# Patient Record
Sex: MALE | Race: WHITE | Employment: OTHER | URBAN - METROPOLITAN AREA
[De-identification: names, ages, dates, MRNs, and addresses within clinical notes are randomized per-mention and may not be internally consistent; named-entity substitution may affect disease eponyms.]

---

## 2017-11-06 ENCOUNTER — IP HISTORICAL/CONVERTED ENCOUNTER (OUTPATIENT)
Dept: OTHER | Age: 57
End: 2017-11-06

## 2017-11-29 ENCOUNTER — ED HISTORICAL/CONVERTED ENCOUNTER (OUTPATIENT)
Dept: OTHER | Age: 57
End: 2017-11-29

## 2020-09-22 ENCOUNTER — HOSPITAL ENCOUNTER (OUTPATIENT)
Age: 60
Setting detail: OBSERVATION
Discharge: HOME | End: 2020-09-25
Attending: EMERGENCY MEDICINE | Admitting: FAMILY MEDICINE
Payer: COMMERCIAL

## 2020-09-22 ENCOUNTER — APPOINTMENT (OUTPATIENT)
Dept: CT IMAGING | Age: 60
End: 2020-09-22
Attending: EMERGENCY MEDICINE
Payer: COMMERCIAL

## 2020-09-22 ENCOUNTER — APPOINTMENT (OUTPATIENT)
Dept: GENERAL RADIOLOGY | Age: 60
End: 2020-09-22
Attending: EMERGENCY MEDICINE
Payer: COMMERCIAL

## 2020-09-22 DIAGNOSIS — N39.0 URINARY TRACT INFECTION WITHOUT HEMATURIA, SITE UNSPECIFIED: Primary | ICD-10-CM

## 2020-09-22 DIAGNOSIS — A41.9 SEPSIS, DUE TO UNSPECIFIED ORGANISM, UNSPECIFIED WHETHER ACUTE ORGAN DYSFUNCTION PRESENT (HCC): ICD-10-CM

## 2020-09-22 LAB
ALBUMIN SERPL-MCNC: 2.3 G/DL (ref 3.5–5)
ALBUMIN/GLOB SERPL: 0.5 {RATIO} (ref 1.1–2.2)
ALP SERPL-CCNC: 85 U/L (ref 45–117)
ALT SERPL-CCNC: 30 U/L (ref 12–78)
ANION GAP SERPL CALC-SCNC: 7 MMOL/L (ref 5–15)
APPEARANCE UR: ABNORMAL
AST SERPL W P-5'-P-CCNC: 73 U/L (ref 15–37)
BACTERIA URNS QL MICRO: ABNORMAL /HPF
BASOPHILS # BLD: 0 K/UL (ref 0–0.1)
BASOPHILS NFR BLD: 0 % (ref 0–1)
BILIRUB SERPL-MCNC: 0.7 MG/DL (ref 0.2–1)
BILIRUB UR QL: NEGATIVE
BUN SERPL-MCNC: 17 MG/DL (ref 6–20)
BUN/CREAT SERPL: 16 (ref 12–20)
CA-I BLD-MCNC: 8.7 MG/DL (ref 8.5–10.1)
CHLORIDE SERPL-SCNC: 101 MMOL/L (ref 97–108)
CO2 SERPL-SCNC: 26 MMOL/L (ref 21–32)
COLOR UR: YELLOW
CREAT SERPL-MCNC: 1.05 MG/DL (ref 0.7–1.3)
DIFFERENTIAL METHOD BLD: ABNORMAL
EOSINOPHIL # BLD: 0 K/UL (ref 0–0.4)
EOSINOPHIL NFR BLD: 0 % (ref 0–7)
EPITH CASTS URNS QL MICRO: ABNORMAL /LPF
ERYTHROCYTE [DISTWIDTH] IN BLOOD BY AUTOMATED COUNT: 12.4 % (ref 11.5–14.5)
GLOBULIN SER CALC-MCNC: 4.3 G/DL (ref 2–4)
GLUCOSE SERPL-MCNC: 97 MG/DL (ref 65–100)
GLUCOSE UR STRIP.AUTO-MCNC: NEGATIVE MG/DL
HCT VFR BLD AUTO: 42.6 % (ref 36.6–50.3)
HGB BLD-MCNC: 14.3 % (ref 12.1–17)
HGB UR QL STRIP: ABNORMAL
IMM GRANULOCYTES # BLD AUTO: 0.1 K/UL (ref 0–0.04)
IMM GRANULOCYTES NFR BLD AUTO: 0 % (ref 0–0.5)
KETONES UR QL STRIP.AUTO: 50 MG/DL
LEUKOCYTE ESTERASE UR QL STRIP.AUTO: ABNORMAL
LYMPHOCYTES # BLD: 1.3 K/UL (ref 0.8–3.5)
LYMPHOCYTES NFR BLD: 7 % (ref 12–49)
MCH RBC QN AUTO: 31 PG (ref 26–34)
MCHC RBC AUTO-ENTMCNC: 33.6 G/DL (ref 30–36.5)
MCV RBC AUTO: 92.4 FL (ref 80–99)
MONOCYTES # BLD: 2.1 K/UL (ref 0–1)
MONOCYTES NFR BLD: 11 % (ref 5–13)
NEUTS SEG # BLD: 15.5 K/UL (ref 1.8–8)
NEUTS SEG NFR BLD: 82 % (ref 32–75)
NITRITE UR QL STRIP.AUTO: POSITIVE
PH UR STRIP: 6 [PH] (ref 5–8)
PLATELET # BLD AUTO: 163 K/UL (ref 150–400)
PMV BLD AUTO: 8.4 FL (ref 8.9–12.9)
POTASSIUM SERPL-SCNC: 3.8 MMOL/L (ref 3.5–5.1)
PROT SERPL-MCNC: 6.6 G/DL (ref 6.4–8.2)
PROT UR STRIP-MCNC: >300 MG/DL
RBC # BLD AUTO: 4.61 M/UL (ref 4.1–5.7)
RBC #/AREA URNS HPF: ABNORMAL /HPF (ref 0–5)
SODIUM SERPL-SCNC: 134 MMOL/L (ref 136–145)
SP GR UR REFRACTOMETRY: 1.02 (ref 1–1.03)
UA: UC IF INDICATED,UAUC: ABNORMAL
UROBILINOGEN UR QL STRIP.AUTO: 0.1 EU/DL (ref 0.2–1)
WBC # BLD AUTO: 19.1 K/UL (ref 4.1–11.1)
WBC URNS QL MICRO: >100 /HPF (ref 0–4)

## 2020-09-22 PROCEDURE — 74011250636 HC RX REV CODE- 250/636: Performed by: EMERGENCY MEDICINE

## 2020-09-22 PROCEDURE — 96372 THER/PROPH/DIAG INJ SC/IM: CPT

## 2020-09-22 PROCEDURE — 81001 URINALYSIS AUTO W/SCOPE: CPT

## 2020-09-22 PROCEDURE — 87086 URINE CULTURE/COLONY COUNT: CPT

## 2020-09-22 PROCEDURE — 36415 COLL VENOUS BLD VENIPUNCTURE: CPT

## 2020-09-22 PROCEDURE — 96376 TX/PRO/DX INJ SAME DRUG ADON: CPT

## 2020-09-22 PROCEDURE — G0378 HOSPITAL OBSERVATION PER HR: HCPCS

## 2020-09-22 PROCEDURE — 71045 X-RAY EXAM CHEST 1 VIEW: CPT

## 2020-09-22 PROCEDURE — 87186 SC STD MICRODIL/AGAR DIL: CPT

## 2020-09-22 PROCEDURE — 74011000258 HC RX REV CODE- 258: Performed by: EMERGENCY MEDICINE

## 2020-09-22 PROCEDURE — 99283 EMERGENCY DEPT VISIT LOW MDM: CPT

## 2020-09-22 PROCEDURE — 96374 THER/PROPH/DIAG INJ IV PUSH: CPT

## 2020-09-22 PROCEDURE — 85025 COMPLETE CBC W/AUTO DIFF WBC: CPT

## 2020-09-22 PROCEDURE — 96366 THER/PROPH/DIAG IV INF ADDON: CPT

## 2020-09-22 PROCEDURE — 96365 THER/PROPH/DIAG IV INF INIT: CPT

## 2020-09-22 PROCEDURE — 74011000258 HC RX REV CODE- 258: Performed by: FAMILY MEDICINE

## 2020-09-22 PROCEDURE — 74011250636 HC RX REV CODE- 250/636: Performed by: FAMILY MEDICINE

## 2020-09-22 PROCEDURE — 74011250637 HC RX REV CODE- 250/637: Performed by: FAMILY MEDICINE

## 2020-09-22 PROCEDURE — 80053 COMPREHEN METABOLIC PANEL: CPT

## 2020-09-22 PROCEDURE — 65270000029 HC RM PRIVATE

## 2020-09-22 PROCEDURE — 87635 SARS-COV-2 COVID-19 AMP PRB: CPT

## 2020-09-22 PROCEDURE — 70450 CT HEAD/BRAIN W/O DYE: CPT

## 2020-09-22 PROCEDURE — 87077 CULTURE AEROBIC IDENTIFY: CPT

## 2020-09-22 PROCEDURE — 65660000000 HC RM CCU STEPDOWN

## 2020-09-22 RX ORDER — TAMSULOSIN HYDROCHLORIDE 0.4 MG/1
0.4 CAPSULE ORAL 2 TIMES DAILY
COMMUNITY

## 2020-09-22 RX ORDER — SODIUM CHLORIDE 9 MG/ML
75 INJECTION, SOLUTION INTRAVENOUS CONTINUOUS
Status: DISCONTINUED | OUTPATIENT
Start: 2020-09-22 | End: 2020-09-25 | Stop reason: HOSPADM

## 2020-09-22 RX ORDER — DIVALPROEX SODIUM 500 MG/1
500 TABLET, DELAYED RELEASE ORAL 2 TIMES DAILY
Status: DISCONTINUED | OUTPATIENT
Start: 2020-09-22 | End: 2020-09-25 | Stop reason: HOSPADM

## 2020-09-22 RX ORDER — LORAZEPAM 0.5 MG/1
0.5 TABLET ORAL 2 TIMES DAILY
Status: DISCONTINUED | OUTPATIENT
Start: 2020-09-22 | End: 2020-09-25 | Stop reason: HOSPADM

## 2020-09-22 RX ORDER — ENOXAPARIN SODIUM 100 MG/ML
40 INJECTION SUBCUTANEOUS DAILY
Status: DISCONTINUED | OUTPATIENT
Start: 2020-09-23 | End: 2020-09-25 | Stop reason: HOSPADM

## 2020-09-22 RX ORDER — PRAVASTATIN SODIUM 20 MG/1
20 TABLET ORAL
Status: DISCONTINUED | OUTPATIENT
Start: 2020-09-22 | End: 2020-09-25 | Stop reason: HOSPADM

## 2020-09-22 RX ORDER — BISMUTH SUBSALICYLATE 262 MG/15ML
10 LIQUID ORAL
COMMUNITY

## 2020-09-22 RX ORDER — OLANZAPINE 10 MG/1
5 TABLET ORAL 3 TIMES DAILY
Status: DISCONTINUED | OUTPATIENT
Start: 2020-09-22 | End: 2020-09-25 | Stop reason: HOSPADM

## 2020-09-22 RX ORDER — ZIPRASIDONE MESYLATE 20 MG/ML
20 INJECTION, POWDER, LYOPHILIZED, FOR SOLUTION INTRAMUSCULAR ONCE
Status: COMPLETED | OUTPATIENT
Start: 2020-09-22 | End: 2020-09-22

## 2020-09-22 RX ORDER — NYSTATIN 100000 U/G
CREAM TOPICAL 2 TIMES DAILY
COMMUNITY

## 2020-09-22 RX ORDER — BENZTROPINE MESYLATE 1 MG/1
1 TABLET ORAL 3 TIMES DAILY
Status: DISCONTINUED | OUTPATIENT
Start: 2020-09-22 | End: 2020-09-25 | Stop reason: HOSPADM

## 2020-09-22 RX ORDER — BENZTROPINE MESYLATE 1 MG/1
1 TABLET ORAL 3 TIMES DAILY
COMMUNITY

## 2020-09-22 RX ORDER — LORAZEPAM 1 MG/1
1 TABLET ORAL
COMMUNITY

## 2020-09-22 RX ORDER — LOPERAMIDE HYDROCHLORIDE 2 MG/1
2 CAPSULE ORAL
COMMUNITY

## 2020-09-22 RX ORDER — DIVALPROEX SODIUM 500 MG/1
500 TABLET, DELAYED RELEASE ORAL 2 TIMES DAILY
COMMUNITY
End: 2022-07-26

## 2020-09-22 RX ORDER — ADHESIVE BANDAGE
10 BANDAGE TOPICAL DAILY PRN
Status: DISCONTINUED | OUTPATIENT
Start: 2020-09-22 | End: 2020-09-25 | Stop reason: HOSPADM

## 2020-09-22 RX ORDER — ONDANSETRON 4 MG/1
4 TABLET, FILM COATED ORAL
COMMUNITY

## 2020-09-22 RX ORDER — ACETAMINOPHEN 325 MG/1
650 TABLET ORAL
COMMUNITY

## 2020-09-22 RX ORDER — TAMSULOSIN HYDROCHLORIDE 0.4 MG/1
0.4 CAPSULE ORAL 2 TIMES DAILY
Status: DISCONTINUED | OUTPATIENT
Start: 2020-09-22 | End: 2020-09-25 | Stop reason: HOSPADM

## 2020-09-22 RX ORDER — LORAZEPAM 0.5 MG/1
0.5 TABLET ORAL 2 TIMES DAILY
COMMUNITY

## 2020-09-22 RX ORDER — LORAZEPAM 1 MG/1
1 TABLET ORAL
Status: DISCONTINUED | OUTPATIENT
Start: 2020-09-22 | End: 2020-09-25 | Stop reason: HOSPADM

## 2020-09-22 RX ORDER — ACETAMINOPHEN 325 MG/1
650 TABLET ORAL
Status: DISCONTINUED | OUTPATIENT
Start: 2020-09-22 | End: 2020-09-25 | Stop reason: HOSPADM

## 2020-09-22 RX ORDER — ONDANSETRON 4 MG/1
4 TABLET, ORALLY DISINTEGRATING ORAL
Status: DISCONTINUED | OUTPATIENT
Start: 2020-09-22 | End: 2020-09-25 | Stop reason: HOSPADM

## 2020-09-22 RX ORDER — OLANZAPINE 5 MG/1
10 TABLET ORAL 3 TIMES DAILY
COMMUNITY

## 2020-09-22 RX ORDER — MAGNESIUM HYDROXIDE 400 MG/5ML
10 SUSPENSION, ORAL (FINAL DOSE FORM) ORAL DAILY PRN
COMMUNITY

## 2020-09-22 RX ORDER — POLYETHYLENE GLYCOL 3350 17 G/17G
17 POWDER, FOR SOLUTION ORAL DAILY PRN
Status: DISCONTINUED | OUTPATIENT
Start: 2020-09-22 | End: 2020-09-25 | Stop reason: HOSPADM

## 2020-09-22 RX ORDER — ONDANSETRON 2 MG/ML
4 INJECTION INTRAMUSCULAR; INTRAVENOUS
Status: DISCONTINUED | OUTPATIENT
Start: 2020-09-22 | End: 2020-09-25 | Stop reason: HOSPADM

## 2020-09-22 RX ORDER — PRAVASTATIN SODIUM 20 MG/1
20 TABLET ORAL
COMMUNITY

## 2020-09-22 RX ORDER — ACETAMINOPHEN 650 MG/1
650 SUPPOSITORY RECTAL
Status: DISCONTINUED | OUTPATIENT
Start: 2020-09-22 | End: 2020-09-25 | Stop reason: HOSPADM

## 2020-09-22 RX ORDER — HYDROCORTISONE 1 %
CREAM (GRAM) TOPICAL 2 TIMES DAILY
COMMUNITY

## 2020-09-22 RX ORDER — GUAIFENESIN 100 MG/5ML
100 SOLUTION ORAL
COMMUNITY

## 2020-09-22 RX ORDER — ONDANSETRON 4 MG/1
4 TABLET, ORALLY DISINTEGRATING ORAL
Status: DISCONTINUED | OUTPATIENT
Start: 2020-09-22 | End: 2020-09-22 | Stop reason: SDUPTHER

## 2020-09-22 RX ADMIN — CEFTRIAXONE SODIUM 1 G: 1 INJECTION, POWDER, FOR SOLUTION INTRAMUSCULAR; INTRAVENOUS at 23:46

## 2020-09-22 RX ADMIN — SODIUM CHLORIDE 75 ML/HR: 9 INJECTION, SOLUTION INTRAVENOUS at 14:00

## 2020-09-22 RX ADMIN — BENZTROPINE MESYLATE 1 MG: 1 TABLET ORAL at 23:45

## 2020-09-22 RX ADMIN — LORAZEPAM 0.5 MG: 0.5 TABLET ORAL at 23:46

## 2020-09-22 RX ADMIN — ZIPRASIDONE MESYLATE 20 MG: 20 INJECTION, POWDER, LYOPHILIZED, FOR SOLUTION INTRAMUSCULAR at 11:22

## 2020-09-22 RX ADMIN — PRAVASTATIN SODIUM 20 MG: 20 TABLET ORAL at 23:45

## 2020-09-22 RX ADMIN — DIVALPROEX SODIUM 500 MG: 500 TABLET, DELAYED RELEASE ORAL at 23:45

## 2020-09-22 RX ADMIN — TAMSULOSIN HYDROCHLORIDE 0.4 MG: 0.4 CAPSULE ORAL at 23:45

## 2020-09-22 RX ADMIN — OLANZAPINE 5 MG: 10 TABLET, FILM COATED ORAL at 23:45

## 2020-09-22 RX ADMIN — CEFTRIAXONE SODIUM 1 G: 1 INJECTION, POWDER, FOR SOLUTION INTRAMUSCULAR; INTRAVENOUS at 12:36

## 2020-09-22 NOTE — ED PROVIDER NOTES
EMERGENCY DEPARTMENT HISTORY AND PHYSICAL EXAM      Date: 9/22/2020  Patient Name: Juliann Lewis    History of Presenting Illness     Chief Complaint   Patient presents with    Fatigue       History Provided By: Patient and Caregiver    HPI: Juliann Lewis, 61 y.o. male with a past medical history significant MR presents to the ED with chief complaint of Fatigue  . He returned from a day support program yesterday and I thought he was more confused than usual.  No falls. Has been needing help with assistance with walking secondary to new weakness. Slight confusion. No syncope. Patient however is a limited historian and cannot provide history. There are no other complaints, changes, or physical findings at this time. PCP: Fausto Bhatia MD    Current Facility-Administered Medications   Medication Dose Route Frequency Provider Last Rate Last Dose    0.9% sodium chloride infusion  75 mL/hr IntraVENous CONTINUOUS Ana Pro MD           Past History     Past Medical History:  Past Medical History:   Diagnosis Date    Intellectual disability        Past Surgical History:  No past surgical history on file. Family History:  No family history on file. Social History:  Social History     Tobacco Use    Smoking status: Never Smoker    Smokeless tobacco: Never Used   Substance Use Topics    Alcohol use: Not on file    Drug use: Not on file       Allergies:  No Known Allergies      Review of Systems   Review of Systems   Unable to perform ROS: Mental status change       Physical Exam   Physical Exam  Vitals signs and nursing note reviewed. Constitutional:       General: He is not in acute distress. Appearance: He is normal weight. He is not ill-appearing. HENT:      Head: Normocephalic and atraumatic. Right Ear: External ear normal.      Left Ear: External ear normal.      Nose: Nose normal. No rhinorrhea.       Mouth/Throat:      Mouth: Mucous membranes are moist.      Pharynx: Oropharynx is clear. Eyes:      Extraocular Movements: Extraocular movements intact. Conjunctiva/sclera: Conjunctivae normal.      Pupils: Pupils are equal, round, and reactive to light. Neck:      Musculoskeletal: Normal range of motion and neck supple. Cardiovascular:      Rate and Rhythm: Normal rate and regular rhythm. Pulses: Normal pulses. Heart sounds: Normal heart sounds. Pulmonary:      Effort: Pulmonary effort is normal. No respiratory distress. Breath sounds: Normal breath sounds. Abdominal:      General: Abdomen is flat. Bowel sounds are normal.      Palpations: Abdomen is soft. Musculoskeletal: Normal range of motion. General: No tenderness or deformity. Skin:     General: Skin is warm and dry. Capillary Refill: Capillary refill takes less than 2 seconds. Findings: No bruising, lesion or rash. Neurological:      General: No focal deficit present. Mental Status: He is alert. He is disoriented. Comments: More disoriented than usual at baseline. Diagnostic Study Results     Labs -     Recent Results (from the past 12 hour(s))   CBC WITH AUTOMATED DIFF    Collection Time: 09/22/20 11:00 AM   Result Value Ref Range    WBC 19.1 (H) 4.1 - 11.1 K/uL    RBC 4.61 4.10 - 5.70 M/uL    HGB 14.3 12.1 - 17.0 %    HCT 42.6 36.6 - 50.3 %    MCV 92.4 80.0 - 99.0 FL    MCH 31.0 26.0 - 34.0 PG    MCHC 33.6 30.0 - 36.5 g/dL    RDW 12.4 11.5 - 14.5 %    PLATELET 999 679 - 342 K/uL    MPV 8.4 (L) 8.9 - 12.9 FL    NEUTROPHILS 82 (H) 32 - 75 %    LYMPHOCYTES 7 (L) 12 - 49 %    MONOCYTES 11 5 - 13 %    EOSINOPHILS 0 0 - 7 %    BASOPHILS 0 0 - 1 %    IMMATURE GRANULOCYTES 0 0.0 - 0.5 %    ABS. NEUTROPHILS 15.5 (H) 1.8 - 8.0 K/UL    ABS. LYMPHOCYTES 1.3 0.8 - 3.5 K/UL    ABS. MONOCYTES 2.1 (H) 0.0 - 1.0 K/UL    ABS. EOSINOPHILS 0.0 0.0 - 0.4 K/UL    ABS. BASOPHILS 0.0 0.0 - 0.1 K/UL    ABS. IMM.  GRANS. 0.1 (H) 0.00 - 0.04 K/UL    DF AUTOMATED     METABOLIC PANEL, COMPREHENSIVE    Collection Time: 09/22/20 11:00 AM   Result Value Ref Range    Sodium 134 (L) 136 - 145 mmol/L    Potassium 3.8 3.5 - 5.1 mmol/L    Chloride 101 97 - 108 mmol/L    CO2 26 21 - 32 mmol/L    Anion gap 7 5 - 15 mmol/L    Glucose 97 65 - 100 mg/dL    BUN 17 6 - 20 mg/dL    Creatinine 1.05 0.70 - 1.30 mg/dL    BUN/Creatinine ratio 16 12 - 20      GFR est AA >60 >60 ml/min/1.73m2    GFR est non-AA >60 >60 ml/min/1.73m2    Calcium 8.7 8.5 - 10.1 mg/dL    Bilirubin, total 0.7 0.2 - 1.0 mg/dL    AST (SGOT) 73 (H) 15 - 37 U/L    ALT (SGPT) 30 12 - 78 U/L    Alk. phosphatase 85 45 - 117 U/L    Protein, total 6.6 6.4 - 8.2 g/dL    Albumin 2.3 (L) 3.5 - 5.0 g/dL    Globulin 4.3 (H) 2.0 - 4.0 g/dL    A-G Ratio 0.5 (L) 1.1 - 2.2     URINALYSIS W/ REFLEX CULTURE    Collection Time: 09/22/20 11:00 AM    Specimen: Urine   Result Value Ref Range    Color Yellow      Appearance Turbid (A) Clear      Specific gravity 1.020 1.003 - 1.030      pH (UA) 6.0 5.0 - 8.0      Protein >300 (A) Negative mg/dL    Glucose Negative Negative mg/dL    Ketone 50 (A) Negative mg/dL    Bilirubin Negative Negative      Blood Large (A) Negative      Urobilinogen 0.1 (L) 0.2 - 1.0 EU/dL    Nitrites Positive (A) Negative      Leukocyte Esterase Large (A) Negative      WBC >100 (H) 0 - 4 /hpf    RBC 0-5 0 - 5 /hpf    Epithelial cells Few Few /lpf    Bacteria 3+ (A) Negative /hpf    UA:UC IF INDICATED Urine Culture Ordered (A) Culture not indicated by UA result         Radiologic Studies -   CT HEAD WO CONT   Final Result   Impression: Negative CT of the brain. XR CHEST SNGL V   Final Result        CT Results  (Last 48 hours)               09/22/20 1159  CT HEAD WO CONT Final result    Impression:  Impression: Negative CT of the brain. Narrative:  Axial images from the skull base to the vertex were obtained without the use of   contrast. Bone windows were reviewed. Sagittal and coronal reformatted images   were also reviewed. All CT scans at this facility are performed using dose   reduction optimization techniques as appropriate to a performed exam including   the following:   automated exposure control, adjustments of the mA and/or kV   according to patient size, or use of iterative reconstruction technique. INDICATION: Altered mental status. There is no evidence of hemorrhage. No mass or mass effect is seen. There is no   acute ischemia. Ventricles, sulci and cisterns are intact. Normal gray matter/white matter   differentiation is seen. Filling defects are seen in both external auditory   canals most likely cerumen. Bone windows show no acute abnormalities. The visualized portions of the orbits,   paranasal sinuses and mastoid air cells are unremarkable. Review of the   reconstructed images show no additional findings. CXR Results  (Last 48 hours)               09/22/20 1114  XR CHEST SNGL V Final result    Narrative: Indication: Altered mental status. AP portable upright chest radiograph 9/22/2020. Comparison 3 October 2011. Underexpanded lungs. Right lung appears clear. Mild left basilar atelectasis. Normal heart size. No pneumothorax. No apparent pleural effusion. Normal bones. Medical Decision Making and ED Course   I am the first provider for this patient. I reviewed the vital signs, available nursing notes, past medical history, past surgical history, family history and social history. Vital Signs-Reviewed the patient's vital signs. Patient Vitals for the past 12 hrs:   Temp Pulse Resp BP SpO2   09/22/20 1033 97.5 °F (36.4 °C) (!) 103 20 (!) 141/75 99 %       EKG interpretation:         Records Reviewed: Previous Hospital chart. EMS run report      ED Course:   Initial assessment performed. The patients presenting problems have been discussed, and they are in agreement with the care plan formulated and outlined with them.   I have encouraged them to ask questions as they arise throughout their visit. Orders Placed This Encounter    CULTURE, URINE     Standing Status:   Standing     Number of Occurrences:   1    CT HEAD WO CONT     Standing Status:   Standing     Number of Occurrences:   1     Order Specific Question:   Transport     Answer:   BED [2]     Order Specific Question:   Reason for Exam     Answer:   ams    XR CHEST SNGL V     Standing Status:   Standing     Number of Occurrences:   1     Order Specific Question:   Transport     Answer:   BED [2]     Order Specific Question:   Reason for Exam     Answer:   ams    CBC WITH AUTOMATED DIFF     Standing Status:   Standing     Number of Occurrences:   1    METABOLIC PANEL, COMPREHENSIVE     Standing Status:   Standing     Number of Occurrences:   1    URINALYSIS W/ REFLEX CULTURE     Standing Status:   Standing     Number of Occurrences:   1    ziprasidone (GEODON) injection 20 mg    cefTRIAXone (ROCEPHIN) 1 g in 0.9% sodium chloride (MBP/ADV) 50 mL MBP     Order Specific Question:   Antibiotic Indications     Answer:   Urinary Tract Infection    0.9% sodium chloride infusion    IP CONSULT TO HOSPITALIST     Standing Status:   Standing     Number of Occurrences:   1     Order Specific Question:   Reason for Consult: Answer:   TO ADMIT     Order Specific Question:   Did you call or speak to the consulting provider? Answer: Yes              CONSULTANTS:  Discussed the case with hospitalist who agrees for admission. Dr Napoleon King      Provider Notes (Medical Decision Making):   69-year-old male with a change in mental status with urosepsis. Patient seen assistance to stand up plan for admission for fluids and antibiotics. Procedures                       Disposition       Emergency Department Disposition:  Admitted Dr Hernandez        Diagnosis     Clinical Impression:   1. Urinary tract infection without hematuria, site unspecified    2.  Sepsis, due to unspecified organism, unspecified whether acute organ dysfunction present [facility])        Attestations:    Suzan Sanchez MD    Please note that this dictation was completed with EPS, the computer voice recognition software. Quite often unanticipated grammatical, syntax, homophones, and other interpretive errors are inadvertently transcribed by the computer software. Please disregard these errors. Please excuse any errors that have escaped final proofreading. Thank you.

## 2020-09-22 NOTE — ED TRIAGE NOTES
Pt here with caretaker for evaluation of fatigue and decreased appetite for a few days. Hx ID, minimal communication.

## 2020-09-22 NOTE — PROGRESS NOTES
Skin assessment done on patient upon arrival.  Pt has 2 quarter sized abrasions on his right shoulder open to air. Pt has an old surgical scar to the left knee, scratches on the top of top of his head and right knee area. No areas of breakdown. Moisture associated redness to the scrotal and penile area.

## 2020-09-22 NOTE — ROUTINE PROCESS
Pt left ER with [facility] via stretcher with out problems.  Report called to Sussy Helton on [address]

## 2020-09-23 ENCOUNTER — HOSPITAL ENCOUNTER (OUTPATIENT)
Dept: LAB | Age: 60
Discharge: HOME | End: 2020-09-23

## 2020-09-23 LAB
ALBUMIN SERPL-MCNC: 2 G/DL (ref 3.5–5)
ALBUMIN/GLOB SERPL: 0.5 {RATIO} (ref 1.1–2.2)
ALP SERPL-CCNC: 91 U/L (ref 45–117)
ALT SERPL-CCNC: 38 U/L (ref 12–78)
ANION GAP SERPL CALC-SCNC: 6 MMOL/L (ref 5–15)
AST SERPL W P-5'-P-CCNC: 74 U/L (ref 15–37)
BASOPHILS # BLD: 0 K/UL (ref 0–0.1)
BASOPHILS NFR BLD: 0 % (ref 0–1)
BILIRUB SERPL-MCNC: 0.2 MG/DL (ref 0.2–1)
BUN SERPL-MCNC: 14 MG/DL (ref 6–20)
BUN/CREAT SERPL: 14 (ref 12–20)
CA-I BLD-MCNC: 8 MG/DL (ref 8.5–10.1)
CHLORIDE SERPL-SCNC: 103 MMOL/L (ref 97–108)
CO2 SERPL-SCNC: 27 MMOL/L (ref 21–32)
CREAT SERPL-MCNC: 0.99 MG/DL (ref 0.7–1.3)
CRP SERPL-MCNC: 15.6 MG/DL (ref 0–0.6)
DIFFERENTIAL METHOD BLD: ABNORMAL
EOSINOPHIL # BLD: 0.1 K/UL (ref 0–0.4)
EOSINOPHIL NFR BLD: 1 % (ref 0–7)
ERYTHROCYTE [DISTWIDTH] IN BLOOD BY AUTOMATED COUNT: 13.3 % (ref 11.5–14.5)
GLOBULIN SER CALC-MCNC: 4.2 G/DL (ref 2–4)
GLUCOSE SERPL-MCNC: 105 MG/DL (ref 65–100)
HCT VFR BLD AUTO: 35.2 % (ref 36.6–50.3)
HGB BLD-MCNC: 12 % (ref 12.1–17)
IMM GRANULOCYTES # BLD AUTO: 0 K/UL (ref 0–0.04)
IMM GRANULOCYTES NFR BLD AUTO: 0 % (ref 0–0.5)
LYMPHOCYTES # BLD: 1 K/UL (ref 0.8–3.5)
LYMPHOCYTES NFR BLD: 8 % (ref 12–49)
MCH RBC QN AUTO: 30.9 PG (ref 26–34)
MCHC RBC AUTO-ENTMCNC: 34.1 G/DL (ref 30–36.5)
MCV RBC AUTO: 90.7 FL (ref 80–99)
MONOCYTES # BLD: 1.2 K/UL (ref 0–1)
MONOCYTES NFR BLD: 9 % (ref 5–13)
NEUTS SEG # BLD: 10.3 K/UL (ref 1.8–8)
NEUTS SEG NFR BLD: 82 % (ref 32–75)
PLATELET # BLD AUTO: 188 K/UL (ref 150–400)
PMV BLD AUTO: 9.5 FL (ref 8.9–12.9)
POTASSIUM SERPL-SCNC: 3.7 MMOL/L (ref 3.5–5.1)
PROCALCITONIN SERPL-MCNC: 0.63 NG/ML
PROT SERPL-MCNC: 6.2 G/DL (ref 6.4–8.2)
RBC # BLD AUTO: 3.88 M/UL (ref 4.1–5.7)
SARS-COV-2, COV2: NORMAL
SODIUM SERPL-SCNC: 136 MMOL/L (ref 136–145)
WBC # BLD AUTO: 12.5 K/UL (ref 4.1–11.1)

## 2020-09-23 PROCEDURE — 80053 COMPREHEN METABOLIC PANEL: CPT

## 2020-09-23 PROCEDURE — 65270000029 HC RM PRIVATE

## 2020-09-23 PROCEDURE — 87040 BLOOD CULTURE FOR BACTERIA: CPT

## 2020-09-23 PROCEDURE — 96372 THER/PROPH/DIAG INJ SC/IM: CPT

## 2020-09-23 PROCEDURE — 87077 CULTURE AEROBIC IDENTIFY: CPT

## 2020-09-23 PROCEDURE — 65660000000 HC RM CCU STEPDOWN

## 2020-09-23 PROCEDURE — 86140 C-REACTIVE PROTEIN: CPT

## 2020-09-23 PROCEDURE — 99222 1ST HOSP IP/OBS MODERATE 55: CPT | Performed by: INTERNAL MEDICINE

## 2020-09-23 PROCEDURE — G0378 HOSPITAL OBSERVATION PER HR: HCPCS

## 2020-09-23 PROCEDURE — 74011250637 HC RX REV CODE- 250/637: Performed by: FAMILY MEDICINE

## 2020-09-23 PROCEDURE — 87186 SC STD MICRODIL/AGAR DIL: CPT

## 2020-09-23 PROCEDURE — 74011250636 HC RX REV CODE- 250/636: Performed by: FAMILY MEDICINE

## 2020-09-23 PROCEDURE — 84145 PROCALCITONIN (PCT): CPT

## 2020-09-23 PROCEDURE — 85025 COMPLETE CBC W/AUTO DIFF WBC: CPT

## 2020-09-23 RX ADMIN — SODIUM CHLORIDE 75 ML/HR: 9 INJECTION, SOLUTION INTRAVENOUS at 00:00

## 2020-09-23 RX ADMIN — DIVALPROEX SODIUM 500 MG: 500 TABLET, DELAYED RELEASE ORAL at 17:46

## 2020-09-23 RX ADMIN — BENZTROPINE MESYLATE 1 MG: 1 TABLET ORAL at 17:45

## 2020-09-23 RX ADMIN — TAMSULOSIN HYDROCHLORIDE 0.4 MG: 0.4 CAPSULE ORAL at 09:00

## 2020-09-23 RX ADMIN — TAMSULOSIN HYDROCHLORIDE 0.4 MG: 0.4 CAPSULE ORAL at 17:46

## 2020-09-23 RX ADMIN — BENZTROPINE MESYLATE 1 MG: 1 TABLET ORAL at 11:32

## 2020-09-23 RX ADMIN — OLANZAPINE 5 MG: 10 TABLET, FILM COATED ORAL at 17:46

## 2020-09-23 RX ADMIN — OLANZAPINE 5 MG: 10 TABLET, FILM COATED ORAL at 11:32

## 2020-09-23 RX ADMIN — DIVALPROEX SODIUM 500 MG: 500 TABLET, DELAYED RELEASE ORAL at 11:31

## 2020-09-23 RX ADMIN — ENOXAPARIN SODIUM 40 MG: 40 INJECTION SUBCUTANEOUS at 11:31

## 2020-09-23 RX ADMIN — LORAZEPAM 0.5 MG: 0.5 TABLET ORAL at 17:45

## 2020-09-23 RX ADMIN — LORAZEPAM 0.5 MG: 0.5 TABLET ORAL at 09:00

## 2020-09-23 RX ADMIN — LORAZEPAM 1 MG: 1 TABLET ORAL at 11:31

## 2020-09-23 RX ADMIN — LORAZEPAM 1 MG: 1 TABLET ORAL at 05:49

## 2020-09-23 NOTE — PROGRESS NOTES
Bed alarm in place. Sitter at bedside. When awake patient is irritated, agitated, not easily redirected r/t cognitive impairment.

## 2020-09-23 NOTE — CONSULTS
Consult Date: 9/23/2020    Consults: Sepsis    Subjective      This is a 61year old, chronically debilitated, intellectually challenged, brought to ED because of generalized weakness. He was afebrile but with WBC 19,100. Urinalysis had marked pyuria and bacteria. CXR showed clear right lungs with left basilar atelectasis. CT Head was negative. Urine culture was sent and patient started on Ceftriaxone. ID has been consulted for this reason. Patient is unable to provide any historical information. He is currently in [facility]. Past Medical History:   Diagnosis Date    Intellectual disability     Seizures ([address]       Past Surgical History:   Procedure Laterality Date    HX PROSTATE SURGERY      bph    HX UROLOGICAL      retention     History reviewed. No pertinent family history.    Social History     Tobacco Use    Smoking status: Never Smoker    Smokeless tobacco: Never Used   Substance Use Topics    Alcohol use: Never     Frequency: Never       Current Facility-Administered Medications   Medication Dose Route Frequency Provider Last Rate Last Dose    0.9% sodium chloride infusion  75 mL/hr IntraVENous CONTINUOUS Ellyn Hernnadez MD 75 mL/hr at 09/22/20 1400 75 mL/hr at 09/22/20 1400    benztropine (COGENTIN) tablet 1 mg  1 mg Oral TID Ellyn Hernandez MD   1 mg at 09/23/20 1132    divalproex DR (DEPAKOTE) tablet 500 mg  500 mg Oral BID Ellyn Hernandez MD   500 mg at 09/23/20 1131    LORazepam (ATIVAN) tablet 0.5 mg  0.5 mg Oral BID Ellyn Hernandez MD   0.5 mg at 09/23/20 0900    LORazepam (ATIVAN) tablet 1 mg  1 mg Oral Q6H PRN Ellyn Hernandez MD   1 mg at 09/23/20 1131    magnesium hydroxide (MILK OF MAGNESIA) 400 mg/5 mL oral suspension 10 mL  10 mL Oral DAILY PRN Fartun Hernandez MD        OLANZapine (ZyPREXA) tablet 5 mg  5 mg Oral TID Ellyn Hernandez MD   5 mg at 09/23/20 1132    ondansetron (ZOFRAN ODT) tablet 4 mg  4 mg Oral Q8H PRN Humble Hodges MD       [address] [place] [address] pravastatin (PRAVACHOL) tablet 20 mg  20 mg Oral QHS Ellyn Hernandez MD   20 mg at 09/22/20 2345    tamsulosin (FLOMAX) capsule 0.4 mg  0.4 mg Oral BID Samir Hernandez MD   0.4 mg at 09/23/20 0900    0.9% sodium chloride infusion  75 mL/hr IntraVENous CONTINUOUS Ellyn Hernandez MD 75 mL/hr at 09/23/20 0000 75 mL/hr at 09/23/20 0000    acetaminophen (TYLENOL) tablet 650 mg  650 mg Oral Q6H PRN Samir Hernandez MD        Or   Randy Naamaria teresa acetaminophen (TYLENOL) suppository 650 mg  650 mg Rectal Q6H PRN Samir Hernandez MD        polyethylene glycol (MIRALAX) packet 17 g  17 g Oral DAILY PRN Samir Hernandez MD        ondansetron [facility]) injection 4 mg  4 mg IntraVENous Q6H PRN Samir Hernandez MD        enoxaparin (LOVENOX) injection 40 mg  40 mg SubCUTAneous DAILY Ellyn Hernandez MD   40 mg at 09/23/20 1131    cefTRIAXone (ROCEPHIN) 1 g in 0.9% sodium chloride (MBP/ADV) 50 mL MBP  1 g IntraVENous Q24H Ellyn Hernandez  mL/hr at 09/22/20 2346 1 g at 09/22/20 2346        Review of Systems   Unable to perform ROS: Mental status change       Objective     Vital signs for last 24 hours:  Visit Vitals  BP (!) 150/80   Pulse 88   Temp 98.8 °F (37.1 °C)   Resp 20   Ht 5' 6\" (1.676 m)   Wt 175 lb (79.4 kg)   SpO2 98%   BMI 28.25 kg/m²       Intake/Output this shift:  Current Shift: No intake/output data recorded. Last 3 Shifts: No intake/output data recorded. Data Review:   Recent Results (from the past 24 hour(s))   SARS-COV-2    Collection Time: 09/22/20  7:30 PM   Result Value Ref Range    SARS-CoV-2 Please find results under separate order         Physical Exam  Vitals signs and nursing note reviewed. Constitutional:       General: He is not in acute distress. Appearance: He is not ill-appearing or toxic-appearing. Comments: Chronically  Ill appearing   HENT:      Head: Normocephalic and atraumatic. Nose: Nose normal.   Eyes:      Pupils: Pupils are equal, round, and reactive to light. Neck:      Musculoskeletal: Neck supple. Cardiovascular:      Rate and Rhythm: Regular rhythm. Tachycardia present. Heart sounds: No murmur. Pulmonary:      Breath sounds: Normal breath sounds. No rhonchi or rales. Abdominal:      Palpations: Abdomen is soft. Tenderness: There is no abdominal tenderness. Genitourinary:     Comments: No Flores  Musculoskeletal:      Right lower leg: No edema. Left lower leg: No edema. Skin:     Findings: No rash. Neurological:      Mental Status: He is disoriented. Psychiatric:      Comments: Unable to assess due to intellectual deficit       ASSESSMENT:    1. UTI with marked pyuria, bacteriuria, positive nitrite, urine culture pending  2. Possible sepsis/septicemia with marked leukocytosis. 3. Intellectually challenged with altered mental status. 4. Chronic debility    1. Discontinue Ceftriaxone   2. Start Zosyn for broader Gram negative coverage and Enterococcal coverage pending urine culture   3. Blood cultures, CRP, procalcitonin. Jose Martin Marie MD

## 2020-09-23 NOTE — H&P
History and Physical    NAME: Jeancarlos Rodriguez   :  1960   MRN:  [id number]     Date/Time:  2020 1:18 PM    Patient PCP: Cat Perez MD  ______________________________________________________________________             Subjective:     CHIEF COMPLAINT:     Generalized weakness    HISTORY OF PRESENT ILLNESS:       Patient is a 61y.o. year old male history of intellectual disability seizure disorder history of prostate came to the ER because of generalized weakness seen by the ER physician work-up shows UTI with sepsis patient was recommend to be admitted for IV antibiotic as patient is mentally challenged  Past Medical History:   Diagnosis Date    Intellectual disability     Seizures ([address]         Past Surgical History:   Procedure Laterality Date    HX PROSTATE SURGERY      bph    HX UROLOGICAL      retention       Social History     Tobacco Use    Smoking status: Never Smoker    Smokeless tobacco: Never Used   Substance Use Topics    Alcohol use: Never     Frequency: Never        History reviewed. No pertinent family history. No Known Allergies     Prior to Admission medications    Medication Sig Start Date End Date Taking? Authorizing Provider   LORazepam (ATIVAN) 0.5 mg tablet Take 0.5 mg by mouth two (2) times a day. Yes Eloise, MD Connie   OLANZapine (ZyPREXA) 5 mg tablet Take 5 mg by mouth three (3) times daily. Yes Other, MD Connie   benztropine (COGENTIN) 1 mg tablet Take 1 mg by mouth three (3) times daily. Yes Other, MD Connie   divalproex DR (DEPAKOTE) 500 mg tablet Take 500 mg by mouth two (2) times a day. Yes Other, MD Connie   nystatin (MYCOSTATIN) topical cream Apply  to affected area two (2) times a day. To axilla and both arms   Yes Eloise, MD Connie   acetaminophen (TYLENOL) 325 mg tablet Take 650 mg by mouth every six (6) hours as needed for Pain.    Yes Other, MD Connie   magnesium hydroxide ([employer] Milk of Magnesia) 400 mg/5 mL suspension Take 10 mL by mouth daily as needed for Constipation. Yes Eloise, MD Connie   LORazepam (ATIVAN) 1 mg tablet Take 1 mg by mouth every six (6) hours as needed for Anxiety. Yes Eloise, MD Connie   pravastatin (PravachoL) 20 mg tablet Take 20 mg by mouth nightly. Yes Connie Navas MD   tamsulosin (FLOMAX) 0.4 mg capsule Take 0.4 mg by mouth two (2) times a day. Yes Connie Navas MD   hydrocortisone (CORTAID) 1 % topical cream Apply  to affected area two (2) times a day. use thin layer   Connie Ambriz MD   guaiFENesin (ROBITUSSIN) 100 mg/5 mL liquid Take 100 mg by mouth four (4) times daily as needed for Cough. Connie Ambriz MD   bismuth subsalicylate (BismatroL) 262 mg/15 mL suspension Take 10 mL by mouth every six (6) hours as needed for Indigestion. Yes Connie Navas MD   loperamide (IMODIUM) 2 mg capsule Take 2 mg by mouth four (4) times daily as needed for Diarrhea. Yes Eloise, MD Connie   ondansetron hcl (Zofran) 4 mg tablet Take 4 mg by mouth every eight (8) hours as needed for Nausea or Vomiting.    Yes Eloise, MD Connie         Current Facility-Administered Medications:     0.9% sodium chloride infusion, 75 mL/hr, IntraVENous, CONTINUOUS, Ellyn Hernandez MD, Last Rate: 75 mL/hr at 09/22/20 1400, 75 mL/hr at 09/22/20 1400    benztropine (COGENTIN) tablet 1 mg, 1 mg, Oral, TID, Ellyn Hernandez MD, 1 mg at 09/23/20 1132    divalproex DR (DEPAKOTE) tablet 500 mg, 500 mg, Oral, BID, Ellyn Hernandez MD, 500 mg at 09/23/20 1131    LORazepam (ATIVAN) tablet 0.5 mg, 0.5 mg, Oral, BID, Ellyn Hernandez MD, 0.5 mg at 09/23/20 0900    LORazepam (ATIVAN) tablet 1 mg, 1 mg, Oral, Q6H PRN, Ellyn Hernandez MD, 1 mg at 09/23/20 1131    magnesium hydroxide (MILK OF MAGNESIA) 400 mg/5 mL oral suspension 10 mL, 10 mL, Oral, DAILY PRN, Ellyn Hernandez MD    OLANZapine (ZyPREXA) tablet 5 mg, 5 mg, Oral, TID, Ellyn Hernandez MD, 5 mg at 09/23/20 1132    ondansetron (ZOFRAN ODT) tablet 4 mg, 4 mg, Oral, Q8H PRN, Georgi Hernandez [facility], MD    pravastatin (PRAVACHOL) tablet 20 mg, 20 mg, Oral, QHS, Ellyn Hernandez MD, 20 mg at 09/22/20 2345    tamsulosin (FLOMAX) capsule 0.4 mg, 0.4 mg, Oral, BID, Ellyn Hernandez MD, 0.4 mg at 09/23/20 0900    0.9% sodium chloride infusion, 75 mL/hr, IntraVENous, CONTINUOUS, Ellyn Hernandez MD, Last Rate: 75 mL/hr at 09/23/20 0000, 75 mL/hr at 09/23/20 0000    acetaminophen (TYLENOL) tablet 650 mg, 650 mg, Oral, Q6H PRN **OR** acetaminophen (TYLENOL) suppository 650 mg, 650 mg, Rectal, Q6H PRN, Miracle Hernandez MD    polyethylene glycol (MIRALAX) packet 17 g, 17 g, Oral, DAILY PRN, Miracle Hernandez MD    [DISCONTINUED] ondansetron (ZOFRAN ODT) tablet 4 mg, 4 mg, Oral, Q8H PRN **OR** ondansetron (ZOFRAN) injection 4 mg, 4 mg, IntraVENous, Q6H PRN, Ellyn Hernandez MD    enoxaparin (LOVENOX) injection 40 mg, 40 mg, SubCUTAneous, DAILY, Ellyn Hernandez MD, 40 mg at 09/23/20 1131    cefTRIAXone (ROCEPHIN) 1 g in 0.9% sodium chloride (MBP/ADV) 50 mL MBP, 1 g, IntraVENous, Q24H, Ellyn Hernandez MD, Last Rate: 100 mL/hr at 09/22/20 2346, 1 g at 09/22/20 2346    LAB DATA REVIEWED:    Recent Results (from the past 24 hour(s))   SARS-COV-2    Collection Time: 09/22/20  7:30 PM   Result Value Ref Range    SARS-CoV-2 Please find results under separate order         XR Results (most recent):  Results from Hospital Encounter encounter on 09/22/20   XR CHEST SNGL V    Narrative Indication: Altered mental status. AP portable upright chest radiograph 9/22/2020. Comparison 3 October 2011. Underexpanded lungs. Right lung appears clear. Mild left basilar atelectasis. Normal heart size. No pneumothorax. No apparent pleural effusion. Normal bones. CT HEAD WO CONT   Final Result   Impression: Negative CT of the brain. XR CHEST SNGL V   Final Result           Review of Systems:  Constitutional: Negative for chills and fever. HENT: Negative. Eyes: Negative. Respiratory: Negative. Cardiovascular: Negative. Gastrointestinal: Negative for abdominal pain and nausea. Skin: Negative. Neurological: Negative. Objective:   VITALS:    Visit Vitals  BP (!) 150/80   Pulse 88   Temp 98.8 °F (37.1 °C)   Resp 20   Ht 5' 6\" (1.676 m)   Wt 79.4 kg (175 lb)   SpO2 98%   BMI 28.25 kg/m²       Physical Exam:   Constitutional: pt is oriented to person, place, and time. HENT:   Head: Normocephalic and atraumatic. Eyes: Pupils are equal, round, and reactive to light. EOM are normal.   Cardiovascular: Normal rate, regular rhythm and normal heart sounds. Pulmonary/Chest: Breath sounds normal. No wheezes. No rales. Exhibits no tenderness. Abdominal: Soft. Bowel sounds are normal. There is no abdominal tenderness. There is no rebound and no guarding. Musculoskeletal: Normal range of motion. Neurological: pt is alert and oriented to person, place, and time. Alert. Normal strength. No cranial nerve deficit or sensory deficit. Displays a negative Romberg sign.         ASSESSMENT & PLAN:    Sepsis with UTI  Intellectual disability  Psychosis  Hyperlipidemia    Patient started on following medication blood culture urine cultures and ID consult start on IV Rocephin      Current Facility-Administered Medications:     0.9% sodium chloride infusion, 75 mL/hr, IntraVENous, CONTINUOUS, Ellyn Hernandez MD, Last Rate: 75 mL/hr at 09/22/20 1400, 75 mL/hr at 09/22/20 1400    benztropine (COGENTIN) tablet 1 mg, 1 mg, Oral, TID, Ellyn Hernandez MD, 1 mg at 09/23/20 1132    divalproex DR (DEPAKOTE) tablet 500 mg, 500 mg, Oral, BID, Ellyn Hernandez MD, 500 mg at 09/23/20 1131    LORazepam (ATIVAN) tablet 0.5 mg, 0.5 mg, Oral, BID, Ellyn Hernandez MD, 0.5 mg at 09/23/20 0900    LORazepam (ATIVAN) tablet 1 mg, 1 mg, Oral, Q6H PRN, Ellyn Hernandez MD, 1 mg at 09/23/20 1131    magnesium hydroxide (MILK OF MAGNESIA) 400 mg/5 mL oral suspension 10 mL, 10 mL, Oral, DAILY PRN, Miracle Hernandez MD Brady Rudder OLANZapine (ZyPREXA) tablet 5 mg, 5 mg, Oral, TID, Ellyn Hernandez MD, 5 mg at 09/23/20 1132    ondansetron (ZOFRAN ODT) tablet 4 mg, 4 mg, Oral, Q8H PRN, Samir Hernandez MD    pravastatin (PRAVACHOL) tablet 20 mg, 20 mg, Oral, QHS, Ellyn Hernandez MD, 20 mg at 09/22/20 2345    tamsulosin (FLOMAX) capsule 0.4 mg, 0.4 mg, Oral, BID, Ellyn Hernandez MD, 0.4 mg at 09/23/20 0900    0.9% sodium chloride infusion, 75 mL/hr, IntraVENous, CONTINUOUS, Ellyn Hernandez MD, Last Rate: 75 mL/hr at 09/23/20 0000, 75 mL/hr at 09/23/20 0000    acetaminophen (TYLENOL) tablet 650 mg, 650 mg, Oral, Q6H PRN **OR** acetaminophen (TYLENOL) suppository 650 mg, 650 mg, Rectal, Q6H PRN, Samir Hernandez MD    polyethylene glycol (MIRALAX) packet 17 g, 17 g, Oral, DAILY PRN, Samir Hernandez MD    [DISCONTINUED] ondansetron (ZOFRAN ODT) tablet 4 mg, 4 mg, Oral, Q8H PRN **OR** ondansetron (ZOFRAN) injection 4 mg, 4 mg, IntraVENous, Q6H PRN, Ellyn Hernandez MD    enoxaparin (LOVENOX) injection 40 mg, 40 mg, SubCUTAneous, DAILY, Ellyn Hernandez MD, 40 mg at 09/23/20 1131    cefTRIAXone (ROCEPHIN) 1 g in 0.9% sodium chloride (MBP/ADV) 50 mL MBP, 1 g, IntraVENous, Q24H, Ellyn Hernandez MD, Last Rate: 100 mL/hr at 09/22/20 2346, 1 g at 09/22/20 2346    ________________________________________________________________________    Signed: Emilie Reyes MD

## 2020-09-24 LAB
BASOPHILS # BLD: 0 K/UL (ref 0–0.1)
BASOPHILS NFR BLD: 0 % (ref 0–1)
DIFFERENTIAL METHOD BLD: ABNORMAL
EOSINOPHIL # BLD: 0.1 K/UL (ref 0–0.4)
EOSINOPHIL NFR BLD: 1 % (ref 0–7)
ERYTHROCYTE [DISTWIDTH] IN BLOOD BY AUTOMATED COUNT: 13.2 % (ref 11.5–14.5)
HCT VFR BLD AUTO: 34 % (ref 36.6–50.3)
HGB BLD-MCNC: 11.5 % (ref 12.1–17)
IMM GRANULOCYTES # BLD AUTO: 0 K/UL (ref 0–0.04)
IMM GRANULOCYTES NFR BLD AUTO: 0 % (ref 0–0.5)
LYMPHOCYTES # BLD: 1.6 K/UL (ref 0.8–3.5)
LYMPHOCYTES NFR BLD: 18 % (ref 12–49)
MCH RBC QN AUTO: 30.6 PG (ref 26–34)
MCHC RBC AUTO-ENTMCNC: 33.8 G/DL (ref 30–36.5)
MCV RBC AUTO: 90.4 FL (ref 80–99)
MONOCYTES # BLD: 0.5 K/UL (ref 0–1)
MONOCYTES NFR BLD: 5 % (ref 5–13)
NEUTS SEG # BLD: 6.8 K/UL (ref 1.8–8)
NEUTS SEG NFR BLD: 76 % (ref 32–75)
PLATELET # BLD AUTO: 182 K/UL (ref 150–400)
PMV BLD AUTO: 9.3 FL (ref 8.9–12.9)
RBC # BLD AUTO: 3.76 M/UL (ref 4.1–5.7)
SARS-COV-2, COV2NT: NOT DETECTED
WBC # BLD AUTO: 9 K/UL (ref 4.1–11.1)

## 2020-09-24 PROCEDURE — 74011000258 HC RX REV CODE- 258: Performed by: INTERNAL MEDICINE

## 2020-09-24 PROCEDURE — 96372 THER/PROPH/DIAG INJ SC/IM: CPT

## 2020-09-24 PROCEDURE — 85025 COMPLETE CBC W/AUTO DIFF WBC: CPT

## 2020-09-24 PROCEDURE — 74011250636 HC RX REV CODE- 250/636: Performed by: FAMILY MEDICINE

## 2020-09-24 PROCEDURE — 65660000000 HC RM CCU STEPDOWN

## 2020-09-24 PROCEDURE — 74011250636 HC RX REV CODE- 250/636: Performed by: INTERNAL MEDICINE

## 2020-09-24 PROCEDURE — 36415 COLL VENOUS BLD VENIPUNCTURE: CPT

## 2020-09-24 PROCEDURE — G0378 HOSPITAL OBSERVATION PER HR: HCPCS

## 2020-09-24 PROCEDURE — 99232 SBSQ HOSP IP/OBS MODERATE 35: CPT | Performed by: INTERNAL MEDICINE

## 2020-09-24 PROCEDURE — 65270000029 HC RM PRIVATE

## 2020-09-24 PROCEDURE — 74011250637 HC RX REV CODE- 250/637: Performed by: FAMILY MEDICINE

## 2020-09-24 PROCEDURE — 96375 TX/PRO/DX INJ NEW DRUG ADDON: CPT

## 2020-09-24 RX ADMIN — BENZTROPINE MESYLATE 1 MG: 1 TABLET ORAL at 11:52

## 2020-09-24 RX ADMIN — OLANZAPINE 5 MG: 10 TABLET, FILM COATED ORAL at 17:48

## 2020-09-24 RX ADMIN — ENOXAPARIN SODIUM 40 MG: 40 INJECTION SUBCUTANEOUS at 11:51

## 2020-09-24 RX ADMIN — PRAVASTATIN SODIUM 20 MG: 20 TABLET ORAL at 23:27

## 2020-09-24 RX ADMIN — ACETAMINOPHEN 650 MG: 325 TABLET, FILM COATED ORAL at 11:52

## 2020-09-24 RX ADMIN — BENZTROPINE MESYLATE 1 MG: 1 TABLET ORAL at 01:18

## 2020-09-24 RX ADMIN — BENZTROPINE MESYLATE 1 MG: 1 TABLET ORAL at 23:26

## 2020-09-24 RX ADMIN — OLANZAPINE 5 MG: 10 TABLET, FILM COATED ORAL at 01:18

## 2020-09-24 RX ADMIN — PRAVASTATIN SODIUM 20 MG: 20 TABLET ORAL at 01:18

## 2020-09-24 RX ADMIN — DIVALPROEX SODIUM 500 MG: 500 TABLET, DELAYED RELEASE ORAL at 17:48

## 2020-09-24 RX ADMIN — LORAZEPAM 0.5 MG: 0.5 TABLET ORAL at 17:48

## 2020-09-24 RX ADMIN — DIVALPROEX SODIUM 500 MG: 500 TABLET, DELAYED RELEASE ORAL at 11:53

## 2020-09-24 RX ADMIN — TAMSULOSIN HYDROCHLORIDE 0.4 MG: 0.4 CAPSULE ORAL at 11:52

## 2020-09-24 RX ADMIN — OLANZAPINE 5 MG: 10 TABLET, FILM COATED ORAL at 11:53

## 2020-09-24 RX ADMIN — TAMSULOSIN HYDROCHLORIDE 0.4 MG: 0.4 CAPSULE ORAL at 17:48

## 2020-09-24 RX ADMIN — LORAZEPAM 1 MG: 1 TABLET ORAL at 01:18

## 2020-09-24 RX ADMIN — PIPERACILLIN SODIUM AND TAZOBACTAM SODIUM 3.38 G: 3; .375 INJECTION, POWDER, LYOPHILIZED, FOR SOLUTION INTRAVENOUS at 11:50

## 2020-09-24 RX ADMIN — BENZTROPINE MESYLATE 1 MG: 1 TABLET ORAL at 16:00

## 2020-09-24 RX ADMIN — LORAZEPAM 0.5 MG: 0.5 TABLET ORAL at 11:52

## 2020-09-24 RX ADMIN — OLANZAPINE 5 MG: 10 TABLET, FILM COATED ORAL at 23:26

## 2020-09-24 NOTE — PROGRESS NOTES
General Daily Progress Note          Patient Name:   Myesha Ashley       YOB: 1960       Age:  61 y.o. Admit Date: 9/22/2020      Subjective:     Patient in [address] bed not in distress             Objective:     Visit Vitals  BP (!) 169/94   Pulse 73   Temp 98.1 °F (36.7 °C)   Resp 22   Ht 5' 6\" (1.676 m)   Wt 79.4 kg (175 lb)   SpO2 98%   BMI 28.25 kg/m²        Recent Results (from the past 24 hour(s))   METABOLIC PANEL, COMPREHENSIVE    Collection Time: 09/23/20  5:08 PM   Result Value Ref Range    Sodium 136 136 - 145 mmol/L    Potassium 3.7 3.5 - 5.1 mmol/L    Chloride 103 97 - 108 mmol/L    CO2 27 21 - 32 mmol/L    Anion gap 6 5 - 15 mmol/L    Glucose 105 (H) 65 - 100 mg/dL    BUN 14 6 - 20 mg/dL    Creatinine 0.99 0.70 - 1.30 mg/dL    BUN/Creatinine ratio 14 12 - 20      GFR est AA >60 >60 ml/min/1.73m2    GFR est non-AA >60 >60 ml/min/1.73m2    Calcium 8.0 (L) 8.5 - 10.1 mg/dL    Bilirubin, total 0.2 0.2 - 1.0 mg/dL    AST (SGOT) 74 (H) 15 - 37 U/L    ALT (SGPT) 38 12 - 78 U/L    Alk. phosphatase 91 45 - 117 U/L    Protein, total 6.2 (L) 6.4 - 8.2 g/dL    Albumin 2.0 (L) 3.5 - 5.0 g/dL    Globulin 4.2 (H) 2.0 - 4.0 g/dL    A-G Ratio 0.5 (L) 1.1 - 2.2     CBC WITH AUTOMATED DIFF    Collection Time: 09/23/20  5:08 PM   Result Value Ref Range    WBC 12.5 (H) 4.1 - 11.1 K/uL    RBC 3.88 (L) 4.10 - 5.70 M/uL    HGB 12.0 (L) 12.1 - 17.0 %    HCT 35.2 (L) 36.6 - 50.3 %    MCV 90.7 80.0 - 99.0 FL    MCH 30.9 26.0 - 34.0 PG    MCHC 34.1 30.0 - 36.5 g/dL    RDW 13.3 11.5 - 14.5 %    PLATELET 132 724 - 165 K/uL    MPV 9.5 8.9 - 12.9 FL    NEUTROPHILS 82 (H) 32 - 75 %    LYMPHOCYTES 8 (L) 12 - 49 %    MONOCYTES 9 5 - 13 %    EOSINOPHILS 1 0 - 7 %    BASOPHILS 0 0 - 1 %    IMMATURE GRANULOCYTES 0 0.0 - 0.5 %    ABS. NEUTROPHILS 10.3 (H) 1.8 - 8.0 K/UL    ABS. LYMPHOCYTES 1.0 0.8 - 3.5 K/UL    ABS. MONOCYTES 1.2 (H) 0.0 - 1.0 K/UL    ABS. EOSINOPHILS 0.1 0.0 - 0.4 K/UL    ABS.  BASOPHILS 0.0 0.0 - 0.1 K/UL ABS. IMM. GRANS. 0.0 0.00 - 0.04 K/UL    DF AUTOMATED     C REACTIVE PROTEIN, QT    Collection Time: 09/23/20  5:08 PM   Result Value Ref Range    C-Reactive protein 15.60 (H) 0.00 - 0.60 mg/dL   PROCALCITONIN    Collection Time: 09/23/20  5:08 PM   Result Value Ref Range    Procalcitonin 0.63 (H) 0 ng/mL   CULTURE, BLOOD    Collection Time: 09/23/20  8:45 PM    Specimen: Blood   Result Value Ref Range    Special Requests: No Special Requests      Culture result: No growth after 4 hours     CULTURE, BLOOD    Collection Time: 09/23/20  8:55 PM    Specimen: Blood   Result Value Ref Range    Special Requests: No Special Requests      Culture result: No growth after 4 hours       Recent Results (from the past 24 hour(s))   METABOLIC PANEL, COMPREHENSIVE    Collection Time: 09/23/20  5:08 PM   Result Value Ref Range    Sodium 136 136 - 145 mmol/L    Potassium 3.7 3.5 - 5.1 mmol/L    Chloride 103 97 - 108 mmol/L    CO2 27 21 - 32 mmol/L    Anion gap 6 5 - 15 mmol/L    Glucose 105 (H) 65 - 100 mg/dL    BUN 14 6 - 20 mg/dL    Creatinine 0.99 0.70 - 1.30 mg/dL    BUN/Creatinine ratio 14 12 - 20      GFR est AA >60 >60 ml/min/1.73m2    GFR est non-AA >60 >60 ml/min/1.73m2    Calcium 8.0 (L) 8.5 - 10.1 mg/dL    Bilirubin, total 0.2 0.2 - 1.0 mg/dL    AST (SGOT) 74 (H) 15 - 37 U/L    ALT (SGPT) 38 12 - 78 U/L    Alk.  phosphatase 91 45 - 117 U/L    Protein, total 6.2 (L) 6.4 - 8.2 g/dL    Albumin 2.0 (L) 3.5 - 5.0 g/dL    Globulin 4.2 (H) 2.0 - 4.0 g/dL    A-G Ratio 0.5 (L) 1.1 - 2.2     CBC WITH AUTOMATED DIFF    Collection Time: 09/23/20  5:08 PM   Result Value Ref Range    WBC 12.5 (H) 4.1 - 11.1 K/uL    RBC 3.88 (L) 4.10 - 5.70 M/uL    HGB 12.0 (L) 12.1 - 17.0 %    HCT 35.2 (L) 36.6 - 50.3 %    MCV 90.7 80.0 - 99.0 FL    MCH 30.9 26.0 - 34.0 PG    MCHC 34.1 30.0 - 36.5 g/dL    RDW 13.3 11.5 - 14.5 %    PLATELET 635 215 - 898 K/uL    MPV 9.5 8.9 - 12.9 FL    NEUTROPHILS 82 (H) 32 - 75 %    LYMPHOCYTES 8 (L) 12 - 49 % MONOCYTES 9 5 - 13 %    EOSINOPHILS 1 0 - 7 %    BASOPHILS 0 0 - 1 %    IMMATURE GRANULOCYTES 0 0.0 - 0.5 %    ABS. NEUTROPHILS 10.3 (H) 1.8 - 8.0 K/UL    ABS. LYMPHOCYTES 1.0 0.8 - 3.5 K/UL    ABS. MONOCYTES 1.2 (H) 0.0 - 1.0 K/UL    ABS. EOSINOPHILS 0.1 0.0 - 0.4 K/UL    ABS. BASOPHILS 0.0 0.0 - 0.1 K/UL    ABS. IMM. GRANS. 0.0 0.00 - 0.04 K/UL    DF AUTOMATED     C REACTIVE PROTEIN, QT    Collection Time: 09/23/20  5:08 PM   Result Value Ref Range    C-Reactive protein 15.60 (H) 0.00 - 0.60 mg/dL   PROCALCITONIN    Collection Time: 09/23/20  5:08 PM   Result Value Ref Range    Procalcitonin 0.63 (H) 0 ng/mL   CULTURE, BLOOD    Collection Time: 09/23/20  8:45 PM    Specimen: Blood   Result Value Ref Range    Special Requests: No Special Requests      Culture result: No growth after 4 hours     CULTURE, BLOOD    Collection Time: 09/23/20  8:55 PM    Specimen: Blood   Result Value Ref Range    Special Requests: No Special Requests      Culture result: No growth after 4 hours          Review of Systems    Unable to obtain      Physical Exam:      Constitutional awake  HENT:   Head: Normocephalic and atraumatic. Eyes: Pupils are equal, round, and reactive to light. EOM are normal.   Cardiovascular: Normal rate, regular rhythm and normal heart sounds. Pulmonary/Chest: Breath sounds normal. No wheezes. No rales. Exhibits no tenderness. Abdominal: Soft. Bowel sounds are normal. There is no abdominal tenderness. There is no rebound and no guarding. Musculoskeletal: Normal range of motion. Neurological: pt is alert and oriented to person, place, and time. CT HEAD WO CONT   Final Result   Impression: Negative CT of the brain.       XR CHEST SNGL V   Final Result           Recent Results (from the past 24 hour(s))   METABOLIC PANEL, COMPREHENSIVE    Collection Time: 09/23/20  5:08 PM   Result Value Ref Range    Sodium 136 136 - 145 mmol/L    Potassium 3.7 3.5 - 5.1 mmol/L    Chloride 103 97 - 108 mmol/L    CO2 27 21 - 32 mmol/L    Anion gap 6 5 - 15 mmol/L    Glucose 105 (H) 65 - 100 mg/dL    BUN 14 6 - 20 mg/dL    Creatinine 0.99 0.70 - 1.30 mg/dL    BUN/Creatinine ratio 14 12 - 20      GFR est AA >60 >60 ml/min/1.73m2    GFR est non-AA >60 >60 ml/min/1.73m2    Calcium 8.0 (L) 8.5 - 10.1 mg/dL    Bilirubin, total 0.2 0.2 - 1.0 mg/dL    AST (SGOT) 74 (H) 15 - 37 U/L    ALT (SGPT) 38 12 - 78 U/L    Alk. phosphatase 91 45 - 117 U/L    Protein, total 6.2 (L) 6.4 - 8.2 g/dL    Albumin 2.0 (L) 3.5 - 5.0 g/dL    Globulin 4.2 (H) 2.0 - 4.0 g/dL    A-G Ratio 0.5 (L) 1.1 - 2.2     CBC WITH AUTOMATED DIFF    Collection Time: 09/23/20  5:08 PM   Result Value Ref Range    WBC 12.5 (H) 4.1 - 11.1 K/uL    RBC 3.88 (L) 4.10 - 5.70 M/uL    HGB 12.0 (L) 12.1 - 17.0 %    HCT 35.2 (L) 36.6 - 50.3 %    MCV 90.7 80.0 - 99.0 FL    MCH 30.9 26.0 - 34.0 PG    MCHC 34.1 30.0 - 36.5 g/dL    RDW 13.3 11.5 - 14.5 %    PLATELET 254 749 - 012 K/uL    MPV 9.5 8.9 - 12.9 FL    NEUTROPHILS 82 (H) 32 - 75 %    LYMPHOCYTES 8 (L) 12 - 49 %    MONOCYTES 9 5 - 13 %    EOSINOPHILS 1 0 - 7 %    BASOPHILS 0 0 - 1 %    IMMATURE GRANULOCYTES 0 0.0 - 0.5 %    ABS. NEUTROPHILS 10.3 (H) 1.8 - 8.0 K/UL    ABS. LYMPHOCYTES 1.0 0.8 - 3.5 K/UL    ABS. MONOCYTES 1.2 (H) 0.0 - 1.0 K/UL    ABS. EOSINOPHILS 0.1 0.0 - 0.4 K/UL    ABS. BASOPHILS 0.0 0.0 - 0.1 K/UL    ABS. IMM.  GRANS. 0.0 0.00 - 0.04 K/UL    DF AUTOMATED     C REACTIVE PROTEIN, QT    Collection Time: 09/23/20  5:08 PM   Result Value Ref Range    C-Reactive protein 15.60 (H) 0.00 - 0.60 mg/dL   PROCALCITONIN    Collection Time: 09/23/20  5:08 PM   Result Value Ref Range    Procalcitonin 0.63 (H) 0 ng/mL   CULTURE, BLOOD    Collection Time: 09/23/20  8:45 PM    Specimen: Blood   Result Value Ref Range    Special Requests: No Special Requests      Culture result: No growth after 4 hours     CULTURE, BLOOD    Collection Time: 09/23/20  8:55 PM    Specimen: Blood   Result Value Ref Range    Special Requests: No Special Requests      Culture result: No growth after 4 hours         Results     Procedure Component Value Units Date/Time    CULTURE, BLOOD [[id number]] Collected:  09/23/20 2055    Order Status:  Completed Specimen:  Blood Updated:  09/24/20 1220     Special Requests: No Special Requests        Culture result: No growth after 4 hours       CULTURE, BLOOD [[id number]] Collected:  09/23/20 2045    Order Status:  Completed Specimen:  Blood Updated:  09/24/20 1220     Special Requests: No Special Requests        Culture result: No growth after 4 hours       CULTURE, BLOOD, LINE [[id number]] Collected:  09/22/20 1915    Order Status:  Canceled Specimen:  Blood     CULTURE, URINE [[id number]] Collected:  09/22/20 1915    Order Status:  Completed Specimen:  Urine Updated:  09/24/20 1101    CULTURE, URINE [[id number]]  (Abnormal) Collected:  09/22/20 1100    Order Status:  Completed Specimen:  Urine Updated:  09/24/20 0914     Special Requests: --        No Special Requests  Reflexed from        Bosler Count --        >100,000  colonies/ml       Culture result: Gram Negative Rods                Assessment:       Sepsis with UTI  Intellectual disability  Psychosis  Hyperlipidemia    Plan:     Start on IV Zosyn by infectious disease  Follow the cultures results  Continue home medications        Current Facility-Administered Medications:     piperacillin-tazobactam (ZOSYN) 3.375 g in 0.9% sodium chloride (MBP/ADV) 100 mL MBP, 3.375 g, IntraVENous, Q8H, Angel Luis Gaytan MD, Last Rate: 25 mL/hr at 09/24/20 1150, 3.375 g at 09/24/20 1150    0.9% sodium chloride infusion, 75 mL/hr, IntraVENous, CONTINUOUS, Ellyn Hernandez MD, Last Rate: 75 mL/hr at 09/22/20 1400, 75 mL/hr at 09/22/20 1400    benztropine (COGENTIN) tablet 1 mg, 1 mg, Oral, TID, Ellyn Hernandez MD, 1 mg at 09/24/20 1152    divalproex DR (DEPAKOTE) tablet 500 mg, 500 mg, Oral, BID, Ellyn Hernandez MD, 500 mg at 09/24/20 1153    LORazepam (ATIVAN) tablet 0.5 mg, 0.5 mg, Oral, BID, Ellyn Hernandez MD, 0.5 mg at 09/24/20 1152    LORazepam (ATIVAN) tablet 1 mg, 1 mg, Oral, Q6H PRN, Ellyn Hernandez MD, 1 mg at 09/24/20 0118    magnesium hydroxide (MILK OF MAGNESIA) 400 mg/5 mL oral suspension 10 mL, 10 mL, Oral, DAILY PRN, Mohiuddin, Gretel Lundborg, MD    OLANZapine (ZyPREXA) tablet 5 mg, 5 mg, Oral, TID, Mohiuddin, Gretel Lundborg, MD, 5 mg at 09/24/20 1153    ondansetron (ZOFRAN ODT) tablet 4 mg, 4 mg, Oral, Q8H PRN, Mohiuddin, Gretel Lundborg, MD    pravastatin (PRAVACHOL) tablet 20 mg, 20 mg, Oral, QHS, Ellyn Hernandez MD, 20 mg at 09/24/20 0118    tamsulosin (FLOMAX) capsule 0.4 mg, 0.4 mg, Oral, BID, Ellyn Hernandez MD, 0.4 mg at 09/24/20 1152    0.9% sodium chloride infusion, 75 mL/hr, IntraVENous, CONTINUOUS, Ellyn Hernandez MD, Last Rate: 75 mL/hr at 09/23/20 0000, 75 mL/hr at 09/23/20 0000    acetaminophen (TYLENOL) tablet 650 mg, 650 mg, Oral, Q6H PRN, 650 mg at 09/24/20 1152 **OR** acetaminophen (TYLENOL) suppository 650 mg, 650 mg, Rectal, Q6H PRN, Mohiuddin, Gretel Lundborg, MD    polyethylene glycol (MIRALAX) packet 17 g, 17 g, Oral, DAILY PRN, Mohiuddin, Gretel Lundborg, MD    [DISCONTINUED] ondansetron (ZOFRAN ODT) tablet 4 mg, 4 mg, Oral, Q8H PRN **OR** ondansetron (ZOFRAN) injection 4 mg, 4 mg, IntraVENous, Q6H PRN, Ellyn Hernandez MD    enoxaparin (LOVENOX) injection 40 mg, 40 mg, SubCUTAneous, DAILY, Ellyn Hernandez MD, 40 mg at 09/24/20 1153

## 2020-09-24 NOTE — PROGRESS NOTES
Progress Note    Patient: Artie Lilly MRN: [id number]  SSN: [id number]    YOB: 1960  Age: 61 y.o. Sex: male      Admit Date: 9/22/2020    LOS: 2 days     Subjective:   Patient followed for sepsis with suspected UTI with urine culture growing Gram negative rods. Blood cultures pending. He is afebrile with decreasing WBC. Procalcitonin and CRP also elevated. He is currently on IV Zosyn. He is resting comfortably in [facility] bed, verbally unresponsive. Objective:     Vitals:    09/23/20 1254 09/23/20 1747 09/23/20 2158 09/23/20 2218   BP: (!) 150/80 (!) 174/90 (!) 169/94    Pulse: 88 99 73    Resp: 20 20 22    Temp: 98.8 °F (37.1 °C) 98.6 °F (37 °C) 98.1 °F (36.7 °C)    SpO2:    98%   Weight:       Height:            Intake and Output:  Current Shift: 09/24 0701 - 09/24 1900  In: 300 [P.O.:300]  Out: -   Last three shifts: No intake/output data recorded. Physical Exam:   Vitals signs and nursing note reviewed. Constitutional:  Chronically  Ill appearing   HENT: eyes open. Neck:      Musculoskeletal: Neck supple. Cardiovascular: RRR, No murmur. Pulmonary:  Normal breath sounds   Abdominal: soft, no abdominal tenderness. Genitourinary No Flores  Musculoskeletal: no edema, mitts on both hands  Skin: No rash. Neurological: unable to assess  Psychiatric:  Unable to assess due to intellectual deficit      Lab/Data Review:   WBC 12,500  Procalcitonin 0.63  CRP 15.60     SARS CoV-2 Not detected    Urine culture (9/22) >100,000 cfu/ml Gram negative rods  Blood cultures (9/23) Pending  Assessment:        1. UTI with marked pyuria, bacteriuria, positive nitrite, secondary to Gram negative rods, Day #2 IV Zosyn  2. Sepsis with leukocytosis, elevated procalcitonin and CRP. 3. Intellectually challenged with altered mental status. 4. Chronic debility  5. Covid-19 negative    Plan:   1. Continue Zosyn pending susceptibility results. 2. Follow-up blood cultures, urine culture  3.  In am, repeat CBC, CRP, procalcitonin, done.        Signed By: Sonali Linn MD     September 24, 2020

## 2020-09-25 VITALS
HEART RATE: 75 BPM | TEMPERATURE: 98.2 F | RESPIRATION RATE: 18 BRPM | SYSTOLIC BLOOD PRESSURE: 152 MMHG | BODY MASS INDEX: 28.12 KG/M2 | WEIGHT: 175 LBS | OXYGEN SATURATION: 97 % | HEIGHT: 66 IN | DIASTOLIC BLOOD PRESSURE: 76 MMHG

## 2020-09-25 LAB
BASOPHILS # BLD: 0 K/UL (ref 0–0.1)
BASOPHILS NFR BLD: 0 % (ref 0–1)
COLONY COUNT,CNT: ABNORMAL
CRP SERPL-MCNC: 5.46 MG/DL (ref 0–0.6)
CULTURE,CULT: ABNORMAL
CULTURE,CULT: NORMAL
DIFFERENTIAL METHOD BLD: ABNORMAL
EOSINOPHIL # BLD: 0.1 K/UL (ref 0–0.4)
EOSINOPHIL NFR BLD: 1 % (ref 0–7)
ERYTHROCYTE [DISTWIDTH] IN BLOOD BY AUTOMATED COUNT: 13 % (ref 11.5–14.5)
HCT VFR BLD AUTO: 37.4 % (ref 36.6–50.3)
HGB BLD-MCNC: 12.6 % (ref 12.1–17)
IMM GRANULOCYTES # BLD AUTO: 0.1 K/UL (ref 0–0.04)
IMM GRANULOCYTES NFR BLD AUTO: 1 % (ref 0–0.5)
LYMPHOCYTES # BLD: 0.9 K/UL (ref 0.8–3.5)
LYMPHOCYTES NFR BLD: 10 % (ref 12–49)
MCH RBC QN AUTO: 30.7 PG (ref 26–34)
MCHC RBC AUTO-ENTMCNC: 33.7 G/DL (ref 30–36.5)
MCV RBC AUTO: 91 FL (ref 80–99)
MONOCYTES # BLD: 1.3 K/UL (ref 0–1)
MONOCYTES NFR BLD: 14 % (ref 5–13)
NEUTS SEG # BLD: 6.9 K/UL (ref 1.8–8)
NEUTS SEG NFR BLD: 74 % (ref 32–75)
PLATELET # BLD AUTO: 195 K/UL (ref 150–400)
PMV BLD AUTO: 9.8 FL (ref 8.9–12.9)
PROCALCITONIN SERPL-MCNC: 0.57 NG/ML
RBC # BLD AUTO: 4.11 M/UL (ref 4.1–5.7)
SPECIAL REQUESTS,SREQ: ABNORMAL
SPECIAL REQUESTS,SREQ: NORMAL
WBC # BLD AUTO: 9.2 K/UL (ref 4.1–11.1)

## 2020-09-25 PROCEDURE — 74011250637 HC RX REV CODE- 250/637: Performed by: FAMILY MEDICINE

## 2020-09-25 PROCEDURE — 85025 COMPLETE CBC W/AUTO DIFF WBC: CPT

## 2020-09-25 PROCEDURE — 84145 PROCALCITONIN (PCT): CPT

## 2020-09-25 PROCEDURE — G0378 HOSPITAL OBSERVATION PER HR: HCPCS

## 2020-09-25 PROCEDURE — 74011250636 HC RX REV CODE- 250/636: Performed by: FAMILY MEDICINE

## 2020-09-25 PROCEDURE — 86140 C-REACTIVE PROTEIN: CPT

## 2020-09-25 PROCEDURE — 74011000258 HC RX REV CODE- 258: Performed by: INTERNAL MEDICINE

## 2020-09-25 PROCEDURE — 74011250636 HC RX REV CODE- 250/636: Performed by: INTERNAL MEDICINE

## 2020-09-25 PROCEDURE — 96376 TX/PRO/DX INJ SAME DRUG ADON: CPT

## 2020-09-25 PROCEDURE — 36415 COLL VENOUS BLD VENIPUNCTURE: CPT

## 2020-09-25 PROCEDURE — 96372 THER/PROPH/DIAG INJ SC/IM: CPT

## 2020-09-25 PROCEDURE — 99232 SBSQ HOSP IP/OBS MODERATE 35: CPT | Performed by: INTERNAL MEDICINE

## 2020-09-25 RX ORDER — CIPROFLOXACIN 500 MG/1
500 TABLET ORAL 2 TIMES DAILY
Qty: 20 TAB | Refills: 0 | Status: SHIPPED | OUTPATIENT
Start: 2020-09-25 | End: 2020-09-25 | Stop reason: DRUGHIGH

## 2020-09-25 RX ADMIN — PIPERACILLIN SODIUM AND TAZOBACTAM SODIUM 3.38 G: 3; .375 INJECTION, POWDER, LYOPHILIZED, FOR SOLUTION INTRAVENOUS at 06:23

## 2020-09-25 RX ADMIN — TAMSULOSIN HYDROCHLORIDE 0.4 MG: 0.4 CAPSULE ORAL at 10:18

## 2020-09-25 RX ADMIN — LORAZEPAM 0.5 MG: 0.5 TABLET ORAL at 17:05

## 2020-09-25 RX ADMIN — BENZTROPINE MESYLATE 1 MG: 1 TABLET ORAL at 16:06

## 2020-09-25 RX ADMIN — PIPERACILLIN SODIUM AND TAZOBACTAM SODIUM 3.38 G: 3; .375 INJECTION, POWDER, LYOPHILIZED, FOR SOLUTION INTRAVENOUS at 10:19

## 2020-09-25 RX ADMIN — DIVALPROEX SODIUM 500 MG: 500 TABLET, DELAYED RELEASE ORAL at 10:18

## 2020-09-25 RX ADMIN — TAMSULOSIN HYDROCHLORIDE 0.4 MG: 0.4 CAPSULE ORAL at 17:05

## 2020-09-25 RX ADMIN — ENOXAPARIN SODIUM 40 MG: 40 INJECTION SUBCUTANEOUS at 10:18

## 2020-09-25 RX ADMIN — OLANZAPINE 5 MG: 10 TABLET, FILM COATED ORAL at 10:18

## 2020-09-25 RX ADMIN — LORAZEPAM 0.5 MG: 0.5 TABLET ORAL at 10:18

## 2020-09-25 RX ADMIN — BENZTROPINE MESYLATE 1 MG: 1 TABLET ORAL at 10:18

## 2020-09-25 RX ADMIN — OLANZAPINE 5 MG: 10 TABLET, FILM COATED ORAL at 16:06

## 2020-09-25 RX ADMIN — DIVALPROEX SODIUM 500 MG: 500 TABLET, DELAYED RELEASE ORAL at 17:05

## 2020-09-25 NOTE — PROGRESS NOTES
Progress Note    Patient: Viktor Montelongo MRN: [id number]  SSN: [id number]    YOB: 1960  Age: 61 y.o. Sex: male      Admit Date: 9/22/2020    LOS: 3 days     Subjective:   Patient followed for sepsis with suspected UTI with urine culture growing Gram negative rods. Blood cultures pending. He is afebrile with decreasing WBC. Procalcitonin and CRP also elevated. He is currently on IV Zosyn. He is resting comfortably in Posey bed, verbally unresponsive. It appears that he may be discharged today. Objective:     Vitals:    09/24/20 2002 09/24/20 2051 09/25/20 0408 09/25/20 0811   BP:  (!) 169/90 (!) 155/82 (!) 152/76   Pulse:  88 86 75   Resp:  18 16 18   Temp:  97.7 °F (36.5 °C) 97.8 °F (36.6 °C) 98.2 °F (36.8 °C)   SpO2: 98%   98%   Weight:       Height:            Intake and Output:  Current Shift: No intake/output data recorded. Last three shifts: 09/23 1901 - 09/25 0700  In: 300 [P.O.:300]  Out: -     Physical Exam:   Vitals signs and nursing note reviewed. Constitutional:  Chronically  Ill appearing   HENT: eyes open. Neck:      Musculoskeletal: Neck supple. Cardiovascular: RRR, No murmur. Pulmonary:  Normal breath sounds   Abdominal: soft, no abdominal tenderness. Genitourinary No Flores  Musculoskeletal: no edema, mitts on both hands  Skin: No rash. Neurological: unable to assess  Psychiatric:  Unable to assess due to intellectual deficit      Lab/Data Review:   WBC 9,200  Procalcitonin 0.57 (0.63)  CRP 5.46 (15.60)     SARS CoV-2 Not detected    Urine culture (9/22) >100,000 cfu/ml E. Coli PAN-sensitive  Blood cultures (9/23) No growth at 1 day  Blood cultures (9/23) No growth at 1 day  Assessment:        1. UTI with marked pyuria, bacteriuria, positive nitrite, secondary to E. Coli, Day #3 IV Zosyn  2. Sepsis with leukocytosis, elevated procalcitonin and CRP. 3. Intellectually challenged with altered mental status. 4. Chronic debility  5.  Covid-19 negative    Comment: WBC normal with decreasing procalcitonin and CRP. Plan:   1. Discontinue Zosyn   2. Agree with Ciprofloxacin or Levaquin orally   3. Will follow-up pending blood cultures  4. In am, repeat CRP, procalcitonin, done.   5. Cleared for discharge from ID standpoint    Signed By: Sonali Linn MD     September 25, 2020

## 2020-09-25 NOTE — PROGRESS NOTES
Cm was informed the pt is from [facility] and [address] [place] [address]. Cm attempted to contact pt's LYNDSEY Jaffe Shuck [phone]. CM left a voice message. CM tried calling pt's home number listed on the facesheet [phone]. Cm was not able to get in touch with anyone.

## 2020-09-25 NOTE — PROGRESS NOTES
Bedside and Verbal shift change report given to Donella Mcardle (oncoming nurse) by Dinorah Maguire (offgoing nurse). Report included the following information SBAR and MAR.

## 2020-09-25 NOTE — PROGRESS NOTES
Problem: Falls - Risk of  Goal: *Absence of Falls  Description: Document Zafar Conway Fall Risk and appropriate interventions in the flowsheet. Outcome: Progressing Towards Goal  Note: Fall Risk Interventions:  Mobility Interventions: Assess mobility with egress test, Bed/chair exit alarm, Communicate number of staff needed for ambulation/transfer, Patient to call before getting OOB, OT consult for ADLs, PT Consult for mobility concerns, PT Consult for assist device competence, Strengthening exercises (ROM-active/passive), Utilize walker, cane, or other assistive device, Utilize gait belt for transfers/ambulation    Mentation Interventions: Adequate sleep, hydration, pain control, Bed/chair exit alarm, Door open when patient unattended, Evaluate medications/consider consulting pharmacy, Eyeglasses and hearing aids, Familiar objects from home, Family/sitter at bedside, Gait belt with transfers/ambulation, HELP ([address]) if available, Increase mobility, More frequent rounding, Reorient patient, Room close to nurse's station, Self-releasing belt, Toileting rounds, Update white board    Medication Interventions: Assess postural VS orthostatic hypotension, Bed/chair exit alarm, Evaluate medications/consider consulting pharmacy, Patient to call before getting OOB, Teach patient to arise slowly, Utilize gait belt for transfers/ambulation    Elimination Interventions: Bed/chair exit alarm, Call light in reach, Elevated toilet seat, Patient to call for help with toileting needs, Stay With Me (per policy), Toilet paper/wipes in reach, Toileting schedule/hourly rounds, Urinal in reach              Problem: Patient Education: Go to Patient Education Activity  Goal: Patient/Family Education  Outcome: Progressing Towards Goal     Problem: Pressure Injury - Risk of  Goal: *Prevention of pressure injury  Description: Document Otoniel Scale and appropriate interventions in the flowsheet.   Outcome: Progressing Towards Goal  Note: Pressure Injury Interventions:  Sensory Interventions: Assess changes in LOC, Assess need for specialty bed, Chair cushion, Avoid rigorous massage over bony prominences, Discuss PT/OT consult with provider, Check visual cues for pain, Float heels, Keep linens dry and wrinkle-free, Maintain/enhance activity level, Minimize linen layers, Monitor skin under medical devices, Pad between skin to skin, Pressure redistribution bed/mattress (bed type), Sit a 90-degree angle/use footstool if needed, Suspension boots, Use 30-degree side-lying position, Turn and reposition approx. every two hours (pillows and wedges if needed)    Moisture Interventions: Absorbent underpads, Apply protective barrier, creams and emollients, Check for incontinence Q2 hours and as needed, Assess need for specialty bed, Contain wound drainage, Internal/External fecal devices, Internal/External urinary devices, Maintain skin hydration (lotion/cream), Limit adult briefs, Minimize layers, Moisture barrier, Offer toileting Q_hr    Activity Interventions: Chair cushion, Assess need for specialty bed, Trapeze to reposition, Pressure redistribution bed/mattress(bed type), PT/OT evaluation    Mobility Interventions: Assess need for specialty bed, Float heels, Chair cushion, Pressure redistribution bed/mattress (bed type), HOB 30 degrees or less, PT/OT evaluation, Suspension boots, Trapeze to reposition, Turn and reposition approx.  every two hours(pillow and wedges)    Nutrition Interventions: Document food/fluid/supplement intake, Discuss nutritional consult with provider, Offer support with meals,snacks and hydration                     Problem: Patient Education: Go to Patient Education Activity  Goal: Patient/Family Education  Outcome: Progressing Towards Goal     Problem: Risk for Spread of Infection  Goal: Prevent transmission of infectious organism to others  Description: Prevent the transmission of infectious organisms to other patients, staff members, and visitors.   Outcome: Progressing Towards Goal     Problem: Patient Education:  Go to Education Activity  Goal: Patient/Family Education  Outcome: Progressing Towards Goal

## 2020-09-25 NOTE — PROGRESS NOTES
Pt is COVID negative. Cm called Kamala Pierre [phone]. Cm informed her pt is ready for discharge. Mrs. Deirdre Mishra had some questions. Cm explained to her she will need to speak with the nurse. Pt's primary nurse spoke with Mrs. Gilmore Born. Cm spoke with Mrs. Gilmore Born again. Mrs. Deirdre Mishra indicated the hospital will need to arrange transportation due to staffing issues at the group home. CM verified pt's home address: [address], [address] [place] [address]. Cm asked Mrs. Deirdre Mishra if pt had a [facility] contact. Mrs. Deirdre Mishra indicated [facility] contact was already provided to the hospital.     CM looked through pt's hard chart. CM found a [facility]K contact for Korene Epley and Eliot Drape [phone]. CM called pt's NOK. Mrs. Tori Park answered the phone and indicated she was the sister-in-law. Mrs. Tori Park passed the phone to her . Cm spoke with Mr. Tori Park. Cm informed him his brother is discharging today. Mr. Tori Park voiced understanding.

## 2020-09-25 NOTE — DISCHARGE SUMMARY
Discharge Summary       PATIENT ID: Wiliam Smith  MRN: [id number]   YOB: 1960    DATE OF ADMISSION: 9/22/2020 10:26 AM    DATE OF DISCHARGE:   PRIMARY CARE PROVIDER: Kathleen St MD     ATTENDING PHYSICIAN: Sincere Hernandez  DISCHARGING PROVIDER: Sincere Hernandez      CONSULTATIONS: IP CONSULT TO HOSPITALIST  IP CONSULT TO PSYCHIATRY  IP CONSULT TO INFECTIOUS DISEASES    PROCEDURES/SURGERIES: * No surgery found *    ADMITTING DIAGNOSES:    Patient Active Problem List    Diagnosis Date Noted    UTI (urinary tract infection) 09/22/2020    Sepsis ([address] 09/22/2020       DISCHARGE DIAGNOSES / PLAN:         Sepsis with UTI  Intellectual disability  Psychosis  Hyperlipidemia     ADDITIONAL CARE RECOMMENDATIONS:         DISCHARGE MEDICATIONS:  Current Discharge Medication List      START taking these medications    Details   ciprofloxacin HCl (Cipro) 500 mg tablet Take 1 Tab by mouth two (2) times a day. Qty: 20 Tab, Refills: 0         CONTINUE these medications which have NOT CHANGED    Details   !! LORazepam (ATIVAN) 0.5 mg tablet Take 0.5 mg by mouth two (2) times a day. OLANZapine (ZyPREXA) 5 mg tablet Take 5 mg by mouth three (3) times daily. benztropine (COGENTIN) 1 mg tablet Take 1 mg by mouth three (3) times daily. divalproex DR (DEPAKOTE) 500 mg tablet Take 500 mg by mouth two (2) times a day. nystatin (MYCOSTATIN) topical cream Apply  to affected area two (2) times a day. To axilla and both arms      acetaminophen (TYLENOL) 325 mg tablet Take 650 mg by mouth every six (6) hours as needed for Pain.      magnesium hydroxide (Blackwell Milk of Magnesia) 400 mg/5 mL suspension Take 10 mL by mouth daily as needed for Constipation.      !! LORazepam (ATIVAN) 1 mg tablet Take 1 mg by mouth every six (6) hours as needed for Anxiety. pravastatin (PravachoL) 20 mg tablet Take 20 mg by mouth nightly.       tamsulosin (FLOMAX) 0.4 mg capsule Take 0.4 mg by mouth two (2) times a day.      hydrocortisone (CORTAID) 1 % topical cream Apply  to affected area two (2) times a day. use thin layer      guaiFENesin (ROBITUSSIN) 100 mg/5 mL liquid Take 100 mg by mouth four (4) times daily as needed for Cough. bismuth subsalicylate (BismatroL) 262 mg/15 mL suspension Take 10 mL by mouth every six (6) hours as needed for Indigestion. loperamide (IMODIUM) 2 mg capsule Take 2 mg by mouth four (4) times daily as needed for Diarrhea. ondansetron hcl (Zofran) 4 mg tablet Take 4 mg by mouth every eight (8) hours as needed for Nausea or Vomiting. !! - Potential duplicate medications found. Please discuss with provider. NOTIFY YOUR PHYSICIAN FOR ANY OF THE FOLLOWING:   Fever over 101 degrees for 24 hours. Chest pain, shortness of breath, fever, chills, nausea, vomiting, diarrhea, change in mentation, falling, weakness, bleeding. Severe pain or pain not relieved by medications. Or, any other signs or symptoms that you may have questions about. DISPOSITION:  x  Home With:   OT  PT  HH  RN       Long term SNF/Inpatient Rehab    Independent/assisted living    Hospice    Other:       PATIENT CONDITION AT DISCHARGE: Stable      PHYSICAL EXAMINATION AT DISCHARGE:  General:          Alert, cooperative, no distress, appears stated age. HEENT:           Atraumatic, anicteric sclerae, pink conjunctivae                          No oral ulcers, mucosa moist, throat clear, dentition fair  Neck:               Supple, symmetrical  Lungs:             Clear to auscultation bilaterally. No Wheezing or Rhonchi. No rales. Chest wall:      No tenderness  No Accessory muscle use. Heart:              Regular  rhythm,  No  murmur   No edema  Abdomen:        Soft, non-tender. Not distended. Bowel sounds normal  Extremities:     No cyanosis. No clubbing,                            Skin turgor normal, Capillary refill normal  Skin:                Not pale.   Not Jaundiced  No rashes   Psych: Not anxious or agitated. Neurologic:      Alert, moves all extremities, answers questions appropriately and responds to commands     CT HEAD WO CONT   Final Result   Impression: Negative CT of the brain. XR CHEST SNGL V   Final Result           Recent Results (from the past 24 hour(s))   CBC WITH AUTOMATED DIFF    Collection Time: 09/24/20  1:50 PM   Result Value Ref Range    WBC 9.0 4.1 - 11.1 K/uL    RBC 3.76 (L) 4.10 - 5.70 M/uL    HGB 11.5 (L) 12.1 - 17.0 %    HCT 34.0 (L) 36.6 - 50.3 %    MCV 90.4 80.0 - 99.0 FL    MCH 30.6 26.0 - 34.0 PG    MCHC 33.8 30.0 - 36.5 g/dL    RDW 13.2 11.5 - 14.5 %    PLATELET 648 992 - 421 K/uL    MPV 9.3 8.9 - 12.9 FL    NEUTROPHILS 76 (H) 32 - 75 %    LYMPHOCYTES 18 12 - 49 %    MONOCYTES 5 5 - 13 %    EOSINOPHILS 1 0 - 7 %    BASOPHILS 0 0 - 1 %    IMMATURE GRANULOCYTES 0 0.0 - 0.5 %    ABS. NEUTROPHILS 6.8 1.8 - 8.0 K/UL    ABS. LYMPHOCYTES 1.6 0.8 - 3.5 K/UL    ABS. MONOCYTES 0.5 0.0 - 1.0 K/UL    ABS. EOSINOPHILS 0.1 0.0 - 0.4 K/UL    ABS. BASOPHILS 0.0 0.0 - 0.1 K/UL    ABS. IMM. GRANS. 0.0 0.00 - 0.04 K/UL    DF AUTOMATED            HOSPITAL COURSE:  History of present illness please refer to history and physical at the time of admission and the patient was admitted because of sepsis with UTI culture came back positive for the E. coli patient initially treated with Zosyn switched to Cipro patient is alert awake not in distress no fever no chills no cough no congestion code screening was negative discharge back home on p.o.  Cipro    Signed:   Horace Spatz, MD  9/25/2020  11:07 AM

## 2020-09-25 NOTE — PROGRESS NOTES
Problem: Falls - Risk of  Goal: *Absence of Falls  Description: Document Jadon Iglesias Fall Risk and appropriate interventions in the flowsheet.   9/25/2020 1138 by Cindy Lazo LPN  Outcome: Resolved/Met  9/25/2020 0838 by Cindy Lazo LPN  Outcome: Progressing Towards Goal  Note: Fall Risk Interventions:  Mobility Interventions: Assess mobility with egress test, Bed/chair exit alarm, Communicate number of staff needed for ambulation/transfer, Patient to call before getting OOB, OT consult for ADLs, PT Consult for mobility concerns, PT Consult for assist device competence, Strengthening exercises (ROM-active/passive), Utilize walker, cane, or other assistive device, Utilize gait belt for transfers/ambulation    Mentation Interventions: Adequate sleep, hydration, pain control, Bed/chair exit alarm, Door open when patient unattended, Evaluate medications/consider consulting pharmacy, Eyeglasses and hearing aids, Familiar objects from home, Family/sitter at bedside, Gait belt with transfers/ambulation, HELP ([address]) if available, Increase mobility, More frequent rounding, Reorient patient, Room close to nurse's station, Self-releasing belt, Toileting rounds, Update white board    Medication Interventions: Assess postural VS orthostatic hypotension, Bed/chair exit alarm, Evaluate medications/consider consulting pharmacy, Patient to call before getting OOB, Teach patient to arise slowly, Utilize gait belt for transfers/ambulation    Elimination Interventions: Bed/chair exit alarm, Call light in reach, Elevated toilet seat, Patient to call for help with toileting needs, Stay With Me (per policy), Toilet paper/wipes in reach, Toileting schedule/hourly rounds, Urinal in reach              Problem: Patient Education: Go to Patient Education Activity  Goal: Patient/Family Education  9/25/2020 1138 by Cindy Lazo LPN  Outcome: Resolved/Met  9/25/2020 0838 by Cindy Lazo LPN  Outcome: Progressing Towards Goal     Problem: Pressure Injury - Risk of  Goal: *Prevention of pressure injury  Description: Document Otoniel Scale and appropriate interventions in the flowsheet. 9/25/2020 1138 by Sharlene Cain LPN  Outcome: Resolved/Met  9/25/2020 0838 by Sharlene Cain LPN  Outcome: Progressing Towards Goal  Note: Pressure Injury Interventions:  Sensory Interventions: Assess changes in LOC, Assess need for specialty bed, Chair cushion, Avoid rigorous massage over bony prominences, Discuss PT/OT consult with provider, Check visual cues for pain, Float heels, Keep linens dry and wrinkle-free, Maintain/enhance activity level, Minimize linen layers, Monitor skin under medical devices, Pad between skin to skin, Pressure redistribution bed/mattress (bed type), Sit a 90-degree angle/use footstool if needed, Suspension boots, Use 30-degree side-lying position, Turn and reposition approx. every two hours (pillows and wedges if needed)    Moisture Interventions: Absorbent underpads, Apply protective barrier, creams and emollients, Check for incontinence Q2 hours and as needed, Assess need for specialty bed, Contain wound drainage, Internal/External fecal devices, Internal/External urinary devices, Maintain skin hydration (lotion/cream), Limit adult briefs, Minimize layers, Moisture barrier, Offer toileting Q_hr    Activity Interventions: Chair cushion, Assess need for specialty bed, Trapeze to reposition, Pressure redistribution bed/mattress(bed type), PT/OT evaluation    Mobility Interventions: Assess need for specialty bed, Float heels, Chair cushion, Pressure redistribution bed/mattress (bed type), HOB 30 degrees or less, PT/OT evaluation, Suspension boots, Trapeze to reposition, Turn and reposition approx.  every two hours(pillow and wedges)    Nutrition Interventions: Document food/fluid/supplement intake, Discuss nutritional consult with provider, Offer support with meals,snacks and hydration                     Problem: Patient Education: Go to Patient Education Activity  Goal: Patient/Family Education  9/25/2020 1138 by Fatmata Parsons LPN  Outcome: Resolved/Met  9/25/2020 0838 by Fatmata Parsons LPN  Outcome: Progressing Towards Goal     Problem: Risk for Spread of Infection  Goal: Prevent transmission of infectious organism to others  Description: Prevent the transmission of infectious organisms to other patients, staff members, and visitors.   9/25/2020 1138 by Fatmata Parsons LPN  Outcome: Resolved/Met  9/25/2020 0838 by Fatmata Parsons LPN  Outcome: Progressing Towards Goal     Problem: Patient Education:  Go to Education Activity  Goal: Patient/Family Education  9/25/2020 1138 by Fatmata Parsons LPN  Outcome: Resolved/Met  9/25/2020 0838 by Fatmata Parsons LPN  Outcome: Progressing Towards Goal

## 2020-09-26 NOTE — PROGRESS NOTES
Patient discharged back to group home via [facility] transport. No tele. No IV. No condom cath. Patient in stable condition. Transported with all belongings. Report called previously by edin OAKLEY ,Nicole Adams. Instructed that if facility had any questions about patient or paperwork please call on unit. Patient left in stable condition voicing no complaints.

## 2020-09-27 NOTE — PROGRESS NOTES
Contacted [facility] to perform susceptibilities. Result returned after discharge and patient was sent out on Cipro/Levaquin for E. Coli. These may be contaminants since he was no specific treatment while hospitalized and responded clinically to Zosyn.

## 2020-09-28 NOTE — PROGRESS NOTES
Physician Progress Note      Leonel Giron  CSN #:                  [id number]  :                       1960  ADMIT DATE:       2020 10:26 AM  DISCH DATE:        2020 8:01 PM  RESPONDING  PROVIDER #:        Ronnie Ross MD          QUERY TEXT:    Dr. Rakan Reeves,  Pt admitted with Sepsis. Pt noted to have UTI . If possible, please document in the progress notes and discharge summary if you are evaluating and / or treating any of the following: The medical record reflects the following:  Risk Factors: Sepsis and UTI  Clinical Indicators: AMS, WBC 19.1, Procalcitonin 0.63, , UTI and Sepsis  Treatment: Zosyn IV, Ceftriaxone IV, IVF    Thank you  Maite Mae RN CDI, CCS  Ext [phone]  Options provided:  -- Metabolic encephalopathy  -- Septic encephalopathy  -- Other - I will add my own diagnosis  -- Disagree - Not applicable / Not valid  -- Disagree - Clinically unable to determine / Unknown  -- Refer to Clinical Documentation Reviewer    PROVIDER RESPONSE TEXT:    This patient has septic encephalopathy.     Query created by: Isabela Proctor on 2020 4:09 PM      Electronically signed by:  Ronnie Ross MD 2020 11:44 AM

## 2020-09-29 LAB
CULTURE,CULT: ABNORMAL
SPECIAL REQUESTS,SREQ: ABNORMAL

## 2020-10-05 LAB
CULTURE,CULT: ABNORMAL
SPECIAL REQUESTS,SREQ: ABNORMAL

## 2020-10-19 ENCOUNTER — HOSPITAL ENCOUNTER (EMERGENCY)
Age: 60
Discharge: HOME | End: 2020-10-19
Attending: EMERGENCY MEDICINE
Payer: COMMERCIAL

## 2020-10-19 ENCOUNTER — APPOINTMENT (OUTPATIENT)
Dept: GENERAL RADIOLOGY | Age: 60
End: 2020-10-19
Attending: EMERGENCY MEDICINE
Payer: COMMERCIAL

## 2020-10-19 VITALS
DIASTOLIC BLOOD PRESSURE: 95 MMHG | BODY MASS INDEX: 26.52 KG/M2 | HEART RATE: 95 BPM | HEIGHT: 68 IN | SYSTOLIC BLOOD PRESSURE: 156 MMHG | TEMPERATURE: 98.3 F | RESPIRATION RATE: 18 BRPM | WEIGHT: 175 LBS | OXYGEN SATURATION: 95 %

## 2020-10-19 DIAGNOSIS — R05.9 COUGH: Primary | ICD-10-CM

## 2020-10-19 PROCEDURE — 71046 X-RAY EXAM CHEST 2 VIEWS: CPT

## 2020-10-19 PROCEDURE — 99282 EMERGENCY DEPT VISIT SF MDM: CPT

## 2020-10-19 RX ORDER — CETIRIZINE HCL 10 MG
TABLET ORAL
COMMUNITY

## 2020-10-19 RX ORDER — LEVETIRACETAM 500 MG/1
500 TABLET ORAL 2 TIMES DAILY
COMMUNITY

## 2020-10-19 RX ORDER — AMOXICILLIN 500 MG/1
500 TABLET, FILM COATED ORAL 3 TIMES DAILY
Qty: 21 TAB | Refills: 0 | Status: SHIPPED | OUTPATIENT
Start: 2020-10-19 | End: 2020-10-26

## 2020-10-19 NOTE — ED TRIAGE NOTES
Pt with hx MR, residential rep reports pt with fever and hacking cough, presently being treated for UTI, pt unable to verbalize symptoms or pain, at baseline mentation

## 2020-10-20 NOTE — ED PROVIDER NOTES
EMERGENCY DEPARTMENT HISTORY AND PHYSICAL EXAM      Date: 10/19/2020  Patient Name: Neda Ramsey    History of Presenting Illness     Chief Complaint   Patient presents with    Fever    Cough       History Provided By: Cristo Sandoval    HPI: Neda Ramsey, [address] [facility]. male   presents to the ED with cc of cough and fever. Caretaker at the group home states that patient has been intermittently coughing for last 2 days and had a low-grade fever of 99 today. No respiratory distress. No vomiting or diarrhea. No obvious exposed to COVID-19. No altered mental status from his baseline. PCP: Mag Lewis MD    No current facility-administered medications on file prior to encounter. Current Outpatient Medications on File Prior to Encounter   Medication Sig Dispense Refill    levETIRAcetam (KEPPRA) 500 mg tablet Take 500 mg by mouth two (2) times a day.  cetirizine (ZYRTEC) 10 mg tablet Take  by mouth every six (6) hours as needed for Allergies.  LORazepam (ATIVAN) 0.5 mg tablet Take 0.5 mg by mouth two (2) times a day.  OLANZapine (ZyPREXA) 5 mg tablet Take 5 mg by mouth three (3) times daily.  benztropine (COGENTIN) 1 mg tablet Take 1 mg by mouth three (3) times daily.  divalproex DR (DEPAKOTE) 500 mg tablet Take 500 mg by mouth two (2) times a day.  nystatin (MYCOSTATIN) topical cream Apply  to affected area two (2) times a day. To axilla and both arms      acetaminophen (TYLENOL) 325 mg tablet Take 650 mg by mouth every six (6) hours as needed for Pain.  magnesium hydroxide (Blackwell Milk of Magnesia) 400 mg/5 mL suspension Take 10 mL by mouth daily as needed for Constipation.  LORazepam (ATIVAN) 1 mg tablet Take 1 mg by mouth every six (6) hours as needed for Anxiety.  pravastatin (PravachoL) 20 mg tablet Take 20 mg by mouth nightly.  tamsulosin (FLOMAX) 0.4 mg capsule Take 0.4 mg by mouth two (2) times a day.       hydrocortisone (CORTAID) 1 % topical cream Apply  to affected area two (2) times a day. use thin layer      guaiFENesin (ROBITUSSIN) 100 mg/5 mL liquid Take 100 mg by mouth four (4) times daily as needed for Cough.  bismuth subsalicylate (BismatroL) 262 mg/15 mL suspension Take 10 mL by mouth every six (6) hours as needed for Indigestion.  loperamide (IMODIUM) 2 mg capsule Take 2 mg by mouth four (4) times daily as needed for Diarrhea.  ondansetron hcl (Zofran) 4 mg tablet Take 4 mg by mouth every eight (8) hours as needed for Nausea or Vomiting. Past History     Past Medical History:  Past Medical History:   Diagnosis Date    Anxiety disorder     Dermatitis     High cholesterol     Intellectual disability     [facility]     Seizures ([facility]     Urinary retention        Past Surgical History:  Past Surgical History:   Procedure Laterality Date    HX PROSTATE SURGERY      bph    HX UROLOGICAL      retention       Family History:  No family history on file. Social History:  Social History     Tobacco Use    Smoking status: Never Smoker    Smokeless tobacco: Never Used   Substance Use Topics    Alcohol use: Never     Frequency: Never    Drug use: Never       Allergies:  No Known Allergies      Review of Systems   Review of Systems   Reason unable to perform ROS: ROS as per caretaker. ROS cannot be obtained from patient because of his severe intellectual impairment. Constitutional: Negative for activity change and appetite change. HENT: Negative for voice change. Respiratory: Positive for cough. Negative for choking. Gastrointestinal: Negative for diarrhea and vomiting. Genitourinary: Negative for difficulty urinating. Neurological: Negative for seizures. Psychiatric/Behavioral: Negative for self-injury. Physical Exam   Physical Exam  Vitals signs and nursing note reviewed. Constitutional:       Appearance: Normal appearance. HENT:      Head: Normocephalic and atraumatic. Nose: Nose normal. No congestion. Mouth/Throat:      Mouth: Mucous membranes are moist.   Eyes:      General: No scleral icterus. Conjunctiva/sclera: Conjunctivae normal.   Neck:      Musculoskeletal: Neck supple. Cardiovascular:      Rate and Rhythm: Normal rate and regular rhythm. Heart sounds: Normal heart sounds. Pulmonary:      Effort: Pulmonary effort is normal.      Breath sounds: Normal breath sounds. Abdominal:      General: Abdomen is flat. Bowel sounds are normal.      Palpations: Abdomen is soft. Musculoskeletal:         General: No swelling. Right lower leg: No edema. Left lower leg: No edema. Skin:     General: Skin is warm and dry. Neurological:      General: No focal deficit present. Mental Status: He is alert. Psychiatric:      Comments: Intermittent episode of agitation which is a baseline behavior for the patient as per caretaker         Diagnostic Study Results     Labs -   No results found for this or any previous visit (from the past 12 hour(s)). Radiologic Studies -   XR CHEST PA LAT   Final Result   Impression: The cardiomediastinal silhouette is appropriate for age, technique,   and lung expansion. Pulmonary vasculature is not congested. The lungs are   essentially clear. Nodular density over the right clavicle is probably artifact. No effusion or pneumothorax is seen. CT Results  (Last 48 hours)    None        CXR Results  (Last 48 hours)               10/19/20 1947  XR CHEST PA LAT Final result    Impression:  Impression: The cardiomediastinal silhouette is appropriate for age, technique,   and lung expansion. Pulmonary vasculature is not congested. The lungs are   essentially clear. Nodular density over the right clavicle is probably artifact. No effusion or pneumothorax is seen. Narrative:  2 view comparison September 22                   Medical Decision Making   I am the first provider for this patient.     I reviewed the vital signs, available nursing notes, past medical history, past surgical history, family history and social history. Vital Signs-Reviewed the patient's vital signs. Patient Vitals for the past 12 hrs:   Temp Pulse Resp BP SpO2   10/19/20 1929 98.3 °F (36.8 °C) 95 18 (!) 156/95 95 %       Records Reviewed:     Provider Notes (Medical Decision Making):       ED Course:   Initial assessment performed. The patients presenting problems have been discussed, and they are in agreement with the care plan formulated and outlined with them. I have encouraged them to ask questions as they arise throughout their visit. PROCEDURES        PLAN:  1. Current Discharge Medication List      START taking these medications    Details   amoxicillin 500 mg tab Take 500 mg by mouth three (3) times daily for 7 days. Qty: 21 Tab, Refills: 0           2. Follow-up Information     Follow up With Specialties Details Why Contact Info    Follow up with your primary care physician  Schedule an appointment as soon as possible for a visit in 3 days As needed         Return to ED if worse     Diagnosis     Clinical Impression:   1.  Cough

## 2020-11-09 ENCOUNTER — TRANSCRIBE ORDER (OUTPATIENT)
Dept: SCHEDULING | Age: 60
End: 2020-11-09

## 2020-11-09 DIAGNOSIS — R13.10 DYSPHAGIA: Primary | ICD-10-CM

## 2021-01-01 ENCOUNTER — HOSPITAL ENCOUNTER (OUTPATIENT)
Dept: PREADMISSION TESTING | Age: 61
Discharge: HOME | End: 2021-10-15
Payer: MEDICARE

## 2021-01-01 ENCOUNTER — ANESTHESIA (OUTPATIENT)
Dept: ENDOSCOPY | Age: 61
End: 2021-01-01
Payer: MEDICARE

## 2021-01-01 ENCOUNTER — APPOINTMENT (OUTPATIENT)
Dept: ENDOSCOPY | Age: 61
End: 2021-01-01
Attending: INTERNAL MEDICINE
Payer: MEDICARE

## 2021-01-01 ENCOUNTER — ANESTHESIA EVENT (OUTPATIENT)
Dept: ENDOSCOPY | Age: 61
End: 2021-01-01
Payer: MEDICARE

## 2021-01-01 ENCOUNTER — OFFICE VISIT (OUTPATIENT)
Dept: SURGERY | Age: 61
End: 2021-01-01
Payer: MEDICARE

## 2021-01-01 ENCOUNTER — HOSPITAL ENCOUNTER (OUTPATIENT)
Age: 61
Setting detail: OUTPATIENT SURGERY
Discharge: HOME | End: 2021-10-19
Attending: INTERNAL MEDICINE | Admitting: INTERNAL MEDICINE
Payer: MEDICARE

## 2021-01-01 VITALS
SYSTOLIC BLOOD PRESSURE: 101 MMHG | HEART RATE: 57 BPM | TEMPERATURE: 97.3 F | WEIGHT: 160 LBS | RESPIRATION RATE: 18 BRPM | BODY MASS INDEX: 25.71 KG/M2 | DIASTOLIC BLOOD PRESSURE: 60 MMHG | OXYGEN SATURATION: 99 % | HEIGHT: 66 IN

## 2021-01-01 VITALS — WEIGHT: 162.6 LBS | BODY MASS INDEX: 26.13 KG/M2 | TEMPERATURE: 97.7 F | HEIGHT: 66 IN

## 2021-01-01 VITALS
TEMPERATURE: 97.8 F | HEIGHT: 68 IN | SYSTOLIC BLOOD PRESSURE: 118 MMHG | WEIGHT: 160.2 LBS | DIASTOLIC BLOOD PRESSURE: 72 MMHG | HEART RATE: 66 BPM | BODY MASS INDEX: 24.28 KG/M2

## 2021-01-01 DIAGNOSIS — A63.0 ANAL CONDYLOMA: Primary | ICD-10-CM

## 2021-01-01 LAB
SARS-COV-2, COV2: NORMAL
SARS-COV-2, XPLCVT: NOT DETECTED
SOURCE, COVRS: NORMAL

## 2021-01-01 PROCEDURE — G8427 DOCREV CUR MEDS BY ELIG CLIN: HCPCS | Performed by: COLON & RECTAL SURGERY

## 2021-01-01 PROCEDURE — 99203 OFFICE O/P NEW LOW 30 MIN: CPT | Performed by: COLON & RECTAL SURGERY

## 2021-01-01 PROCEDURE — G8432 DEP SCR NOT DOC, RNG: HCPCS | Performed by: COLON & RECTAL SURGERY

## 2021-01-01 PROCEDURE — 76040000007: Performed by: INTERNAL MEDICINE

## 2021-01-01 PROCEDURE — 74011250636 HC RX REV CODE- 250/636: Performed by: ANESTHESIOLOGY

## 2021-01-01 PROCEDURE — 2709999900 HC NON-CHARGEABLE SUPPLY: Performed by: INTERNAL MEDICINE

## 2021-01-01 PROCEDURE — 99213 OFFICE O/P EST LOW 20 MIN: CPT | Performed by: COLON & RECTAL SURGERY

## 2021-01-01 PROCEDURE — 3017F COLORECTAL CA SCREEN DOC REV: CPT | Performed by: COLON & RECTAL SURGERY

## 2021-01-01 PROCEDURE — 76060000032 HC ANESTHESIA 0.5 TO 1 HR: Performed by: INTERNAL MEDICINE

## 2021-01-01 PROCEDURE — 74011250636 HC RX REV CODE- 250/636: Performed by: INTERNAL MEDICINE

## 2021-01-01 PROCEDURE — 74011000250 HC RX REV CODE- 250: Performed by: ANESTHESIOLOGY

## 2021-01-01 PROCEDURE — G8419 CALC BMI OUT NRM PARAM NOF/U: HCPCS | Performed by: COLON & RECTAL SURGERY

## 2021-01-01 PROCEDURE — G8420 CALC BMI NORM PARAMETERS: HCPCS | Performed by: COLON & RECTAL SURGERY

## 2021-01-01 PROCEDURE — U0003 INFECTIOUS AGENT DETECTION BY NUCLEIC ACID (DNA OR RNA); SEVERE ACUTE RESPIRATORY SYNDROME CORONAVIRUS 2 (SARS-COV-2) (CORONAVIRUS DISEASE [COVID-19]), AMPLIFIED PROBE TECHNIQUE, MAKING USE OF HIGH THROUGHPUT TECHNOLOGIES AS DESCRIBED BY CMS-2020-01-R: HCPCS

## 2021-01-01 RX ORDER — SODIUM CHLORIDE, SODIUM LACTATE, POTASSIUM CHLORIDE, CALCIUM CHLORIDE 600; 310; 30; 20 MG/100ML; MG/100ML; MG/100ML; MG/100ML
50 INJECTION, SOLUTION INTRAVENOUS CONTINUOUS
Status: DISCONTINUED | OUTPATIENT
Start: 2021-01-01 | End: 2021-01-01 | Stop reason: HOSPADM

## 2021-01-01 RX ORDER — FINASTERIDE 5 MG/1
5 TABLET, FILM COATED ORAL DAILY
COMMUNITY
Start: 2021-01-01

## 2021-01-01 RX ORDER — FENTANYL CITRATE 50 UG/ML
50 INJECTION, SOLUTION INTRAMUSCULAR; INTRAVENOUS
Status: CANCELLED | OUTPATIENT
Start: 2021-01-01

## 2021-01-01 RX ORDER — FENTANYL CITRATE 50 UG/ML
50 INJECTION, SOLUTION INTRAMUSCULAR; INTRAVENOUS AS NEEDED
Status: CANCELLED | OUTPATIENT
Start: 2021-01-01

## 2021-01-01 RX ORDER — SODIUM CHLORIDE 0.9 % (FLUSH) 0.9 %
5-40 SYRINGE (ML) INJECTION EVERY 8 HOURS
Status: CANCELLED | OUTPATIENT
Start: 2021-01-01

## 2021-01-01 RX ORDER — LIDOCAINE HYDROCHLORIDE 20 MG/ML
INJECTION, SOLUTION EPIDURAL; INFILTRATION; INTRACAUDAL; PERINEURAL AS NEEDED
Status: DISCONTINUED | OUTPATIENT
Start: 2021-01-01 | End: 2021-01-01 | Stop reason: HOSPADM

## 2021-01-01 RX ORDER — MIDAZOLAM HYDROCHLORIDE 1 MG/ML
0.5 INJECTION, SOLUTION INTRAMUSCULAR; INTRAVENOUS
Status: CANCELLED | OUTPATIENT
Start: 2021-01-01

## 2021-01-01 RX ORDER — HYDROMORPHONE HYDROCHLORIDE 1 MG/ML
0.4 INJECTION, SOLUTION INTRAMUSCULAR; INTRAVENOUS; SUBCUTANEOUS
Status: CANCELLED | OUTPATIENT
Start: 2021-01-01

## 2021-01-01 RX ORDER — ONDANSETRON 2 MG/ML
4 INJECTION INTRAMUSCULAR; INTRAVENOUS AS NEEDED
Status: CANCELLED | OUTPATIENT
Start: 2021-01-01

## 2021-01-01 RX ORDER — LIDOCAINE HYDROCHLORIDE 10 MG/ML
0.1 INJECTION, SOLUTION EPIDURAL; INFILTRATION; INTRACAUDAL; PERINEURAL AS NEEDED
Status: CANCELLED | OUTPATIENT
Start: 2021-01-01

## 2021-01-01 RX ORDER — EPHEDRINE SULFATE/0.9% NACL/PF 50 MG/5 ML
5 SYRINGE (ML) INTRAVENOUS AS NEEDED
Status: CANCELLED | OUTPATIENT
Start: 2021-01-01

## 2021-01-01 RX ORDER — SODIUM CHLORIDE 0.9 % (FLUSH) 0.9 %
5-40 SYRINGE (ML) INJECTION AS NEEDED
Status: CANCELLED | OUTPATIENT
Start: 2021-01-01

## 2021-01-01 RX ORDER — DIPHENHYDRAMINE HYDROCHLORIDE 50 MG/ML
12.5 INJECTION, SOLUTION INTRAMUSCULAR; INTRAVENOUS AS NEEDED
Status: CANCELLED | OUTPATIENT
Start: 2021-01-01 | End: 2021-01-01

## 2021-01-01 RX ORDER — MIDAZOLAM HYDROCHLORIDE 1 MG/ML
1 INJECTION, SOLUTION INTRAMUSCULAR; INTRAVENOUS AS NEEDED
Status: CANCELLED | OUTPATIENT
Start: 2021-01-01

## 2021-01-01 RX ORDER — HALOPERIDOL 5 MG/ML
5 INJECTION INTRAMUSCULAR
Status: COMPLETED | OUTPATIENT
Start: 2021-01-01 | End: 2021-01-01

## 2021-01-01 RX ORDER — PROPOFOL 10 MG/ML
INJECTION, EMULSION INTRAVENOUS AS NEEDED
Status: DISCONTINUED | OUTPATIENT
Start: 2021-01-01 | End: 2021-01-01 | Stop reason: HOSPADM

## 2021-01-01 RX ORDER — SODIUM CHLORIDE, SODIUM LACTATE, POTASSIUM CHLORIDE, CALCIUM CHLORIDE 600; 310; 30; 20 MG/100ML; MG/100ML; MG/100ML; MG/100ML
INJECTION, SOLUTION INTRAVENOUS
Status: DISCONTINUED | OUTPATIENT
Start: 2021-01-01 | End: 2021-01-01 | Stop reason: HOSPADM

## 2021-01-01 RX ORDER — POLYETHYLENE GLYCOL 3350 17 G/17G
17 POWDER, FOR SOLUTION ORAL DAILY
COMMUNITY

## 2021-01-01 RX ORDER — TRIAMCINOLONE ACETONIDE 1 MG/G
CREAM TOPICAL 2 TIMES DAILY
COMMUNITY
Start: 2021-01-01

## 2021-01-01 RX ORDER — LIDOCAINE HYDROCHLORIDE 20 MG/ML
INJECTION, SOLUTION EPIDURAL; INFILTRATION; INTRACAUDAL; PERINEURAL
Status: COMPLETED
Start: 2021-01-01 | End: 2021-01-01

## 2021-01-01 RX ORDER — MIRABEGRON 25 MG/1
25 TABLET, FILM COATED, EXTENDED RELEASE ORAL DAILY
COMMUNITY
Start: 2021-01-01

## 2021-01-01 RX ORDER — HYDROCODONE BITARTRATE AND ACETAMINOPHEN 5; 325 MG/1; MG/1
1 TABLET ORAL AS NEEDED
Status: CANCELLED | OUTPATIENT
Start: 2021-01-01

## 2021-01-01 RX ORDER — PROPOFOL 10 MG/ML
INJECTION, EMULSION INTRAVENOUS
Status: COMPLETED
Start: 2021-01-01 | End: 2021-01-01

## 2021-01-01 RX ADMIN — PROPOFOL 120 MG: 10 INJECTION, EMULSION INTRAVENOUS at 11:12

## 2021-01-01 RX ADMIN — HALOPERIDOL LACTATE 5 MG: 5 INJECTION, SOLUTION INTRAMUSCULAR at 09:49

## 2021-01-01 RX ADMIN — SODIUM CHLORIDE, POTASSIUM CHLORIDE, SODIUM LACTATE AND CALCIUM CHLORIDE 50 ML/HR: 600; 310; 30; 20 INJECTION, SOLUTION INTRAVENOUS at 10:23

## 2021-01-01 RX ADMIN — SODIUM CHLORIDE, POTASSIUM CHLORIDE, SODIUM LACTATE AND CALCIUM CHLORIDE: 600; 310; 30; 20 INJECTION, SOLUTION INTRAVENOUS at 11:05

## 2021-01-01 RX ADMIN — LIDOCAINE HYDROCHLORIDE 100 MG: 20 INJECTION, SOLUTION EPIDURAL; INFILTRATION; INTRACAUDAL; PERINEURAL at 11:12

## 2021-01-01 RX ADMIN — PROPOFOL 30 MG: 10 INJECTION, EMULSION INTRAVENOUS at 11:22

## 2021-01-01 RX ADMIN — PROPOFOL 30 MG: 10 INJECTION, EMULSION INTRAVENOUS at 11:18

## 2021-01-01 RX ADMIN — PROPOFOL 20 MG: 10 INJECTION, EMULSION INTRAVENOUS at 11:32

## 2021-10-13 PROBLEM — A63.0 ANAL CONDYLOMA: Status: ACTIVE | Noted: 2021-01-01

## 2021-10-13 NOTE — PROGRESS NOTES
OFFICE VISIT NOTE    Thomas Swartz is a 64 y.o. male who presents to the office today for:    Chief Complaint   Patient presents with    New Patient     anal warts       The patient is a 77-year-old gentleman who is referred for evaluation of perianal condyloma. I am unable to get any history from the patient as he is essentially nonverbal because of his intellectual disability. From the chart of obstructive that he has a history of phenylketonuria, intellectual disability, seizure disorder, urinary retention/BPH, dermatitis. He has had surgeries for his BPH. In terms of his perianal condyloma is unclear to me how long it has been present. He was seen by dermatology initially for this who recommended a colorectal evaluation. He has been referred to gastroenterology for colonoscopy to evaluate for internal lesions. He has had no prior colonoscopy and he is 64 so screening colonoscopy is in order. The patient is a resident in a group home.       Past Medical History:   Diagnosis Date    Anxiety disorder     Dermatitis     High cholesterol     Intellectual disability     [facility]on[facility]) [facility] [facility]     Seizures ([facility] [facility]     Urinary retention        Past Surgical History:   Procedure Laterality Date    HX PROSTATE SURGERY      bph    HX UROLOGICAL      retention       Family History   Family history unknown: Yes       Social History     Socioeconomic History    Marital status: SINGLE     Spouse name: Not on file    Number of children: Not on file    Years of education: Not on file    Highest education level: Not on file   Occupational History    Not on file   Tobacco Use    Smoking status: Never Smoker    Smokeless tobacco: Never Used   Vaping Use    Vaping Use: Never used   Substance and Sexual Activity    Alcohol use: Never    Drug use: Never    Sexual activity: Never   Other Topics Concern     Service No    Blood Transfusions No    Caffeine Concern No    Occupational Exposure No    Hobby Hazards No    Sleep Concern No    Stress Concern No    Weight Concern No    Special Diet No    Back Care No    Exercise No    Bike Helmet No    Seat Belt No    Self-Exams No   Social History Narrative    Not on file     Social Determinants of Health     Financial Resource Strain:     Difficulty of Paying Living Expenses:    Food Insecurity:     Worried About Running Out of Food in the Last Year:     Ran Out of Food in the Last Year:    Transportation Needs:     Lack of Transportation (Medical):  Lack of Transportation (Non-Medical):    Physical Activity:     Days of Exercise per Week:     Minutes of Exercise per Session:    Stress:     Feeling of Stress :    Social Connections:     Frequency of Communication with Friends and Family:     Frequency of Social Gatherings with Friends and Family:     Attends Hindu Services:     Active Member of Clubs or Organizations:     Attends Club or Organization Meetings:     Marital Status:    Intimate Partner Violence:     Fear of Current or Ex-Partner:     Emotionally Abused:     Physically Abused:     Sexually Abused:        No Known Allergies    Current Outpatient Medications   Medication Sig    finasteride (PROSCAR) 5 mg tablet Take 5 mg by mouth daily.  Myrbetriq 25 mg ER tablet Take 25 mg by mouth daily.  triamcinolone acetonide (KENALOG) 0.1 % topical cream Apply  to affected area two (2) times a day. Apply to face    polyethylene glycol (MIRALAX) 17 gram packet Take 17 g by mouth daily.  levETIRAcetam (KEPPRA) 500 mg tablet Take 500 mg by mouth two (2) times a day.  cetirizine (ZYRTEC) 10 mg tablet Take  by mouth every six (6) hours as needed for Allergies.  LORazepam (ATIVAN) 0.5 mg tablet Take 0.5 mg by mouth two (2) times a day.     OLANZapine (ZyPREXA) 5 mg tablet Take 5 mg by mouth three (3) times daily.  benztropine (COGENTIN) 1 mg tablet Take 1 mg by mouth three (3) times daily.  divalproex DR (DEPAKOTE) 500 mg tablet Take 500 mg by mouth two (2) times a day.  nystatin (MYCOSTATIN) topical cream Apply  to affected area two (2) times a day. To axilla and both arms    acetaminophen (TYLENOL) 325 mg tablet Take 650 mg by mouth every six (6) hours as needed for Pain.  magnesium hydroxide (Blackwell Milk of Magnesia) 400 mg/5 mL suspension Take 10 mL by mouth daily as needed for Constipation.  LORazepam (ATIVAN) 1 mg tablet Take 1 mg by mouth every six (6) hours as needed for Anxiety.  pravastatin (PravachoL) 20 mg tablet Take 20 mg by mouth nightly.  tamsulosin (FLOMAX) 0.4 mg capsule Take 0.4 mg by mouth two (2) times a day.  hydrocortisone (CORTAID) 1 % topical cream Apply  to affected area two (2) times a day. use thin layer    guaiFENesin (ROBITUSSIN) 100 mg/5 mL liquid Take 100 mg by mouth four (4) times daily as needed for Cough.  bismuth subsalicylate (BismatroL) 262 mg/15 mL suspension Take 10 mL by mouth every six (6) hours as needed for Indigestion.  loperamide (IMODIUM) 2 mg capsule Take 2 mg by mouth four (4) times daily as needed for Diarrhea.  ondansetron hcl (Zofran) 4 mg tablet Take 4 mg by mouth every eight (8) hours as needed for Nausea or Vomiting. No current facility-administered medications for this visit. Review of Systems   Constitutional: Negative. HENT: Negative. Eyes: Negative. Respiratory: Negative. Cardiovascular: Negative. Gastrointestinal: Negative. Genitourinary: Negative. Musculoskeletal: Negative. Skin: Negative. Neurological: Negative. Endo/Heme/Allergies: Negative. Psychiatric/Behavioral: Negative.         /72 (BP 1 Location: Left arm, BP Patient Position: Sitting, BP Cuff Size: Adult)   Pulse 66   Temp 97.8 °F (36.6 °C) (Temporal)   Ht 5' 8\" (1.727 m)   Wt 160 lb 3.2 oz (72.7 kg) BMI 24.36 kg/m²     Physical Exam  Constitutional:       Comments: Appears his stated age, answers and nonsensical one-word phrases, in no acute distress, normal weight   HENT:      Head: Normocephalic and atraumatic. Cardiovascular:      Rate and Rhythm: Normal rate and regular rhythm. Pulmonary:      Breath sounds: Normal breath sounds. Abdominal:      General: There is no distension. Palpations: Abdomen is soft. Tenderness: There is no abdominal tenderness. Genitourinary:     Comments: Multiple scattered anal condyloma perianally extending to the posterior perineum  Musculoskeletal:         General: Normal range of motion. Cervical back: Normal range of motion and neck supple. Skin:     General: Skin is warm and dry. Neurological:      Mental Status: Mental status is at baseline. Psychiatric:      Comments: Consistent with history of intellectual disability         Problem List Items Addressed This Visit        Digestive    Anal condyloma - Primary    Relevant Medications    triamcinolone acetonide (KENALOG) 0.1 % topical cream    polyethylene glycol (MIRALAX) 17 gram packet          Assessment and Plan:    I told the caregiver who is with him from the group home that he will need to have surgery for his condyloma. This will be done after his colonoscopy. I will discuss the case with his brother who is the legal guardian. Once the patient has had his colonoscopy we can schedule his exam under anesthesia and excision fulguration perianal condyloma.           Angélica Landaverde MD

## 2021-10-13 NOTE — PROGRESS NOTES
Chief Complaint   Patient presents with    New Patient     anal warts     Visit Vitals  /72 (BP 1 Location: Left arm, BP Patient Position: Sitting, BP Cuff Size: Adult)   Pulse 66   Temp 97.8 °F (36.6 °C) (Temporal)   Ht 5' 8\" (1.727 m)   Wt 160 lb 3.2 oz (72.7 kg)   BMI 24.36 kg/m²

## 2021-10-19 NOTE — ANESTHESIA PREPROCEDURE EVALUATION
Relevant Problems   No relevant active problems       Anesthetic History   No history of anesthetic complications            Review of Systems / Medical History  Patient summary reviewed, nursing notes reviewed and pertinent labs reviewed    Pulmonary  Within defined limits                 Neuro/Psych     seizures    Psychiatric history    Comments: Intellectually disabled Cardiovascular              Hyperlipidemia    Exercise tolerance: >4 METS     GI/Hepatic/Renal  Within defined limits              Endo/Other  Within defined limits           Other Findings              Physical Exam    Airway    TM Distance: 4 - 6 cm  Neck ROM: normal range of motion   Mouth opening: Normal    Comments: Unable to assess due to cognitive disability Cardiovascular    Rhythm: regular  Rate: normal         Dental      Comments: Unable to assess due to cognitive disability   Pulmonary  Breath sounds clear to auscultation               Abdominal  GI exam deferred       Other Findings            Anesthetic Plan    ASA: 3  Anesthesia type: total IV anesthesia and MAC          Induction: Intravenous  Anesthetic plan and risks discussed with: Family

## 2021-10-19 NOTE — ANESTHESIA POSTPROCEDURE EVALUATION
Procedure(s):  COLONOSCOPY.    total IV anesthesia, MAC    Anesthesia Post Evaluation      Multimodal analgesia: multimodal analgesia not used between 6 hours prior to anesthesia start to PACU discharge  Patient location during evaluation: bedside (Endoscopy suite)  Patient participation: complete - patient cannot participate  Level of consciousness: sleepy but conscious  Pain score: 0  Pain management: adequate  Airway patency: patent  Anesthetic complications: no  Cardiovascular status: acceptable  Respiratory status: acceptable and nasal cannula  Hydration status: acceptable  Comments: This patient remained on the stretcher. The patient was handed off to the endoscopy nursing team.  All questions regarding pre-, intra-, and postoperative care were answered.   Post anesthesia nausea and vomiting:  none      INITIAL Post-op Vital signs:   Vitals Value Taken Time   /86 10/19/21 1136   Temp     Pulse 64 10/19/21 1136   Resp 17 10/19/21 1136   SpO2 98 % 10/19/21 1136

## 2021-12-08 NOTE — PROGRESS NOTES
Chief Complaint   Patient presents with    Follow-up     from colonoscopy     Attempted to get vital signs, but patient was non-compliant.

## 2022-01-01 ENCOUNTER — APPOINTMENT (OUTPATIENT)
Dept: GENERAL RADIOLOGY | Age: 62
DRG: 208 | End: 2022-01-01
Attending: PHYSICIAN ASSISTANT
Payer: MEDICARE

## 2022-01-01 ENCOUNTER — APPOINTMENT (OUTPATIENT)
Dept: GENERAL RADIOLOGY | Age: 62
DRG: 208 | End: 2022-01-01
Attending: FAMILY MEDICINE
Payer: MEDICARE

## 2022-01-01 ENCOUNTER — TELEPHONE (OUTPATIENT)
Dept: SURGERY | Age: 62
End: 2022-01-01

## 2022-01-01 ENCOUNTER — APPOINTMENT (OUTPATIENT)
Dept: GENERAL RADIOLOGY | Age: 62
DRG: 208 | End: 2022-01-01
Attending: INTERNAL MEDICINE
Payer: MEDICARE

## 2022-01-01 ENCOUNTER — HOSPITAL ENCOUNTER (OUTPATIENT)
Age: 62
Discharge: HOME | End: 2022-03-15
Attending: COLON & RECTAL SURGERY | Admitting: COLON & RECTAL SURGERY
Payer: MEDICARE

## 2022-01-01 ENCOUNTER — ANESTHESIA EVENT (OUTPATIENT)
Dept: SURGERY | Age: 62
End: 2022-01-01
Payer: MEDICARE

## 2022-01-01 ENCOUNTER — ANESTHESIA (OUTPATIENT)
Dept: ICU | Age: 62
DRG: 208 | End: 2022-01-01
Payer: MEDICARE

## 2022-01-01 ENCOUNTER — ANESTHESIA EVENT (OUTPATIENT)
Dept: ICU | Age: 62
DRG: 208 | End: 2022-01-01
Payer: MEDICARE

## 2022-01-01 ENCOUNTER — APPOINTMENT (OUTPATIENT)
Dept: GENERAL RADIOLOGY | Age: 62
DRG: 208 | End: 2022-01-01
Attending: STUDENT IN AN ORGANIZED HEALTH CARE EDUCATION/TRAINING PROGRAM
Payer: MEDICARE

## 2022-01-01 ENCOUNTER — APPOINTMENT (OUTPATIENT)
Dept: VASCULAR SURGERY | Age: 62
DRG: 208 | End: 2022-01-01
Attending: INTERNAL MEDICINE
Payer: MEDICARE

## 2022-01-01 ENCOUNTER — HOSPITAL ENCOUNTER (OUTPATIENT)
Dept: PREADMISSION TESTING | Age: 62
Discharge: HOME | End: 2022-01-06
Payer: MEDICARE

## 2022-01-01 ENCOUNTER — HOSPITAL ENCOUNTER (INPATIENT)
Age: 62
LOS: 60 days | DRG: 208 | End: 2022-07-25
Attending: STUDENT IN AN ORGANIZED HEALTH CARE EDUCATION/TRAINING PROGRAM | Admitting: STUDENT IN AN ORGANIZED HEALTH CARE EDUCATION/TRAINING PROGRAM
Payer: MEDICARE

## 2022-01-01 ENCOUNTER — ANESTHESIA (OUTPATIENT)
Dept: SURGERY | Age: 62
End: 2022-01-01
Payer: MEDICARE

## 2022-01-01 ENCOUNTER — APPOINTMENT (OUTPATIENT)
Dept: ULTRASOUND IMAGING | Age: 62
DRG: 208 | End: 2022-01-01
Attending: NURSE PRACTITIONER
Payer: MEDICARE

## 2022-01-01 ENCOUNTER — OFFICE VISIT (OUTPATIENT)
Dept: SURGERY | Age: 62
End: 2022-01-01
Payer: MEDICAID

## 2022-01-01 ENCOUNTER — APPOINTMENT (OUTPATIENT)
Dept: CT IMAGING | Age: 62
DRG: 208 | End: 2022-01-01
Attending: STUDENT IN AN ORGANIZED HEALTH CARE EDUCATION/TRAINING PROGRAM
Payer: MEDICARE

## 2022-01-01 ENCOUNTER — APPOINTMENT (OUTPATIENT)
Dept: GENERAL RADIOLOGY | Age: 62
DRG: 208 | End: 2022-01-01
Attending: REGISTERED NURSE
Payer: MEDICARE

## 2022-01-01 VITALS
WEIGHT: 168 LBS | TEMPERATURE: 97 F | RESPIRATION RATE: 16 BRPM | OXYGEN SATURATION: 98 % | SYSTOLIC BLOOD PRESSURE: 122 MMHG | HEIGHT: 68 IN | BODY MASS INDEX: 25.46 KG/M2 | DIASTOLIC BLOOD PRESSURE: 80 MMHG | HEART RATE: 84 BPM

## 2022-01-01 VITALS
HEART RATE: 109 BPM | RESPIRATION RATE: 20 BRPM | HEIGHT: 68 IN | SYSTOLIC BLOOD PRESSURE: 151 MMHG | BODY MASS INDEX: 19.23 KG/M2 | TEMPERATURE: 98.5 F | WEIGHT: 126.88 LBS | DIASTOLIC BLOOD PRESSURE: 78 MMHG | OXYGEN SATURATION: 96 %

## 2022-01-01 VITALS
WEIGHT: 151.8 LBS | HEART RATE: 74 BPM | RESPIRATION RATE: 16 BRPM | OXYGEN SATURATION: 96 % | BODY MASS INDEX: 23.01 KG/M2 | HEIGHT: 68 IN | TEMPERATURE: 98.1 F

## 2022-01-01 DIAGNOSIS — A63.0 ANAL CONDYLOMA: Primary | ICD-10-CM

## 2022-01-01 DIAGNOSIS — R56.9 SEIZURE (HCC): ICD-10-CM

## 2022-01-01 DIAGNOSIS — R53.81 DEBILITY: ICD-10-CM

## 2022-01-01 DIAGNOSIS — E70.1 PKU (PHENYLKETONURIA) (HCC): ICD-10-CM

## 2022-01-01 DIAGNOSIS — J18.9 PNEUMONIA DUE TO INFECTIOUS ORGANISM, UNSPECIFIED LATERALITY, UNSPECIFIED PART OF LUNG: Primary | ICD-10-CM

## 2022-01-01 DIAGNOSIS — R09.02 HYPOXIA: ICD-10-CM

## 2022-01-01 DIAGNOSIS — G93.40 ENCEPHALOPATHY: ICD-10-CM

## 2022-01-01 DIAGNOSIS — R13.10 DYSPHAGIA, UNSPECIFIED TYPE: ICD-10-CM

## 2022-01-01 LAB
ALBUMIN SERPL-MCNC: 2.2 G/DL (ref 3.4–5)
ALBUMIN SERPL-MCNC: 2.3 G/DL (ref 3.4–5)
ALBUMIN SERPL-MCNC: 3.1 G/DL (ref 3.4–5)
ALBUMIN/GLOB SERPL: 0.5 {RATIO} (ref 0.8–1.7)
ALBUMIN/GLOB SERPL: 0.6 {RATIO} (ref 0.8–1.7)
ALBUMIN/GLOB SERPL: 0.6 {RATIO} (ref 0.8–1.7)
ALP SERPL-CCNC: 102 U/L (ref 45–117)
ALP SERPL-CCNC: 51 U/L (ref 45–117)
ALP SERPL-CCNC: 54 U/L (ref 45–117)
ALP SERPL-CCNC: 59 U/L (ref 45–117)
ALP SERPL-CCNC: 72 U/L (ref 45–117)
ALP SERPL-CCNC: 72 U/L (ref 45–117)
ALP SERPL-CCNC: 73 U/L (ref 45–117)
ALT SERPL-CCNC: 57 U/L (ref 16–61)
ALT SERPL-CCNC: 60 U/L (ref 16–61)
ALT SERPL-CCNC: 61 U/L (ref 16–61)
ALT SERPL-CCNC: 76 U/L (ref 16–61)
ALT SERPL-CCNC: 83 U/L (ref 16–61)
ALT SERPL-CCNC: 83 U/L (ref 16–61)
ALT SERPL-CCNC: 85 U/L (ref 16–61)
AMMONIA PLAS-SCNC: 18 UMOL/L (ref 11–32)
AMMONIA PLAS-SCNC: 21 UMOL/L (ref 11–32)
AMPHET UR QL SCN: NEGATIVE
ANION GAP SERPL CALC-SCNC: 4 MMOL/L (ref 3–18)
ANION GAP SERPL CALC-SCNC: 4 MMOL/L (ref 3–18)
ANION GAP SERPL CALC-SCNC: 5 MMOL/L (ref 3–18)
ANION GAP SERPL CALC-SCNC: 6 MMOL/L (ref 3–18)
ANION GAP SERPL CALC-SCNC: 6 MMOL/L (ref 3–18)
ANION GAP SERPL CALC-SCNC: 7 MMOL/L (ref 3–18)
APAP SERPL-MCNC: <2 UG/ML (ref 10–30)
APPEARANCE UR: CLEAR
ARTERIAL PATENCY WRIST A: POSITIVE
AST SERPL-CCNC: 107 U/L (ref 10–38)
AST SERPL-CCNC: 176 U/L (ref 10–38)
AST SERPL-CCNC: 202 U/L (ref 10–38)
AST SERPL-CCNC: 207 U/L (ref 10–38)
AST SERPL-CCNC: 243 U/L (ref 10–38)
AST SERPL-CCNC: 59 U/L (ref 10–38)
AST SERPL-CCNC: 79 U/L (ref 10–38)
ATRIAL RATE: 93 BPM
B PERT DNA SPEC QL NAA+PROBE: NOT DETECTED
BACTERIA SPEC CULT: NORMAL
BACTERIA URNS QL MICRO: ABNORMAL /HPF
BARBITURATES UR QL SCN: NEGATIVE
BASE EXCESS BLD CALC-SCNC: 0.6 MMOL/L
BASE EXCESS BLD CALC-SCNC: 1.9 MMOL/L
BASE EXCESS BLD CALC-SCNC: 2.8 MMOL/L
BASOPHILS # BLD: 0 K/UL (ref 0–0.1)
BASOPHILS # BLD: 0.1 K/UL (ref 0–0.1)
BASOPHILS NFR BLD: 0 % (ref 0–2)
BASOPHILS NFR BLD: 1 % (ref 0–2)
BASOPHILS NFR BLD: 1 % (ref 0–2)
BDY SITE: ABNORMAL
BENZODIAZ UR QL: NEGATIVE
BILIRUB SERPL-MCNC: 0.5 MG/DL (ref 0.2–1)
BILIRUB SERPL-MCNC: 0.5 MG/DL (ref 0.2–1)
BILIRUB SERPL-MCNC: 0.6 MG/DL (ref 0.2–1)
BILIRUB SERPL-MCNC: 0.6 MG/DL (ref 0.2–1)
BILIRUB SERPL-MCNC: 0.7 MG/DL (ref 0.2–1)
BILIRUB UR QL: NEGATIVE
BORDETELLA PARAPERTUSSIS PCR, BORPAR: NOT DETECTED
BUN SERPL-MCNC: 13 MG/DL (ref 7–18)
BUN SERPL-MCNC: 13 MG/DL (ref 7–18)
BUN SERPL-MCNC: 18 MG/DL (ref 7–18)
BUN SERPL-MCNC: 18 MG/DL (ref 7–18)
BUN SERPL-MCNC: 19 MG/DL (ref 7–18)
BUN SERPL-MCNC: 20 MG/DL (ref 7–18)
BUN SERPL-MCNC: 21 MG/DL (ref 7–18)
BUN SERPL-MCNC: 24 MG/DL (ref 7–18)
BUN SERPL-MCNC: 26 MG/DL (ref 7–18)
BUN/CREAT SERPL: 20 (ref 12–20)
BUN/CREAT SERPL: 21 (ref 12–20)
BUN/CREAT SERPL: 23 (ref 12–20)
BUN/CREAT SERPL: 27 (ref 12–20)
BUN/CREAT SERPL: 28 (ref 12–20)
BUN/CREAT SERPL: 28 (ref 12–20)
BUN/CREAT SERPL: 30 (ref 12–20)
BUN/CREAT SERPL: 34 (ref 12–20)
BUN/CREAT SERPL: 34 (ref 12–20)
C PNEUM DNA SPEC QL NAA+PROBE: NOT DETECTED
CA-I SERPL-SCNC: 1.11 MMOL/L (ref 1.15–1.33)
CALCIUM SERPL-MCNC: 8.2 MG/DL (ref 8.5–10.1)
CALCIUM SERPL-MCNC: 8.4 MG/DL (ref 8.5–10.1)
CALCIUM SERPL-MCNC: 8.5 MG/DL (ref 8.5–10.1)
CALCIUM SERPL-MCNC: 8.6 MG/DL (ref 8.5–10.1)
CALCIUM SERPL-MCNC: 8.7 MG/DL (ref 8.5–10.1)
CALCIUM SERPL-MCNC: 9 MG/DL (ref 8.5–10.1)
CALCIUM SERPL-MCNC: 9.3 MG/DL (ref 8.5–10.1)
CALCULATED P AXIS, ECG09: 67 DEGREES
CALCULATED R AXIS, ECG10: 20 DEGREES
CALCULATED T AXIS, ECG11: 54 DEGREES
CANNABINOIDS UR QL SCN: NEGATIVE
CHLORIDE SERPL-SCNC: 100 MMOL/L (ref 100–111)
CHLORIDE SERPL-SCNC: 102 MMOL/L (ref 100–111)
CHLORIDE SERPL-SCNC: 102 MMOL/L (ref 100–111)
CHLORIDE SERPL-SCNC: 103 MMOL/L (ref 100–111)
CHLORIDE SERPL-SCNC: 106 MMOL/L (ref 100–111)
CHLORIDE SERPL-SCNC: 107 MMOL/L (ref 100–111)
CHLORIDE SERPL-SCNC: 107 MMOL/L (ref 100–111)
CHLORIDE SERPL-SCNC: 108 MMOL/L (ref 100–111)
CHLORIDE SERPL-SCNC: 109 MMOL/L (ref 100–111)
CHLORIDE SERPL-SCNC: 109 MMOL/L (ref 100–111)
CHLORIDE SERPL-SCNC: 88 MMOL/L (ref 100–111)
CO2 SERPL-SCNC: 26 MMOL/L (ref 21–32)
CO2 SERPL-SCNC: 27 MMOL/L (ref 21–32)
CO2 SERPL-SCNC: 27 MMOL/L (ref 21–32)
CO2 SERPL-SCNC: 28 MMOL/L (ref 21–32)
CO2 SERPL-SCNC: 29 MMOL/L (ref 21–32)
COCAINE UR QL SCN: NEGATIVE
COLOR UR: YELLOW
COVID-19 RAPID TEST, COVR: DETECTED
CREAT SERPL-MCNC: 0.57 MG/DL (ref 0.6–1.3)
CREAT SERPL-MCNC: 0.6 MG/DL (ref 0.6–1.3)
CREAT SERPL-MCNC: 0.62 MG/DL (ref 0.6–1.3)
CREAT SERPL-MCNC: 0.65 MG/DL (ref 0.6–1.3)
CREAT SERPL-MCNC: 0.67 MG/DL (ref 0.6–1.3)
CREAT SERPL-MCNC: 0.7 MG/DL (ref 0.6–1.3)
CREAT SERPL-MCNC: 0.74 MG/DL (ref 0.6–1.3)
CREAT SERPL-MCNC: 0.76 MG/DL (ref 0.6–1.3)
CREAT SERPL-MCNC: 0.78 MG/DL (ref 0.6–1.3)
CREAT SERPL-MCNC: 0.86 MG/DL (ref 0.6–1.3)
CREAT SERPL-MCNC: 0.98 MG/DL (ref 0.6–1.3)
DIAGNOSIS, 93000: NORMAL
DIFFERENTIAL METHOD BLD: ABNORMAL
EOSINOPHIL # BLD: 0 K/UL (ref 0–0.4)
EOSINOPHIL # BLD: 0.1 K/UL (ref 0–0.4)
EOSINOPHIL # BLD: 0.3 K/UL (ref 0–0.4)
EOSINOPHIL NFR BLD: 0 % (ref 0–5)
EOSINOPHIL NFR BLD: 1 % (ref 0–5)
EOSINOPHIL NFR BLD: 3 % (ref 0–5)
EPITH CASTS URNS QL MICRO: ABNORMAL /LPF (ref 0–5)
ERYTHROCYTE [DISTWIDTH] IN BLOOD BY AUTOMATED COUNT: 12.4 % (ref 11.6–14.5)
ERYTHROCYTE [DISTWIDTH] IN BLOOD BY AUTOMATED COUNT: 12.5 % (ref 11.6–14.5)
ERYTHROCYTE [DISTWIDTH] IN BLOOD BY AUTOMATED COUNT: 12.9 % (ref 11.6–14.5)
ERYTHROCYTE [DISTWIDTH] IN BLOOD BY AUTOMATED COUNT: 12.9 % (ref 11.6–14.5)
ERYTHROCYTE [DISTWIDTH] IN BLOOD BY AUTOMATED COUNT: 13 % (ref 11.6–14.5)
ERYTHROCYTE [DISTWIDTH] IN BLOOD BY AUTOMATED COUNT: 13.1 % (ref 11.6–14.5)
ERYTHROCYTE [DISTWIDTH] IN BLOOD BY AUTOMATED COUNT: 13.2 % (ref 11.6–14.5)
ERYTHROCYTE [DISTWIDTH] IN BLOOD BY AUTOMATED COUNT: 13.2 % (ref 11.6–14.5)
FLUAV H1 2009 PAND RNA SPEC QL NAA+PROBE: NOT DETECTED
FLUAV H1 RNA SPEC QL NAA+PROBE: NOT DETECTED
FLUAV H3 RNA SPEC QL NAA+PROBE: NOT DETECTED
FLUAV SUBTYP SPEC NAA+PROBE: NOT DETECTED
FLUBV RNA SPEC QL NAA+PROBE: NOT DETECTED
FOLATE SERPL-MCNC: 15.5 NG/ML (ref 3.1–17.5)
GAS FLOW.O2 O2 DELIVERY SYS: ABNORMAL L/MIN
GAS FLOW.O2 SETTING OXYMISER: 18 BPM
GAS FLOW.O2 SETTING OXYMISER: 20 BPM
GLOBULIN SER CALC-MCNC: 3.9 G/DL (ref 2–4)
GLOBULIN SER CALC-MCNC: 4.1 G/DL (ref 2–4)
GLOBULIN SER CALC-MCNC: 4.3 G/DL (ref 2–4)
GLOBULIN SER CALC-MCNC: 4.5 G/DL (ref 2–4)
GLOBULIN SER CALC-MCNC: 4.8 G/DL (ref 2–4)
GLOBULIN SER CALC-MCNC: 5 G/DL (ref 2–4)
GLOBULIN SER CALC-MCNC: 5.2 G/DL (ref 2–4)
GLUCOSE BLD STRIP.AUTO-MCNC: 100 MG/DL (ref 70–110)
GLUCOSE BLD STRIP.AUTO-MCNC: 106 MG/DL (ref 70–110)
GLUCOSE BLD STRIP.AUTO-MCNC: 112 MG/DL (ref 70–110)
GLUCOSE BLD STRIP.AUTO-MCNC: 112 MG/DL (ref 70–110)
GLUCOSE BLD STRIP.AUTO-MCNC: 118 MG/DL (ref 70–110)
GLUCOSE BLD STRIP.AUTO-MCNC: 124 MG/DL (ref 70–110)
GLUCOSE BLD STRIP.AUTO-MCNC: 126 MG/DL (ref 70–110)
GLUCOSE BLD STRIP.AUTO-MCNC: 67 MG/DL (ref 70–110)
GLUCOSE BLD STRIP.AUTO-MCNC: 77 MG/DL (ref 70–110)
GLUCOSE BLD STRIP.AUTO-MCNC: 77 MG/DL (ref 70–110)
GLUCOSE BLD STRIP.AUTO-MCNC: 79 MG/DL (ref 70–110)
GLUCOSE BLD STRIP.AUTO-MCNC: 80 MG/DL (ref 70–110)
GLUCOSE BLD STRIP.AUTO-MCNC: 81 MG/DL (ref 70–110)
GLUCOSE BLD STRIP.AUTO-MCNC: 82 MG/DL (ref 70–110)
GLUCOSE BLD STRIP.AUTO-MCNC: 82 MG/DL (ref 70–110)
GLUCOSE BLD STRIP.AUTO-MCNC: 84 MG/DL (ref 70–110)
GLUCOSE BLD STRIP.AUTO-MCNC: 84 MG/DL (ref 70–110)
GLUCOSE BLD STRIP.AUTO-MCNC: 85 MG/DL (ref 70–110)
GLUCOSE BLD STRIP.AUTO-MCNC: 85 MG/DL (ref 70–110)
GLUCOSE BLD STRIP.AUTO-MCNC: 86 MG/DL (ref 70–110)
GLUCOSE BLD STRIP.AUTO-MCNC: 88 MG/DL (ref 70–110)
GLUCOSE BLD STRIP.AUTO-MCNC: 89 MG/DL (ref 70–110)
GLUCOSE BLD STRIP.AUTO-MCNC: 90 MG/DL (ref 70–110)
GLUCOSE BLD STRIP.AUTO-MCNC: 91 MG/DL (ref 70–110)
GLUCOSE BLD STRIP.AUTO-MCNC: 91 MG/DL (ref 70–110)
GLUCOSE BLD STRIP.AUTO-MCNC: 92 MG/DL (ref 70–110)
GLUCOSE BLD STRIP.AUTO-MCNC: 93 MG/DL (ref 70–110)
GLUCOSE BLD STRIP.AUTO-MCNC: 94 MG/DL (ref 70–110)
GLUCOSE BLD STRIP.AUTO-MCNC: 95 MG/DL (ref 70–110)
GLUCOSE BLD STRIP.AUTO-MCNC: 96 MG/DL (ref 70–110)
GLUCOSE BLD STRIP.AUTO-MCNC: 99 MG/DL (ref 70–110)
GLUCOSE SERPL-MCNC: 101 MG/DL (ref 74–99)
GLUCOSE SERPL-MCNC: 102 MG/DL (ref 74–99)
GLUCOSE SERPL-MCNC: 104 MG/DL (ref 74–99)
GLUCOSE SERPL-MCNC: 105 MG/DL (ref 74–99)
GLUCOSE SERPL-MCNC: 113 MG/DL (ref 74–99)
GLUCOSE SERPL-MCNC: 81 MG/DL (ref 74–99)
GLUCOSE SERPL-MCNC: 84 MG/DL (ref 74–99)
GLUCOSE SERPL-MCNC: 95 MG/DL (ref 74–99)
GLUCOSE SERPL-MCNC: 96 MG/DL (ref 74–99)
GLUCOSE SERPL-MCNC: 96 MG/DL (ref 74–99)
GLUCOSE SERPL-MCNC: 99 MG/DL (ref 74–99)
GLUCOSE UR STRIP.AUTO-MCNC: NEGATIVE MG/DL
GRAM STN SPEC: NORMAL
HADV DNA SPEC QL NAA+PROBE: NOT DETECTED
HCO3 BLD-SCNC: 25.1 MMOL/L (ref 22–26)
HCO3 BLD-SCNC: 25.5 MMOL/L (ref 22–26)
HCO3 BLD-SCNC: 26.8 MMOL/L (ref 22–26)
HCOV 229E RNA SPEC QL NAA+PROBE: NOT DETECTED
HCOV HKU1 RNA SPEC QL NAA+PROBE: NOT DETECTED
HCOV NL63 RNA SPEC QL NAA+PROBE: NOT DETECTED
HCOV OC43 RNA SPEC QL NAA+PROBE: NOT DETECTED
HCT VFR BLD AUTO: 35.8 % (ref 36–48)
HCT VFR BLD AUTO: 36.3 % (ref 36–48)
HCT VFR BLD AUTO: 37.6 % (ref 36–48)
HCT VFR BLD AUTO: 37.9 % (ref 36–48)
HCT VFR BLD AUTO: 37.9 % (ref 36–48)
HCT VFR BLD AUTO: 42.1 % (ref 36–48)
HCT VFR BLD AUTO: 42.3 % (ref 36–48)
HCT VFR BLD AUTO: 42.5 % (ref 36–48)
HCT VFR BLD AUTO: 43.5 % (ref 36–48)
HCT VFR BLD AUTO: 44.2 % (ref 36–48)
HCT VFR BLD AUTO: 49.7 % (ref 36–48)
HDSCOM,HDSCOM: NORMAL
HGB BLD-MCNC: 11.7 G/DL (ref 13–16)
HGB BLD-MCNC: 12.1 G/DL (ref 13–16)
HGB BLD-MCNC: 12.4 G/DL (ref 13–16)
HGB BLD-MCNC: 12.4 G/DL (ref 13–16)
HGB BLD-MCNC: 12.6 G/DL (ref 13–16)
HGB BLD-MCNC: 13.9 G/DL (ref 13–16)
HGB BLD-MCNC: 13.9 G/DL (ref 13–16)
HGB BLD-MCNC: 14 G/DL (ref 13–16)
HGB BLD-MCNC: 14.2 G/DL (ref 13–16)
HGB BLD-MCNC: 14.8 G/DL (ref 13–16)
HGB BLD-MCNC: 16.5 G/DL (ref 13–16)
HGB UR QL STRIP: ABNORMAL
HMPV RNA SPEC QL NAA+PROBE: NOT DETECTED
HPIV1 RNA SPEC QL NAA+PROBE: NOT DETECTED
HPIV2 RNA SPEC QL NAA+PROBE: NOT DETECTED
HPIV3 RNA SPEC QL NAA+PROBE: DETECTED
HPIV4 RNA SPEC QL NAA+PROBE: NOT DETECTED
IMM GRANULOCYTES # BLD AUTO: 0 K/UL
IMM GRANULOCYTES # BLD AUTO: 0 K/UL (ref 0–0.04)
IMM GRANULOCYTES # BLD AUTO: 0.1 K/UL (ref 0–0.04)
IMM GRANULOCYTES NFR BLD AUTO: 0 %
IMM GRANULOCYTES NFR BLD AUTO: 0 % (ref 0–0.5)
IMM GRANULOCYTES NFR BLD AUTO: 2 % (ref 0–0.5)
KETONES UR QL STRIP.AUTO: ABNORMAL MG/DL
LACTATE BLD-SCNC: 1.43 MMOL/L (ref 0.4–2)
LACTATE SERPL-SCNC: 1.4 MMOL/L (ref 0.4–2)
LEUKOCYTE ESTERASE UR QL STRIP.AUTO: NEGATIVE
LIPASE SERPL-CCNC: 82 U/L (ref 73–393)
LYMPHOCYTES # BLD: 0.4 K/UL (ref 0.9–3.6)
LYMPHOCYTES # BLD: 0.7 K/UL (ref 0.9–3.6)
LYMPHOCYTES # BLD: 0.9 K/UL (ref 0.9–3.6)
LYMPHOCYTES # BLD: 1.1 K/UL (ref 0.9–3.6)
LYMPHOCYTES # BLD: 1.2 K/UL (ref 0.9–3.6)
LYMPHOCYTES # BLD: 1.3 K/UL (ref 0.9–3.6)
LYMPHOCYTES # BLD: 1.4 K/UL (ref 0.9–3.6)
LYMPHOCYTES NFR BLD: 12 % (ref 21–52)
LYMPHOCYTES NFR BLD: 13 % (ref 21–52)
LYMPHOCYTES NFR BLD: 16 % (ref 21–52)
LYMPHOCYTES NFR BLD: 18 % (ref 21–52)
LYMPHOCYTES NFR BLD: 22 % (ref 21–52)
LYMPHOCYTES NFR BLD: 6 % (ref 21–52)
LYMPHOCYTES NFR BLD: 8 % (ref 21–52)
M PNEUMO DNA SPEC QL NAA+PROBE: NOT DETECTED
MAGNESIUM SERPL-MCNC: 2.2 MG/DL (ref 1.6–2.6)
MAGNESIUM SERPL-MCNC: 2.3 MG/DL (ref 1.6–2.6)
MAGNESIUM SERPL-MCNC: 2.4 MG/DL (ref 1.6–2.6)
MCH RBC QN AUTO: 30.3 PG (ref 24–34)
MCH RBC QN AUTO: 30.4 PG (ref 24–34)
MCH RBC QN AUTO: 30.5 PG (ref 24–34)
MCH RBC QN AUTO: 30.6 PG (ref 24–34)
MCH RBC QN AUTO: 30.6 PG (ref 24–34)
MCH RBC QN AUTO: 30.8 PG (ref 24–34)
MCH RBC QN AUTO: 30.9 PG (ref 24–34)
MCHC RBC AUTO-ENTMCNC: 32 G/DL (ref 31–37)
MCHC RBC AUTO-ENTMCNC: 32.7 G/DL (ref 31–37)
MCHC RBC AUTO-ENTMCNC: 32.7 G/DL (ref 31–37)
MCHC RBC AUTO-ENTMCNC: 33 G/DL (ref 31–37)
MCHC RBC AUTO-ENTMCNC: 33 G/DL (ref 31–37)
MCHC RBC AUTO-ENTMCNC: 33.1 G/DL (ref 31–37)
MCHC RBC AUTO-ENTMCNC: 33.2 G/DL (ref 31–37)
MCHC RBC AUTO-ENTMCNC: 33.2 G/DL (ref 31–37)
MCHC RBC AUTO-ENTMCNC: 33.3 G/DL (ref 31–37)
MCHC RBC AUTO-ENTMCNC: 33.4 G/DL (ref 31–37)
MCHC RBC AUTO-ENTMCNC: 33.5 G/DL (ref 31–37)
MCV RBC AUTO: 90.9 FL (ref 78–100)
MCV RBC AUTO: 91.5 FL (ref 78–100)
MCV RBC AUTO: 91.6 FL (ref 78–100)
MCV RBC AUTO: 91.6 FL (ref 78–100)
MCV RBC AUTO: 92 FL (ref 78–100)
MCV RBC AUTO: 92.2 FL (ref 78–100)
MCV RBC AUTO: 92.8 FL (ref 78–100)
MCV RBC AUTO: 92.9 FL (ref 78–100)
MCV RBC AUTO: 93.2 FL (ref 78–100)
MCV RBC AUTO: 93.3 FL (ref 78–100)
MCV RBC AUTO: 95.4 FL (ref 78–100)
METHADONE UR QL: NEGATIVE
MONOCYTES # BLD: 0.7 K/UL (ref 0.05–1.2)
MONOCYTES # BLD: 1 K/UL (ref 0.05–1.2)
MONOCYTES # BLD: 1.1 K/UL (ref 0.05–1.2)
MONOCYTES # BLD: 1.2 K/UL (ref 0.05–1.2)
MONOCYTES # BLD: 1.3 K/UL (ref 0.05–1.2)
MONOCYTES NFR BLD: 12 % (ref 3–10)
MONOCYTES NFR BLD: 12 % (ref 3–10)
MONOCYTES NFR BLD: 13 % (ref 3–10)
MONOCYTES NFR BLD: 15 % (ref 3–10)
MONOCYTES NFR BLD: 19 % (ref 3–10)
MUCOUS THREADS URNS QL MICRO: ABNORMAL /LPF
NEUTS BAND NFR BLD MANUAL: 15 % (ref 0–5)
NEUTS SEG # BLD: 3.4 K/UL (ref 1.8–8)
NEUTS SEG # BLD: 3.7 K/UL (ref 1.8–8)
NEUTS SEG # BLD: 5.1 K/UL (ref 1.8–8)
NEUTS SEG # BLD: 5.3 K/UL (ref 1.8–8)
NEUTS SEG # BLD: 5.5 K/UL (ref 1.8–8)
NEUTS SEG # BLD: 6.8 K/UL (ref 1.8–8)
NEUTS SEG # BLD: 8.6 K/UL (ref 1.8–8)
NEUTS SEG NFR BLD: 64 % (ref 40–73)
NEUTS SEG NFR BLD: 64 % (ref 40–73)
NEUTS SEG NFR BLD: 67 % (ref 40–73)
NEUTS SEG NFR BLD: 74 % (ref 40–73)
NEUTS SEG NFR BLD: 80 % (ref 40–73)
NITRITE UR QL STRIP.AUTO: NEGATIVE
NRBC # BLD: 0 K/UL (ref 0–0.01)
NRBC BLD-RTO: 0 PER 100 WBC
O2/TOTAL GAS SETTING VFR VENT: 100 %
O2/TOTAL GAS SETTING VFR VENT: 2 %
O2/TOTAL GAS SETTING VFR VENT: 30 %
OPIATES UR QL: NEGATIVE
P-R INTERVAL, ECG05: 162 MS
PAW @ MEAN EXP FLOW ON VENT: 11 CMH2O
PCO2 BLD: 30.2 MMHG (ref 35–45)
PCO2 BLD: 36.1 MMHG (ref 35–45)
PCO2 BLD: 48.5 MMHG (ref 35–45)
PCP UR QL: NEGATIVE
PEEP RESPIRATORY: 5 CMH2O
PH BLD: 7.35 [PH] (ref 7.35–7.45)
PH BLD: 7.46 [PH] (ref 7.35–7.45)
PH BLD: 7.53 [PH] (ref 7.35–7.45)
PH BLDV: 7.36 [PH] (ref 7.32–7.42)
PH UR STRIP: 6.5 [PH] (ref 5–8)
PHOSPHATE SERPL-MCNC: 1 MG/DL (ref 2.5–4.9)
PHOSPHATE SERPL-MCNC: 2.6 MG/DL (ref 2.5–4.9)
PHOSPHATE SERPL-MCNC: 2.7 MG/DL (ref 2.5–4.9)
PHOSPHATE SERPL-MCNC: 2.9 MG/DL (ref 2.5–4.9)
PHOSPHATE SERPL-MCNC: 2.9 MG/DL (ref 2.5–4.9)
PHOSPHATE SERPL-MCNC: 3.3 MG/DL (ref 2.5–4.9)
PHOSPHATE SERPL-MCNC: 3.6 MG/DL (ref 2.5–4.9)
PHOSPHATE SERPL-MCNC: 5 MG/DL (ref 2.5–4.9)
PIP ISTAT,IPIP: 19
PLATELET # BLD AUTO: 124 K/UL (ref 135–420)
PLATELET # BLD AUTO: 143 K/UL (ref 135–420)
PLATELET # BLD AUTO: 151 K/UL (ref 135–420)
PLATELET # BLD AUTO: 158 K/UL (ref 135–420)
PLATELET # BLD AUTO: 163 K/UL (ref 135–420)
PLATELET # BLD AUTO: 185 K/UL (ref 135–420)
PLATELET # BLD AUTO: 201 K/UL (ref 135–420)
PLATELET # BLD AUTO: 202 K/UL (ref 135–420)
PLATELET # BLD AUTO: 250 K/UL (ref 135–420)
PLATELET # BLD AUTO: 394 K/UL (ref 135–420)
PLATELET # BLD AUTO: ABNORMAL K/UL (ref 135–420)
PLATELET COMMENTS,PCOM: ABNORMAL
PLATELET COMMENTS,PCOM: ABNORMAL
PMV BLD AUTO: 10 FL (ref 9.2–11.8)
PMV BLD AUTO: 10.4 FL (ref 9.2–11.8)
PMV BLD AUTO: 10.9 FL (ref 9.2–11.8)
PMV BLD AUTO: 12.9 FL (ref 9.2–11.8)
PMV BLD AUTO: 9.4 FL (ref 9.2–11.8)
PMV BLD AUTO: 9.5 FL (ref 9.2–11.8)
PMV BLD AUTO: 9.5 FL (ref 9.2–11.8)
PMV BLD AUTO: 9.6 FL (ref 9.2–11.8)
PMV BLD AUTO: 9.9 FL (ref 9.2–11.8)
PO2 BLD: 350 MMHG (ref 80–100)
PO2 BLD: 85 MMHG (ref 80–100)
PO2 BLD: 87 MMHG (ref 80–100)
POTASSIUM SERPL-SCNC: 2.9 MMOL/L (ref 3.5–5.5)
POTASSIUM SERPL-SCNC: 3.7 MMOL/L (ref 3.5–5.5)
POTASSIUM SERPL-SCNC: 3.8 MMOL/L (ref 3.5–5.5)
POTASSIUM SERPL-SCNC: 3.8 MMOL/L (ref 3.5–5.5)
POTASSIUM SERPL-SCNC: 3.9 MMOL/L (ref 3.5–5.5)
POTASSIUM SERPL-SCNC: 4 MMOL/L (ref 3.5–5.5)
POTASSIUM SERPL-SCNC: 4.1 MMOL/L (ref 3.5–5.5)
POTASSIUM SERPL-SCNC: 4.2 MMOL/L (ref 3.5–5.5)
POTASSIUM SERPL-SCNC: 4.3 MMOL/L (ref 3.5–5.5)
POTASSIUM SERPL-SCNC: 4.5 MMOL/L (ref 3.5–5.5)
POTASSIUM SERPL-SCNC: 5 MMOL/L (ref 3.5–5.5)
PROCALCITONIN SERPL-MCNC: 0.45 NG/ML
PROT SERPL-MCNC: 6.1 G/DL (ref 6.4–8.2)
PROT SERPL-MCNC: 6.3 G/DL (ref 6.4–8.2)
PROT SERPL-MCNC: 6.5 G/DL (ref 6.4–8.2)
PROT SERPL-MCNC: 6.8 G/DL (ref 6.4–8.2)
PROT SERPL-MCNC: 7.1 G/DL (ref 6.4–8.2)
PROT SERPL-MCNC: 7.3 G/DL (ref 6.4–8.2)
PROT SERPL-MCNC: 8.3 G/DL (ref 6.4–8.2)
PROT UR STRIP-MCNC: 30 MG/DL
Q-T INTERVAL, ECG07: 318 MS
QRS DURATION, ECG06: 92 MS
QTC CALCULATION (BEZET), ECG08: 395 MS
RBC # BLD AUTO: 3.84 M/UL (ref 4.35–5.65)
RBC # BLD AUTO: 3.91 M/UL (ref 4.35–5.65)
RBC # BLD AUTO: 4.08 M/UL (ref 4.35–5.65)
RBC # BLD AUTO: 4.08 M/UL (ref 4.35–5.65)
RBC # BLD AUTO: 4.14 M/UL (ref 4.35–5.65)
RBC # BLD AUTO: 4.51 M/UL (ref 4.35–5.65)
RBC # BLD AUTO: 4.56 M/UL (ref 4.35–5.65)
RBC # BLD AUTO: 4.62 M/UL (ref 4.35–5.65)
RBC # BLD AUTO: 4.64 M/UL (ref 4.35–5.65)
RBC # BLD AUTO: 4.86 M/UL (ref 4.35–5.65)
RBC # BLD AUTO: 5.4 M/UL (ref 4.35–5.65)
RBC #/AREA URNS HPF: ABNORMAL /HPF (ref 0–5)
RBC MORPH BLD: ABNORMAL
RBC MORPH BLD: ABNORMAL
RSV RNA SPEC QL NAA+PROBE: NOT DETECTED
RV+EV RNA SPEC QL NAA+PROBE: NOT DETECTED
SALICYLATES SERPL-MCNC: <1.7 MG/DL (ref 2.8–20)
SAO2 % BLD: 97 % (ref 92–97)
SAO2 % BLD: 97.8 % (ref 92–97)
SAO2 % BLD: 99.9 % (ref 92–97)
SARS-COV-2 PCR, COVPCR: NOT DETECTED
SERVICE CMNT-IMP: ABNORMAL
SERVICE CMNT-IMP: NORMAL
SODIUM SERPL-SCNC: 124 MMOL/L (ref 136–145)
SODIUM SERPL-SCNC: 131 MMOL/L (ref 136–145)
SODIUM SERPL-SCNC: 135 MMOL/L (ref 136–145)
SODIUM SERPL-SCNC: 136 MMOL/L (ref 136–145)
SODIUM SERPL-SCNC: 137 MMOL/L (ref 136–145)
SODIUM SERPL-SCNC: 139 MMOL/L (ref 136–145)
SODIUM SERPL-SCNC: 140 MMOL/L (ref 136–145)
SODIUM SERPL-SCNC: 142 MMOL/L (ref 136–145)
SODIUM SERPL-SCNC: 142 MMOL/L (ref 136–145)
SP GR UR REFRACTOMETRY: 1.03 (ref 1–1.03)
SPECIMEN SOURCE: ABNORMAL
SPECIMEN TYPE: ABNORMAL
TOTAL RESP. RATE, ITRR: 18
TOTAL RESP. RATE, ITRR: 23
TSH SERPL DL<=0.05 MIU/L-ACNC: 1.22 UIU/ML (ref 0.36–3.74)
UROBILINOGEN UR QL STRIP.AUTO: 1 EU/DL (ref 0.2–1)
VALPROATE SERPL-MCNC: 26 UG/ML (ref 50–100)
VALPROATE SERPL-MCNC: 35 UG/ML (ref 50–100)
VENTILATION MODE VENT: ABNORMAL
VENTILATION MODE VENT: ABNORMAL
VENTRICULAR RATE, ECG03: 93 BPM
VIT B12 SERPL-MCNC: 727 PG/ML (ref 211–911)
VT SETTING VENT: 450 ML
VT SETTING VENT: 640 ML
WBC # BLD AUTO: 10.8 K/UL (ref 4.6–13.2)
WBC # BLD AUTO: 12 K/UL (ref 4.6–13.2)
WBC # BLD AUTO: 5.4 K/UL (ref 4.6–13.2)
WBC # BLD AUTO: 5.5 K/UL (ref 4.6–13.2)
WBC # BLD AUTO: 6.7 K/UL (ref 4.6–13.2)
WBC # BLD AUTO: 7.5 K/UL (ref 4.6–13.2)
WBC # BLD AUTO: 7.7 K/UL (ref 4.6–13.2)
WBC # BLD AUTO: 7.8 K/UL (ref 4.6–13.2)
WBC # BLD AUTO: 8.2 K/UL (ref 4.6–13.2)
WBC # BLD AUTO: 9.1 K/UL (ref 4.6–13.2)
WBC # BLD AUTO: 9.2 K/UL (ref 4.6–13.2)
WBC URNS QL MICRO: ABNORMAL /HPF (ref 0–4)

## 2022-01-01 PROCEDURE — 74011250636 HC RX REV CODE- 250/636: Performed by: STUDENT IN AN ORGANIZED HEALTH CARE EDUCATION/TRAINING PROGRAM

## 2022-01-01 PROCEDURE — 99231 SBSQ HOSP IP/OBS SF/LOW 25: CPT | Performed by: INTERNAL MEDICINE

## 2022-01-01 PROCEDURE — 99232 SBSQ HOSP IP/OBS MODERATE 35: CPT | Performed by: INTERNAL MEDICINE

## 2022-01-01 PROCEDURE — 74011250637 HC RX REV CODE- 250/637: Performed by: INTERNAL MEDICINE

## 2022-01-01 PROCEDURE — 74011000258 HC RX REV CODE- 258: Performed by: STUDENT IN AN ORGANIZED HEALTH CARE EDUCATION/TRAINING PROGRAM

## 2022-01-01 PROCEDURE — 74230 X-RAY XM SWLNG FUNCJ C+: CPT

## 2022-01-01 PROCEDURE — 94002 VENT MGMT INPAT INIT DAY: CPT

## 2022-01-01 PROCEDURE — 80307 DRUG TEST PRSMV CHEM ANLYZR: CPT

## 2022-01-01 PROCEDURE — 3E0G76Z INTRODUCTION OF NUTRITIONAL SUBSTANCE INTO UPPER GI, VIA NATURAL OR ARTIFICIAL OPENING: ICD-10-PCS | Performed by: INTERNAL MEDICINE

## 2022-01-01 PROCEDURE — 84100 ASSAY OF PHOSPHORUS: CPT

## 2022-01-01 PROCEDURE — 74011250637 HC RX REV CODE- 250/637: Performed by: STUDENT IN AN ORGANIZED HEALTH CARE EDUCATION/TRAINING PROGRAM

## 2022-01-01 PROCEDURE — 92526 ORAL FUNCTION THERAPY: CPT

## 2022-01-01 PROCEDURE — 74011250637 HC RX REV CODE- 250/637: Performed by: REGISTERED NURSE

## 2022-01-01 PROCEDURE — 65270000046 HC RM TELEMETRY

## 2022-01-01 PROCEDURE — 36592 COLLECT BLOOD FROM PICC: CPT

## 2022-01-01 PROCEDURE — 99291 CRITICAL CARE FIRST HOUR: CPT | Performed by: INTERNAL MEDICINE

## 2022-01-01 PROCEDURE — 77010033678 HC OXYGEN DAILY

## 2022-01-01 PROCEDURE — 77030041445 HC PENCL ELECT SMK EVAC STRY -B: Performed by: COLON & RECTAL SURGERY

## 2022-01-01 PROCEDURE — 0BH17EZ INSERTION OF ENDOTRACHEAL AIRWAY INTO TRACHEA, VIA NATURAL OR ARTIFICIAL OPENING: ICD-10-PCS | Performed by: NURSE ANESTHETIST, CERTIFIED REGISTERED

## 2022-01-01 PROCEDURE — 87070 CULTURE OTHR SPECIMN AEROBIC: CPT

## 2022-01-01 PROCEDURE — 2709999900 HC NON-CHARGEABLE SUPPLY

## 2022-01-01 PROCEDURE — 65660000004 HC RM CVT STEPDOWN

## 2022-01-01 PROCEDURE — 74011250636 HC RX REV CODE- 250/636: Performed by: REGISTERED NURSE

## 2022-01-01 PROCEDURE — 74011250637 HC RX REV CODE- 250/637: Performed by: EMERGENCY MEDICINE

## 2022-01-01 PROCEDURE — 99233 SBSQ HOSP IP/OBS HIGH 50: CPT | Performed by: FAMILY MEDICINE

## 2022-01-01 PROCEDURE — 74011250636 HC RX REV CODE- 250/636: Performed by: FAMILY MEDICINE

## 2022-01-01 PROCEDURE — 80053 COMPREHEN METABOLIC PANEL: CPT

## 2022-01-01 PROCEDURE — 99232 SBSQ HOSP IP/OBS MODERATE 35: CPT | Performed by: EMERGENCY MEDICINE

## 2022-01-01 PROCEDURE — 36415 COLL VENOUS BLD VENIPUNCTURE: CPT

## 2022-01-01 PROCEDURE — 87040 BLOOD CULTURE FOR BACTERIA: CPT

## 2022-01-01 PROCEDURE — 99233 SBSQ HOSP IP/OBS HIGH 50: CPT | Performed by: INTERNAL MEDICINE

## 2022-01-01 PROCEDURE — 74011250636 HC RX REV CODE- 250/636: Performed by: EMERGENCY MEDICINE

## 2022-01-01 PROCEDURE — 82140 ASSAY OF AMMONIA: CPT

## 2022-01-01 PROCEDURE — 85025 COMPLETE CBC W/AUTO DIFF WBC: CPT

## 2022-01-01 PROCEDURE — 99232 SBSQ HOSP IP/OBS MODERATE 35: CPT | Performed by: HOSPITALIST

## 2022-01-01 PROCEDURE — 92611 MOTION FLUOROSCOPY/SWALLOW: CPT

## 2022-01-01 PROCEDURE — 65610000006 HC RM INTENSIVE CARE

## 2022-01-01 PROCEDURE — 97530 THERAPEUTIC ACTIVITIES: CPT

## 2022-01-01 PROCEDURE — 74011000250 HC RX REV CODE- 250: Performed by: INTERNAL MEDICINE

## 2022-01-01 PROCEDURE — 0DH67UZ INSERTION OF FEEDING DEVICE INTO STOMACH, VIA NATURAL OR ARTIFICIAL OPENING: ICD-10-PCS | Performed by: RADIOLOGY

## 2022-01-01 PROCEDURE — 80048 BASIC METABOLIC PNL TOTAL CA: CPT

## 2022-01-01 PROCEDURE — 93971 EXTREMITY STUDY: CPT

## 2022-01-01 PROCEDURE — 74011250636 HC RX REV CODE- 250/636: Performed by: NURSE ANESTHETIST, CERTIFIED REGISTERED

## 2022-01-01 PROCEDURE — 74011250636 HC RX REV CODE- 250/636: Performed by: INTERNAL MEDICINE

## 2022-01-01 PROCEDURE — 80179 DRUG ASSAY SALICYLATE: CPT

## 2022-01-01 PROCEDURE — 99231 SBSQ HOSP IP/OBS SF/LOW 25: CPT | Performed by: HOSPITALIST

## 2022-01-01 PROCEDURE — 76060000032 HC ANESTHESIA 0.5 TO 1 HR: Performed by: COLON & RECTAL SURGERY

## 2022-01-01 PROCEDURE — 36600 WITHDRAWAL OF ARTERIAL BLOOD: CPT

## 2022-01-01 PROCEDURE — 74018 RADEX ABDOMEN 1 VIEW: CPT

## 2022-01-01 PROCEDURE — 97110 THERAPEUTIC EXERCISES: CPT

## 2022-01-01 PROCEDURE — 92610 EVALUATE SWALLOWING FUNCTION: CPT

## 2022-01-01 PROCEDURE — 76705 ECHO EXAM OF ABDOMEN: CPT

## 2022-01-01 PROCEDURE — 2709999900 HC NON-CHARGEABLE SUPPLY: Performed by: COLON & RECTAL SURGERY

## 2022-01-01 PROCEDURE — APPSS45 APP SPLIT SHARED TIME 31-45 MINUTES: Performed by: NURSE PRACTITIONER

## 2022-01-01 PROCEDURE — 94762 N-INVAS EAR/PLS OXIMTRY CONT: CPT

## 2022-01-01 PROCEDURE — 82962 GLUCOSE BLOOD TEST: CPT

## 2022-01-01 PROCEDURE — 97164 PT RE-EVAL EST PLAN CARE: CPT

## 2022-01-01 PROCEDURE — P9045 ALBUMIN (HUMAN), 5%, 250 ML: HCPCS

## 2022-01-01 PROCEDURE — 77030040361 HC SLV COMPR DVT MDII -B: Performed by: COLON & RECTAL SURGERY

## 2022-01-01 PROCEDURE — 99024 POSTOP FOLLOW-UP VISIT: CPT | Performed by: COLON & RECTAL SURGERY

## 2022-01-01 PROCEDURE — 5A1945Z RESPIRATORY VENTILATION, 24-96 CONSECUTIVE HOURS: ICD-10-PCS | Performed by: NURSE ANESTHETIST, CERTIFIED REGISTERED

## 2022-01-01 PROCEDURE — 85027 COMPLETE CBC AUTOMATED: CPT

## 2022-01-01 PROCEDURE — 94761 N-INVAS EAR/PLS OXIMETRY MLT: CPT

## 2022-01-01 PROCEDURE — 94640 AIRWAY INHALATION TREATMENT: CPT

## 2022-01-01 PROCEDURE — 74011000258 HC RX REV CODE- 258: Performed by: INTERNAL MEDICINE

## 2022-01-01 PROCEDURE — 99223 1ST HOSP IP/OBS HIGH 75: CPT | Performed by: STUDENT IN AN ORGANIZED HEALTH CARE EDUCATION/TRAINING PROGRAM

## 2022-01-01 PROCEDURE — 76210000063 HC OR PH I REC FIRST 0.5 HR: Performed by: COLON & RECTAL SURGERY

## 2022-01-01 PROCEDURE — 31500 INSERT EMERGENCY AIRWAY: CPT

## 2022-01-01 PROCEDURE — APPSS30 APP SPLIT SHARED TIME 16-30 MINUTES: Performed by: REGISTERED NURSE

## 2022-01-01 PROCEDURE — 74011000250 HC RX REV CODE- 250: Performed by: FAMILY MEDICINE

## 2022-01-01 PROCEDURE — 97167 OT EVAL HIGH COMPLEX 60 MIN: CPT

## 2022-01-01 PROCEDURE — 83735 ASSAY OF MAGNESIUM: CPT

## 2022-01-01 PROCEDURE — 83605 ASSAY OF LACTIC ACID: CPT

## 2022-01-01 PROCEDURE — 74011250636 HC RX REV CODE- 250/636

## 2022-01-01 PROCEDURE — 82803 BLOOD GASES ANY COMBINATION: CPT

## 2022-01-01 PROCEDURE — 74011000258 HC RX REV CODE- 258: Performed by: NURSE PRACTITIONER

## 2022-01-01 PROCEDURE — 74011250636 HC RX REV CODE- 250/636: Performed by: COLON & RECTAL SURGERY

## 2022-01-01 PROCEDURE — 94669 MECHANICAL CHEST WALL OSCILL: CPT

## 2022-01-01 PROCEDURE — 97535 SELF CARE MNGMENT TRAINING: CPT

## 2022-01-01 PROCEDURE — 74011000250 HC RX REV CODE- 250: Performed by: PHYSICIAN ASSISTANT

## 2022-01-01 PROCEDURE — 70450 CT HEAD/BRAIN W/O DYE: CPT

## 2022-01-01 PROCEDURE — 87086 URINE CULTURE/COLONY COUNT: CPT

## 2022-01-01 PROCEDURE — 82800 BLOOD PH: CPT

## 2022-01-01 PROCEDURE — 74011000250 HC RX REV CODE- 250: Performed by: NURSE ANESTHETIST, CERTIFIED REGISTERED

## 2022-01-01 PROCEDURE — 74011000250 HC RX REV CODE- 250: Performed by: REGISTERED NURSE

## 2022-01-01 PROCEDURE — 46922 EXCISION OF ANAL LESION(S): CPT | Performed by: COLON & RECTAL SURGERY

## 2022-01-01 PROCEDURE — 71045 X-RAY EXAM CHEST 1 VIEW: CPT

## 2022-01-01 PROCEDURE — 94760 N-INVAS EAR/PLS OXIMETRY 1: CPT

## 2022-01-01 PROCEDURE — 74011250636 HC RX REV CODE- 250/636: Performed by: PHYSICIAN ASSISTANT

## 2022-01-01 PROCEDURE — 74011000250 HC RX REV CODE- 250: Performed by: HOSPITALIST

## 2022-01-01 PROCEDURE — 87635 SARS-COV-2 COVID-19 AMP PRB: CPT

## 2022-01-01 PROCEDURE — 84145 PROCALCITONIN (PCT): CPT

## 2022-01-01 PROCEDURE — 74011250637 HC RX REV CODE- 250/637: Performed by: PHYSICIAN ASSISTANT

## 2022-01-01 PROCEDURE — 76210000021 HC REC RM PH II 0.5 TO 1 HR: Performed by: COLON & RECTAL SURGERY

## 2022-01-01 PROCEDURE — 74011000250 HC RX REV CODE- 250: Performed by: STUDENT IN AN ORGANIZED HEALTH CARE EDUCATION/TRAINING PROGRAM

## 2022-01-01 PROCEDURE — 02HV33Z INSERTION OF INFUSION DEVICE INTO SUPERIOR VENA CAVA, PERCUTANEOUS APPROACH: ICD-10-PCS | Performed by: PHYSICIAN ASSISTANT

## 2022-01-01 PROCEDURE — 80164 ASSAY DIPROPYLACETIC ACD TOT: CPT

## 2022-01-01 PROCEDURE — 51798 US URINE CAPACITY MEASURE: CPT

## 2022-01-01 PROCEDURE — 43752 NASAL/OROGASTRIC W/TUBE PLMT: CPT

## 2022-01-01 PROCEDURE — 36555 INSERT NON-TUNNEL CV CATH: CPT | Performed by: PHYSICIAN ASSISTANT

## 2022-01-01 PROCEDURE — 93005 ELECTROCARDIOGRAM TRACING: CPT

## 2022-01-01 PROCEDURE — 74011000250 HC RX REV CODE- 250: Performed by: COLON & RECTAL SURGERY

## 2022-01-01 PROCEDURE — 74011000250 HC RX REV CODE- 250: Performed by: NURSE PRACTITIONER

## 2022-01-01 PROCEDURE — 0202U NFCT DS 22 TRGT SARS-COV-2: CPT

## 2022-01-01 PROCEDURE — 82330 ASSAY OF CALCIUM: CPT

## 2022-01-01 PROCEDURE — 77030040392 HC DRSG OPTIFOAM MDII -A

## 2022-01-01 PROCEDURE — 80143 DRUG ASSAY ACETAMINOPHEN: CPT

## 2022-01-01 PROCEDURE — 77030008771 HC TU NG SALEM SUMP -A

## 2022-01-01 PROCEDURE — 77030005513 HC CATH URETH FOL11 MDII -B

## 2022-01-01 PROCEDURE — 84443 ASSAY THYROID STIM HORMONE: CPT

## 2022-01-01 PROCEDURE — 96375 TX/PRO/DX INJ NEW DRUG ADDON: CPT

## 2022-01-01 PROCEDURE — 99285 EMERGENCY DEPT VISIT HI MDM: CPT

## 2022-01-01 PROCEDURE — 83690 ASSAY OF LIPASE: CPT

## 2022-01-01 PROCEDURE — 99221 1ST HOSP IP/OBS SF/LOW 40: CPT | Performed by: NURSE PRACTITIONER

## 2022-01-01 PROCEDURE — 76010000138 HC OR TIME 0.5 TO 1 HR: Performed by: COLON & RECTAL SURGERY

## 2022-01-01 PROCEDURE — 96365 THER/PROPH/DIAG IV INF INIT: CPT

## 2022-01-01 PROCEDURE — 97162 PT EVAL MOD COMPLEX 30 MIN: CPT

## 2022-01-01 PROCEDURE — 81001 URINALYSIS AUTO W/SCOPE: CPT

## 2022-01-01 PROCEDURE — 82607 VITAMIN B-12: CPT

## 2022-01-01 PROCEDURE — 94003 VENT MGMT INPAT SUBQ DAY: CPT

## 2022-01-01 PROCEDURE — 77030018842 HC SOL IRR SOD CL 9% BAXT -A

## 2022-01-01 RX ORDER — ACETAMINOPHEN 650 MG/1
650 SUPPOSITORY RECTAL
Status: DISCONTINUED | OUTPATIENT
Start: 2022-01-01 | End: 2022-01-01

## 2022-01-01 RX ORDER — ONDANSETRON 2 MG/ML
INJECTION INTRAMUSCULAR; INTRAVENOUS AS NEEDED
Status: DISCONTINUED | OUTPATIENT
Start: 2022-01-01 | End: 2022-01-01 | Stop reason: HOSPADM

## 2022-01-01 RX ORDER — HYDRALAZINE HYDROCHLORIDE 20 MG/ML
10 INJECTION INTRAMUSCULAR; INTRAVENOUS
Status: DISCONTINUED | OUTPATIENT
Start: 2022-01-01 | End: 2022-01-01 | Stop reason: HOSPADM

## 2022-01-01 RX ORDER — SODIUM CHLORIDE FOR INHALATION 3 %
4 VIAL, NEBULIZER (ML) INHALATION EVERY 4 HOURS
Status: DISCONTINUED | OUTPATIENT
Start: 2022-01-01 | End: 2022-01-01

## 2022-01-01 RX ORDER — ROCURONIUM BROMIDE 10 MG/ML
INJECTION, SOLUTION INTRAVENOUS AS NEEDED
Status: DISCONTINUED | OUTPATIENT
Start: 2022-01-01 | End: 2022-01-01 | Stop reason: HOSPADM

## 2022-01-01 RX ORDER — HALOPERIDOL 5 MG/ML
2 INJECTION INTRAMUSCULAR
Status: DISCONTINUED | OUTPATIENT
Start: 2022-01-01 | End: 2022-07-26 | Stop reason: HOSPADM

## 2022-01-01 RX ORDER — SODIUM CHLORIDE 0.9 % (FLUSH) 0.9 %
5-40 SYRINGE (ML) INJECTION EVERY 8 HOURS
Status: DISCONTINUED | OUTPATIENT
Start: 2022-01-01 | End: 2022-01-01 | Stop reason: HOSPADM

## 2022-01-01 RX ORDER — ENOXAPARIN SODIUM 100 MG/ML
40 INJECTION SUBCUTANEOUS DAILY
Status: DISCONTINUED | OUTPATIENT
Start: 2022-01-01 | End: 2022-01-01

## 2022-01-01 RX ORDER — BENZTROPINE MESYLATE 1 MG/1
1 TABLET ORAL 3 TIMES DAILY
Status: DISCONTINUED | OUTPATIENT
Start: 2022-01-01 | End: 2022-01-01

## 2022-01-01 RX ORDER — TAMSULOSIN HYDROCHLORIDE 0.4 MG/1
0.4 CAPSULE ORAL DAILY
Status: DISCONTINUED | OUTPATIENT
Start: 2022-01-01 | End: 2022-07-26 | Stop reason: HOSPADM

## 2022-01-01 RX ORDER — DIVALPROEX SODIUM 250 MG/1
500 TABLET, DELAYED RELEASE ORAL 2 TIMES DAILY
Status: DISCONTINUED | OUTPATIENT
Start: 2022-01-01 | End: 2022-01-01

## 2022-01-01 RX ORDER — MIDAZOLAM HYDROCHLORIDE 1 MG/ML
INJECTION, SOLUTION INTRAMUSCULAR; INTRAVENOUS AS NEEDED
Status: DISCONTINUED | OUTPATIENT
Start: 2022-01-01 | End: 2022-01-01 | Stop reason: HOSPADM

## 2022-01-01 RX ORDER — LORAZEPAM 1 MG/1
1 TABLET ORAL
Status: DISCONTINUED | OUTPATIENT
Start: 2022-01-01 | End: 2022-01-01

## 2022-01-01 RX ORDER — DIPHENHYDRAMINE HYDROCHLORIDE 50 MG/ML
12.5 INJECTION, SOLUTION INTRAMUSCULAR; INTRAVENOUS AS NEEDED
Status: DISCONTINUED | OUTPATIENT
Start: 2022-01-01 | End: 2022-01-01 | Stop reason: HOSPADM

## 2022-01-01 RX ORDER — CETIRIZINE HCL 10 MG
10 TABLET ORAL DAILY
Status: DISCONTINUED | OUTPATIENT
Start: 2022-01-01 | End: 2022-01-01

## 2022-01-01 RX ORDER — FAMOTIDINE 40 MG/5ML
20 POWDER, FOR SUSPENSION ORAL DAILY
Status: DISCONTINUED | OUTPATIENT
Start: 2022-01-01 | End: 2022-07-26 | Stop reason: HOSPADM

## 2022-01-01 RX ORDER — FENTANYL CITRATE 50 UG/ML
100 INJECTION, SOLUTION INTRAMUSCULAR; INTRAVENOUS
Status: DISCONTINUED | OUTPATIENT
Start: 2022-01-01 | End: 2022-01-01

## 2022-01-01 RX ORDER — FENTANYL CITRATE 50 UG/ML
50 INJECTION, SOLUTION INTRAMUSCULAR; INTRAVENOUS
Status: DISCONTINUED | OUTPATIENT
Start: 2022-01-01 | End: 2022-01-01 | Stop reason: HOSPADM

## 2022-01-01 RX ORDER — PROPOFOL 10 MG/ML
INJECTION, EMULSION INTRAVENOUS AS NEEDED
Status: DISCONTINUED | OUTPATIENT
Start: 2022-01-01 | End: 2022-01-01 | Stop reason: HOSPADM

## 2022-01-01 RX ORDER — PRAVASTATIN SODIUM 20 MG/1
20 TABLET ORAL
Status: DISCONTINUED | OUTPATIENT
Start: 2022-01-01 | End: 2022-01-01

## 2022-01-01 RX ORDER — ONDANSETRON 4 MG/1
4 TABLET, ORALLY DISINTEGRATING ORAL
Status: DISCONTINUED | OUTPATIENT
Start: 2022-01-01 | End: 2022-07-26 | Stop reason: HOSPADM

## 2022-01-01 RX ORDER — LEVETIRACETAM 500 MG/1
500 TABLET ORAL 2 TIMES DAILY
Status: DISCONTINUED | OUTPATIENT
Start: 2022-01-01 | End: 2022-01-01

## 2022-01-01 RX ORDER — TROSPIUM CHLORIDE 20 MG/1
20 TABLET, FILM COATED ORAL DAILY
Status: DISCONTINUED | OUTPATIENT
Start: 2022-01-01 | End: 2022-01-01

## 2022-01-01 RX ORDER — VALPROIC ACID 250 MG/5ML
500 SOLUTION ORAL EVERY 12 HOURS
Qty: 60 ML | Refills: 0 | Status: SHIPPED | OUTPATIENT
Start: 2022-01-01

## 2022-01-01 RX ORDER — HYDROMORPHONE HYDROCHLORIDE 1 MG/ML
0.4 INJECTION, SOLUTION INTRAMUSCULAR; INTRAVENOUS; SUBCUTANEOUS
Status: DISCONTINUED | OUTPATIENT
Start: 2022-01-01 | End: 2022-01-01 | Stop reason: HOSPADM

## 2022-01-01 RX ORDER — ONDANSETRON 2 MG/ML
4 INJECTION INTRAMUSCULAR; INTRAVENOUS AS NEEDED
Status: DISCONTINUED | OUTPATIENT
Start: 2022-01-01 | End: 2022-01-01 | Stop reason: HOSPADM

## 2022-01-01 RX ORDER — OXYCODONE HYDROCHLORIDE 5 MG/1
5 TABLET ORAL
Qty: 20 TABLET | Refills: 0 | Status: SHIPPED | OUTPATIENT
Start: 2022-01-01 | End: 2022-01-01

## 2022-01-01 RX ORDER — LIDOCAINE HYDROCHLORIDE 20 MG/ML
INJECTION, SOLUTION EPIDURAL; INFILTRATION; INTRACAUDAL; PERINEURAL AS NEEDED
Status: DISCONTINUED | OUTPATIENT
Start: 2022-01-01 | End: 2022-01-01 | Stop reason: HOSPADM

## 2022-01-01 RX ORDER — MIDAZOLAM HYDROCHLORIDE 1 MG/ML
2 INJECTION, SOLUTION INTRAMUSCULAR; INTRAVENOUS
Status: DISCONTINUED | OUTPATIENT
Start: 2022-01-01 | End: 2022-01-01

## 2022-01-01 RX ORDER — BUPIVACAINE HYDROCHLORIDE AND EPINEPHRINE 2.5; 5 MG/ML; UG/ML
INJECTION, SOLUTION INFILTRATION; PERINEURAL AS NEEDED
Status: DISCONTINUED | OUTPATIENT
Start: 2022-01-01 | End: 2022-01-01 | Stop reason: HOSPADM

## 2022-01-01 RX ORDER — LOPERAMIDE HYDROCHLORIDE 2 MG/1
2 CAPSULE ORAL
Status: DISCONTINUED | OUTPATIENT
Start: 2022-01-01 | End: 2022-07-26 | Stop reason: HOSPADM

## 2022-01-01 RX ORDER — CLOTRIMAZOLE AND BETAMETHASONE DIPROPIONATE 10; .64 MG/G; MG/G
CREAM TOPICAL 2 TIMES DAILY
Status: DISCONTINUED | OUTPATIENT
Start: 2022-01-01 | End: 2022-07-26 | Stop reason: HOSPADM

## 2022-01-01 RX ORDER — DEXTROSE, SODIUM CHLORIDE, SODIUM LACTATE, POTASSIUM CHLORIDE, AND CALCIUM CHLORIDE 5; .6; .31; .03; .02 G/100ML; G/100ML; G/100ML; G/100ML; G/100ML
75 INJECTION, SOLUTION INTRAVENOUS CONTINUOUS
Status: DISCONTINUED | OUTPATIENT
Start: 2022-01-01 | End: 2022-01-01

## 2022-01-01 RX ORDER — SODIUM CHLORIDE 0.9 % (FLUSH) 0.9 %
5-40 SYRINGE (ML) INJECTION AS NEEDED
Status: DISCONTINUED | OUTPATIENT
Start: 2022-01-01 | End: 2022-01-01

## 2022-01-01 RX ORDER — VALPROIC ACID 250 MG/5ML
500 SOLUTION ORAL EVERY 12 HOURS
Status: DISCONTINUED | OUTPATIENT
Start: 2022-01-01 | End: 2022-07-26 | Stop reason: HOSPADM

## 2022-01-01 RX ORDER — MIDAZOLAM HYDROCHLORIDE 1 MG/ML
0.5 INJECTION, SOLUTION INTRAMUSCULAR; INTRAVENOUS
Status: DISCONTINUED | OUTPATIENT
Start: 2022-01-01 | End: 2022-01-01 | Stop reason: HOSPADM

## 2022-01-01 RX ORDER — HALOPERIDOL 5 MG/ML
2 INJECTION INTRAMUSCULAR ONCE
Status: COMPLETED | OUTPATIENT
Start: 2022-01-01 | End: 2022-01-01

## 2022-01-01 RX ORDER — MIDAZOLAM HYDROCHLORIDE 1 MG/ML
1 INJECTION, SOLUTION INTRAMUSCULAR; INTRAVENOUS AS NEEDED
Status: DISCONTINUED | OUTPATIENT
Start: 2022-01-01 | End: 2022-01-01 | Stop reason: HOSPADM

## 2022-01-01 RX ORDER — LIDOCAINE HYDROCHLORIDE 10 MG/ML
0.1 INJECTION, SOLUTION EPIDURAL; INFILTRATION; INTRACAUDAL; PERINEURAL AS NEEDED
Status: DISCONTINUED | OUTPATIENT
Start: 2022-01-01 | End: 2022-01-01 | Stop reason: HOSPADM

## 2022-01-01 RX ORDER — VALPROIC ACID 250 MG/5ML
500 SOLUTION ORAL 2 TIMES DAILY
Status: DISCONTINUED | OUTPATIENT
Start: 2022-01-01 | End: 2022-01-01

## 2022-01-01 RX ORDER — CEFAZOLIN SODIUM 1 G/3ML
INJECTION, POWDER, FOR SOLUTION INTRAMUSCULAR; INTRAVENOUS
Status: DISCONTINUED
Start: 2022-01-01 | End: 2022-01-01 | Stop reason: HOSPADM

## 2022-01-01 RX ORDER — SODIUM CHLORIDE, SODIUM LACTATE, POTASSIUM CHLORIDE, CALCIUM CHLORIDE 600; 310; 30; 20 MG/100ML; MG/100ML; MG/100ML; MG/100ML
INJECTION, SOLUTION INTRAVENOUS
Status: DISCONTINUED | OUTPATIENT
Start: 2022-01-01 | End: 2022-01-01 | Stop reason: HOSPADM

## 2022-01-01 RX ORDER — ALBUMIN HUMAN 50 G/1000ML
25 SOLUTION INTRAVENOUS ONCE
Status: COMPLETED | OUTPATIENT
Start: 2022-01-01 | End: 2022-01-01

## 2022-01-01 RX ORDER — DEXTROSE MONOHYDRATE 100 MG/ML
0-250 INJECTION, SOLUTION INTRAVENOUS AS NEEDED
Status: DISCONTINUED | OUTPATIENT
Start: 2022-01-01 | End: 2022-07-26 | Stop reason: HOSPADM

## 2022-01-01 RX ORDER — CALCIUM GLUCONATE 20 MG/ML
1 INJECTION, SOLUTION INTRAVENOUS
Status: DISPENSED | OUTPATIENT
Start: 2022-01-01 | End: 2022-01-01

## 2022-01-01 RX ORDER — ACETAMINOPHEN 650 MG/1
650 SUPPOSITORY RECTAL
Status: DISCONTINUED | OUTPATIENT
Start: 2022-01-01 | End: 2022-07-26 | Stop reason: HOSPADM

## 2022-01-01 RX ORDER — POTASSIUM CHLORIDE 14.9 MG/ML
20 INJECTION INTRAVENOUS ONCE
Status: COMPLETED | OUTPATIENT
Start: 2022-01-01 | End: 2022-01-01

## 2022-01-01 RX ORDER — SODIUM CHLORIDE, SODIUM LACTATE, POTASSIUM CHLORIDE, CALCIUM CHLORIDE 600; 310; 30; 20 MG/100ML; MG/100ML; MG/100ML; MG/100ML
20 INJECTION, SOLUTION INTRAVENOUS CONTINUOUS
Status: DISCONTINUED | OUTPATIENT
Start: 2022-01-01 | End: 2022-01-01 | Stop reason: HOSPADM

## 2022-01-01 RX ORDER — METOPROLOL TARTRATE 5 MG/5ML
2.5 INJECTION INTRAVENOUS
Status: DISCONTINUED | OUTPATIENT
Start: 2022-01-01 | End: 2022-01-01 | Stop reason: HOSPADM

## 2022-01-01 RX ORDER — ACETAMINOPHEN 325 MG/1
650 TABLET ORAL
Status: DISCONTINUED | OUTPATIENT
Start: 2022-01-01 | End: 2022-01-01

## 2022-01-01 RX ORDER — MAGNESIUM SULFATE 100 %
4 CRYSTALS MISCELLANEOUS AS NEEDED
Status: DISCONTINUED | OUTPATIENT
Start: 2022-01-01 | End: 2022-07-26 | Stop reason: HOSPADM

## 2022-01-01 RX ORDER — IPRATROPIUM BROMIDE AND ALBUTEROL SULFATE 2.5; .5 MG/3ML; MG/3ML
3 SOLUTION RESPIRATORY (INHALATION)
Status: DISCONTINUED | OUTPATIENT
Start: 2022-01-01 | End: 2022-01-01

## 2022-01-01 RX ORDER — LABETALOL HCL 20 MG/4 ML
5 SYRINGE (ML) INTRAVENOUS
Status: DISCONTINUED | OUTPATIENT
Start: 2022-01-01 | End: 2022-01-01 | Stop reason: HOSPADM

## 2022-01-01 RX ORDER — VANCOMYCIN/0.9 % SOD CHLORIDE 1.5G/250ML
1500 PLASTIC BAG, INJECTION (ML) INTRAVENOUS
Status: COMPLETED | OUTPATIENT
Start: 2022-01-01 | End: 2022-01-01

## 2022-01-01 RX ORDER — SODIUM CHLORIDE 0.9 % (FLUSH) 0.9 %
5-40 SYRINGE (ML) INJECTION AS NEEDED
Status: DISCONTINUED | OUTPATIENT
Start: 2022-01-01 | End: 2022-01-01 | Stop reason: HOSPADM

## 2022-01-01 RX ORDER — ONDANSETRON 2 MG/ML
4 INJECTION INTRAMUSCULAR; INTRAVENOUS
Status: DISCONTINUED | OUTPATIENT
Start: 2022-01-01 | End: 2022-07-26 | Stop reason: HOSPADM

## 2022-01-01 RX ORDER — ALBUMIN HUMAN 50 G/1000ML
SOLUTION INTRAVENOUS
Status: COMPLETED
Start: 2022-01-01 | End: 2022-01-01

## 2022-01-01 RX ORDER — NORETHINDRONE AND ETHINYL ESTRADIOL 0.5-0.035
5 KIT ORAL AS NEEDED
Status: DISCONTINUED | OUTPATIENT
Start: 2022-01-01 | End: 2022-01-01 | Stop reason: HOSPADM

## 2022-01-01 RX ORDER — SUCCINYLCHOLINE CHLORIDE 20 MG/ML
INJECTION INTRAMUSCULAR; INTRAVENOUS AS NEEDED
Status: DISCONTINUED | OUTPATIENT
Start: 2022-01-01 | End: 2022-01-01 | Stop reason: HOSPADM

## 2022-01-01 RX ORDER — GLYCOPYRROLATE 0.2 MG/ML
INJECTION INTRAMUSCULAR; INTRAVENOUS AS NEEDED
Status: DISCONTINUED | OUTPATIENT
Start: 2022-01-01 | End: 2022-01-01 | Stop reason: HOSPADM

## 2022-01-01 RX ORDER — LORAZEPAM 1 MG/1
0.5 TABLET ORAL 2 TIMES DAILY
Status: DISCONTINUED | OUTPATIENT
Start: 2022-01-01 | End: 2022-01-01

## 2022-01-01 RX ORDER — HYDROCODONE BITARTRATE AND ACETAMINOPHEN 5; 325 MG/1; MG/1
1 TABLET ORAL AS NEEDED
Status: DISCONTINUED | OUTPATIENT
Start: 2022-01-01 | End: 2022-01-01 | Stop reason: HOSPADM

## 2022-01-01 RX ORDER — BENZTROPINE MESYLATE 1 MG/1
1 TABLET ORAL 3 TIMES DAILY
Status: DISCONTINUED | OUTPATIENT
Start: 2022-01-01 | End: 2022-07-26 | Stop reason: HOSPADM

## 2022-01-01 RX ORDER — FINASTERIDE 5 MG/1
5 TABLET, FILM COATED ORAL DAILY
Status: DISCONTINUED | OUTPATIENT
Start: 2022-01-01 | End: 2022-01-01

## 2022-01-01 RX ORDER — OLANZAPINE 5 MG/1
10 TABLET ORAL 3 TIMES DAILY
Status: DISCONTINUED | OUTPATIENT
Start: 2022-01-01 | End: 2022-07-26 | Stop reason: HOSPADM

## 2022-01-01 RX ORDER — POLYETHYLENE GLYCOL 3350 17 G/17G
17 POWDER, FOR SOLUTION ORAL DAILY PRN
Status: DISCONTINUED | OUTPATIENT
Start: 2022-01-01 | End: 2022-07-26 | Stop reason: HOSPADM

## 2022-01-01 RX ORDER — TROSPIUM CHLORIDE 20 MG/1
20 TABLET, FILM COATED ORAL DAILY
Status: DISCONTINUED | OUTPATIENT
Start: 2022-01-01 | End: 2022-07-26 | Stop reason: HOSPADM

## 2022-01-01 RX ORDER — BACITRACIN 500 [USP'U]/G
OINTMENT TOPICAL AS NEEDED
Status: DISCONTINUED | OUTPATIENT
Start: 2022-01-01 | End: 2022-01-01 | Stop reason: HOSPADM

## 2022-01-01 RX ORDER — BENZTROPINE MESYLATE 1 MG/1
1.5 TABLET ORAL 3 TIMES DAILY
Status: DISCONTINUED | OUTPATIENT
Start: 2022-01-01 | End: 2022-01-01

## 2022-01-01 RX ORDER — FENTANYL CITRATE 50 UG/ML
50 INJECTION, SOLUTION INTRAMUSCULAR; INTRAVENOUS AS NEEDED
Status: DISCONTINUED | OUTPATIENT
Start: 2022-01-01 | End: 2022-01-01 | Stop reason: HOSPADM

## 2022-01-01 RX ORDER — CHLORHEXIDINE GLUCONATE 1.2 MG/ML
10 RINSE ORAL EVERY 12 HOURS
Status: DISCONTINUED | OUTPATIENT
Start: 2022-01-01 | End: 2022-01-01

## 2022-01-01 RX ORDER — BENZTROPINE MESYLATE 1 MG/1
1 TABLET ORAL 2 TIMES DAILY
Status: DISCONTINUED | OUTPATIENT
Start: 2022-01-01 | End: 2022-01-01

## 2022-01-01 RX ORDER — SODIUM CHLORIDE 0.9 % (FLUSH) 0.9 %
5-40 SYRINGE (ML) INJECTION EVERY 8 HOURS
Status: DISCONTINUED | OUTPATIENT
Start: 2022-01-01 | End: 2022-01-01

## 2022-01-01 RX ORDER — FENTANYL CITRATE 50 UG/ML
INJECTION, SOLUTION INTRAMUSCULAR; INTRAVENOUS AS NEEDED
Status: DISCONTINUED | OUTPATIENT
Start: 2022-01-01 | End: 2022-01-01 | Stop reason: HOSPADM

## 2022-01-01 RX ORDER — ALBUTEROL SULFATE 2.5 MG/.5ML
2.5 SOLUTION RESPIRATORY (INHALATION)
Status: DISCONTINUED | OUTPATIENT
Start: 2022-01-01 | End: 2022-01-01

## 2022-01-01 RX ORDER — HALOPERIDOL 5 MG/ML
2 INJECTION INTRAMUSCULAR
Status: COMPLETED | OUTPATIENT
Start: 2022-01-01 | End: 2022-01-01

## 2022-01-01 RX ORDER — DEXAMETHASONE SODIUM PHOSPHATE 4 MG/ML
INJECTION, SOLUTION INTRA-ARTICULAR; INTRALESIONAL; INTRAMUSCULAR; INTRAVENOUS; SOFT TISSUE AS NEEDED
Status: DISCONTINUED | OUTPATIENT
Start: 2022-01-01 | End: 2022-01-01 | Stop reason: HOSPADM

## 2022-01-01 RX ORDER — DIPHENHYDRAMINE HCL 50 MG
50 CAPSULE ORAL
Status: DISCONTINUED | OUTPATIENT
Start: 2022-01-01 | End: 2022-07-26 | Stop reason: HOSPADM

## 2022-01-01 RX ORDER — IPRATROPIUM BROMIDE AND ALBUTEROL SULFATE 2.5; .5 MG/3ML; MG/3ML
3 SOLUTION RESPIRATORY (INHALATION)
Status: DISCONTINUED | OUTPATIENT
Start: 2022-01-01 | End: 2022-07-26 | Stop reason: HOSPADM

## 2022-01-01 RX ORDER — IPRATROPIUM BROMIDE AND ALBUTEROL SULFATE 2.5; .5 MG/3ML; MG/3ML
3 SOLUTION RESPIRATORY (INHALATION)
Status: COMPLETED | OUTPATIENT
Start: 2022-01-01 | End: 2022-01-01

## 2022-01-01 RX ADMIN — BENZTROPINE MESYLATE 1 MG: 1 TABLET ORAL at 21:52

## 2022-01-01 RX ADMIN — TROSPIUM CHLORIDE 20 MG: 20 TABLET, FILM COATED ORAL at 14:11

## 2022-01-01 RX ADMIN — LEVETIRACETAM 500 MG: 100 SOLUTION ORAL at 12:27

## 2022-01-01 RX ADMIN — OLANZAPINE 10 MG: 5 TABLET, FILM COATED ORAL at 14:11

## 2022-01-01 RX ADMIN — BENZTROPINE MESYLATE 1 MG: 1 TABLET ORAL at 17:24

## 2022-01-01 RX ADMIN — VALPROIC ACID 500 MG: 250 SOLUTION ORAL at 20:42

## 2022-01-01 RX ADMIN — DIPHENHYDRAMINE HYDROCHLORIDE 50 MG: 50 CAPSULE ORAL at 20:06

## 2022-01-01 RX ADMIN — CHLORHEXIDINE GLUCONATE 0.12% ORAL RINSE 10 ML: 1.2 LIQUID ORAL at 03:00

## 2022-01-01 RX ADMIN — OLANZAPINE 10 MG: 5 TABLET, FILM COATED ORAL at 21:14

## 2022-01-01 RX ADMIN — VALPROIC ACID 500 MG: 250 SOLUTION ORAL at 08:16

## 2022-01-01 RX ADMIN — OLANZAPINE 10 MG: 5 TABLET, FILM COATED ORAL at 16:48

## 2022-01-01 RX ADMIN — CLOTRIMAZOLE AND BETAMETHASONE DIPROPIONATE: 10; .5 CREAM TOPICAL at 09:12

## 2022-01-01 RX ADMIN — CLOTRIMAZOLE AND BETAMETHASONE DIPROPIONATE: 10; .5 CREAM TOPICAL at 17:17

## 2022-01-01 RX ADMIN — VALPROIC ACID 500 MG: 250 SOLUTION ORAL at 21:22

## 2022-01-01 RX ADMIN — BENZTROPINE MESYLATE 1 MG: 1 TABLET ORAL at 16:44

## 2022-01-01 RX ADMIN — FENTANYL CITRATE 100 MCG: 50 INJECTION, SOLUTION INTRAMUSCULAR; INTRAVENOUS at 02:02

## 2022-01-01 RX ADMIN — LEVETIRACETAM 500 MG: 100 INJECTION, SOLUTION INTRAVENOUS at 09:19

## 2022-01-01 RX ADMIN — TROSPIUM CHLORIDE 20 MG: 20 TABLET, FILM COATED ORAL at 09:31

## 2022-01-01 RX ADMIN — OLANZAPINE 10 MG: 5 TABLET, FILM COATED ORAL at 19:29

## 2022-01-01 RX ADMIN — TAMSULOSIN HYDROCHLORIDE 0.4 MG: 0.4 CAPSULE ORAL at 09:56

## 2022-01-01 RX ADMIN — CLOTRIMAZOLE AND BETAMETHASONE DIPROPIONATE: 10; .5 CREAM TOPICAL at 17:19

## 2022-01-01 RX ADMIN — CLOTRIMAZOLE AND BETAMETHASONE DIPROPIONATE: 10; .5 CREAM TOPICAL at 09:00

## 2022-01-01 RX ADMIN — CLOTRIMAZOLE AND BETAMETHASONE DIPROPIONATE: 10; .5 CREAM TOPICAL at 17:08

## 2022-01-01 RX ADMIN — LEVETIRACETAM 500 MG: 100 SOLUTION ORAL at 08:24

## 2022-01-01 RX ADMIN — Medication 20 MG: at 08:40

## 2022-01-01 RX ADMIN — CLOTRIMAZOLE AND BETAMETHASONE DIPROPIONATE: 10; .5 CREAM TOPICAL at 18:06

## 2022-01-01 RX ADMIN — ALBUTEROL SULFATE 2.5 MG: 2.5 SOLUTION RESPIRATORY (INHALATION) at 03:26

## 2022-01-01 RX ADMIN — Medication 20 MG: at 08:54

## 2022-01-01 RX ADMIN — LEVETIRACETAM 500 MG: 100 SOLUTION ORAL at 08:38

## 2022-01-01 RX ADMIN — LEVETIRACETAM 500 MG: 100 SOLUTION ORAL at 17:58

## 2022-01-01 RX ADMIN — BENZTROPINE MESYLATE 1 MG: 1 TABLET ORAL at 09:58

## 2022-01-01 RX ADMIN — BENZTROPINE MESYLATE 1 MG: 1 TABLET ORAL at 15:09

## 2022-01-01 RX ADMIN — OLANZAPINE 10 MG: 5 TABLET, FILM COATED ORAL at 08:45

## 2022-01-01 RX ADMIN — TROSPIUM CHLORIDE 20 MG: 20 TABLET, FILM COATED ORAL at 08:46

## 2022-01-01 RX ADMIN — CLOTRIMAZOLE AND BETAMETHASONE DIPROPIONATE: 10; .5 CREAM TOPICAL at 17:56

## 2022-01-01 RX ADMIN — LEVETIRACETAM 500 MG: 100 SOLUTION ORAL at 17:48

## 2022-01-01 RX ADMIN — DIPHENHYDRAMINE HYDROCHLORIDE 50 MG: 50 CAPSULE ORAL at 22:19

## 2022-01-01 RX ADMIN — FENTANYL CITRATE 100 MCG: 50 INJECTION, SOLUTION INTRAMUSCULAR; INTRAVENOUS at 05:16

## 2022-01-01 RX ADMIN — OLANZAPINE 10 MG: 5 TABLET, FILM COATED ORAL at 22:40

## 2022-01-01 RX ADMIN — OLANZAPINE 10 MG: 5 TABLET, FILM COATED ORAL at 21:04

## 2022-01-01 RX ADMIN — BENZTROPINE MESYLATE 1 MG: 1 TABLET ORAL at 10:39

## 2022-01-01 RX ADMIN — OLANZAPINE 10 MG: 5 TABLET, FILM COATED ORAL at 08:24

## 2022-01-01 RX ADMIN — TROSPIUM CHLORIDE 20 MG: 20 TABLET, FILM COATED ORAL at 09:11

## 2022-01-01 RX ADMIN — OLANZAPINE 10 MG: 5 TABLET, FILM COATED ORAL at 21:25

## 2022-01-01 RX ADMIN — ACETAMINOPHEN 650 MG: 650 SUPPOSITORY RECTAL at 17:30

## 2022-01-01 RX ADMIN — OLANZAPINE 10 MG: 5 TABLET, FILM COATED ORAL at 22:17

## 2022-01-01 RX ADMIN — CLOTRIMAZOLE AND BETAMETHASONE DIPROPIONATE: 10; .5 CREAM TOPICAL at 10:14

## 2022-01-01 RX ADMIN — HALOPERIDOL LACTATE 2 MG: 5 INJECTION, SOLUTION INTRAMUSCULAR at 21:07

## 2022-01-01 RX ADMIN — MIDAZOLAM 2 MG: 1 INJECTION, SOLUTION INTRAMUSCULAR; INTRAVENOUS at 21:57

## 2022-01-01 RX ADMIN — Medication 20 MG: at 10:44

## 2022-01-01 RX ADMIN — BENZTROPINE MESYLATE 1 MG: 1 TABLET ORAL at 08:24

## 2022-01-01 RX ADMIN — LEVETIRACETAM 500 MG: 100 SOLUTION ORAL at 11:15

## 2022-01-01 RX ADMIN — LEVETIRACETAM 500 MG: 100 SOLUTION ORAL at 17:00

## 2022-01-01 RX ADMIN — OLANZAPINE 10 MG: 5 TABLET, FILM COATED ORAL at 22:20

## 2022-01-01 RX ADMIN — Medication 20 MG: at 10:12

## 2022-01-01 RX ADMIN — TAMSULOSIN HYDROCHLORIDE 0.4 MG: 0.4 CAPSULE ORAL at 08:20

## 2022-01-01 RX ADMIN — TAMSULOSIN HYDROCHLORIDE 0.4 MG: 0.4 CAPSULE ORAL at 08:38

## 2022-01-01 RX ADMIN — VALPROIC ACID 500 MG: 250 SOLUTION ORAL at 20:27

## 2022-01-01 RX ADMIN — DIPHENHYDRAMINE HYDROCHLORIDE 50 MG: 50 CAPSULE ORAL at 05:37

## 2022-01-01 RX ADMIN — CLOTRIMAZOLE AND BETAMETHASONE DIPROPIONATE: 10; .5 CREAM TOPICAL at 12:55

## 2022-01-01 RX ADMIN — OLANZAPINE 10 MG: 5 TABLET, FILM COATED ORAL at 17:47

## 2022-01-01 RX ADMIN — CLOTRIMAZOLE AND BETAMETHASONE DIPROPIONATE: 10; .5 CREAM TOPICAL at 17:24

## 2022-01-01 RX ADMIN — TAMSULOSIN HYDROCHLORIDE 0.4 MG: 0.4 CAPSULE ORAL at 09:35

## 2022-01-01 RX ADMIN — BENZTROPINE MESYLATE 1 MG: 1 TABLET ORAL at 08:43

## 2022-01-01 RX ADMIN — PIPERACILLIN AND TAZOBACTAM 3.38 G: 3; .375 INJECTION, POWDER, LYOPHILIZED, FOR SOLUTION INTRAVENOUS at 10:44

## 2022-01-01 RX ADMIN — OLANZAPINE 10 MG: 5 TABLET, FILM COATED ORAL at 15:48

## 2022-01-01 RX ADMIN — TROSPIUM CHLORIDE 20 MG: 20 TABLET, FILM COATED ORAL at 08:43

## 2022-01-01 RX ADMIN — TROSPIUM CHLORIDE 20 MG: 20 TABLET, FILM COATED ORAL at 08:24

## 2022-01-01 RX ADMIN — TROSPIUM CHLORIDE 20 MG: 20 TABLET, FILM COATED ORAL at 08:51

## 2022-01-01 RX ADMIN — TROSPIUM CHLORIDE 20 MG: 20 TABLET, FILM COATED ORAL at 12:54

## 2022-01-01 RX ADMIN — CLOTRIMAZOLE AND BETAMETHASONE DIPROPIONATE: 10; .5 CREAM TOPICAL at 18:54

## 2022-01-01 RX ADMIN — HALOPERIDOL LACTATE 2 MG: 5 INJECTION, SOLUTION INTRAMUSCULAR at 19:45

## 2022-01-01 RX ADMIN — BENZTROPINE MESYLATE 1.5 MG: 1 TABLET ORAL at 09:41

## 2022-01-01 RX ADMIN — BENZTROPINE MESYLATE 1 MG: 1 TABLET ORAL at 17:18

## 2022-01-01 RX ADMIN — BENZTROPINE MESYLATE 1 MG: 1 TABLET ORAL at 21:44

## 2022-01-01 RX ADMIN — BENZTROPINE MESYLATE 1 MG: 1 TABLET ORAL at 11:27

## 2022-01-01 RX ADMIN — HALOPERIDOL LACTATE 2 MG: 5 INJECTION, SOLUTION INTRAMUSCULAR at 14:38

## 2022-01-01 RX ADMIN — LEVETIRACETAM 500 MG: 100 SOLUTION ORAL at 09:28

## 2022-01-01 RX ADMIN — LIDOCAINE HYDROCHLORIDE 100 MG: 20 INJECTION, SOLUTION EPIDURAL; INFILTRATION; INTRACAUDAL; PERINEURAL at 09:15

## 2022-01-01 RX ADMIN — TROSPIUM CHLORIDE 20 MG: 20 TABLET, FILM COATED ORAL at 08:38

## 2022-01-01 RX ADMIN — GLYCOPYRROLATE 0.2 MG: 0.2 INJECTION INTRAMUSCULAR; INTRAVENOUS at 09:19

## 2022-01-01 RX ADMIN — BENZTROPINE MESYLATE 1 MG: 1 TABLET ORAL at 18:08

## 2022-01-01 RX ADMIN — CLOTRIMAZOLE AND BETAMETHASONE DIPROPIONATE: 10; .5 CREAM TOPICAL at 13:33

## 2022-01-01 RX ADMIN — TAMSULOSIN HYDROCHLORIDE 0.4 MG: 0.4 CAPSULE ORAL at 09:31

## 2022-01-01 RX ADMIN — SODIUM CHLORIDE 500 MG: 9 INJECTION, SOLUTION INTRAVENOUS at 08:26

## 2022-01-01 RX ADMIN — BENZTROPINE MESYLATE 1 MG: 1 TABLET ORAL at 16:42

## 2022-01-01 RX ADMIN — BENZTROPINE MESYLATE 1 MG: 1 TABLET ORAL at 12:31

## 2022-01-01 RX ADMIN — BENZTROPINE MESYLATE 1 MG: 1 TABLET ORAL at 21:10

## 2022-01-01 RX ADMIN — LEVETIRACETAM 500 MG: 100 SOLUTION ORAL at 12:54

## 2022-01-01 RX ADMIN — ENOXAPARIN SODIUM 40 MG: 100 INJECTION SUBCUTANEOUS at 11:39

## 2022-01-01 RX ADMIN — DIPHENHYDRAMINE HYDROCHLORIDE 50 MG: 50 CAPSULE ORAL at 22:43

## 2022-01-01 RX ADMIN — VALPROIC ACID 500 MG: 250 SOLUTION ORAL at 09:38

## 2022-01-01 RX ADMIN — BENZTROPINE MESYLATE 1 MG: 1 TABLET ORAL at 16:35

## 2022-01-01 RX ADMIN — HALOPERIDOL LACTATE 2 MG: 5 INJECTION, SOLUTION INTRAMUSCULAR at 06:03

## 2022-01-01 RX ADMIN — LEVETIRACETAM 500 MG: 100 SOLUTION ORAL at 10:01

## 2022-01-01 RX ADMIN — BENZTROPINE MESYLATE 1 MG: 1 TABLET ORAL at 22:17

## 2022-01-01 RX ADMIN — VALPROIC ACID 500 MG: 250 SOLUTION ORAL at 09:20

## 2022-01-01 RX ADMIN — BENZTROPINE MESYLATE 1 MG: 1 TABLET ORAL at 09:31

## 2022-01-01 RX ADMIN — BENZTROPINE MESYLATE 1 MG: 1 TABLET ORAL at 08:51

## 2022-01-01 RX ADMIN — LEVETIRACETAM 500 MG: 100 SOLUTION ORAL at 17:21

## 2022-01-01 RX ADMIN — PIPERACILLIN AND TAZOBACTAM 3.38 G: 3; .375 INJECTION, POWDER, LYOPHILIZED, FOR SOLUTION INTRAVENOUS at 10:20

## 2022-01-01 RX ADMIN — VALPROIC ACID 500 MG: 250 SOLUTION ORAL at 22:40

## 2022-01-01 RX ADMIN — PIPERACILLIN AND TAZOBACTAM 3.38 G: 3; .375 INJECTION, POWDER, LYOPHILIZED, FOR SOLUTION INTRAVENOUS at 10:37

## 2022-01-01 RX ADMIN — TAMSULOSIN HYDROCHLORIDE 0.4 MG: 0.4 CAPSULE ORAL at 09:37

## 2022-01-01 RX ADMIN — OLANZAPINE 10 MG: 5 TABLET, FILM COATED ORAL at 18:38

## 2022-01-01 RX ADMIN — BENZTROPINE MESYLATE 1 MG: 1 TABLET ORAL at 17:58

## 2022-01-01 RX ADMIN — Medication 20 MG: at 09:19

## 2022-01-01 RX ADMIN — BENZTROPINE MESYLATE 1 MG: 1 TABLET ORAL at 08:04

## 2022-01-01 RX ADMIN — DIPHENHYDRAMINE HYDROCHLORIDE 50 MG: 50 CAPSULE ORAL at 22:38

## 2022-01-01 RX ADMIN — OLANZAPINE 10 MG: 5 TABLET, FILM COATED ORAL at 21:01

## 2022-01-01 RX ADMIN — OLANZAPINE 10 MG: 5 TABLET, FILM COATED ORAL at 21:44

## 2022-01-01 RX ADMIN — SODIUM CHLORIDE, PRESERVATIVE FREE 20 MG: 5 INJECTION INTRAVENOUS at 08:25

## 2022-01-01 RX ADMIN — CLOTRIMAZOLE AND BETAMETHASONE DIPROPIONATE: 10; .5 CREAM TOPICAL at 16:18

## 2022-01-01 RX ADMIN — VALPROIC ACID 500 MG: 250 SOLUTION ORAL at 21:58

## 2022-01-01 RX ADMIN — SODIUM CHLORIDE SOLN NEBU 3% 4 ML: 3 NEBU SOLN at 23:09

## 2022-01-01 RX ADMIN — VALPROIC ACID 500 MG: 250 SOLUTION ORAL at 09:55

## 2022-01-01 RX ADMIN — HALOPERIDOL LACTATE 2 MG: 5 INJECTION, SOLUTION INTRAMUSCULAR at 13:54

## 2022-01-01 RX ADMIN — Medication 20 MG: at 08:46

## 2022-01-01 RX ADMIN — CLOTRIMAZOLE AND BETAMETHASONE DIPROPIONATE: 10; .5 CREAM TOPICAL at 10:39

## 2022-01-01 RX ADMIN — LEVETIRACETAM 500 MG: 100 SOLUTION ORAL at 18:33

## 2022-01-01 RX ADMIN — PHENYLEPHRINE HYDROCHLORIDE 200 MCG: 10 INJECTION INTRAVENOUS at 09:19

## 2022-01-01 RX ADMIN — BENZTROPINE MESYLATE 1 MG: 1 TABLET ORAL at 21:04

## 2022-01-01 RX ADMIN — TAMSULOSIN HYDROCHLORIDE 0.4 MG: 0.4 CAPSULE ORAL at 10:23

## 2022-01-01 RX ADMIN — TAMSULOSIN HYDROCHLORIDE 0.4 MG: 0.4 CAPSULE ORAL at 10:43

## 2022-01-01 RX ADMIN — Medication 20 MG: at 08:20

## 2022-01-01 RX ADMIN — BENZTROPINE MESYLATE 1 MG: 1 TABLET ORAL at 22:21

## 2022-01-01 RX ADMIN — CLOTRIMAZOLE AND BETAMETHASONE DIPROPIONATE: 10; .5 CREAM TOPICAL at 10:24

## 2022-01-01 RX ADMIN — CLOTRIMAZOLE AND BETAMETHASONE DIPROPIONATE: 10; .5 CREAM TOPICAL at 17:00

## 2022-01-01 RX ADMIN — DIPHENHYDRAMINE HYDROCHLORIDE 50 MG: 50 CAPSULE ORAL at 21:08

## 2022-01-01 RX ADMIN — CLOTRIMAZOLE AND BETAMETHASONE DIPROPIONATE: 10; .5 CREAM TOPICAL at 17:46

## 2022-01-01 RX ADMIN — ROCURONIUM BROMIDE 10 MG: 50 INJECTION, SOLUTION INTRAVENOUS at 09:15

## 2022-01-01 RX ADMIN — BENZTROPINE MESYLATE 1 MG: 1 TABLET ORAL at 09:33

## 2022-01-01 RX ADMIN — CLOTRIMAZOLE AND BETAMETHASONE DIPROPIONATE: 10; .5 CREAM TOPICAL at 08:39

## 2022-01-01 RX ADMIN — BENZTROPINE MESYLATE 1 MG: 1 TABLET ORAL at 21:24

## 2022-01-01 RX ADMIN — OLANZAPINE 10 MG: 5 TABLET, FILM COATED ORAL at 22:03

## 2022-01-01 RX ADMIN — VALPROIC ACID 500 MG: 250 SOLUTION ORAL at 21:14

## 2022-01-01 RX ADMIN — PIPERACILLIN AND TAZOBACTAM 3.38 G: 3; .375 INJECTION, POWDER, LYOPHILIZED, FOR SOLUTION INTRAVENOUS at 01:59

## 2022-01-01 RX ADMIN — CLOTRIMAZOLE AND BETAMETHASONE DIPROPIONATE: 10; .5 CREAM TOPICAL at 17:21

## 2022-01-01 RX ADMIN — BENZTROPINE MESYLATE 1 MG: 1 TABLET ORAL at 22:01

## 2022-01-01 RX ADMIN — ENOXAPARIN SODIUM 40 MG: 100 INJECTION SUBCUTANEOUS at 10:17

## 2022-01-01 RX ADMIN — PHENYLEPHRINE HYDROCHLORIDE 100 MCG: 10 INJECTION INTRAVENOUS at 09:24

## 2022-01-01 RX ADMIN — CLOTRIMAZOLE AND BETAMETHASONE DIPROPIONATE: 10; .5 CREAM TOPICAL at 10:06

## 2022-01-01 RX ADMIN — TROSPIUM CHLORIDE 20 MG: 20 TABLET, FILM COATED ORAL at 08:20

## 2022-01-01 RX ADMIN — LEVETIRACETAM 500 MG: 100 SOLUTION ORAL at 10:11

## 2022-01-01 RX ADMIN — Medication 20 MG: at 10:23

## 2022-01-01 RX ADMIN — OLANZAPINE 10 MG: 5 TABLET, FILM COATED ORAL at 11:15

## 2022-01-01 RX ADMIN — TROSPIUM CHLORIDE 20 MG: 20 TABLET, FILM COATED ORAL at 09:27

## 2022-01-01 RX ADMIN — VALPROIC ACID 500 MG: 250 SOLUTION ORAL at 10:10

## 2022-01-01 RX ADMIN — HALOPERIDOL LACTATE 2 MG: 5 INJECTION, SOLUTION INTRAMUSCULAR at 18:31

## 2022-01-01 RX ADMIN — LEVETIRACETAM 500 MG: 100 SOLUTION ORAL at 17:07

## 2022-01-01 RX ADMIN — LEVETIRACETAM 500 MG: 100 SOLUTION ORAL at 18:38

## 2022-01-01 RX ADMIN — VALPROIC ACID 500 MG: 250 SOLUTION ORAL at 08:24

## 2022-01-01 RX ADMIN — TROSPIUM CHLORIDE 20 MG: 20 TABLET, FILM COATED ORAL at 09:35

## 2022-01-01 RX ADMIN — OLANZAPINE 10 MG: 5 TABLET, FILM COATED ORAL at 16:26

## 2022-01-01 RX ADMIN — OLANZAPINE 10 MG: 5 TABLET, FILM COATED ORAL at 12:48

## 2022-01-01 RX ADMIN — BENZTROPINE MESYLATE 1 MG: 1 TABLET ORAL at 17:05

## 2022-01-01 RX ADMIN — CLOTRIMAZOLE AND BETAMETHASONE DIPROPIONATE: 10; .5 CREAM TOPICAL at 09:07

## 2022-01-01 RX ADMIN — TROSPIUM CHLORIDE 20 MG: 20 TABLET, FILM COATED ORAL at 09:00

## 2022-01-01 RX ADMIN — PRAVASTATIN SODIUM 20 MG: 20 TABLET ORAL at 22:07

## 2022-01-01 RX ADMIN — OLANZAPINE 10 MG: 5 TABLET, FILM COATED ORAL at 21:58

## 2022-01-01 RX ADMIN — SODIUM CHLORIDE 500 MG: 9 INJECTION, SOLUTION INTRAVENOUS at 23:31

## 2022-01-01 RX ADMIN — CLOTRIMAZOLE AND BETAMETHASONE DIPROPIONATE: 10; .5 CREAM TOPICAL at 10:52

## 2022-01-01 RX ADMIN — TROSPIUM CHLORIDE 20 MG: 20 TABLET, FILM COATED ORAL at 12:48

## 2022-01-01 RX ADMIN — VALPROIC ACID 500 MG: 250 SOLUTION ORAL at 20:16

## 2022-01-01 RX ADMIN — VALPROIC ACID 500 MG: 250 SOLUTION ORAL at 10:44

## 2022-01-01 RX ADMIN — TAMSULOSIN HYDROCHLORIDE 0.4 MG: 0.4 CAPSULE ORAL at 12:19

## 2022-01-01 RX ADMIN — VALPROIC ACID 500 MG: 250 SOLUTION ORAL at 21:06

## 2022-01-01 RX ADMIN — OLANZAPINE 10 MG: 5 TABLET, FILM COATED ORAL at 09:41

## 2022-01-01 RX ADMIN — HALOPERIDOL LACTATE 2 MG: 5 INJECTION, SOLUTION INTRAMUSCULAR at 14:04

## 2022-01-01 RX ADMIN — LEVETIRACETAM 500 MG: 100 SOLUTION ORAL at 18:26

## 2022-01-01 RX ADMIN — OLANZAPINE 10 MG: 5 TABLET, FILM COATED ORAL at 16:17

## 2022-01-01 RX ADMIN — VALPROIC ACID 500 MG: 250 SOLUTION ORAL at 22:39

## 2022-01-01 RX ADMIN — OLANZAPINE 10 MG: 5 TABLET, FILM COATED ORAL at 09:49

## 2022-01-01 RX ADMIN — BENZTROPINE MESYLATE 1 MG: 1 TABLET ORAL at 21:07

## 2022-01-01 RX ADMIN — OLANZAPINE 10 MG: 5 TABLET, FILM COATED ORAL at 22:01

## 2022-01-01 RX ADMIN — Medication 20 MG: at 10:56

## 2022-01-01 RX ADMIN — TAMSULOSIN HYDROCHLORIDE 0.4 MG: 0.4 CAPSULE ORAL at 12:48

## 2022-01-01 RX ADMIN — VALPROIC ACID 500 MG: 250 SOLUTION ORAL at 21:46

## 2022-01-01 RX ADMIN — VALPROIC ACID 500 MG: 250 SOLUTION ORAL at 09:31

## 2022-01-01 RX ADMIN — BENZTROPINE MESYLATE 1 MG: 1 TABLET ORAL at 15:49

## 2022-01-01 RX ADMIN — CLOTRIMAZOLE AND BETAMETHASONE DIPROPIONATE: 10; .5 CREAM TOPICAL at 10:34

## 2022-01-01 RX ADMIN — OLANZAPINE 10 MG: 5 TABLET, FILM COATED ORAL at 10:27

## 2022-01-01 RX ADMIN — CLOTRIMAZOLE AND BETAMETHASONE DIPROPIONATE: 10; .5 CREAM TOPICAL at 09:38

## 2022-01-01 RX ADMIN — BENZTROPINE MESYLATE 1 MG: 1 TABLET ORAL at 12:54

## 2022-01-01 RX ADMIN — SODIUM CHLORIDE 250 MG: 9 INJECTION, SOLUTION INTRAVENOUS at 17:12

## 2022-01-01 RX ADMIN — BENZTROPINE MESYLATE 1 MG: 1 TABLET ORAL at 22:40

## 2022-01-01 RX ADMIN — LEVETIRACETAM 500 MG: 100 SOLUTION ORAL at 08:15

## 2022-01-01 RX ADMIN — OLANZAPINE 10 MG: 5 TABLET, FILM COATED ORAL at 09:35

## 2022-01-01 RX ADMIN — Medication 20 MG: at 08:38

## 2022-01-01 RX ADMIN — CLOTRIMAZOLE AND BETAMETHASONE DIPROPIONATE: 10; .5 CREAM TOPICAL at 17:18

## 2022-01-01 RX ADMIN — OLANZAPINE 10 MG: 5 TABLET, FILM COATED ORAL at 10:56

## 2022-01-01 RX ADMIN — SODIUM CHLORIDE, PRESERVATIVE FREE 20 MG: 5 INJECTION INTRAVENOUS at 08:55

## 2022-01-01 RX ADMIN — LEVETIRACETAM 500 MG: 100 SOLUTION ORAL at 09:17

## 2022-01-01 RX ADMIN — TAMSULOSIN HYDROCHLORIDE 0.4 MG: 0.4 CAPSULE ORAL at 10:39

## 2022-01-01 RX ADMIN — BENZTROPINE MESYLATE 1 MG: 1 TABLET ORAL at 03:55

## 2022-01-01 RX ADMIN — VALPROIC ACID 500 MG: 250 SOLUTION ORAL at 08:43

## 2022-01-01 RX ADMIN — HALOPERIDOL LACTATE 2 MG: 5 INJECTION, SOLUTION INTRAMUSCULAR at 20:02

## 2022-01-01 RX ADMIN — OLANZAPINE 10 MG: 5 TABLET, FILM COATED ORAL at 21:40

## 2022-01-01 RX ADMIN — TAMSULOSIN HYDROCHLORIDE 0.4 MG: 0.4 CAPSULE ORAL at 12:39

## 2022-01-01 RX ADMIN — VALPROIC ACID 500 MG: 250 SOLUTION ORAL at 20:53

## 2022-01-01 RX ADMIN — CLOTRIMAZOLE AND BETAMETHASONE DIPROPIONATE: 10; .5 CREAM TOPICAL at 08:56

## 2022-01-01 RX ADMIN — CLOTRIMAZOLE AND BETAMETHASONE DIPROPIONATE: 10; .5 CREAM TOPICAL at 17:53

## 2022-01-01 RX ADMIN — CLOTRIMAZOLE AND BETAMETHASONE DIPROPIONATE: 10; .5 CREAM TOPICAL at 17:44

## 2022-01-01 RX ADMIN — BENZTROPINE MESYLATE 1 MG: 1 TABLET ORAL at 17:14

## 2022-01-01 RX ADMIN — VALPROIC ACID 500 MG: 250 SOLUTION ORAL at 21:36

## 2022-01-01 RX ADMIN — BENZTROPINE MESYLATE 1 MG: 1 TABLET ORAL at 10:26

## 2022-01-01 RX ADMIN — LEVETIRACETAM 500 MG: 100 SOLUTION ORAL at 17:11

## 2022-01-01 RX ADMIN — SODIUM CHLORIDE 250 MG: 9 INJECTION, SOLUTION INTRAVENOUS at 06:08

## 2022-01-01 RX ADMIN — VALPROIC ACID 500 MG: 250 SOLUTION ORAL at 08:14

## 2022-01-01 RX ADMIN — OLANZAPINE 10 MG: 5 TABLET, FILM COATED ORAL at 21:08

## 2022-01-01 RX ADMIN — LEVETIRACETAM 500 MG: 100 SOLUTION ORAL at 17:37

## 2022-01-01 RX ADMIN — Medication 20 MG: at 10:10

## 2022-01-01 RX ADMIN — OLANZAPINE 10 MG: 5 TABLET, FILM COATED ORAL at 16:49

## 2022-01-01 RX ADMIN — CLOTRIMAZOLE AND BETAMETHASONE DIPROPIONATE: 10; .5 CREAM TOPICAL at 17:54

## 2022-01-01 RX ADMIN — PHENYLEPHRINE HYDROCHLORIDE 200 MCG: 10 INJECTION INTRAVENOUS at 09:42

## 2022-01-01 RX ADMIN — HALOPERIDOL LACTATE 2 MG: 5 INJECTION, SOLUTION INTRAMUSCULAR at 10:00

## 2022-01-01 RX ADMIN — OLANZAPINE 10 MG: 5 TABLET, FILM COATED ORAL at 22:29

## 2022-01-01 RX ADMIN — CLOTRIMAZOLE AND BETAMETHASONE DIPROPIONATE: 10; .5 CREAM TOPICAL at 18:41

## 2022-01-01 RX ADMIN — TAMSULOSIN HYDROCHLORIDE 0.4 MG: 0.4 CAPSULE ORAL at 10:11

## 2022-01-01 RX ADMIN — BENZTROPINE MESYLATE 1.5 MG: 1 TABLET ORAL at 21:09

## 2022-01-01 RX ADMIN — VALPROIC ACID 500 MG: 250 SOLUTION ORAL at 12:18

## 2022-01-01 RX ADMIN — PIPERACILLIN AND TAZOBACTAM 3.38 G: 3; .375 INJECTION, POWDER, LYOPHILIZED, FOR SOLUTION INTRAVENOUS at 11:11

## 2022-01-01 RX ADMIN — OLANZAPINE 10 MG: 5 TABLET, FILM COATED ORAL at 22:07

## 2022-01-01 RX ADMIN — HALOPERIDOL LACTATE 2 MG: 5 INJECTION, SOLUTION INTRAMUSCULAR at 18:10

## 2022-01-01 RX ADMIN — BENZTROPINE MESYLATE 1 MG: 1 TABLET ORAL at 21:09

## 2022-01-01 RX ADMIN — Medication 20 MG: at 11:04

## 2022-01-01 RX ADMIN — BENZTROPINE MESYLATE 1 MG: 1 TABLET ORAL at 22:16

## 2022-01-01 RX ADMIN — CLOTRIMAZOLE AND BETAMETHASONE DIPROPIONATE: 10; .5 CREAM TOPICAL at 11:05

## 2022-01-01 RX ADMIN — TAMSULOSIN HYDROCHLORIDE 0.4 MG: 0.4 CAPSULE ORAL at 08:24

## 2022-01-01 RX ADMIN — BENZTROPINE MESYLATE 1 MG: 1 TABLET ORAL at 16:48

## 2022-01-01 RX ADMIN — BENZTROPINE MESYLATE 1 MG: 1 TABLET ORAL at 09:49

## 2022-01-01 RX ADMIN — CLOTRIMAZOLE AND BETAMETHASONE DIPROPIONATE: 10; .5 CREAM TOPICAL at 18:52

## 2022-01-01 RX ADMIN — BENZTROPINE MESYLATE 1 MG: 1 TABLET ORAL at 10:43

## 2022-01-01 RX ADMIN — TROSPIUM CHLORIDE 20 MG: 20 TABLET, FILM COATED ORAL at 09:47

## 2022-01-01 RX ADMIN — LEVETIRACETAM 500 MG: 100 SOLUTION ORAL at 17:22

## 2022-01-01 RX ADMIN — OLANZAPINE 10 MG: 5 TABLET, FILM COATED ORAL at 17:24

## 2022-01-01 RX ADMIN — VALPROIC ACID 500 MG: 250 SOLUTION ORAL at 21:35

## 2022-01-01 RX ADMIN — VALPROIC ACID 500 MG: 250 SOLUTION ORAL at 21:33

## 2022-01-01 RX ADMIN — PIPERACILLIN AND TAZOBACTAM 3.38 G: 3; .375 INJECTION, POWDER, LYOPHILIZED, FOR SOLUTION INTRAVENOUS at 18:20

## 2022-01-01 RX ADMIN — BENZTROPINE MESYLATE 1 MG: 1 TABLET ORAL at 21:36

## 2022-01-01 RX ADMIN — CLOTRIMAZOLE AND BETAMETHASONE DIPROPIONATE: 10; .5 CREAM TOPICAL at 17:37

## 2022-01-01 RX ADMIN — TAMSULOSIN HYDROCHLORIDE 0.4 MG: 0.4 CAPSULE ORAL at 08:40

## 2022-01-01 RX ADMIN — LEVETIRACETAM 500 MG: 100 INJECTION, SOLUTION INTRAVENOUS at 19:00

## 2022-01-01 RX ADMIN — LEVETIRACETAM 500 MG: 100 SOLUTION ORAL at 22:23

## 2022-01-01 RX ADMIN — CLOTRIMAZOLE AND BETAMETHASONE DIPROPIONATE: 10; .5 CREAM TOPICAL at 12:48

## 2022-01-01 RX ADMIN — BENZTROPINE MESYLATE 1 MG: 1 TABLET ORAL at 10:09

## 2022-01-01 RX ADMIN — LEVETIRACETAM 500 MG: 100 SOLUTION ORAL at 17:17

## 2022-01-01 RX ADMIN — LEVETIRACETAM 500 MG: 100 SOLUTION ORAL at 10:23

## 2022-01-01 RX ADMIN — VALPROIC ACID 500 MG: 250 SOLUTION ORAL at 10:23

## 2022-01-01 RX ADMIN — PIPERACILLIN AND TAZOBACTAM 3.38 G: 3; .375 INJECTION, POWDER, LYOPHILIZED, FOR SOLUTION INTRAVENOUS at 18:15

## 2022-01-01 RX ADMIN — LEVETIRACETAM 500 MG: 100 INJECTION, SOLUTION INTRAVENOUS at 06:06

## 2022-01-01 RX ADMIN — OLANZAPINE 10 MG: 5 TABLET, FILM COATED ORAL at 17:41

## 2022-01-01 RX ADMIN — TROSPIUM CHLORIDE 20 MG: 20 TABLET, FILM COATED ORAL at 09:17

## 2022-01-01 RX ADMIN — OLANZAPINE 10 MG: 5 TABLET, FILM COATED ORAL at 17:18

## 2022-01-01 RX ADMIN — VALPROIC ACID 500 MG: 250 SOLUTION ORAL at 22:38

## 2022-01-01 RX ADMIN — Medication 20 MG: at 09:47

## 2022-01-01 RX ADMIN — MIDAZOLAM 2 MG: 1 INJECTION, SOLUTION INTRAMUSCULAR; INTRAVENOUS at 10:38

## 2022-01-01 RX ADMIN — HALOPERIDOL LACTATE 2 MG: 5 INJECTION, SOLUTION INTRAMUSCULAR at 20:00

## 2022-01-01 RX ADMIN — TROSPIUM CHLORIDE 20 MG: 20 TABLET, FILM COATED ORAL at 10:10

## 2022-01-01 RX ADMIN — OLANZAPINE 10 MG: 5 TABLET, FILM COATED ORAL at 10:22

## 2022-01-01 RX ADMIN — VALPROIC ACID 500 MG: 250 SOLUTION ORAL at 22:01

## 2022-01-01 RX ADMIN — VALPROIC ACID 500 MG: 250 SOLUTION ORAL at 22:03

## 2022-01-01 RX ADMIN — BENZTROPINE MESYLATE 1 MG: 1 TABLET ORAL at 21:38

## 2022-01-01 RX ADMIN — VALPROIC ACID 500 MG: 250 SOLUTION ORAL at 09:02

## 2022-01-01 RX ADMIN — BENZTROPINE MESYLATE 1 MG: 1 TABLET ORAL at 17:56

## 2022-01-01 RX ADMIN — LEVETIRACETAM 500 MG: 100 SOLUTION ORAL at 09:34

## 2022-01-01 RX ADMIN — BENZTROPINE MESYLATE 1 MG: 1 TABLET ORAL at 12:48

## 2022-01-01 RX ADMIN — TAMSULOSIN HYDROCHLORIDE 0.4 MG: 0.4 CAPSULE ORAL at 09:20

## 2022-01-01 RX ADMIN — LEVETIRACETAM 500 MG: 100 SOLUTION ORAL at 09:20

## 2022-01-01 RX ADMIN — OLANZAPINE 10 MG: 5 TABLET, FILM COATED ORAL at 21:33

## 2022-01-01 RX ADMIN — CLOTRIMAZOLE AND BETAMETHASONE DIPROPIONATE: 10; .5 CREAM TOPICAL at 08:41

## 2022-01-01 RX ADMIN — LEVETIRACETAM 500 MG: 100 SOLUTION ORAL at 17:18

## 2022-01-01 RX ADMIN — VALPROIC ACID 500 MG: 250 SOLUTION ORAL at 12:54

## 2022-01-01 RX ADMIN — VALPROIC ACID 500 MG: 250 SOLUTION ORAL at 09:49

## 2022-01-01 RX ADMIN — SODIUM CHLORIDE SOLN NEBU 3% 4 ML: 3 NEBU SOLN at 03:26

## 2022-01-01 RX ADMIN — BENZTROPINE MESYLATE 1 MG: 1 TABLET ORAL at 19:54

## 2022-01-01 RX ADMIN — OLANZAPINE 10 MG: 5 TABLET, FILM COATED ORAL at 19:54

## 2022-01-01 RX ADMIN — LEVETIRACETAM 500 MG: 100 SOLUTION ORAL at 08:20

## 2022-01-01 RX ADMIN — SODIUM CHLORIDE SOLN NEBU 3% 4 ML: 3 NEBU SOLN at 19:13

## 2022-01-01 RX ADMIN — OLANZAPINE 10 MG: 5 TABLET, FILM COATED ORAL at 10:11

## 2022-01-01 RX ADMIN — LEVETIRACETAM 500 MG: 100 SOLUTION ORAL at 17:23

## 2022-01-01 RX ADMIN — CLOTRIMAZOLE AND BETAMETHASONE DIPROPIONATE: 10; .5 CREAM TOPICAL at 09:48

## 2022-01-01 RX ADMIN — VALPROIC ACID 500 MG: 250 SOLUTION ORAL at 21:24

## 2022-01-01 RX ADMIN — LEVETIRACETAM 500 MG: 100 SOLUTION ORAL at 09:21

## 2022-01-01 RX ADMIN — Medication 20 MG: at 09:37

## 2022-01-01 RX ADMIN — VALPROIC ACID 500 MG: 250 SOLUTION ORAL at 09:37

## 2022-01-01 RX ADMIN — VALPROIC ACID 500 MG: 250 SOLUTION ORAL at 22:07

## 2022-01-01 RX ADMIN — DIPHENHYDRAMINE HYDROCHLORIDE 50 MG: 50 CAPSULE ORAL at 13:16

## 2022-01-01 RX ADMIN — CHLORHEXIDINE GLUCONATE 0.12% ORAL RINSE 10 ML: 1.2 LIQUID ORAL at 09:17

## 2022-01-01 RX ADMIN — VALPROIC ACID 500 MG: 250 SOLUTION ORAL at 08:20

## 2022-01-01 RX ADMIN — VALPROIC ACID 500 MG: 250 SOLUTION ORAL at 21:13

## 2022-01-01 RX ADMIN — BENZTROPINE MESYLATE 1.5 MG: 1 TABLET ORAL at 15:34

## 2022-01-01 RX ADMIN — LEVETIRACETAM 500 MG: 100 SOLUTION ORAL at 10:44

## 2022-01-01 RX ADMIN — VALPROIC ACID 500 MG: 250 SOLUTION ORAL at 21:56

## 2022-01-01 RX ADMIN — LEVETIRACETAM 500 MG: 100 SOLUTION ORAL at 09:37

## 2022-01-01 RX ADMIN — VALPROIC ACID 500 MG: 250 SOLUTION ORAL at 08:38

## 2022-01-01 RX ADMIN — LEVETIRACETAM 500 MG: 100 SOLUTION ORAL at 17:56

## 2022-01-01 RX ADMIN — VALPROIC ACID 500 MG: 250 SOLUTION ORAL at 09:47

## 2022-01-01 RX ADMIN — HALOPERIDOL LACTATE 2 MG: 5 INJECTION, SOLUTION INTRAMUSCULAR at 04:28

## 2022-01-01 RX ADMIN — CLOTRIMAZOLE AND BETAMETHASONE DIPROPIONATE: 10; .5 CREAM TOPICAL at 09:41

## 2022-01-01 RX ADMIN — CLOTRIMAZOLE AND BETAMETHASONE DIPROPIONATE: 10; .5 CREAM TOPICAL at 18:19

## 2022-01-01 RX ADMIN — BENZTROPINE MESYLATE 1 MG: 1 TABLET ORAL at 22:03

## 2022-01-01 RX ADMIN — PIPERACILLIN AND TAZOBACTAM 3.38 G: 3; .375 INJECTION, POWDER, LYOPHILIZED, FOR SOLUTION INTRAVENOUS at 01:48

## 2022-01-01 RX ADMIN — VALPROIC ACID 500 MG: 250 SOLUTION ORAL at 21:40

## 2022-01-01 RX ADMIN — BENZTROPINE MESYLATE 1 MG: 1 TABLET ORAL at 10:23

## 2022-01-01 RX ADMIN — TAMSULOSIN HYDROCHLORIDE 0.4 MG: 0.4 CAPSULE ORAL at 09:02

## 2022-01-01 RX ADMIN — CLOTRIMAZOLE AND BETAMETHASONE DIPROPIONATE: 10; .5 CREAM TOPICAL at 18:00

## 2022-01-01 RX ADMIN — BENZTROPINE MESYLATE 1 MG: 1 TABLET ORAL at 11:15

## 2022-01-01 RX ADMIN — OLANZAPINE 10 MG: 5 TABLET, FILM COATED ORAL at 17:52

## 2022-01-01 RX ADMIN — Medication 20 MG: at 08:01

## 2022-01-01 RX ADMIN — OLANZAPINE 10 MG: 5 TABLET, FILM COATED ORAL at 09:00

## 2022-01-01 RX ADMIN — ENOXAPARIN SODIUM 40 MG: 100 INJECTION SUBCUTANEOUS at 08:17

## 2022-01-01 RX ADMIN — OLANZAPINE 10 MG: 5 TABLET, FILM COATED ORAL at 17:16

## 2022-01-01 RX ADMIN — TROSPIUM CHLORIDE 20 MG: 20 TABLET, FILM COATED ORAL at 11:27

## 2022-01-01 RX ADMIN — PIPERACILLIN AND TAZOBACTAM 3.38 G: 3; .375 INJECTION, POWDER, LYOPHILIZED, FOR SOLUTION INTRAVENOUS at 18:21

## 2022-01-01 RX ADMIN — TROSPIUM CHLORIDE 20 MG: 20 TABLET, FILM COATED ORAL at 09:49

## 2022-01-01 RX ADMIN — CHLORHEXIDINE GLUCONATE 0.12% ORAL RINSE 10 ML: 1.2 LIQUID ORAL at 21:57

## 2022-01-01 RX ADMIN — IPRATROPIUM BROMIDE AND ALBUTEROL SULFATE 3 ML: .5; 2.5 SOLUTION RESPIRATORY (INHALATION) at 14:22

## 2022-01-01 RX ADMIN — LEVETIRACETAM 500 MG: 100 SOLUTION ORAL at 18:54

## 2022-01-01 RX ADMIN — LEVETIRACETAM 500 MG: 100 SOLUTION ORAL at 08:58

## 2022-01-01 RX ADMIN — VALPROIC ACID 500 MG: 250 SOLUTION ORAL at 22:19

## 2022-01-01 RX ADMIN — BENZTROPINE MESYLATE 1 MG: 1 TABLET ORAL at 22:20

## 2022-01-01 RX ADMIN — BENZTROPINE MESYLATE 1 MG: 1 TABLET ORAL at 21:57

## 2022-01-01 RX ADMIN — OLANZAPINE 10 MG: 5 TABLET, FILM COATED ORAL at 10:10

## 2022-01-01 RX ADMIN — DIPHENHYDRAMINE HYDROCHLORIDE 50 MG: 50 CAPSULE ORAL at 21:10

## 2022-01-01 RX ADMIN — Medication 20 MG: at 09:58

## 2022-01-01 RX ADMIN — BENZTROPINE MESYLATE 1 MG: 1 TABLET ORAL at 18:43

## 2022-01-01 RX ADMIN — SUCCINYLCHOLINE CHLORIDE 140 MG: 20 INJECTION, SOLUTION INTRAMUSCULAR; INTRAVENOUS at 09:15

## 2022-01-01 RX ADMIN — VALPROIC ACID 500 MG: 250 SOLUTION ORAL at 09:28

## 2022-01-01 RX ADMIN — PIPERACILLIN AND TAZOBACTAM 3.38 G: 3; .375 INJECTION, POWDER, LYOPHILIZED, FOR SOLUTION INTRAVENOUS at 03:55

## 2022-01-01 RX ADMIN — TAMSULOSIN HYDROCHLORIDE 0.4 MG: 0.4 CAPSULE ORAL at 09:33

## 2022-01-01 RX ADMIN — VALPROIC ACID 500 MG: 250 SOLUTION ORAL at 09:58

## 2022-01-01 RX ADMIN — LEVETIRACETAM 500 MG: 100 SOLUTION ORAL at 17:42

## 2022-01-01 RX ADMIN — OLANZAPINE 10 MG: 5 TABLET, FILM COATED ORAL at 09:31

## 2022-01-01 RX ADMIN — BENZTROPINE MESYLATE 1 MG: 1 TABLET ORAL at 21:14

## 2022-01-01 RX ADMIN — CLOTRIMAZOLE AND BETAMETHASONE DIPROPIONATE: 10; .5 CREAM TOPICAL at 09:18

## 2022-01-01 RX ADMIN — FENTANYL CITRATE 150 MCG/HR: 0.05 INJECTION, SOLUTION INTRAMUSCULAR; INTRAVENOUS at 11:31

## 2022-01-01 RX ADMIN — PHENYLEPHRINE HYDROCHLORIDE 100 MCG: 10 INJECTION INTRAVENOUS at 09:32

## 2022-01-01 RX ADMIN — ENOXAPARIN SODIUM 40 MG: 100 INJECTION SUBCUTANEOUS at 09:59

## 2022-01-01 RX ADMIN — OLANZAPINE 10 MG: 5 TABLET, FILM COATED ORAL at 18:26

## 2022-01-01 RX ADMIN — BENZTROPINE MESYLATE 1 MG: 1 TABLET ORAL at 17:39

## 2022-01-01 RX ADMIN — BENZTROPINE MESYLATE 1 MG: 1 TABLET ORAL at 16:26

## 2022-01-01 RX ADMIN — GLYCOPYRROLATE 0.2 MG: 0.2 INJECTION INTRAMUSCULAR; INTRAVENOUS at 09:26

## 2022-01-01 RX ADMIN — OLANZAPINE 10 MG: 5 TABLET, FILM COATED ORAL at 18:01

## 2022-01-01 RX ADMIN — CLOTRIMAZOLE AND BETAMETHASONE DIPROPIONATE: 10; .5 CREAM TOPICAL at 18:21

## 2022-01-01 RX ADMIN — VALPROIC ACID 500 MG: 250 SOLUTION ORAL at 21:52

## 2022-01-01 RX ADMIN — ENOXAPARIN SODIUM 40 MG: 100 INJECTION SUBCUTANEOUS at 08:41

## 2022-01-01 RX ADMIN — ALBUMIN HUMAN 25 G: 50 SOLUTION INTRAVENOUS at 02:07

## 2022-01-01 RX ADMIN — LEVETIRACETAM 500 MG: 100 SOLUTION ORAL at 18:44

## 2022-01-01 RX ADMIN — OLANZAPINE 10 MG: 5 TABLET, FILM COATED ORAL at 15:09

## 2022-01-01 RX ADMIN — TAMSULOSIN HYDROCHLORIDE 0.4 MG: 0.4 CAPSULE ORAL at 09:27

## 2022-01-01 RX ADMIN — SODIUM CHLORIDE 500 MG: 9 INJECTION, SOLUTION INTRAVENOUS at 20:25

## 2022-01-01 RX ADMIN — CLOTRIMAZOLE AND BETAMETHASONE DIPROPIONATE: 10; .5 CREAM TOPICAL at 09:01

## 2022-01-01 RX ADMIN — LEVETIRACETAM 500 MG: 100 SOLUTION ORAL at 18:00

## 2022-01-01 RX ADMIN — VALPROIC ACID 500 MG: 250 SOLUTION ORAL at 21:44

## 2022-01-01 RX ADMIN — OLANZAPINE 10 MG: 5 TABLET, FILM COATED ORAL at 15:34

## 2022-01-01 RX ADMIN — HALOPERIDOL LACTATE 2 MG: 5 INJECTION, SOLUTION INTRAMUSCULAR at 13:16

## 2022-01-01 RX ADMIN — CLOTRIMAZOLE AND BETAMETHASONE DIPROPIONATE: 10; .5 CREAM TOPICAL at 08:24

## 2022-01-01 RX ADMIN — HALOPERIDOL LACTATE 2 MG: 5 INJECTION, SOLUTION INTRAMUSCULAR at 22:03

## 2022-01-01 RX ADMIN — BENZTROPINE MESYLATE 1.5 MG: 1 TABLET ORAL at 08:38

## 2022-01-01 RX ADMIN — BARIUM SULFATE 15 ML: 400 PASTE ORAL at 12:07

## 2022-01-01 RX ADMIN — CLOTRIMAZOLE AND BETAMETHASONE DIPROPIONATE: 10; .5 CREAM TOPICAL at 08:26

## 2022-01-01 RX ADMIN — Medication 20 MG: at 08:43

## 2022-01-01 RX ADMIN — LEVETIRACETAM 500 MG: 100 SOLUTION ORAL at 10:10

## 2022-01-01 RX ADMIN — OLANZAPINE 10 MG: 5 TABLET, FILM COATED ORAL at 15:41

## 2022-01-01 RX ADMIN — LEVETIRACETAM 500 MG: 100 SOLUTION ORAL at 17:04

## 2022-01-01 RX ADMIN — BENZTROPINE MESYLATE 1 MG: 1 TABLET ORAL at 21:40

## 2022-01-01 RX ADMIN — HALOPERIDOL LACTATE 2 MG: 5 INJECTION, SOLUTION INTRAMUSCULAR at 22:40

## 2022-01-01 RX ADMIN — OLANZAPINE 10 MG: 5 TABLET, FILM COATED ORAL at 16:30

## 2022-01-01 RX ADMIN — LEVETIRACETAM 500 MG: 100 SOLUTION ORAL at 17:19

## 2022-01-01 RX ADMIN — DIPHENHYDRAMINE HYDROCHLORIDE 50 MG: 50 CAPSULE ORAL at 22:40

## 2022-01-01 RX ADMIN — Medication 20 MG: at 08:50

## 2022-01-01 RX ADMIN — OLANZAPINE 10 MG: 5 TABLET, FILM COATED ORAL at 17:38

## 2022-01-01 RX ADMIN — LEVETIRACETAM 500 MG: 100 SOLUTION ORAL at 18:13

## 2022-01-01 RX ADMIN — LEVETIRACETAM 500 MG: 100 SOLUTION ORAL at 17:06

## 2022-01-01 RX ADMIN — ENOXAPARIN SODIUM 40 MG: 100 INJECTION SUBCUTANEOUS at 08:20

## 2022-01-01 RX ADMIN — Medication 20 MG: at 08:25

## 2022-01-01 RX ADMIN — VALPROIC ACID 500 MG: 250 SOLUTION ORAL at 22:18

## 2022-01-01 RX ADMIN — LEVETIRACETAM 500 MG: 100 INJECTION, SOLUTION INTRAVENOUS at 10:44

## 2022-01-01 RX ADMIN — ALBUTEROL SULFATE 2.5 MG: 2.5 SOLUTION RESPIRATORY (INHALATION) at 19:13

## 2022-01-01 RX ADMIN — FENTANYL CITRATE 100 MCG: 50 INJECTION, SOLUTION INTRAMUSCULAR; INTRAVENOUS at 03:00

## 2022-01-01 RX ADMIN — Medication 20 MG: at 09:31

## 2022-01-01 RX ADMIN — VALPROIC ACID 500 MG: 250 SOLUTION ORAL at 17:21

## 2022-01-01 RX ADMIN — CLOTRIMAZOLE AND BETAMETHASONE DIPROPIONATE: 10; .5 CREAM TOPICAL at 08:51

## 2022-01-01 RX ADMIN — HALOPERIDOL LACTATE 2 MG: 5 INJECTION, SOLUTION INTRAMUSCULAR at 22:08

## 2022-01-01 RX ADMIN — Medication 20 MG: at 09:21

## 2022-01-01 RX ADMIN — LEVETIRACETAM 500 MG: 100 SOLUTION ORAL at 12:48

## 2022-01-01 RX ADMIN — CLOTRIMAZOLE AND BETAMETHASONE DIPROPIONATE: 10; .5 CREAM TOPICAL at 17:58

## 2022-01-01 RX ADMIN — CLOTRIMAZOLE AND BETAMETHASONE DIPROPIONATE: 10; .5 CREAM TOPICAL at 09:29

## 2022-01-01 RX ADMIN — BENZTROPINE MESYLATE 1 MG: 1 TABLET ORAL at 11:13

## 2022-01-01 RX ADMIN — VALPROIC ACID 500 MG: 250 SOLUTION ORAL at 22:04

## 2022-01-01 RX ADMIN — ENOXAPARIN SODIUM 40 MG: 100 INJECTION SUBCUTANEOUS at 09:07

## 2022-01-01 RX ADMIN — VALPROIC ACID 500 MG: 250 SOLUTION ORAL at 08:40

## 2022-01-01 RX ADMIN — OLANZAPINE 10 MG: 5 TABLET, FILM COATED ORAL at 16:31

## 2022-01-01 RX ADMIN — LEVETIRACETAM 500 MG: 100 SOLUTION ORAL at 08:43

## 2022-01-01 RX ADMIN — POLYETHYLENE GLYCOL 3350 17 G: 17 POWDER, FOR SOLUTION ORAL at 15:27

## 2022-01-01 RX ADMIN — LEVETIRACETAM 500 MG: 100 SOLUTION ORAL at 09:02

## 2022-01-01 RX ADMIN — BENZTROPINE MESYLATE 1 MG: 1 TABLET ORAL at 09:47

## 2022-01-01 RX ADMIN — BENZTROPINE MESYLATE 1 MG: 1 TABLET ORAL at 21:08

## 2022-01-01 RX ADMIN — Medication 20 MG: at 08:19

## 2022-01-01 RX ADMIN — Medication 20 MG: at 14:11

## 2022-01-01 RX ADMIN — FENTANYL CITRATE 100 MCG/HR: 0.05 INJECTION, SOLUTION INTRAMUSCULAR; INTRAVENOUS at 06:52

## 2022-01-01 RX ADMIN — LEVETIRACETAM 500 MG: 100 SOLUTION ORAL at 10:08

## 2022-01-01 RX ADMIN — Medication 20 MG: at 12:55

## 2022-01-01 RX ADMIN — FENTANYL CITRATE 150 MCG/HR: 0.05 INJECTION, SOLUTION INTRAMUSCULAR; INTRAVENOUS at 20:46

## 2022-01-01 RX ADMIN — LEVETIRACETAM 500 MG: 100 SOLUTION ORAL at 09:07

## 2022-01-01 RX ADMIN — BARIUM SULFATE 15 ML: 400 SUSPENSION ORAL at 12:07

## 2022-01-01 RX ADMIN — PIPERACILLIN AND TAZOBACTAM 3.38 G: 3; .375 INJECTION, POWDER, LYOPHILIZED, FOR SOLUTION INTRAVENOUS at 02:00

## 2022-01-01 RX ADMIN — OLANZAPINE 10 MG: 5 TABLET, FILM COATED ORAL at 15:27

## 2022-01-01 RX ADMIN — PIPERACILLIN AND TAZOBACTAM 3.38 G: 3; .375 INJECTION, POWDER, LYOPHILIZED, FOR SOLUTION INTRAVENOUS at 19:29

## 2022-01-01 RX ADMIN — CEFAZOLIN SODIUM 2 G: 1 INJECTION, POWDER, FOR SOLUTION INTRAMUSCULAR; INTRAVENOUS at 09:27

## 2022-01-01 RX ADMIN — CLOTRIMAZOLE AND BETAMETHASONE DIPROPIONATE: 10; .5 CREAM TOPICAL at 10:17

## 2022-01-01 RX ADMIN — VALPROIC ACID 500 MG: 250 SOLUTION ORAL at 22:29

## 2022-01-01 RX ADMIN — BENZTROPINE MESYLATE 1 MG: 1 TABLET ORAL at 22:19

## 2022-01-01 RX ADMIN — FENTANYL CITRATE 100 MCG/HR: 0.05 INJECTION, SOLUTION INTRAMUSCULAR; INTRAVENOUS at 12:00

## 2022-01-01 RX ADMIN — BENZTROPINE MESYLATE 1 MG: 1 TABLET ORAL at 21:34

## 2022-01-01 RX ADMIN — BENZTROPINE MESYLATE 1 MG: 1 TABLET ORAL at 10:10

## 2022-01-01 RX ADMIN — OLANZAPINE 10 MG: 5 TABLET, FILM COATED ORAL at 16:34

## 2022-01-01 RX ADMIN — TAMSULOSIN HYDROCHLORIDE 0.4 MG: 0.4 CAPSULE ORAL at 08:56

## 2022-01-01 RX ADMIN — Medication 20 MG: at 09:38

## 2022-01-01 RX ADMIN — FINASTERIDE 5 MG: 5 TABLET, FILM COATED ORAL at 12:31

## 2022-01-01 RX ADMIN — LEVETIRACETAM 500 MG: 100 SOLUTION ORAL at 17:55

## 2022-01-01 RX ADMIN — LEVETIRACETAM 500 MG: 100 SOLUTION ORAL at 09:49

## 2022-01-01 RX ADMIN — LEVETIRACETAM 500 MG: 100 SOLUTION ORAL at 17:49

## 2022-01-01 RX ADMIN — VALPROIC ACID 500 MG: 250 SOLUTION ORAL at 10:12

## 2022-01-01 RX ADMIN — CLOTRIMAZOLE AND BETAMETHASONE DIPROPIONATE: 10; .5 CREAM TOPICAL at 18:12

## 2022-01-01 RX ADMIN — VALPROIC ACID 500 MG: 250 SOLUTION ORAL at 21:16

## 2022-01-01 RX ADMIN — BENZTROPINE MESYLATE 1 MG: 1 TABLET ORAL at 22:04

## 2022-01-01 RX ADMIN — CLOTRIMAZOLE AND BETAMETHASONE DIPROPIONATE: 10; .5 CREAM TOPICAL at 08:21

## 2022-01-01 RX ADMIN — TAMSULOSIN HYDROCHLORIDE 0.4 MG: 0.4 CAPSULE ORAL at 10:08

## 2022-01-01 RX ADMIN — SODIUM CHLORIDE, SODIUM LACTATE, POTASSIUM CHLORIDE, CALCIUM CHLORIDE, AND DEXTROSE MONOHYDRATE 75 ML/HR: 600; 310; 30; 20; 5 INJECTION, SOLUTION INTRAVENOUS at 17:14

## 2022-01-01 RX ADMIN — SODIUM CHLORIDE 250 MG: 9 INJECTION, SOLUTION INTRAVENOUS at 01:00

## 2022-01-01 RX ADMIN — VANCOMYCIN HYDROCHLORIDE 1500 MG: 10 INJECTION, POWDER, LYOPHILIZED, FOR SOLUTION INTRAVENOUS at 13:33

## 2022-01-01 RX ADMIN — LEVETIRACETAM 500 MG: 100 SOLUTION ORAL at 08:40

## 2022-01-01 RX ADMIN — Medication 20 MG: at 09:59

## 2022-01-01 RX ADMIN — LEVETIRACETAM 500 MG: 100 SOLUTION ORAL at 09:00

## 2022-01-01 RX ADMIN — POTASSIUM BICARBONATE 20 MEQ: 782 TABLET, EFFERVESCENT ORAL at 02:04

## 2022-01-01 RX ADMIN — FENTANYL CITRATE 50 MCG: 50 INJECTION, SOLUTION INTRAMUSCULAR; INTRAVENOUS at 09:52

## 2022-01-01 RX ADMIN — HALOPERIDOL LACTATE 2 MG: 5 INJECTION, SOLUTION INTRAMUSCULAR at 19:52

## 2022-01-01 RX ADMIN — BENZTROPINE MESYLATE 1 MG: 1 TABLET ORAL at 08:38

## 2022-01-01 RX ADMIN — TAMSULOSIN HYDROCHLORIDE 0.4 MG: 0.4 CAPSULE ORAL at 10:26

## 2022-01-01 RX ADMIN — SODIUM CHLORIDE 500 MG: 9 INJECTION, SOLUTION INTRAVENOUS at 22:06

## 2022-01-01 RX ADMIN — VALPROIC ACID 500 MG: 250 SOLUTION ORAL at 18:22

## 2022-01-01 RX ADMIN — VALPROIC ACID 500 MG: 250 SOLUTION ORAL at 08:45

## 2022-01-01 RX ADMIN — CLOTRIMAZOLE AND BETAMETHASONE DIPROPIONATE: 10; .5 CREAM TOPICAL at 17:22

## 2022-01-01 RX ADMIN — TROSPIUM CHLORIDE 20 MG: 20 TABLET, FILM COATED ORAL at 10:23

## 2022-01-01 RX ADMIN — FENTANYL CITRATE 100 MCG: 50 INJECTION, SOLUTION INTRAMUSCULAR; INTRAVENOUS at 23:51

## 2022-01-01 RX ADMIN — PIPERACILLIN AND TAZOBACTAM 3.38 G: 3; .375 INJECTION, POWDER, LYOPHILIZED, FOR SOLUTION INTRAVENOUS at 17:11

## 2022-01-01 RX ADMIN — LEVETIRACETAM 500 MG: 100 SOLUTION ORAL at 09:31

## 2022-01-01 RX ADMIN — BENZTROPINE MESYLATE 1 MG: 1 TABLET ORAL at 10:56

## 2022-01-01 RX ADMIN — CLOTRIMAZOLE AND BETAMETHASONE DIPROPIONATE: 10; .5 CREAM TOPICAL at 17:33

## 2022-01-01 RX ADMIN — POTASSIUM CHLORIDE 20 MEQ: 14.9 INJECTION, SOLUTION INTRAVENOUS at 04:54

## 2022-01-01 RX ADMIN — TROSPIUM CHLORIDE 20 MG: 20 TABLET, FILM COATED ORAL at 10:44

## 2022-01-01 RX ADMIN — LEVETIRACETAM 500 MG: 100 SOLUTION ORAL at 09:48

## 2022-01-01 RX ADMIN — LEVETIRACETAM 500 MG: 100 SOLUTION ORAL at 10:27

## 2022-01-01 RX ADMIN — OLANZAPINE 10 MG: 5 TABLET, FILM COATED ORAL at 09:47

## 2022-01-01 RX ADMIN — ENOXAPARIN SODIUM 40 MG: 100 INJECTION SUBCUTANEOUS at 08:24

## 2022-01-01 RX ADMIN — OLANZAPINE 10 MG: 5 TABLET, FILM COATED ORAL at 22:19

## 2022-01-01 RX ADMIN — Medication 20 MG: at 08:53

## 2022-01-01 RX ADMIN — BENZTROPINE MESYLATE 1 MG: 1 TABLET ORAL at 17:41

## 2022-01-01 RX ADMIN — Medication 20 MG: at 10:26

## 2022-01-01 RX ADMIN — VALPROIC ACID 500 MG: 250 SOLUTION ORAL at 11:25

## 2022-01-01 RX ADMIN — LEVETIRACETAM 500 MG: 100 SOLUTION ORAL at 17:36

## 2022-01-01 RX ADMIN — TAMSULOSIN HYDROCHLORIDE 0.4 MG: 0.4 CAPSULE ORAL at 09:49

## 2022-01-01 RX ADMIN — OLANZAPINE 7.5 MG: 10 INJECTION, POWDER, LYOPHILIZED, FOR SOLUTION INTRAMUSCULAR at 22:28

## 2022-01-01 RX ADMIN — TROSPIUM CHLORIDE 20 MG: 20 TABLET, FILM COATED ORAL at 10:08

## 2022-01-01 RX ADMIN — BENZTROPINE MESYLATE 1 MG: 1 TABLET ORAL at 08:40

## 2022-01-01 RX ADMIN — OLANZAPINE 10 MG: 5 TABLET, FILM COATED ORAL at 08:54

## 2022-01-01 RX ADMIN — TAMSULOSIN HYDROCHLORIDE 0.4 MG: 0.4 CAPSULE ORAL at 11:04

## 2022-01-01 RX ADMIN — OLANZAPINE 10 MG: 5 TABLET, FILM COATED ORAL at 15:43

## 2022-01-01 RX ADMIN — Medication 20 MG: at 09:02

## 2022-01-01 RX ADMIN — Medication 20 MG: at 09:49

## 2022-01-01 RX ADMIN — LEVETIRACETAM 500 MG: 100 INJECTION, SOLUTION INTRAVENOUS at 19:08

## 2022-01-01 RX ADMIN — BENZTROPINE MESYLATE 1 MG: 1 TABLET ORAL at 17:47

## 2022-01-01 RX ADMIN — VALPROIC ACID 500 MG: 250 SOLUTION ORAL at 09:33

## 2022-01-01 RX ADMIN — OLANZAPINE 10 MG: 5 TABLET, FILM COATED ORAL at 17:23

## 2022-01-01 RX ADMIN — BENZTROPINE MESYLATE 1 MG: 1 TABLET ORAL at 09:11

## 2022-01-01 RX ADMIN — LEVETIRACETAM 500 MG: 100 SOLUTION ORAL at 09:12

## 2022-01-01 RX ADMIN — OLANZAPINE 10 MG: 5 TABLET, FILM COATED ORAL at 10:43

## 2022-01-01 RX ADMIN — TROSPIUM CHLORIDE 20 MG: 20 TABLET, FILM COATED ORAL at 10:39

## 2022-01-01 RX ADMIN — VALPROIC ACID 500 MG: 250 SOLUTION ORAL at 20:48

## 2022-01-01 RX ADMIN — VALPROIC ACID 500 MG: 250 SOLUTION ORAL at 08:51

## 2022-01-01 RX ADMIN — BENZTROPINE MESYLATE 1.5 MG: 1 TABLET ORAL at 22:29

## 2022-01-01 RX ADMIN — CLOTRIMAZOLE AND BETAMETHASONE DIPROPIONATE: 10; .5 CREAM TOPICAL at 19:04

## 2022-01-01 RX ADMIN — LEVETIRACETAM 500 MG: 100 SOLUTION ORAL at 08:41

## 2022-01-01 RX ADMIN — BENZTROPINE MESYLATE 1 MG: 1 TABLET ORAL at 09:02

## 2022-01-01 RX ADMIN — LEVETIRACETAM 500 MG: 100 SOLUTION ORAL at 08:46

## 2022-01-01 RX ADMIN — TROSPIUM CHLORIDE 20 MG: 20 TABLET, FILM COATED ORAL at 09:58

## 2022-01-01 RX ADMIN — OLANZAPINE 10 MG: 5 TABLET, FILM COATED ORAL at 21:52

## 2022-01-01 RX ADMIN — POLYETHYLENE GLYCOL 3350 17 G: 17 POWDER, FOR SOLUTION ORAL at 18:31

## 2022-01-01 RX ADMIN — LEVETIRACETAM 500 MG: 100 SOLUTION ORAL at 08:50

## 2022-01-01 RX ADMIN — OLANZAPINE 10 MG: 5 TABLET, FILM COATED ORAL at 08:38

## 2022-01-01 RX ADMIN — VALPROIC ACID 500 MG: 250 SOLUTION ORAL at 10:38

## 2022-01-01 RX ADMIN — OLANZAPINE 10 MG: 5 TABLET, FILM COATED ORAL at 09:27

## 2022-01-01 RX ADMIN — LEVETIRACETAM 500 MG: 100 INJECTION, SOLUTION INTRAVENOUS at 00:56

## 2022-01-01 RX ADMIN — TROSPIUM CHLORIDE 20 MG: 20 TABLET, FILM COATED ORAL at 09:33

## 2022-01-01 RX ADMIN — LEVETIRACETAM 500 MG: 100 SOLUTION ORAL at 08:19

## 2022-01-01 RX ADMIN — TAMSULOSIN HYDROCHLORIDE 0.4 MG: 0.4 CAPSULE ORAL at 10:10

## 2022-01-01 RX ADMIN — TAMSULOSIN HYDROCHLORIDE 0.4 MG: 0.4 CAPSULE ORAL at 09:17

## 2022-01-01 RX ADMIN — LEVETIRACETAM 500 MG: 100 SOLUTION ORAL at 18:08

## 2022-01-01 RX ADMIN — OLANZAPINE 10 MG: 5 TABLET, FILM COATED ORAL at 12:31

## 2022-01-01 RX ADMIN — VALPROIC ACID 500 MG: 250 SOLUTION ORAL at 22:24

## 2022-01-01 RX ADMIN — LEVETIRACETAM 500 MG: 100 SOLUTION ORAL at 17:01

## 2022-01-01 RX ADMIN — OLANZAPINE 10 MG: 5 TABLET, FILM COATED ORAL at 17:21

## 2022-01-01 RX ADMIN — TROSPIUM CHLORIDE 20 MG: 20 TABLET, FILM COATED ORAL at 08:40

## 2022-01-01 RX ADMIN — LEVETIRACETAM 500 MG: 100 SOLUTION ORAL at 08:32

## 2022-01-01 RX ADMIN — SODIUM CHLORIDE 250 MG: 9 INJECTION, SOLUTION INTRAVENOUS at 13:10

## 2022-01-01 RX ADMIN — VALPROIC ACID 500 MG: 250 SOLUTION ORAL at 22:21

## 2022-01-01 RX ADMIN — VALPROIC ACID 500 MG: 250 SOLUTION ORAL at 21:09

## 2022-01-01 RX ADMIN — PHENYLEPHRINE HYDROCHLORIDE 100 MCG: 10 INJECTION INTRAVENOUS at 09:49

## 2022-01-01 RX ADMIN — Medication 20 MG: at 10:38

## 2022-01-01 RX ADMIN — LEVETIRACETAM 500 MG: 100 SOLUTION ORAL at 10:38

## 2022-01-01 RX ADMIN — Medication 20 MG: at 10:11

## 2022-01-01 RX ADMIN — BENZTROPINE MESYLATE 1 MG: 1 TABLET ORAL at 22:29

## 2022-01-01 RX ADMIN — VALPROIC ACID 500 MG: 250 SOLUTION ORAL at 21:08

## 2022-01-01 RX ADMIN — OLANZAPINE 10 MG: 5 TABLET, FILM COATED ORAL at 18:52

## 2022-01-01 RX ADMIN — ENOXAPARIN SODIUM 40 MG: 100 INJECTION SUBCUTANEOUS at 08:32

## 2022-01-01 RX ADMIN — TAMSULOSIN HYDROCHLORIDE 0.4 MG: 0.4 CAPSULE ORAL at 10:02

## 2022-01-01 RX ADMIN — TROSPIUM CHLORIDE 20 MG: 20 TABLET, FILM COATED ORAL at 09:41

## 2022-01-01 RX ADMIN — ENOXAPARIN SODIUM 40 MG: 100 INJECTION SUBCUTANEOUS at 09:11

## 2022-01-01 RX ADMIN — CLOTRIMAZOLE AND BETAMETHASONE DIPROPIONATE: 10; .5 CREAM TOPICAL at 17:05

## 2022-01-01 RX ADMIN — CLOTRIMAZOLE AND BETAMETHASONE DIPROPIONATE: 10; .5 CREAM TOPICAL at 17:06

## 2022-01-01 RX ADMIN — OLANZAPINE 10 MG: 5 TABLET, FILM COATED ORAL at 09:11

## 2022-01-01 RX ADMIN — ALBUTEROL SULFATE 2.5 MG: 2.5 SOLUTION RESPIRATORY (INHALATION) at 23:09

## 2022-01-01 RX ADMIN — ENOXAPARIN SODIUM 40 MG: 100 INJECTION SUBCUTANEOUS at 08:57

## 2022-01-01 RX ADMIN — CLOTRIMAZOLE AND BETAMETHASONE DIPROPIONATE: 10; .5 CREAM TOPICAL at 09:50

## 2022-01-01 RX ADMIN — OLANZAPINE 10 MG: 5 TABLET, FILM COATED ORAL at 17:57

## 2022-01-01 RX ADMIN — PIPERACILLIN AND TAZOBACTAM 3.38 G: 3; .375 INJECTION, POWDER, LYOPHILIZED, FOR SOLUTION INTRAVENOUS at 02:50

## 2022-01-01 RX ADMIN — VALPROIC ACID 500 MG: 250 SOLUTION ORAL at 11:15

## 2022-01-01 RX ADMIN — HALOPERIDOL LACTATE 2 MG: 5 INJECTION, SOLUTION INTRAMUSCULAR at 20:34

## 2022-01-01 RX ADMIN — OLANZAPINE 10 MG: 5 TABLET, FILM COATED ORAL at 21:38

## 2022-01-01 RX ADMIN — OLANZAPINE 10 MG: 5 TABLET, FILM COATED ORAL at 08:51

## 2022-01-01 RX ADMIN — CLOTRIMAZOLE AND BETAMETHASONE DIPROPIONATE: 10; .5 CREAM TOPICAL at 17:30

## 2022-01-01 RX ADMIN — VALPROIC ACID 500 MG: 250 SOLUTION ORAL at 12:27

## 2022-01-01 RX ADMIN — Medication 20 MG: at 10:00

## 2022-01-01 RX ADMIN — LEVETIRACETAM 500 MG: 100 SOLUTION ORAL at 17:26

## 2022-01-01 RX ADMIN — PIPERACILLIN AND TAZOBACTAM 3.38 G: 3; .375 INJECTION, POWDER, LYOPHILIZED, FOR SOLUTION INTRAVENOUS at 02:17

## 2022-01-01 RX ADMIN — OLANZAPINE 10 MG: 5 TABLET, FILM COATED ORAL at 08:39

## 2022-01-01 RX ADMIN — BENZTROPINE MESYLATE 1 MG: 1 TABLET ORAL at 17:22

## 2022-01-01 RX ADMIN — BENZTROPINE MESYLATE 1 MG: 1 TABLET ORAL at 15:27

## 2022-01-01 RX ADMIN — LEVETIRACETAM 500 MG: 100 SOLUTION ORAL at 18:22

## 2022-01-01 RX ADMIN — VALPROIC ACID 500 MG: 250 SOLUTION ORAL at 10:56

## 2022-01-01 RX ADMIN — BENZTROPINE MESYLATE 1 MG: 1 TABLET ORAL at 16:04

## 2022-01-01 RX ADMIN — LEVETIRACETAM 500 MG: 100 SOLUTION ORAL at 08:01

## 2022-01-01 RX ADMIN — CLOTRIMAZOLE AND BETAMETHASONE DIPROPIONATE: 10; .5 CREAM TOPICAL at 08:49

## 2022-01-01 RX ADMIN — OLANZAPINE 10 MG: 5 TABLET, FILM COATED ORAL at 09:17

## 2022-01-01 RX ADMIN — BENZTROPINE MESYLATE 1 MG: 1 TABLET ORAL at 16:31

## 2022-01-01 RX ADMIN — BARIUM SULFATE 30 G: 960 POWDER, FOR SUSPENSION ORAL at 09:58

## 2022-01-01 RX ADMIN — BENZTROPINE MESYLATE 1 MG: 1 TABLET ORAL at 17:21

## 2022-01-01 RX ADMIN — BENZTROPINE MESYLATE 1 MG: 1 TABLET ORAL at 15:20

## 2022-01-01 RX ADMIN — OLANZAPINE 10 MG: 5 TABLET, FILM COATED ORAL at 18:00

## 2022-01-01 RX ADMIN — OLANZAPINE 10 MG: 5 TABLET, FILM COATED ORAL at 21:57

## 2022-01-01 RX ADMIN — Medication 20 MG: at 10:08

## 2022-01-01 RX ADMIN — LEVETIRACETAM 500 MG: 100 SOLUTION ORAL at 17:05

## 2022-01-01 RX ADMIN — VALPROIC ACID 500 MG: 250 SOLUTION ORAL at 14:11

## 2022-01-01 RX ADMIN — VALPROIC ACID 500 MG: 250 SOLUTION ORAL at 08:54

## 2022-01-01 RX ADMIN — OLANZAPINE 10 MG: 5 TABLET, FILM COATED ORAL at 09:36

## 2022-01-01 RX ADMIN — OLANZAPINE 10 MG: 5 TABLET, FILM COATED ORAL at 10:02

## 2022-01-01 RX ADMIN — HALOPERIDOL LACTATE 2 MG: 5 INJECTION, SOLUTION INTRAMUSCULAR at 10:43

## 2022-01-01 RX ADMIN — MIDAZOLAM 2 MG: 1 INJECTION, SOLUTION INTRAMUSCULAR; INTRAVENOUS at 16:44

## 2022-01-01 RX ADMIN — PIPERACILLIN AND TAZOBACTAM 4.5 G: 4; .5 INJECTION, POWDER, FOR SOLUTION INTRAVENOUS at 12:57

## 2022-01-01 RX ADMIN — PIPERACILLIN AND TAZOBACTAM 3.38 G: 3; .375 INJECTION, POWDER, LYOPHILIZED, FOR SOLUTION INTRAVENOUS at 09:46

## 2022-01-01 RX ADMIN — HALOPERIDOL LACTATE 2 MG: 5 INJECTION, SOLUTION INTRAMUSCULAR at 22:18

## 2022-01-01 RX ADMIN — TAMSULOSIN HYDROCHLORIDE 0.4 MG: 0.4 CAPSULE ORAL at 12:54

## 2022-01-01 RX ADMIN — VALPROIC ACID 500 MG: 250 SOLUTION ORAL at 09:16

## 2022-01-01 RX ADMIN — Medication 20 MG: at 12:18

## 2022-01-01 RX ADMIN — OLANZAPINE 10 MG: 5 TABLET, FILM COATED ORAL at 09:20

## 2022-01-01 RX ADMIN — TROSPIUM CHLORIDE 20 MG: 20 TABLET, FILM COATED ORAL at 09:20

## 2022-01-01 RX ADMIN — SODIUM CHLORIDE, PRESERVATIVE FREE 20 MG: 5 INJECTION INTRAVENOUS at 08:24

## 2022-01-01 RX ADMIN — OLANZAPINE 10 MG: 5 TABLET, FILM COATED ORAL at 20:02

## 2022-01-01 RX ADMIN — CLOTRIMAZOLE AND BETAMETHASONE DIPROPIONATE: 10; .5 CREAM TOPICAL at 18:33

## 2022-01-01 RX ADMIN — VALPROIC ACID 500 MG: 250 SOLUTION ORAL at 10:08

## 2022-01-01 RX ADMIN — TROSPIUM CHLORIDE 20 MG: 20 TABLET, FILM COATED ORAL at 08:04

## 2022-01-01 RX ADMIN — LEVETIRACETAM 500 MG: 100 SOLUTION ORAL at 17:14

## 2022-01-01 RX ADMIN — LEVETIRACETAM 500 MG: 100 INJECTION, SOLUTION INTRAVENOUS at 07:20

## 2022-01-01 RX ADMIN — TROSPIUM CHLORIDE 20 MG: 20 TABLET, FILM COATED ORAL at 09:37

## 2022-01-01 RX ADMIN — ENOXAPARIN SODIUM 40 MG: 100 INJECTION SUBCUTANEOUS at 10:11

## 2022-01-01 RX ADMIN — VALPROIC ACID 500 MG: 250 SOLUTION ORAL at 10:11

## 2022-01-01 RX ADMIN — LEVETIRACETAM 500 MG: 100 SOLUTION ORAL at 18:01

## 2022-01-01 RX ADMIN — BENZTROPINE MESYLATE 1 MG: 1 TABLET ORAL at 18:52

## 2022-01-01 RX ADMIN — HALOPERIDOL LACTATE 2 MG: 5 INJECTION, SOLUTION INTRAMUSCULAR at 16:43

## 2022-01-01 RX ADMIN — SODIUM CHLORIDE 500 MG: 9 INJECTION, SOLUTION INTRAVENOUS at 10:18

## 2022-01-01 RX ADMIN — BENZTROPINE MESYLATE 1 MG: 1 TABLET ORAL at 09:20

## 2022-01-01 RX ADMIN — HALOPERIDOL LACTATE 2 MG: 5 INJECTION, SOLUTION INTRAMUSCULAR at 15:55

## 2022-01-01 RX ADMIN — BENZTROPINE MESYLATE 1 MG: 1 TABLET ORAL at 12:19

## 2022-01-01 RX ADMIN — VALPROIC ACID 500 MG: 250 SOLUTION ORAL at 21:41

## 2022-01-01 RX ADMIN — OLANZAPINE 10 MG: 5 TABLET, FILM COATED ORAL at 21:36

## 2022-01-01 RX ADMIN — LEVETIRACETAM 500 MG: 100 SOLUTION ORAL at 10:22

## 2022-01-01 RX ADMIN — CLOTRIMAZOLE AND BETAMETHASONE DIPROPIONATE: 10; .5 CREAM TOPICAL at 18:53

## 2022-01-01 RX ADMIN — LEVETIRACETAM 500 MG: 100 SOLUTION ORAL at 09:33

## 2022-01-01 RX ADMIN — IPRATROPIUM BROMIDE AND ALBUTEROL SULFATE 3 ML: .5; 2.5 SOLUTION RESPIRATORY (INHALATION) at 02:17

## 2022-01-01 RX ADMIN — BENZTROPINE MESYLATE 1 MG: 1 TABLET ORAL at 21:41

## 2022-01-01 RX ADMIN — CLOTRIMAZOLE AND BETAMETHASONE DIPROPIONATE: 10; .5 CREAM TOPICAL at 18:08

## 2022-01-01 RX ADMIN — TROSPIUM CHLORIDE 20 MG: 20 TABLET, FILM COATED ORAL at 11:04

## 2022-01-01 RX ADMIN — Medication 20 MG: at 09:33

## 2022-01-01 RX ADMIN — Medication 20 MG: at 09:11

## 2022-01-01 RX ADMIN — PIPERACILLIN AND TAZOBACTAM 3.38 G: 3; .375 INJECTION, POWDER, LYOPHILIZED, FOR SOLUTION INTRAVENOUS at 02:53

## 2022-01-01 RX ADMIN — TAMSULOSIN HYDROCHLORIDE 0.4 MG: 0.4 CAPSULE ORAL at 08:46

## 2022-01-01 RX ADMIN — VALPROIC ACID 500 MG: 250 SOLUTION ORAL at 21:00

## 2022-01-01 RX ADMIN — Medication 20 MG: at 08:16

## 2022-01-01 RX ADMIN — Medication 20 MG: at 11:15

## 2022-01-01 RX ADMIN — OLANZAPINE 10 MG: 5 TABLET, FILM COATED ORAL at 10:08

## 2022-01-01 RX ADMIN — PIPERACILLIN AND TAZOBACTAM 3.38 G: 3; .375 INJECTION, POWDER, LYOPHILIZED, FOR SOLUTION INTRAVENOUS at 02:19

## 2022-01-01 RX ADMIN — TAMSULOSIN HYDROCHLORIDE 0.4 MG: 0.4 CAPSULE ORAL at 09:47

## 2022-01-01 RX ADMIN — BENZTROPINE MESYLATE 1 MG: 1 TABLET ORAL at 21:58

## 2022-01-01 RX ADMIN — OLANZAPINE 10 MG: 5 TABLET, FILM COATED ORAL at 21:00

## 2022-01-01 RX ADMIN — BENZTROPINE MESYLATE 1 MG: 1 TABLET ORAL at 09:00

## 2022-01-01 RX ADMIN — CLOTRIMAZOLE AND BETAMETHASONE DIPROPIONATE: 10; .5 CREAM TOPICAL at 17:48

## 2022-01-01 RX ADMIN — LEVETIRACETAM 500 MG: 100 SOLUTION ORAL at 09:56

## 2022-01-01 RX ADMIN — LEVETIRACETAM 500 MG: 100 SOLUTION ORAL at 08:54

## 2022-01-01 RX ADMIN — ENOXAPARIN SODIUM 40 MG: 100 INJECTION SUBCUTANEOUS at 08:19

## 2022-01-01 RX ADMIN — Medication 20 MG: at 09:00

## 2022-01-01 RX ADMIN — PIPERACILLIN AND TAZOBACTAM 4.5 G: 4; .5 INJECTION, POWDER, FOR SOLUTION INTRAVENOUS at 20:09

## 2022-01-01 RX ADMIN — OLANZAPINE 10 MG: 5 TABLET, FILM COATED ORAL at 09:07

## 2022-01-01 RX ADMIN — OLANZAPINE 10 MG: 5 TABLET, FILM COATED ORAL at 21:10

## 2022-01-01 RX ADMIN — BENZTROPINE MESYLATE 1 MG: 1 TABLET ORAL at 08:20

## 2022-01-01 RX ADMIN — BENZTROPINE MESYLATE 1 MG: 1 TABLET ORAL at 14:11

## 2022-01-01 RX ADMIN — CLOTRIMAZOLE AND BETAMETHASONE DIPROPIONATE: 10; .5 CREAM TOPICAL at 17:01

## 2022-01-01 RX ADMIN — OLANZAPINE 10 MG: 5 TABLET, FILM COATED ORAL at 17:49

## 2022-01-01 RX ADMIN — ENOXAPARIN SODIUM 40 MG: 100 INJECTION SUBCUTANEOUS at 09:27

## 2022-01-01 RX ADMIN — CLOTRIMAZOLE AND BETAMETHASONE DIPROPIONATE: 10; .5 CREAM TOPICAL at 18:39

## 2022-01-01 RX ADMIN — TAMSULOSIN HYDROCHLORIDE 0.4 MG: 0.4 CAPSULE ORAL at 08:04

## 2022-01-01 RX ADMIN — VALPROIC ACID 500 MG: 250 SOLUTION ORAL at 20:33

## 2022-01-01 RX ADMIN — LEVETIRACETAM 500 MG: 100 SOLUTION ORAL at 17:47

## 2022-01-01 RX ADMIN — OLANZAPINE 10 MG: 5 TABLET, FILM COATED ORAL at 22:21

## 2022-01-01 RX ADMIN — CLOTRIMAZOLE AND BETAMETHASONE DIPROPIONATE: 10; .5 CREAM TOPICAL at 09:32

## 2022-01-01 RX ADMIN — CLOTRIMAZOLE AND BETAMETHASONE DIPROPIONATE: 10; .5 CREAM TOPICAL at 13:21

## 2022-01-01 RX ADMIN — SODIUM CHLORIDE, PRESERVATIVE FREE 10 ML: 5 INJECTION INTRAVENOUS at 20:46

## 2022-01-01 RX ADMIN — TROSPIUM CHLORIDE 20 MG: 20 TABLET, FILM COATED ORAL at 10:11

## 2022-01-01 RX ADMIN — POTASSIUM PHOSPHATE, MONOBASIC AND POTASSIUM PHOSPHATE, DIBASIC: 224; 236 INJECTION, SOLUTION, CONCENTRATE INTRAVENOUS at 01:46

## 2022-01-01 RX ADMIN — MIDAZOLAM 2 MG: 1 INJECTION, SOLUTION INTRAMUSCULAR; INTRAVENOUS at 05:16

## 2022-01-01 RX ADMIN — TAMSULOSIN HYDROCHLORIDE 0.4 MG: 0.4 CAPSULE ORAL at 11:27

## 2022-01-01 RX ADMIN — CLOTRIMAZOLE AND BETAMETHASONE DIPROPIONATE: 10; .5 CREAM TOPICAL at 12:19

## 2022-01-01 RX ADMIN — Medication 20 MG: at 09:07

## 2022-01-01 RX ADMIN — OLANZAPINE 10 MG: 5 TABLET, FILM COATED ORAL at 09:33

## 2022-01-01 RX ADMIN — VALPROIC ACID 500 MG: 250 SOLUTION ORAL at 21:04

## 2022-01-01 RX ADMIN — CLOTRIMAZOLE AND BETAMETHASONE DIPROPIONATE: 10; .5 CREAM TOPICAL at 10:12

## 2022-01-01 RX ADMIN — OLANZAPINE 10 MG: 5 TABLET, FILM COATED ORAL at 11:04

## 2022-01-01 RX ADMIN — BENZTROPINE MESYLATE 1 MG: 1 TABLET ORAL at 09:56

## 2022-01-01 RX ADMIN — OLANZAPINE 10 MG: 5 TABLET, FILM COATED ORAL at 18:43

## 2022-01-01 RX ADMIN — Medication 20 MG: at 09:16

## 2022-01-01 RX ADMIN — DIPHENHYDRAMINE HYDROCHLORIDE 50 MG: 50 CAPSULE ORAL at 12:48

## 2022-01-01 RX ADMIN — HALOPERIDOL LACTATE 2 MG: 5 INJECTION, SOLUTION INTRAMUSCULAR at 22:21

## 2022-01-01 RX ADMIN — OLANZAPINE 10 MG: 5 TABLET, FILM COATED ORAL at 15:14

## 2022-01-01 RX ADMIN — VALPROIC ACID 500 MG: 250 SOLUTION ORAL at 09:34

## 2022-01-01 RX ADMIN — ENOXAPARIN SODIUM 40 MG: 100 INJECTION SUBCUTANEOUS at 08:04

## 2022-01-01 RX ADMIN — OLANZAPINE 10 MG: 5 TABLET, FILM COATED ORAL at 08:20

## 2022-01-01 RX ADMIN — TROSPIUM CHLORIDE 20 MG: 20 TABLET, FILM COATED ORAL at 09:02

## 2022-01-01 RX ADMIN — BENZTROPINE MESYLATE 1 MG: 1 TABLET ORAL at 21:25

## 2022-01-01 RX ADMIN — ENOXAPARIN SODIUM 40 MG: 100 INJECTION SUBCUTANEOUS at 09:21

## 2022-01-01 RX ADMIN — LEVETIRACETAM 500 MG: 100 SOLUTION ORAL at 17:24

## 2022-01-01 RX ADMIN — ONDANSETRON 4 MG: 2 INJECTION INTRAMUSCULAR; INTRAVENOUS at 09:25

## 2022-01-01 RX ADMIN — LEVETIRACETAM 500 MG: 100 SOLUTION ORAL at 11:26

## 2022-01-01 RX ADMIN — CLOTRIMAZOLE AND BETAMETHASONE DIPROPIONATE: 10; .5 CREAM TOPICAL at 09:47

## 2022-01-01 RX ADMIN — OLANZAPINE 10 MG: 5 TABLET, FILM COATED ORAL at 12:54

## 2022-01-01 RX ADMIN — VALPROIC ACID 500 MG: 250 SOLUTION ORAL at 21:39

## 2022-01-01 RX ADMIN — CLOTRIMAZOLE AND BETAMETHASONE DIPROPIONATE: 10; .5 CREAM TOPICAL at 17:03

## 2022-01-01 RX ADMIN — SODIUM CHLORIDE 250 MG: 9 INJECTION, SOLUTION INTRAVENOUS at 05:02

## 2022-01-01 RX ADMIN — Medication 20 MG: at 12:48

## 2022-01-01 RX ADMIN — HALOPERIDOL LACTATE 2 MG: 5 INJECTION, SOLUTION INTRAMUSCULAR at 06:14

## 2022-01-01 RX ADMIN — Medication 20 MG: at 09:41

## 2022-01-01 RX ADMIN — OLANZAPINE 10 MG: 5 TABLET, FILM COATED ORAL at 21:06

## 2022-01-01 RX ADMIN — CALCIUM GLUCONATE 1000 MG: 20 INJECTION, SOLUTION INTRAVENOUS at 01:53

## 2022-01-01 RX ADMIN — BENZTROPINE MESYLATE 1 MG: 1 TABLET ORAL at 17:52

## 2022-01-01 RX ADMIN — LEVETIRACETAM 500 MG: 100 SOLUTION ORAL at 11:04

## 2022-01-01 RX ADMIN — LEVETIRACETAM 500 MG: 100 SOLUTION ORAL at 10:12

## 2022-01-01 RX ADMIN — BENZTROPINE MESYLATE 1 MG: 1 TABLET ORAL at 16:16

## 2022-01-01 RX ADMIN — LEVETIRACETAM 500 MG: 100 SOLUTION ORAL at 09:38

## 2022-01-01 RX ADMIN — Medication 20 MG: at 09:34

## 2022-01-01 RX ADMIN — BENZTROPINE MESYLATE 1 MG: 1 TABLET ORAL at 08:46

## 2022-01-01 RX ADMIN — LEVETIRACETAM 500 MG: 100 SOLUTION ORAL at 12:18

## 2022-01-01 RX ADMIN — OLANZAPINE 10 MG: 5 TABLET, FILM COATED ORAL at 15:20

## 2022-01-01 RX ADMIN — OLANZAPINE 10 MG: 5 TABLET, FILM COATED ORAL at 11:27

## 2022-01-01 RX ADMIN — LEVETIRACETAM 500 MG: 100 SOLUTION ORAL at 17:33

## 2022-01-01 RX ADMIN — FENTANYL CITRATE 50 MCG/HR: 0.05 INJECTION, SOLUTION INTRAMUSCULAR; INTRAVENOUS at 03:40

## 2022-01-01 RX ADMIN — VALPROIC ACID 500 MG: 250 SOLUTION ORAL at 08:01

## 2022-01-01 RX ADMIN — ENOXAPARIN SODIUM 40 MG: 100 INJECTION SUBCUTANEOUS at 09:12

## 2022-01-01 RX ADMIN — BENZTROPINE MESYLATE 1 MG: 1 TABLET ORAL at 18:38

## 2022-01-01 RX ADMIN — CLOTRIMAZOLE AND BETAMETHASONE DIPROPIONATE: 10; .5 CREAM TOPICAL at 10:35

## 2022-01-01 RX ADMIN — OLANZAPINE 10 MG: 5 TABLET, FILM COATED ORAL at 10:23

## 2022-01-01 RX ADMIN — ENOXAPARIN SODIUM 40 MG: 100 INJECTION SUBCUTANEOUS at 09:37

## 2022-01-01 RX ADMIN — VALPROIC ACID 500 MG: 250 SOLUTION ORAL at 09:07

## 2022-01-01 RX ADMIN — CLOTRIMAZOLE AND BETAMETHASONE DIPROPIONATE: 10; .5 CREAM TOPICAL at 09:21

## 2022-01-01 RX ADMIN — OLANZAPINE 10 MG: 5 TABLET, FILM COATED ORAL at 16:04

## 2022-01-01 RX ADMIN — TAMSULOSIN HYDROCHLORIDE 0.4 MG: 0.4 CAPSULE ORAL at 09:07

## 2022-01-01 RX ADMIN — PIPERACILLIN AND TAZOBACTAM 3.38 G: 3; .375 INJECTION, POWDER, LYOPHILIZED, FOR SOLUTION INTRAVENOUS at 09:28

## 2022-01-01 RX ADMIN — VALPROIC ACID 500 MG: 250 SOLUTION ORAL at 09:00

## 2022-01-01 RX ADMIN — SODIUM CHLORIDE, POTASSIUM CHLORIDE, SODIUM LACTATE AND CALCIUM CHLORIDE: 600; 310; 30; 20 INJECTION, SOLUTION INTRAVENOUS at 09:14

## 2022-01-01 RX ADMIN — BENZTROPINE MESYLATE 1 MG: 1 TABLET ORAL at 16:40

## 2022-01-01 RX ADMIN — BENZTROPINE MESYLATE 1 MG: 1 TABLET ORAL at 09:36

## 2022-01-01 RX ADMIN — TROSPIUM CHLORIDE 20 MG: 20 TABLET, FILM COATED ORAL at 12:19

## 2022-01-01 RX ADMIN — OLANZAPINE 10 MG: 5 TABLET, FILM COATED ORAL at 21:16

## 2022-01-01 RX ADMIN — VALPROIC ACID 500 MG: 250 SOLUTION ORAL at 22:02

## 2022-01-01 RX ADMIN — OLANZAPINE 10 MG: 5 TABLET, FILM COATED ORAL at 08:40

## 2022-01-01 RX ADMIN — LEVETIRACETAM 500 MG: 100 SOLUTION ORAL at 18:52

## 2022-01-01 RX ADMIN — BENZTROPINE MESYLATE 1 MG: 1 TABLET ORAL at 10:11

## 2022-01-01 RX ADMIN — SODIUM CHLORIDE, PRESERVATIVE FREE 20 MG: 5 INJECTION INTRAVENOUS at 09:18

## 2022-01-01 RX ADMIN — PROPOFOL 50 MG: 10 INJECTION, EMULSION INTRAVENOUS at 09:15

## 2022-01-01 RX ADMIN — BENZTROPINE MESYLATE 1 MG: 1 TABLET ORAL at 16:30

## 2022-01-01 RX ADMIN — TAMSULOSIN HYDROCHLORIDE 0.4 MG: 0.4 CAPSULE ORAL at 09:00

## 2022-01-01 RX ADMIN — OLANZAPINE 10 MG: 5 TABLET, FILM COATED ORAL at 03:58

## 2022-01-01 RX ADMIN — BENZTROPINE MESYLATE 1 MG: 1 TABLET ORAL at 22:39

## 2022-01-01 RX ADMIN — FENTANYL CITRATE 50 MCG: 50 INJECTION, SOLUTION INTRAMUSCULAR; INTRAVENOUS at 09:44

## 2022-01-01 RX ADMIN — CLOTRIMAZOLE AND BETAMETHASONE DIPROPIONATE: 10; .5 CREAM TOPICAL at 09:39

## 2022-01-01 RX ADMIN — FINASTERIDE 5 MG: 5 TABLET, FILM COATED ORAL at 08:24

## 2022-01-01 RX ADMIN — DIPHENHYDRAMINE HYDROCHLORIDE 50 MG: 50 CAPSULE ORAL at 21:25

## 2022-01-01 RX ADMIN — OLANZAPINE 10 MG: 5 TABLET, FILM COATED ORAL at 09:56

## 2022-01-01 RX ADMIN — LEVETIRACETAM 500 MG: 100 SOLUTION ORAL at 14:11

## 2022-01-01 RX ADMIN — TAMSULOSIN HYDROCHLORIDE 0.4 MG: 0.4 CAPSULE ORAL at 08:51

## 2022-01-01 RX ADMIN — IPRATROPIUM BROMIDE AND ALBUTEROL SULFATE 3 ML: .5; 2.5 SOLUTION RESPIRATORY (INHALATION) at 11:38

## 2022-01-01 RX ADMIN — TAMSULOSIN HYDROCHLORIDE 0.4 MG: 0.4 CAPSULE ORAL at 09:40

## 2022-01-01 RX ADMIN — LEVETIRACETAM 500 MG: 100 INJECTION, SOLUTION INTRAVENOUS at 18:17

## 2022-01-01 RX ADMIN — VALPROIC ACID 500 MG: 250 SOLUTION ORAL at 10:26

## 2022-01-01 RX ADMIN — TAMSULOSIN HYDROCHLORIDE 0.4 MG: 0.4 CAPSULE ORAL at 14:11

## 2022-01-01 RX ADMIN — OLANZAPINE 10 MG: 5 TABLET, FILM COATED ORAL at 16:40

## 2022-01-01 RX ADMIN — ENOXAPARIN SODIUM 40 MG: 100 INJECTION SUBCUTANEOUS at 08:25

## 2022-01-01 RX ADMIN — OLANZAPINE 10 MG: 5 TABLET, FILM COATED ORAL at 09:02

## 2022-01-01 RX ADMIN — LEVETIRACETAM 500 MG: 100 SOLUTION ORAL at 10:56

## 2022-01-01 RX ADMIN — VALPROIC ACID 500 MG: 250 SOLUTION ORAL at 22:17

## 2022-01-01 RX ADMIN — VALPROIC ACID 500 MG: 250 SOLUTION ORAL at 09:11

## 2022-01-01 RX ADMIN — Medication 20 MG: at 08:24

## 2022-01-01 RX ADMIN — OLANZAPINE 10 MG: 5 TABLET, FILM COATED ORAL at 21:27

## 2022-01-01 RX ADMIN — VALPROIC ACID 500 MG: 250 SOLUTION ORAL at 12:48

## 2022-01-01 RX ADMIN — BENZTROPINE MESYLATE 1 MG: 1 TABLET ORAL at 09:37

## 2022-01-01 RX ADMIN — LEVETIRACETAM 500 MG: 100 SOLUTION ORAL at 17:52

## 2022-01-01 RX ADMIN — ALBUMIN (HUMAN) 25 G: 12.5 INJECTION, SOLUTION INTRAVENOUS at 02:07

## 2022-01-01 RX ADMIN — OLANZAPINE 10 MG: 5 TABLET, FILM COATED ORAL at 08:43

## 2022-01-01 RX ADMIN — OLANZAPINE 10 MG: 5 TABLET, FILM COATED ORAL at 10:38

## 2022-01-01 RX ADMIN — HALOPERIDOL LACTATE 2 MG: 5 INJECTION, SOLUTION INTRAMUSCULAR at 19:18

## 2022-01-01 RX ADMIN — CLOTRIMAZOLE AND BETAMETHASONE DIPROPIONATE: 10; .5 CREAM TOPICAL at 09:40

## 2022-01-01 RX ADMIN — OLANZAPINE 10 MG: 5 TABLET, FILM COATED ORAL at 17:05

## 2022-01-01 RX ADMIN — LEVETIRACETAM 500 MG: 100 SOLUTION ORAL at 17:38

## 2022-01-01 RX ADMIN — VALPROIC ACID 500 MG: 250 SOLUTION ORAL at 10:22

## 2022-01-01 RX ADMIN — CLOTRIMAZOLE AND BETAMETHASONE DIPROPIONATE: 10; .5 CREAM TOPICAL at 10:57

## 2022-01-01 RX ADMIN — BENZTROPINE MESYLATE 1 MG: 1 TABLET ORAL at 18:21

## 2022-01-01 RX ADMIN — VALPROIC ACID 500 MG: 250 SOLUTION ORAL at 21:28

## 2022-01-01 RX ADMIN — OLANZAPINE 10 MG: 5 TABLET, FILM COATED ORAL at 16:42

## 2022-01-01 RX ADMIN — TROSPIUM CHLORIDE 20 MG: 20 TABLET, FILM COATED ORAL at 10:26

## 2022-01-01 RX ADMIN — BENZTROPINE MESYLATE 1 MG: 1 TABLET ORAL at 21:16

## 2022-01-01 RX ADMIN — VALPROIC ACID 500 MG: 250 SOLUTION ORAL at 21:49

## 2022-01-01 RX ADMIN — BENZTROPINE MESYLATE 1 MG: 1 TABLET ORAL at 21:22

## 2022-01-01 RX ADMIN — VALPROIC ACID 500 MG: 250 SOLUTION ORAL at 11:04

## 2022-01-01 RX ADMIN — CLOTRIMAZOLE AND BETAMETHASONE DIPROPIONATE: 10; .5 CREAM TOPICAL at 18:27

## 2022-01-01 RX ADMIN — Medication 20 MG: at 10:22

## 2022-01-01 RX ADMIN — TAMSULOSIN HYDROCHLORIDE 0.4 MG: 0.4 CAPSULE ORAL at 11:15

## 2022-01-01 RX ADMIN — OLANZAPINE 10 MG: 5 TABLET, FILM COATED ORAL at 16:44

## 2022-01-01 RX ADMIN — CLOTRIMAZOLE AND BETAMETHASONE DIPROPIONATE: 10; .5 CREAM TOPICAL at 10:09

## 2022-01-01 RX ADMIN — LEVETIRACETAM 500 MG: 100 SOLUTION ORAL at 09:41

## 2022-01-01 RX ADMIN — TROSPIUM CHLORIDE 20 MG: 20 TABLET, FILM COATED ORAL at 10:56

## 2022-01-01 RX ADMIN — OLANZAPINE 10 MG: 5 TABLET, FILM COATED ORAL at 21:24

## 2022-01-01 RX ADMIN — FENTANYL CITRATE 100 MCG: 50 INJECTION, SOLUTION INTRAMUSCULAR; INTRAVENOUS at 02:14

## 2022-01-01 RX ADMIN — BENZTROPINE MESYLATE 1 MG: 1 TABLET ORAL at 15:14

## 2022-01-01 RX ADMIN — CHLORHEXIDINE GLUCONATE 0.12% ORAL RINSE 10 ML: 1.2 LIQUID ORAL at 08:24

## 2022-01-01 RX ADMIN — OLANZAPINE 10 MG: 5 TABLET, FILM COATED ORAL at 12:18

## 2022-01-01 RX ADMIN — CLOTRIMAZOLE AND BETAMETHASONE DIPROPIONATE: 10; .5 CREAM TOPICAL at 17:23

## 2022-01-01 RX ADMIN — LEVETIRACETAM 500 MG: 100 INJECTION, SOLUTION INTRAVENOUS at 10:18

## 2022-01-01 RX ADMIN — OLANZAPINE 10 MG: 5 TABLET, FILM COATED ORAL at 18:21

## 2022-01-01 RX ADMIN — VALPROIC ACID 500 MG: 250 SOLUTION ORAL at 08:32

## 2022-01-01 RX ADMIN — BENZTROPINE MESYLATE 1 MG: 1 TABLET ORAL at 19:29

## 2022-01-01 RX ADMIN — PIPERACILLIN AND TAZOBACTAM 3.38 G: 3; .375 INJECTION, POWDER, LYOPHILIZED, FOR SOLUTION INTRAVENOUS at 17:44

## 2022-01-01 RX ADMIN — HALOPERIDOL LACTATE 2 MG: 5 INJECTION, SOLUTION INTRAMUSCULAR at 09:03

## 2022-01-01 RX ADMIN — TROSPIUM CHLORIDE 20 MG: 20 TABLET, FILM COATED ORAL at 11:14

## 2022-01-01 RX ADMIN — Medication 20 MG: at 11:26

## 2022-01-01 RX ADMIN — VALPROIC ACID 500 MG: 250 SOLUTION ORAL at 21:02

## 2022-01-01 RX ADMIN — BENZTROPINE MESYLATE 1 MG: 1 TABLET ORAL at 17:23

## 2022-01-01 RX ADMIN — LEVETIRACETAM 500 MG: 100 SOLUTION ORAL at 19:54

## 2022-01-01 RX ADMIN — OLANZAPINE 10 MG: 5 TABLET, FILM COATED ORAL at 17:14

## 2022-01-01 RX ADMIN — TROSPIUM CHLORIDE 20 MG: 20 TABLET, FILM COATED ORAL at 08:54

## 2022-01-01 RX ADMIN — CLOTRIMAZOLE AND BETAMETHASONE DIPROPIONATE: 10; .5 CREAM TOPICAL at 08:01

## 2022-01-01 RX ADMIN — LEVETIRACETAM 500 MG: 100 SOLUTION ORAL at 17:16

## 2022-01-01 RX ADMIN — ENOXAPARIN SODIUM 40 MG: 100 INJECTION SUBCUTANEOUS at 09:18

## 2022-01-01 RX ADMIN — OLANZAPINE 10 MG: 5 TABLET, FILM COATED ORAL at 21:37

## 2022-01-01 RX ADMIN — CLOTRIMAZOLE AND BETAMETHASONE DIPROPIONATE: 10; .5 CREAM TOPICAL at 08:17

## 2022-01-01 RX ADMIN — VALPROIC ACID 500 MG: 250 SOLUTION ORAL at 21:38

## 2022-01-01 RX ADMIN — OLANZAPINE 10 MG: 5 TABLET, FILM COATED ORAL at 16:35

## 2022-01-01 RX ADMIN — SODIUM CHLORIDE, PRESERVATIVE FREE 20 MG: 5 INJECTION INTRAVENOUS at 09:28

## 2022-01-01 RX ADMIN — OLANZAPINE 10 MG: 5 TABLET, FILM COATED ORAL at 21:09

## 2022-01-01 RX ADMIN — PIPERACILLIN AND TAZOBACTAM 3.38 G: 3; .375 INJECTION, POWDER, LYOPHILIZED, FOR SOLUTION INTRAVENOUS at 09:10

## 2022-01-01 RX ADMIN — PIPERACILLIN AND TAZOBACTAM 3.38 G: 3; .375 INJECTION, POWDER, LYOPHILIZED, FOR SOLUTION INTRAVENOUS at 17:03

## 2022-01-01 RX ADMIN — BENZTROPINE MESYLATE 1 MG: 1 TABLET ORAL at 18:26

## 2022-01-01 RX ADMIN — LEVETIRACETAM 500 MG: 100 SOLUTION ORAL at 18:41

## 2022-01-01 RX ADMIN — CLOTRIMAZOLE AND BETAMETHASONE DIPROPIONATE: 10; .5 CREAM TOPICAL at 18:49

## 2022-01-01 RX ADMIN — OLANZAPINE 10 MG: 5 TABLET, FILM COATED ORAL at 08:04

## 2022-01-01 RX ADMIN — BENZTROPINE MESYLATE 1 MG: 1 TABLET ORAL at 17:16

## 2022-01-01 RX ADMIN — LEVETIRACETAM 500 MG: 100 SOLUTION ORAL at 19:29

## 2022-01-01 RX ADMIN — CLOTRIMAZOLE AND BETAMETHASONE DIPROPIONATE: 10; .5 CREAM TOPICAL at 09:46

## 2022-01-01 RX ADMIN — CLOTRIMAZOLE AND BETAMETHASONE DIPROPIONATE: 10; .5 CREAM TOPICAL at 18:44

## 2022-01-01 RX ADMIN — TROSPIUM CHLORIDE 20 MG: 20 TABLET, FILM COATED ORAL at 09:56

## 2022-01-01 RX ADMIN — LEVETIRACETAM 500 MG: 100 SOLUTION ORAL at 22:04

## 2022-01-01 RX ADMIN — MIDAZOLAM HYDROCHLORIDE 2 MG: 2 INJECTION, SOLUTION INTRAMUSCULAR; INTRAVENOUS at 09:44

## 2022-01-01 RX ADMIN — TAMSULOSIN HYDROCHLORIDE 0.4 MG: 0.4 CAPSULE ORAL at 09:11

## 2022-01-01 RX ADMIN — OLANZAPINE 10 MG: 5 TABLET, FILM COATED ORAL at 22:39

## 2022-01-01 RX ADMIN — BENZTROPINE MESYLATE 1 MG: 1 TABLET ORAL at 11:04

## 2022-01-01 RX ADMIN — BENZTROPINE MESYLATE 1 MG: 1 TABLET ORAL at 16:28

## 2022-01-01 RX ADMIN — OLANZAPINE 10 MG: 5 TABLET, FILM COATED ORAL at 21:22

## 2022-01-01 RX ADMIN — VALPROIC ACID 500 MG: 250 SOLUTION ORAL at 09:41

## 2022-01-01 RX ADMIN — BENZTROPINE MESYLATE 1 MG: 1 TABLET ORAL at 15:43

## 2022-01-01 RX ADMIN — LEVETIRACETAM 500 MG: 100 SOLUTION ORAL at 17:39

## 2022-01-01 RX ADMIN — SODIUM CHLORIDE, PRESERVATIVE FREE 20 MG: 5 INJECTION INTRAVENOUS at 10:18

## 2022-01-01 RX ADMIN — OLANZAPINE 10 MG: 5 TABLET, FILM COATED ORAL at 22:16

## 2022-01-01 RX ADMIN — LEVETIRACETAM 500 MG: 100 SOLUTION ORAL at 17:45

## 2022-01-01 RX ADMIN — TAMSULOSIN HYDROCHLORIDE 0.4 MG: 0.4 CAPSULE ORAL at 10:56

## 2022-01-01 RX ADMIN — BENZTROPINE MESYLATE 1 MG: 1 TABLET ORAL at 18:00

## 2022-01-01 RX ADMIN — ENOXAPARIN SODIUM 40 MG: 100 INJECTION SUBCUTANEOUS at 10:00

## 2022-01-01 RX ADMIN — VALPROIC ACID 500 MG: 250 SOLUTION ORAL at 20:38

## 2022-01-01 RX ADMIN — VALPROIC ACID 500 MG: 250 SOLUTION ORAL at 09:59

## 2022-01-01 RX ADMIN — LEVETIRACETAM 500 MG: 100 SOLUTION ORAL at 09:59

## 2022-01-01 RX ADMIN — LEVETIRACETAM 500 MG: 100 SOLUTION ORAL at 18:51

## 2022-01-01 RX ADMIN — CLOTRIMAZOLE AND BETAMETHASONE DIPROPIONATE: 10; .5 CREAM TOPICAL at 17:49

## 2022-01-01 RX ADMIN — DEXAMETHASONE SODIUM PHOSPHATE 4 MG: 4 INJECTION, SOLUTION INTRA-ARTICULAR; INTRALESIONAL; INTRAMUSCULAR; INTRAVENOUS; SOFT TISSUE at 09:25

## 2022-01-01 RX ADMIN — BENZTROPINE MESYLATE 1 MG: 1 TABLET ORAL at 09:17

## 2022-01-04 NOTE — TELEPHONE ENCOUNTER
Called Ms Willy Erickson to be sure they are getting pt to the [facility] by 1230pm on Friday. Ms. Willy Erickson stated the pt , GLADYS Coleman, will drive the pt and will stay with the pt and bring him home when he is done. I also confirmed that the hospital will call pt's brother, Laureen Escalante at [phone] to get verbal permission for this procedure since he is power of . I called the brother-JOELRamo Naik and he verbalized understanding of being available via phone on Friday to give permission for this procedure.  Thank you

## 2022-01-24 NOTE — TELEPHONE ENCOUNTER
Vinayak Curiel from [place] [facility] called stated would like to reschedule patients surgery with Dr Casey Govea.  Please call Hanna [phone]

## 2022-02-03 NOTE — TELEPHONE ENCOUNTER
Spoke to Alaina regarding reschedule of pt procedure. She preferred March. Pt was rescheduled for march 15, 2022, 1100am. Hanna verbalized understanding of this information. We went over her bringing in pt, staying with him, and taking him home. Also nothing to eat or drink after midnight, and come for covid swab 4 days prior. Hanna verbalized understanding.  Thank you

## 2022-02-15 NOTE — TELEPHONE ENCOUNTER
Returned [facility]	[place]. She was confirming date and time that pt needs to be at hospital for his procedure.  Thank you

## 2022-02-15 NOTE — TELEPHONE ENCOUNTER
Faizan Guardado from [facility]. She have more question about patient procedure schedule for 03/15/2022. Ms. Ayla Fajardo can be reach at [phone].

## 2022-03-15 NOTE — INTERVAL H&P NOTE
Update History & Physical    The Patient's History and Physical of 3/15/2022  was reviewed with the patient and I examined the patient. There was no change. The surgical site was confirmed by the patient and me. Plan:  The risk, benefits, expected outcome, and alternative to the recommended procedure have been discussed with the patient. Patient understands and wants to proceed with the procedure.     Electronically signed by Kaci Roy MD on 3/15/2022 at 8:14 AM

## 2022-03-15 NOTE — ANESTHESIA PREPROCEDURE EVALUATION
Relevant Problems   No relevant active problems       Anesthetic History   No history of anesthetic complications            Review of Systems / Medical History  Patient summary reviewed, nursing notes reviewed and pertinent labs reviewed    Pulmonary  Within defined limits                 Neuro/Psych     seizures    Psychiatric history    Comments: Intellectually disabled Cardiovascular              Hyperlipidemia    Exercise tolerance: >4 METS     GI/Hepatic/Renal  Within defined limits              Endo/Other  Within defined limits           Other Findings            Past Medical History:   Diagnosis Date    Anxiety disorder     Dermatitis     High cholesterol     Intellectual disability     [facility] [address]     Seizures ([facility] [address]     Urinary retention        Past Surgical History:   Procedure Laterality Date    COLONOSCOPY N/A 10/19/2021    COLONOSCOPY performed by Alphonso Hurst MD at [address] HX PROSTATE SURGERY      bph    HX UROLOGICAL      retention       Current Outpatient Medications   Medication Instructions    acetaminophen (TYLENOL) 650 mg, Oral, EVERY 6 HOURS AS NEEDED    benztropine (COGENTIN) 1 mg, Oral, 3 TIMES DAILY    bismuth subsalicylate (BismatroL) 262 mg/15 mL suspension 10 mL, Oral, EVERY 6 HOURS AS NEEDED    cetirizine (ZYRTEC) 10 mg tablet Oral, EVERY 6 HOURS AS NEEDED    divalproex DR (DEPAKOTE) 500 mg, Oral, 2 TIMES DAILY    finasteride (PROSCAR) 5 mg, Oral, DAILY    guaiFENesin (ROBITUSSIN) 100 mg, Oral, 4 TIMES DAILY AS NEEDED    hydrocortisone (CORTAID) 1 % topical cream Topical, 2 TIMES DAILY, use thin layer     levETIRAcetam (KEPPRA) 500 mg, Oral, 2 TIMES DAILY    loperamide (IMODIUM) 2 mg, Oral, 4 TIMES DAILY AS NEEDED    LORazepam (ATIVAN) 0.5 mg, Oral, 2 TIMES DAILY    LORazepam (ATIVAN) 1 mg, Oral, EVERY 6 HOURS AS NEEDED    magnesium hydroxide ([employer] Milk of Magnesia) 400 mg/5 mL suspension 10 mL, Oral, DAILY PRN    Myrbetriq 25 mg, Oral, DAILY    nystatin (MYCOSTATIN) topical cream Topical, 2 TIMES DAILY, To axilla and both arms     OLANZapine (ZYPREXA) 10 mg, Oral, 3 TIMES DAILY    ondansetron hcl (ZOFRAN) 4 mg, Oral, EVERY 8 HOURS AS NEEDED    polyethylene glycol (MIRALAX) 17 g, Oral, DAILY    pravastatin (PRAVACHOL) 20 mg, Oral, EVERY BEDTIME    tamsulosin (FLOMAX) 0.4 mg, Oral, 2 TIMES DAILY    triamcinolone acetonide (KENALOG) 0.1 % topical cream Topical, 2 TIMES DAILY, Apply to face       Current Facility-Administered Medications   Medication Dose Route Frequency    lactated Ringers infusion  20 mL/hr IntraVENous CONTINUOUS    ceFAZolin (ANCEF) 2 g in sterile water (preservative free) 20 mL IV syringe  2 g IntraVENous ONCE    ceFAZolin (ANCEF) 1 gram injection           Patient Vitals for the past 24 hrs:   Temp Pulse Resp BP SpO2   03/15/22 0748 36.6 °C (97.9 °F) 78 18 (!) 147/75 97 %       Lab Results   Component Value Date/Time    WBC 9.2 09/25/2020 09:15 AM    HGB 12.6 09/25/2020 09:15 AM    HCT 37.4 09/25/2020 09:15 AM    PLATELET 176 06/33/9561 09:15 AM    MCV 91.0 09/25/2020 09:15 AM     Lab Results   Component Value Date/Time    Sodium 136 09/23/2020 05:08 PM    Potassium 3.7 09/23/2020 05:08 PM    Chloride 103 09/23/2020 05:08 PM    CO2 27 09/23/2020 05:08 PM    Anion gap 6 09/23/2020 05:08 PM    Glucose 105 (H) 09/23/2020 05:08 PM    BUN 14 09/23/2020 05:08 PM    Creatinine 0.99 09/23/2020 05:08 PM    BUN/Creatinine ratio 14 09/23/2020 05:08 PM    GFR est AA >60 09/23/2020 05:08 PM    GFR est non-AA >60 09/23/2020 05:08 PM    Calcium 8.0 (L) 09/23/2020 05:08 PM     No results found for: APTT, PTP, INR, INREXT  Lab Results   Component Value Date/Time    Glucose 105 (H) 09/23/2020 05:08 PM     Physical Exam    Airway    TM Distance: 4 - 6 cm  Neck ROM: normal range of motion   Mouth opening: Normal    Comments: Unable to assess due to cognitive disability Cardiovascular    Rhythm: regular  Rate: normal Dental      Comments: Unable to assess due to cognitive disability   Pulmonary  Breath sounds clear to auscultation               Abdominal  GI exam deferred       Other Findings            Anesthetic Plan    ASA: 3  Anesthesia type: general          Induction: Intravenous  Anesthetic plan and risks discussed with: Patient and Family

## 2022-03-15 NOTE — ANESTHESIA POSTPROCEDURE EVALUATION
Procedure(s):  EXCISION AND FULGERATION ANAL CONDYLOMA.    general    Anesthesia Post Evaluation      Multimodal analgesia: multimodal analgesia used between 6 hours prior to anesthesia start to PACU discharge  Patient location during evaluation: PACU  Patient participation: complete - patient participated  Level of consciousness: awake  Pain score: 0  Pain management: adequate  Airway patency: patent  Anesthetic complications: no  Cardiovascular status: acceptable  Respiratory status: acceptable  Hydration status: acceptable  Post anesthesia nausea and vomiting:  controlled  Final Post Anesthesia Temperature Assessment:  Normothermia (36.0-37.5 degrees C)      INITIAL Post-op Vital signs:   Vitals Value Taken Time   /68 03/15/22 1004   Temp 36.1 °C (97 °F) 03/15/22 1004   Pulse 85 03/15/22 1004   Resp 14 03/15/22 1004   SpO2 99 % 03/15/22 1004

## 2022-03-15 NOTE — PERIOP NOTES
TRANSFER - OUT REPORT:    Verbal report given to Bob Zheng RN(name) on Jose Rogers  being transferred to [facility] room 24(unit) for routine post - op       Report consisted of patients Situation, Background, Assessment and   Recommendations(SBAR). Information from the following report(s) SBAR, Procedure Summary, Intake/Output, MAR and Cardiac Rhythm SR was reviewed with the receiving nurse. Opportunity for questions and clarification was provided.       Patient transported with:   Registered Nurse

## 2022-03-15 NOTE — H&P
History and Physical    Subjective:     Ledy Duran is a 64 y.o.  male who presents whom I have seen in the office for perianal condyloma. The patient is nonverbal because of his intellectual disability. He does live in a group home. At the time of my initial visit and subsequent visit he was noted to have significant perianal condyloma however could not do a digital rectal exam because of his emotional status at the time. He did go on to have a colonoscopy with Dr. Alec Garibay which did demonstrate anal condyloma within the anal canal.    He comes in today for surgical management of his internal/external anal condyloma. His past medical history significant for history of phenylketonuria, intellectual disability, seizure disorder, urinary retention/BPH, dermatitis. He has had surgical intervention for his BPH. Past Medical History:   Diagnosis Date    Anxiety disorder     Dermatitis     High cholesterol     Intellectual disability     [facility] [address] [facility]     Seizures ([facility] [facility]     Urinary retention       Past Surgical History:   Procedure Laterality Date    COLONOSCOPY N/A 10/19/2021    [facility]    HX UROLOGICAL      retention     Family History   Family history unknown: Yes      Social History     Tobacco Use    Smoking status: Never Smoker    Smokeless tobacco: Never Used   Substance Use Topics    Alcohol use: Never       Prior to Admission medications    Medication Sig Start Date End Date Taking? Authorizing Provider   finasteride (PROSCAR) 5 mg tablet Take 5 mg by mouth daily. 9/26/21  Yes Provider, Historical   Myrbetriq 25 mg ER tablet Take 25 mg by mouth daily. 9/26/21  Yes Provider, Historical   triamcinolone acetonide (KENALOG) 0.1 % topical cream Apply  to affected area two (2) times a day.  Apply to face 8/20/21  Yes Provider, Historical   levETIRAcetam (KEPPRA) 500 mg tablet Take 500 mg by mouth two (2) times a day. Yes Connie Navas MD   cetirizine (ZYRTEC) 10 mg tablet Take  by mouth every six (6) hours as needed for Allergies. Yes Connie Navas MD   LORazepam (ATIVAN) 0.5 mg tablet Take 0.5 mg by mouth two (2) times a day. Yes Connie Navas MD   OLANZapine (ZyPREXA) 5 mg tablet Take 10 mg by mouth three (3) times daily. Yes Connie Navas MD   benztropine (COGENTIN) 1 mg tablet Take 1 mg by mouth three (3) times daily. Yes Connie Navas MD   divalproex DR (DEPAKOTE) 500 mg tablet Take 500 mg by mouth two (2) times a day. Yes Connie Navas MD   nystatin (MYCOSTATIN) topical cream Apply  to affected area two (2) times a day. To axilla and both arms   Yes Connie Navas MD   LORazepam (ATIVAN) 1 mg tablet Take 1 mg by mouth every six (6) hours as needed for Anxiety. Yes Connie Navas MD   pravastatin (PravachoL) 20 mg tablet Take 20 mg by mouth nightly. Yes Connie Navas MD   tamsulosin (FLOMAX) 0.4 mg capsule Take 0.4 mg by mouth two (2) times a day. Yes Connie Navas MD   polyethylene glycol (MIRALAX) 17 gram packet Take 17 g by mouth daily. Ayla Bello   acetaminophen (TYLENOL) 325 mg tablet Take 650 mg by mouth every six (6) hours as needed for Pain. Connie Navas MD   magnesium hydroxide (Blackwell Milk of Magnesia) 400 mg/5 mL suspension Take 10 mL by mouth daily as needed for Constipation. Connie Navas MD   hydrocortisone (CORTAID) 1 % topical cream Apply  to affected area two (2) times a day. use thin layer    Connie Navas MD   guaiFENesin (ROBITUSSIN) 100 mg/5 mL liquid Take 100 mg by mouth four (4) times daily as needed for Cough. Connie Navas MD   bismuth subsalicylate (BismatroL) 262 mg/15 mL suspension Take 10 mL by mouth every six (6) hours as needed for Indigestion. Connie Navas MD   loperamide (IMODIUM) 2 mg capsule Take 2 mg by mouth four (4) times daily as needed for Diarrhea.     Connie Navas MD   ondansetron hcl (Zofran) 4 mg tablet Take 4 mg by mouth every eight (8) hours as needed for Nausea or Vomiting. Other, MD Connie     No Known Allergies     Review of Systems:  Unable to obtain given patient's intellectual disability    Objective:     Patient Vitals for the past 12 hrs:   Temp Pulse Resp BP SpO2   03/15/22 0748 97.9 °F (36.6 °C) 78 18 (!) 147/75 97 %     Physical Exam:   Constitutional:       Comments: Appears his stated age, answers and nonsensical one-word phrases, in no acute distress, normal weight   HENT:      Head: Normocephalic and atraumatic. Cardiovascular:      Rate and Rhythm: Normal rate and regular rhythm. Pulmonary:      Breath sounds: Normal breath sounds. Abdominal:      General: There is no distension. Palpations: Abdomen is soft. Tenderness: There is no abdominal tenderness. Genitourinary:     Comments: Multiple scattered anal condyloma perianally extending to the posterior perineum  Musculoskeletal:         General: Normal range of motion. Cervical back: Normal range of motion and neck supple. Skin:     General: Skin is warm and dry. Neurological:      Mental Status: Mental status is at baseline.    Psychiatric:      Comments: Consistent with history of intellectual disability         Assessment:     Active Problems:    Anal condyloma (10/13/2021)        Plan:   Plan for the OR today for excision fulguration of internal and external anal condyloma  Consent to be obtained from patient's brother who is POA  Stat rapid Covid test ordered

## 2022-03-16 NOTE — TELEPHONE ENCOUNTER
Staci Somers from [facility] called stated patient had surgery with Dr Brent Davis on 03/15/2022 and stated Dr Brent Davis spoke with the patients brother Jose Luis Sanchez and informed him that the patient would need medication for his private area. Please call Staci Somers [phone] for clarification.

## 2022-03-16 NOTE — TELEPHONE ENCOUNTER
Spoke with Renita Fish, she states that the reason she was calling was due to the brother stating that  has an infection on his scrotum. I placed Hanna on hold to clarify with . I spoke with Stevie Tam he states that he informed the brother that  has HPV which is a sexually transmitted disease and to let Hanna know that. I informed her of what  informed me. She said she thought they established this before the surgery. She was a little confused and wanted to know if there was other ways to catch it. I informed her that it was a sexually transmitted disease. She understands and will speak to the brother.

## 2022-03-26 NOTE — PROGRESS NOTES
OFFICE VISIT NOTE    Kellen Henley is a 64 y.o. male who presents to the office today for:    Chief Complaint   Patient presents with    Follow-up     from colonoscopy       Mr. Deven Richardson comes in today for follow-up of his anal condyloma. He is a 26-year-old male who is mentally disabled secondary to phenylketonuria. He lives in a group home. I initially saw him for this couple months ago at that time he had fairly significant perianal condyloma. He had never had a colonoscopy and has since been referred for colonoscopy. Dr. Mic Mejia performed his colonoscopy and it demonstrated perianal condyloma and hemorrhoids. Otherwise normal.    Mr. Deven Richardson himself is nonverbal which is his baseline however the aide with him today denies any change in his health or health history.       Past Medical History:   Diagnosis Date    Anxiety disorder     Dermatitis     High cholesterol     Intellectual disability     PKU (phenylketonuria) ([address]     Seizures ([address]     Urinary retention        Past Surgical History:   Procedure Laterality Date    COLONOSCOPY N/A 10/19/2021    COLONOSCOPY performed by Kamla Jain MD at [address] [facility] H[facility] GI  03/15/2022    Excision and Fulgeration Anal Condyloma      PROSTATE SURGERY      bph    HX UROLOGICAL      retention       Family History   Family history unknown: Yes       Social History     Socioeconomic History    Marital status: SINGLE     Spouse name: Not on file    Number of children: Not on file    Years of education: Not on file    Highest education level: Not on file   Occupational History    Not on file   Tobacco Use    Smoking status: Never Smoker    Smokeless tobacco: Never Used   Vaping Use    Vaping Use: Never used   Substance and Sexual Activity    Alcohol use: Never    Drug use: Never    Sexual activity: Never   Other Topics Concern   Service No    Blood Transfusions No    Caffeine Concern No    Occupational Exposure No    Hobby Hazards No    Sleep Concern No    Stress Concern No    Weight Concern No    Special Diet No    Back Care No    Exercise No    Bike Helmet No    Seat Belt No    Self-Exams No   Social History Narrative    Not on file     Social Determinants of Health     Financial Resource Strain:     Difficulty of Paying Living Expenses: Not on file   Food Insecurity:     Worried About Running Out of Food in the Last Year: Not on file    Mango of Food in the Last Year: Not on file   Transportation Needs:     Lack of Transportation (Medical): Not on file    Lack of Transportation (Non-Medical): Not on file   Physical Activity:     Days of Exercise per Week: Not on file    Minutes of Exercise per Session: Not on file   Stress:     Feeling of Stress : Not on file   Social Connections:     Frequency of Communication with Friends and Family: Not on file    Frequency of Social Gatherings with Friends and Family: Not on file    Attends [employer] Services: Not on file    Active Member of [address] [place] [address] or Organizations: Not on file    Attends Club or Organization Meetings: Not on file    Marital Status: Not on file   Intimate Partner Violence:     Fear of Current or Ex-Partner: Not on file    Emotionally Abused: Not on file    Physically Abused: Not on file    Sexually Abused: Not on file   Housing Stability:     Unable to Pay for Housing in the Last Year: Not on file    Number of [place] in the Last Year: Not on file    Unstable Housing in the Last Year: Not on file       No Known Allergies    Current Outpatient Medications   Medication Sig    finasteride (PROSCAR) 5 mg tablet Take 5 mg by mouth daily.  Myrbetriq 25 mg ER tablet Take 25 mg by mouth daily.  triamcinolone acetonide (KENALOG) 0.1 % topical cream Apply  to affected area two (2) times a day.  Apply to face    polyethylene glycol (MIRALAX) 17 gram packet Take 17 g by mouth daily.  levETIRAcetam (KEPPRA) 500 mg tablet Take 500 mg by mouth two (2) times a day.  cetirizine (ZYRTEC) 10 mg tablet Take  by mouth every six (6) hours as needed for Allergies.  OLANZapine (ZyPREXA) 5 mg tablet Take 10 mg by mouth three (3) times daily.  benztropine (COGENTIN) 1 mg tablet Take 1 mg by mouth three (3) times daily.  divalproex DR (DEPAKOTE) 500 mg tablet Take 500 mg by mouth two (2) times a day.  nystatin (MYCOSTATIN) topical cream Apply  to affected area two (2) times a day. To axilla and both arms    acetaminophen (TYLENOL) 325 mg tablet Take 650 mg by mouth every six (6) hours as needed for Pain.  magnesium hydroxide (Blackwell Milk of Magnesia) 400 mg/5 mL suspension Take 10 mL by mouth daily as needed for Constipation.  LORazepam (ATIVAN) 1 mg tablet Take 1 mg by mouth every six (6) hours as needed for Anxiety.  pravastatin (PravachoL) 20 mg tablet Take 20 mg by mouth nightly.  tamsulosin (FLOMAX) 0.4 mg capsule Take 0.4 mg by mouth two (2) times a day.  hydrocortisone (CORTAID) 1 % topical cream Apply  to affected area two (2) times a day. use thin layer    guaiFENesin (ROBITUSSIN) 100 mg/5 mL liquid Take 100 mg by mouth four (4) times daily as needed for Cough.  bismuth subsalicylate (BismatroL) 262 mg/15 mL suspension Take 10 mL by mouth every six (6) hours as needed for Indigestion.  loperamide (IMODIUM) 2 mg capsule Take 2 mg by mouth four (4) times daily as needed for Diarrhea.  ondansetron hcl (Zofran) 4 mg tablet Take 4 mg by mouth every eight (8) hours as needed for Nausea or Vomiting.  LORazepam (ATIVAN) 0.5 mg tablet Take 0.5 mg by mouth two (2) times a day. No current facility-administered medications for this visit. Review of Systems   All other systems reviewed and are negative.       Temp 97.7 °F (36.5 °C) (Temporal)   Ht 5' 6\" (1.676 m)   Wt 162 lb 9.6 oz (73.8 kg)   BMI 26.24 kg/m²     Physical Exam  Genitourinary:     Comments: On examination he has multiple scattered external anal condyloma extending out from the anal verge for a distance of approximately 5 cm. He would not tolerate digital rectal examination. Problem List Items Addressed This Visit        Digestive    Anal condyloma - Primary          Assessment and Plan:    I told the person with him from the group home that he would need to be scheduled for a excision fulguration of perianal condyloma. Consent will need to be obtained from his POA who is his brother already had surgery. Surgery be scheduled in the upcoming month. Instructions were sent to the facility.           Radha Warren MD

## 2022-03-30 NOTE — PROGRESS NOTES
Chief Complaint   Patient presents with    Post OP Follow Up     Excision Anal Condyloma      Visit Vitals  Pulse 74   Temp 98.1 °F (36.7 °C) (Temporal)   Resp 16   Ht 5' 8\" (1.727 m)   Wt 151 lb 12.8 oz (68.9 kg)   SpO2 96%   BMI 23.08 kg/m²     1. Have you been to the ER, urgent care clinic since your last visit? Hospitalized since your last visit? No    2. Have you seen or consulted any other health care providers outside of the [address] [place] [address] since your last visit? Include any pap smears or colon screening.  No

## 2022-03-30 NOTE — PROGRESS NOTES
OFFICE VISIT NOTE    Colin Shultz is a 64 y.o. male who presents to the office today for:    Chief Complaint   Patient presents with    Post OP Follow Up     Excision Anal Condyloma        Mr. Misael Negrete comes in today for follow-up status post excision fulguration perianal condyloma on March 15, 2022. He is accompanied by caretaker from the group home where he lives. He remains at his baseline communicative. The caretaker with him states he is doing well.        Past Medical History:   Diagnosis Date    Anxiety disorder     Dermatitis     High cholesterol     Intellectual disability     [facility] [address]     Seizures ([facility] [address] [facility]     Urinary retention        Past Surgical History:   Procedure Laterality Date    COLONOSCOPY N/A 10/19/2021    COLONOSCOPY performed by Liu Keen MD at [facility] GI  03/15/2022    Excision and Fulgeration Anal Condyloma     HX PROSTATE SURGERY      bph    HX UROLOGICAL      retention       Family History   Family history unknown: Yes       Social History     Socioeconomic History    Marital status: SINGLE     Spouse name: Not on file    Number of children: Not on file    Years of education: Not on file    Highest education level: Not on file   Occupational History    Not on file   Tobacco Use    Smoking status: Never Smoker    Smokeless tobacco: Never Used   Vaping Use    Vaping Use: Never used   Substance and Sexual Activity    Alcohol use: Never    Drug use: Never    Sexual activity: Never   Other Topics Concern     Service No    Blood Transfusions No    Caffeine Concern No    Occupational Exposure No    Hobby Hazards No    Sleep Concern No    Stress Concern No    Weight Concern No    Special Diet No    Back Care No    Exercise No    Bike Helmet No    Seat Belt No    Self-Exams No   Social History Narrative    Not on file     Social Determinants of Health     Financial Resource Strain:     Difficulty of Paying Living Expenses: Not on file   Food Insecurity:     Worried About Running Out of Food in the Last Year: Not on file    Mango of Food in the Last Year: Not on file   Transportation Needs:     Lack of Transportation (Medical): Not on file    Lack of Transportation (Non-Medical): Not on file   Physical Activity:     Days of Exercise per Week: Not on file    Minutes of Exercise per Session: Not on file   Stress:     Feeling of Stress : Not on file   Social Connections:     Frequency of Communication with Friends and Family: Not on file    Frequency of Social Gatherings with Friends and Family: Not on file    Attends Pentecostal Services: Not on file    Active Member of [address] [place] [address] [employer] or Organizations: Not on file    Attends Club or Organization Meetings: Not on file    Marital Status: Not on file   Intimate Partner Violence:     Fear of Current or Ex-Partner: Not on file    Emotionally Abused: Not on file    Physically Abused: Not on file    Sexually Abused: Not on file   Housing Stability:     Unable to Pay for Housing in the Last Year: Not on file    Number of [place] in the Last Year: Not on file    Unstable Housing in the Last Year: Not on file       No Known Allergies    Current Outpatient Medications   Medication Sig    finasteride (PROSCAR) 5 mg tablet Take 5 mg by mouth daily.  Myrbetriq 25 mg ER tablet Take 25 mg by mouth daily.  triamcinolone acetonide (KENALOG) 0.1 % topical cream Apply  to affected area two (2) times a day. Apply to face    polyethylene glycol (MIRALAX) 17 gram packet Take 17 g by mouth daily.  levETIRAcetam (KEPPRA) 500 mg tablet Take 500 mg by mouth two (2) times a day.  cetirizine (ZYRTEC) 10 mg tablet Take  by mouth every six (6) hours as needed for Allergies.  LORazepam (ATIVAN) 0.5 mg tablet Take 0.5 mg by mouth two (2) times a day.     OLANZapine (ZyPREXA) 5 mg tablet Take 10 mg by mouth three (3) times daily.  benztropine (COGENTIN) 1 mg tablet Take 1 mg by mouth three (3) times daily.  divalproex DR (DEPAKOTE) 500 mg tablet Take 500 mg by mouth two (2) times a day.  nystatin (MYCOSTATIN) topical cream Apply  to affected area two (2) times a day. To axilla and both arms    acetaminophen (TYLENOL) 325 mg tablet Take 650 mg by mouth every six (6) hours as needed for Pain.  magnesium hydroxide (Blackwell Milk of Magnesia) 400 mg/5 mL suspension Take 10 mL by mouth daily as needed for Constipation.  LORazepam (ATIVAN) 1 mg tablet Take 1 mg by mouth every six (6) hours as needed for Anxiety.  pravastatin (PravachoL) 20 mg tablet Take 20 mg by mouth nightly.  tamsulosin (FLOMAX) 0.4 mg capsule Take 0.4 mg by mouth two (2) times a day.  hydrocortisone (CORTAID) 1 % topical cream Apply  to affected area two (2) times a day. use thin layer    guaiFENesin (ROBITUSSIN) 100 mg/5 mL liquid Take 100 mg by mouth four (4) times daily as needed for Cough.  bismuth subsalicylate (BismatroL) 262 mg/15 mL suspension Take 10 mL by mouth every six (6) hours as needed for Indigestion.  loperamide (IMODIUM) 2 mg capsule Take 2 mg by mouth four (4) times daily as needed for Diarrhea.  ondansetron hcl (Zofran) 4 mg tablet Take 4 mg by mouth every eight (8) hours as needed for Nausea or Vomiting. No current facility-administered medications for this visit. Review of Systems   All other systems reviewed and are negative. Pulse 74   Temp 98.1 °F (36.7 °C) (Temporal)   Resp 16   Ht 5' 8\" (1.727 m)   Wt 151 lb 12.8 oz (68.9 kg)   SpO2 96%   BMI 23.08 kg/m²     Physical Exam  Genitourinary:     Comments: On examination his fulguration sites perianally are healing nicely.   I see no evidence of recurrent or persistent condyloma        Problem List Items Addressed This Visit        Digestive    Anal condyloma - Primary Assessment and Plan:    Doing well  Follow-up my office 3 months for routine Hardik Gardner MD

## 2022-05-26 PROBLEM — J18.9 PNEUMONIA: Status: ACTIVE | Noted: 2022-01-01

## 2022-05-26 NOTE — ED NOTES
TRANSFER - OUT REPORT:    Verbal report given to Luis Smith RN (name) on Artie Lilly  being transferred to [address] [place] [address] (unit) for routine progression of care       Report consisted of patients Situation, Background, Assessment and   Recommendations(SBAR). Information from the following report(s) ED Summary and MAR was reviewed with the receiving nurse. Lines:   Peripheral IV 05/26/22 (Active)       Peripheral IV 05/26/22 Right Hand (Active)   Site Assessment Clean, dry, & intact 05/26/22 1100   Phlebitis Assessment 0 05/26/22 1100   Infiltration Assessment 0 05/26/22 1100   Dressing Status Clean, dry, & intact 05/26/22 1100   Hub Color/Line Status Pink 05/26/22 1100        Opportunity for questions and clarification was provided.       Patient transported with:   Tech, transportation

## 2022-05-26 NOTE — ED PROVIDER NOTES
70-year-old male with a history of intellectual disability, PKU, seizures, anxiety, and hypercholesteremia. Patient presents today with primary complaint of altered mental status per EMS patient was last known well 2 days prior but has had a gradual worsening of his mental status, they state patient is normally able to walk and hold conversations but has been nonverbal for the last 24 to 48 hours. Due to patient's nonverbal status history is limited what was provided by EMS and available on chart review.            Past Medical History:   Diagnosis Date    Anxiety disorder     Dermatitis     High cholesterol     Intellectual disability     [facility]     Seizures ([facility]     Urinary retention        Past Surgical History:   Procedure Laterality Date    COLONOSCOPY N/A 10/19/2021    COLONOSCOPY performed by Nargis Dennis MD at [address] [place] [address] HX GI  03/15/2022    Excision and Fulgeration Anal Condyloma     HX PROSTATE SURGERY      bph    HX UROLOGICAL      retention         Family History:   Family history unknown: Yes       Social History     Socioeconomic History    Marital status: SINGLE     Spouse name: Not on file    Number of children: Not on file    Years of education: Not on file    Highest education level: Not on file   Occupational History    Not on file   Tobacco Use    Smoking status: Never Smoker    Smokeless tobacco: Never Used   Vaping Use    Vaping Use: Never used   Substance and Sexual Activity    Alcohol use: Never    Drug use: Never    Sexual activity: Never   Other Topics Concern     Service No    Blood Transfusions No    Caffeine Concern No    Occupational Exposure No    Hobby Hazards No    Sleep Concern No    Stress Concern No    Weight Concern No    Special Diet No    Back Care No    Exercise No    Bike Helmet No    Seat Belt No    Self-Exams No   Social History Narrative    Not on file     Social Determinants of Health Financial Resource Strain:     Difficulty of Paying Living Expenses: Not on file   Food Insecurity:     Worried About Running Out of Food in the Last Year: Not on file    Mango of Food in the Last Year: Not on file   Transportation Needs:     Lack of Transportation (Medical): Not on file    Lack of Transportation (Non-Medical): Not on file   Physical Activity:     Days of Exercise per Week: Not on file    Minutes of Exercise per Session: Not on file   Stress:     Feeling of Stress : Not on file   Social Connections:     Frequency of Communication with Friends and Family: Not on file    Frequency of Social Gatherings with Friends and Family: Not on file    Attends Scientology Services: Not on file    Active Member of [address] [place] [address] [employer] or Organizations: Not on file    Attends Club or Organization Meetings: Not on file    Marital Status: Not on file   Intimate Partner Violence:     Fear of Current or Ex-Partner: Not on file    Emotionally Abused: Not on file    Physically Abused: Not on file    Sexually Abused: Not on file   Housing Stability:     Unable to Pay for Housing in the Last Year: Not on file    Number of [place] in the Last Year: Not on file    Unstable Housing in the Last Year: Not on file         ALLERGIES: Patient has no known allergies. Review of Systems   Unable to perform ROS: Patient nonverbal       There were no vitals filed for this visit. Physical Exam  Constitutional:       General: He is not in acute distress. Appearance: He is not ill-appearing, toxic-appearing or diaphoretic. HENT:      Head: Normocephalic and atraumatic. Right Ear: External ear normal.      Left Ear: External ear normal.      Nose: No congestion or rhinorrhea. Mouth/Throat:      Mouth: Mucous membranes are moist.      Pharynx: No oropharyngeal exudate or posterior oropharyngeal erythema. Eyes:      General: No scleral icterus. Right eye: No discharge.          Left eye: No discharge. Pupils: Pupils are equal, round, and reactive to light. Neck:      Vascular: No carotid bruit. Cardiovascular:      Rate and Rhythm: Normal rate and regular rhythm. Heart sounds: No murmur heard. No friction rub. No gallop. Pulmonary:      Effort: Pulmonary effort is normal. No respiratory distress. Breath sounds: No stridor. No wheezing, rhonchi or rales. Abdominal:      General: Abdomen is flat. There is no distension. Tenderness: There is no abdominal tenderness. There is no right CVA tenderness, left CVA tenderness, guarding or rebound. Musculoskeletal:         General: No swelling, tenderness, deformity or signs of injury. Cervical back: No rigidity or tenderness. Right lower leg: No edema. Left lower leg: No edema. Lymphadenopathy:      Cervical: No cervical adenopathy. Skin:     General: Skin is warm. Capillary Refill: Capillary refill takes less than 2 seconds. Coloration: Skin is not jaundiced or pale. Findings: No bruising, erythema, lesion or rash. Neurological:      Mental Status: He is lethargic. Cranial Nerves: No facial asymmetry. Sensory: No sensory deficit. Motor: No weakness. Comments: Left upper extremity is contracted, patient is nonverbal but responds to verbal stimuli. Neurologic exam limited by patient's altered mental status. Psychiatric:         Mood and Affect: Mood normal.          MDM  Number of Diagnoses or Management Options  Diagnosis management comments: Patient presents with primary complaint of altered mental status, staff state patient has become less responsive for the last 24 to 48 hours and reports he is at increased coughing and reportedly may have aspirated. They report no fever and state he is nonverbal at his baseline but he is normally able to still interact which she has not been for the last 24 to 48 hours.   Given patient's presentation concern would be for infection versus metabolic derangement versus electrolyte abnormality versus anemia versus decline of chronic conditions.        Amount and/or Complexity of Data Reviewed  Clinical lab tests: reviewed  Tests in the radiology section of CPT®: reviewed  Decide to obtain previous medical records or to obtain history from someone other than the patient: yes           Procedures

## 2022-05-26 NOTE — ED TRIAGE NOTES
Per EMS, Patient from home, caregiver is new to the patient x2 days. He usually can talk in one or two word sentences, but today he only responds to verbal or painful stimuli. Per new caregiver he may have aspirated on some water or something he drank.

## 2022-05-26 NOTE — ED NOTES
Patient resting on stretcher. Call bell in reach NAD noted will continue to monitor. Antibiotics infusing without difficulty.

## 2022-05-26 NOTE — ED NOTES
Adult brief changed. Patient tolerated activity well. Straight cath preformed with personal aid assistance. Patient did not like this, but specimen was obtained and sent to lab.

## 2022-05-26 NOTE — H&P
History & Physical    Patient: Lois Humphries MRN: [id number]  CSN: [id number]    YOB: 1960  Age: 64 y.o. Sex: male      DOA: 5/26/2022    Chief Complaint:   Chief Complaint   Patient presents with    Altered mental status          HPI:     Lois Humphries is a 64 y.o.  male with hx of PKU, seizures, anxiety disorder. Patient presented from group home secondary to decreased alertness. He apparently was at baseline until 2 days ago and then started progressively becoming more lethargic and altered  Caregivers state normally patient is able to walk and hold conversation but has been nonverbal for the last 24 to 48 hours. Upon presentation, patient is excessively lethargic. Will open eyes on exam but otherwise does not respond to stimuli. Not answering questions by provider. Patient is new to current group home - has only been there for 2 days (from ED notes).      Past Medical History:   Diagnosis Date    Anxiety disorder     Dermatitis     High cholesterol     Intellectual disability     PKU (phenylketonuria) ([address]     Seizures ([facility] [address]     Urinary retention        Past Surgical History:   Procedure Laterality Date    COLONOSCOPY N/A 10/19/2021    COLONOSCOPY performed by Mariano Zepeda MD at [address] [facility] HX GI  03/15/2022    Excision and Fulgeration Anal Condyloma     HX PROSTATE SURGERY      bph    HX UROLOGICAL      retention       Family History   Family history unknown: Yes       Social History     Socioeconomic History    Marital status: SINGLE   Tobacco Use    Smoking status: Never Smoker    Smokeless tobacco: Never Used   Vaping Use    Vaping Use: Never used   Substance and Sexual Activity    Alcohol use: Never    Drug use: Never    Sexual activity: Never   Other Topics Concern     Service No    Blood Transfusions No    Caffeine Concern No    Occupational Exposure No    Hobby Hazards No    Sleep Concern No    Stress Concern No    Weight Concern No    Special Diet No    Back Care No    Exercise No    Bike Helmet No    Seat Belt No    Self-Exams No       Prior to Admission medications    Medication Sig Start Date End Date Taking? Authorizing Provider   finasteride (PROSCAR) 5 mg tablet Take 5 mg by mouth daily. 9/26/21  Yes Provider, Historical   Myrbetriq 25 mg ER tablet Take 25 mg by mouth daily. 9/26/21  Yes Provider, Historical   triamcinolone acetonide (KENALOG) 0.1 % topical cream Apply  to affected area two (2) times a day. Apply to face 8/20/21  Yes Provider, Historical   polyethylene glycol (MIRALAX) 17 gram packet Take 17 g by mouth daily. Yes Provider, Historical   levETIRAcetam (KEPPRA) 500 mg tablet Take 500 mg by mouth two (2) times a day. Yes Other, MD Connie   cetirizine (ZYRTEC) 10 mg tablet Take  by mouth every six (6) hours as needed for Allergies. Yes Other, MD Connie   LORazepam (ATIVAN) 0.5 mg tablet Take 0.5 mg by mouth two (2) times a day. Yes Other, MD Connie   OLANZapine (ZyPREXA) 5 mg tablet Take 10 mg by mouth three (3) times daily. Yes Other, MD Connie   benztropine (COGENTIN) 1 mg tablet Take 1 mg by mouth three (3) times daily. Yes Other, MD Connie   divalproex DR (DEPAKOTE) 500 mg tablet Take 500 mg by mouth two (2) times a day. Yes Other, MD Connie   nystatin (MYCOSTATIN) topical cream Apply  to affected area two (2) times a day. To axilla and both arms   Yes Eloise, MD Connie   acetaminophen (TYLENOL) 325 mg tablet Take 650 mg by mouth every six (6) hours as needed for Pain. Yes Other, MD Connie   magnesium hydroxide ([employer] Milk of Magnesia) 400 mg/5 mL suspension Take 10 mL by mouth daily as needed for Constipation. Yes Other, MD Connie   LORazepam (ATIVAN) 1 mg tablet Take 1 mg by mouth every six (6) hours as needed for Anxiety. Yes Other, MD Connie   pravastatin (PravachoL) 20 mg tablet Take 20 mg by mouth nightly.    Yes Eloise, MD Connie   tamsulosin (FLOMAX) 0.4 mg capsule Take 0.4 mg by mouth two (2) times a day. Yes Eloise, MD Connie   hydrocortisone (CORTAID) 1 % topical cream Apply  to affected area two (2) times a day. use thin layer   Yes Connie Navas MD   guaiFENesin (ROBITUSSIN) 100 mg/5 mL liquid Take 100 mg by mouth four (4) times daily as needed for Cough. Yes Connie Navas MD   bismuth subsalicylate (BismatroL) 262 mg/15 mL suspension Take 10 mL by mouth every six (6) hours as needed for Indigestion. Yes Eloise, MD Connie   loperamide (IMODIUM) 2 mg capsule Take 2 mg by mouth four (4) times daily as needed for Diarrhea. Yes Connie Navas MD   ondansetron hcl (Zofran) 4 mg tablet Take 4 mg by mouth every eight (8) hours as needed for Nausea or Vomiting. Yes Eloise, MD Connie       No Known Allergies      Review of Systems  Currently not speaking. Physical Exam:     Physical Exam:  Visit Vitals  /72 (BP 1 Location: Left upper arm, BP Patient Position: At rest)   Pulse 98   Temp 98.7 °F (37.1 °C)   Resp 19   SpO2 92%           Temp (24hrs), Av.7 °F (37.6 °C), Min:98.7 °F (37.1 °C), Max:100.6 °F (38.1 °C)    No intake/output data recorded. No intake/output data recorded. General:  Lethargic, no distress, appears stated age. Head: Normocephalic, without obvious abnormality, atraumatic. Eyes:  Conjunctivae/corneas clear. PERRL, EOMs intact. Nose: Nares normal.    Neck: Supple, symmetrical, trachea midline   Lungs:    Rhonchi bilaterally worse in upper lung fields. Heart:  Regular rate and rhythm, S1, S2 normal.     Abdomen: Soft, non-distended. Extremities: Extremities normal, atraumatic, no cyanosis or edema. Pulses: 2+ and symmetric all extremities. Skin:  No rashes or lesions   Neurologic:  Responsive to painful stimuli; will open eyes to voice. Currently non-verbal.        Labs Reviewed: All lab results for the last 24 hours reviewed.   Recent Results (from the past 24 hour(s))   POC LACTIC ACID    Collection Time: 22 10:56 AM   Result Value Ref Range    Lactic Acid (POC) 1.43 0.40 - 2.00 mmol/L   CBC WITH AUTOMATED DIFF    Collection Time: 05/26/22 11:00 AM   Result Value Ref Range    WBC 10.8 4.6 - 13.2 K/uL    RBC 5.40 4.35 - 5.65 M/uL    HGB 16.5 (H) 13.0 - 16.0 g/dL    HCT 49.7 (H) 36.0 - 48.0 %    MCV 92.0 78.0 - 100.0 FL    MCH 30.6 24.0 - 34.0 PG    MCHC 33.2 31.0 - 37.0 g/dL    RDW 13.0 11.6 - 14.5 %    PLATELET  186 - 338 K/uL     UNABLE TO REPORT ACCURATE COUNT DUE TO PLATELET AGGREGATION, HOWEVER, PLATELETS APPEAR DECREASED IN NUMBER ON SMEAR. PLEASE RESUBMIT SODIUM CITRATE (BLUE) AND EDTA (LAVENDAR) TUBES FOR HEMATOLOGICAL TESTING. MPV 10.9 9.2 - 11.8 FL    NRBC 0.0 0  WBC    ABSOLUTE NRBC 0.00 0.00 - 0.01 K/uL    NEUTROPHILS 80 (H) 40 - 73 %    LYMPHOCYTES 8 (L) 21 - 52 %    MONOCYTES 12 (H) 3 - 10 %    EOSINOPHILS 0 0 - 5 %    BASOPHILS 0 0 - 2 %    IMMATURE GRANULOCYTES 0 0.0 - 0.5 %    ABS. NEUTROPHILS 8.6 (H) 1.8 - 8.0 K/UL    ABS. LYMPHOCYTES 0.9 0.9 - 3.6 K/UL    ABS. MONOCYTES 1.3 (H) 0.05 - 1.2 K/UL    ABS. EOSINOPHILS 0.0 0.0 - 0.4 K/UL    ABS. BASOPHILS 0.0 0.0 - 0.1 K/UL    ABS. IMM. GRANS. 0.0 0.00 - 0.04 K/UL    DF SMEAR SCANNED      PLATELET COMMENTS DECREASED PLATELETS      RBC COMMENTS NORMOCYTIC, NORMOCHROMIC     METABOLIC PANEL, COMPREHENSIVE    Collection Time: 05/26/22 11:00 AM   Result Value Ref Range    Sodium 131 (L) 136 - 145 mmol/L    Potassium 5.0 3.5 - 5.5 mmol/L    Chloride 100 100 - 111 mmol/L    CO2 26 21 - 32 mmol/L    Anion gap 5 3.0 - 18 mmol/L    Glucose 105 (H) 74 - 99 mg/dL    BUN 18 7.0 - 18 MG/DL    Creatinine 0.86 0.6 - 1.3 MG/DL    BUN/Creatinine ratio 21 (H) 12 - 20      GFR est AA >60 >60 ml/min/1.73m2    GFR est non-AA >60 >60 ml/min/1.73m2    Calcium 9.3 8.5 - 10.1 MG/DL    Bilirubin, total 0.7 0.2 - 1.0 MG/DL    ALT (SGPT) 83 (H) 16 - 61 U/L    AST (SGOT) 243 (H) 10 - 38 U/L    Alk.  phosphatase 102 45 - 117 U/L    Protein, total 8.3 (H) 6.4 - 8.2 g/dL    Albumin 3.1 (L) 3.4 - 5.0 g/dL Globulin 5.2 (H) 2.0 - 4.0 g/dL    A-G Ratio 0.6 (L) 0.8 - 1.7     MAGNESIUM    Collection Time: 05/26/22 11:00 AM   Result Value Ref Range    Magnesium 2.2 1.6 - 2.6 mg/dL   PHOSPHORUS    Collection Time: 05/26/22 11:00 AM   Result Value Ref Range    Phosphorus 3.6 2.5 - 4.9 MG/DL   EKG, 12 LEAD, INITIAL    Collection Time: 05/26/22 12:11 PM   Result Value Ref Range    Ventricular Rate 93 BPM    Atrial Rate 93 BPM    P-R Interval 162 ms    QRS Duration 92 ms    Q-T Interval 318 ms    QTC Calculation (Bezet) 395 ms    Calculated P Axis 67 degrees    Calculated R Axis 20 degrees    Calculated T Axis 54 degrees    Diagnosis       Normal sinus rhythm  Normal ECG  No previous ECGs available     URINALYSIS W/ RFLX MICROSCOPIC    Collection Time: 05/26/22 12:34 PM   Result Value Ref Range    Color YELLOW      Appearance CLEAR      Specific gravity 1.026 1.005 - 1.030      pH (UA) 6.5 5.0 - 8.0      Protein 30 (A) NEG mg/dL    Glucose Negative NEG mg/dL    Ketone TRACE (A) NEG mg/dL    Bilirubin Negative NEG      Blood MODERATE (A) NEG      Urobilinogen 1.0 0.2 - 1.0 EU/dL    Nitrites Negative NEG      Leukocyte Esterase Negative NEG     URINE MICROSCOPIC ONLY    Collection Time: 05/26/22 12:34 PM   Result Value Ref Range    WBC 4 to 6 0 - 4 /hpf    RBC 8 to 10 0 - 5 /hpf    Epithelial cells FEW 0 - 5 /lpf    Bacteria FEW (A) NEG /hpf    Mucus FEW (A) NEG /lpf   RESPIRATORY VIRUS PANEL W/COVID-19, PCR    Collection Time: 05/26/22  1:09 PM    Specimen: Nasopharyngeal   Result Value Ref Range    Adenovirus Not detected NOTD      Coronavirus 229E Not detected NOTD      Coronavirus HKU1 Not detected NOTD      Coronavirus CVNL63 Not detected NOTD      Coronavirus OC43 Not detected NOTD      SARS-CoV-2, PCR Not detected NOTD      Metapneumovirus Not detected NOTD      Rhinovirus and Enterovirus Not detected NOTD      Influenza A Not detected NOTD      Influenza A, subtype H1 Not detected NOTD      Influenza A, subtype H3 Not detected NOTD      INFLUENZA A H1N1 PCR Not detected NOTD      Influenza B Not detected NOTD      Parainfluenza 1 Not detected NOTD      Parainfluenza 2 Not detected NOTD      Parainfluenza 3 Detected (A) NOTD      Parainfluenza virus 4 Not detected NOTD      RSV by PCR Not detected NOTD      B. parapertussis, PCR Not detected NOTD      Bordetella pertussis - PCR Not detected NOTD      Chlamydophila pneumoniae DNA, QL, PCR Not detected NOTD      Mycoplasma pneumoniae DNA, QL, PCR Not detected NOTD          XR Results (most recent):  Results from Hospital Encounter encounter on 05/26/22    XR CHEST PORT    Narrative  HISTORY: Altered mental status. EXAM: Chest.    TECHNIQUE: Single view portable chest.    COMPARISON: 10/19/2020    FINDINGS: There is no pneumothorax, pneumonia or pleural effusions. Heart and  mediastinal structures are unremarkable. . Visualized bony thorax and soft  tissues are within normal limits. Impression  IMPRESSION:    1. No acute cardiopulmonary process. No change. CT Results  (Last 48 hours)               05/26/22 1128  CT HEAD WO CONT Final result    Impression:      No acute findings. Stable mild features of chronic microvascular disease. Narrative:  CT OF THE HEAD WITHOUT CONTRAST. CLINICAL HISTORY: Altered mental status. TECHNIQUE: Helical scan obtained of the head were obtained from the skull vertex   through the base of the skull without intravenous contrast.    All CT scans are   performed using dose optimization techniques as appropriate to the performed   exam including the following: Automated exposure control, adjustment of mA   and/or kV according to patient size, and use of iterative reconstructive   technique. COMPARISON: 9/22/2020. FINDINGS:        The sulcal pattern and ventricular system are normal in size and configuration   for age. Mild periventricular white matter hypodensities. No intracranial   hemorrhage. No mass effect. The visualized paranasal sinuses are clear. The   mastoid air cells are clear. The visualized bony structures are unremarkable. Procedures/imaging: see electronic medical records for all procedures/Xrays and details which were not copied into this note but were reviewed prior to creation of Plan      Assessment/Plan     Active Problems:    Pneumonia (5/26/2022)       Assessment  1. Sepsis -tachypnea, tachycardia, febrile to 100.6. Pneumonia likely source. 2. Pneumonia -likely multifactorial to both parainfluenza and aspiration. #1-2. Admit. ID consult (Dr. Gina Carpenter). Started Zosyn (5/26/22). SLP consult to assess diet when more alert. Scheduled duonebs. 3.   Transaminitis - ?secondary to benztropine? #3. Trend. 4.   Abnormal urinalysis        #4. Urine culture pending. 5.   History of seizure disorder        #5. Hold oral meds secondary to lethargy. IV Keppra, Zosyn. 6.   Hyponatremia, mild  7. History of phenylketonuria        #7. Nutrition consult. Unsure if patient is on specialized diet at group home. 8.   Intellectual disability - group home patient, at baseline walks freely, speaks in 1-2 word sentences        #8. SLP consult for swallow eval. Per group home they are unsure of what fluid thickness the patient should  be given. 9.   History of anxiety    #9. On PRN Ativan at group home, will hold currently secondary to lethargy and need to assess mentation. 10. Hypercholesterolemia    #10. Hold statin until patient more alert. Diet: NPO until more awake   DVT/GI Prophylaxis: Lovenox  Code Status: full     Contact: [phone], Norma Ayala (caregiver)     Discussed with patient at bedside about hospital admission and plan care. He understands and agrees. All questions answered. Disclaimer: Sections of this note are dictated using utilizing voice recognition software. Minor typographical errors may be present.  If questions arise, please do not hesitate to contact me or call our department.         Rebekah Noble DO  [facility]

## 2022-05-26 NOTE — Clinical Note
Status[de-identified] INPATIENT [101]   Type of Bed: Telemetry [19]   Cardiac Monitoring Required?: Yes   Inpatient Hospitalization Certified Necessary for the Following Reasons: 3.  Patient receiving treatment that can only be provided in an inpatient setting (further clarification in H&P documentation)   Admitting Diagnosis: Pneumonia [926111]   Admitting Physician: Jenny Victor [[id number]]   Attending Physician: Jenny Victor [[id number]]   Estimated Length of Stay: 2 Midnights   Discharge Plan[de-identified] Home with Office Follow-up

## 2022-05-27 NOTE — CONSULTS
3 [facility]  Pulmonary, Critical Care, and Sleep Medicine      Name: Erum Santiago MRN: [id number]   : 1960 Hospital: [facility]   Date: 2022          Critical Care Initial Patient Consult    Requesting MD:                                                  Reason for CC Consult:    IMPRESSION:   · Acute toxic metabolic encephalopathy- compounded by intellectual disability, lethargic on presentation, negative CT head  · Acute respiratory failure secondary to parainfluenza 3 virus compounded by aspiration secondary to above  · Sepsis vs SIRS- lethargic and low grade fever  on presentation. Elevated procal and CRP. UA and CXR negative  · Transaminitis- ALT:76, AST:207, negative alk phos  · Hyponatremia, mild  · Intellectual disability-able to speak 1-2 words per caregiver, able to walk at  Baseline  · Hx of seizure- Keppra and Depakote as home regimen  · Hx of Anxiety- PRN Ativan per home regimen  · Hx of PKU  · Hypercholesterolemia- home statins      RECOMMENDATIONS:   Resp -  Ventilator-Titrate supplemental O2 for SpO2 >92%. Scheduled hypertonic and albuterol, CPT for bronchial hygiene, frequent NT suctioning as needed  ID - . Afebrile, aleukocytosis. Zosyn to cover for aspiration. ID on board. Trend WBCs and temperature curve. CVS - Monitor HD, MAP goal>65. Heme/Onc- Daily CBC. Monitor for s/o active bleeding. Metabolic - Daily BMP, mag, phos. Renal - Trend Renal Indices, UOP. Endocrine - Q6 glucoses, SSI. Neuro/ Pain/ Sedation - Titrate sedation to RASS of 0 to -1. PRN for breakthrough sedation needs. GI - NPO. Failed SLP eval. Trending LFTs, DILIP of liver ordered per ID recs  Prophylaxis - DVT-Lovenox , GI- Pepcid  +/- restraints  PT/OT/SLP  Full Code  Discussed with Dr. Mere Romano     Subjective/History: This patient has been seen and evaluated at the request of Dr. Leander Carpenter for acute respiratory failure and encephalopathy.   Patient is a 64 y.o. male with PMHx of seizures, anxiety, dyslipidemia, PKU, and intellectual disability, presented in the ED from his group home due to lethargy, shortness of breath and cough. It was also reported that he fell at his group home with bleeding in his left ear, CT head is negative. His work-up revealed mild leukocytosis, hyponatremia, positive parainfluenza virus. Pt was admitted to the floor under IM service. On 5/27, pt was transferred to the ICU for concerns for  airway clearance due to encephalopathy with risk of intubation. The patient is critically ill and can not provide additional history due to Unable to comprehend. Past Medical History:   Diagnosis Date    Anxiety disorder     Dermatitis     High cholesterol     Intellectual disability     PKU (phenyl[facility]onuria) ([facility] [address]     Seizures ([facility] [address]     Urinary retention       Past Surgical History:   Procedure Laterality Date    COLONOSCOPY N/A 10/19/2021    COLONOSCOPY performed by Emerald Bartlett MD at [facility] HX GI  03/15/2022    Excision and Fulgeration Anal Condyloma     HX PROSTATE SURGERY      bph    HX UROLOGICAL      retention      Prior to Admission medications    Medication Sig Start Date End Date Taking? Authorizing Provider   finasteride (PROSCAR) 5 mg tablet Take 5 mg by mouth daily. 9/26/21  Yes Provider, Historical   Myrbetriq 25 mg ER tablet Take 25 mg by mouth daily. 9/26/21  Yes Provider, Historical   triamcinolone acetonide (KENALOG) 0.1 % topical cream Apply  to affected area two (2) times a day. Apply to face 8/20/21  Yes Provider, Historical   polyethylene glycol (MIRALAX) 17 gram packet Take 17 g by mouth daily. Yes Provider, Historical   levETIRAcetam (KEPPRA) 500 mg tablet Take 500 mg by mouth two (2) times a day. Yes Other, MD Connie   cetirizine (ZYRTEC) 10 mg tablet Take  by mouth every six (6) hours as needed for Allergies.    Yes Other, MD Connie   LORazepam (ATIVAN) 0.5 mg tablet Take 0.5 mg by mouth two (2) times a day.   Yes Connie Navas MD   OLANZapine (ZyPREXA) 5 mg tablet Take 10 mg by mouth three (3) times daily. Yes Connie Navas MD   benztropine (COGENTIN) 1 mg tablet Take 1 mg by mouth three (3) times daily. Yes Connie Navas MD   divalproex DR (DEPAKOTE) 500 mg tablet Take 500 mg by mouth two (2) times a day. Yes Connie Navas MD   nystatin (MYCOSTATIN) topical cream Apply  to affected area two (2) times a day. To axilla and both arms   Yes Connie Navas MD   acetaminophen (TYLENOL) 325 mg tablet Take 650 mg by mouth every six (6) hours as needed for Pain. Connie Ambriz MD   LORazepam (ATIVAN) 1 mg tablet Take 1 mg by mouth every six (6) hours as needed for Anxiety. Yes Connie Navas MD   pravastatin (PravachoL) 20 mg tablet Take 20 mg by mouth nightly. Yes Connie Navas MD   tamsulosin (FLOMAX) 0.4 mg capsule Take 0.4 mg by mouth two (2) times a day. Yes Connie Navas MD   hydrocortisone (CORTAID) 1 % topical cream Apply  to affected area two (2) times a day. use thin layer   Yes Connie Navas MD   guaiFENesin (ROBITUSSIN) 100 mg/5 mL liquid Take 100 mg by mouth four (4) times daily as needed for Cough. Yes Connie Navas MD   bismuth subsalicylate (BismatroL) 262 mg/15 mL suspension Take 10 mL by mouth every six (6) hours as needed for Indigestion. Yes Connie Navas MD   loperamide (IMODIUM) 2 mg capsule Take 2 mg by mouth four (4) times daily as needed for Diarrhea. Yes Connie Navas MD   ondansetron hcl (Zofran) 4 mg tablet Take 4 mg by mouth every eight (8) hours as needed for Nausea or Vomiting. Yes Connie Navas MD   magnesium hydroxide (Blackwell Milk of Magnesia) 400 mg/5 mL suspension Take 10 mL by mouth daily as needed for Constipation.     Connie Navas MD     Current Facility-Administered Medications   Medication Dose Route Frequency    albuterol-ipratropium (DUO-NEB) 2.5 MG-0.5 MG/3 ML  3 mL Nebulization Q4H RT    sodium chloride 3% hypertonic nebulizer soln  4 mL Nebulization Q4H    And    albuterol CONCENTRATE 2.5mg/0.5 mL neb soln  2.5 mg Nebulization Q4H RT    valproate (DEPACON) 250 mg in 0.9% sodium chloride 50 mL IVPB  250 mg IntraVENous Q6H    clotrimazole-betamethasone (LOTRISONE) 1-0.05 % cream   Topical BID    [Held by provider] benztropine (COGENTIN) tablet 1 mg  1 mg Oral TID    [Held by provider] cetirizine (ZYRTEC) tablet 10 mg  10 mg Oral DAILY    [Held by provider] divalproex DR (DEPAKOTE) tablet 500 mg  500 mg Oral BID    [Held by provider] finasteride (PROSCAR) tablet 5 mg  5 mg Oral DAILY    [Held by provider] levETIRAcetam (KEPPRA) tablet 500 mg  500 mg Oral BID    [Held by provider] LORazepam (ATIVAN) tablet 0.5 mg  0.5 mg Oral BID    [Held by provider] trospium (SANCTURA) tablet 20 mg  20 mg Oral DAILY    [Held by provider] OLANZapine (ZyPREXA) tablet 10 mg  10 mg Oral TID    [Held by provider] pravastatin (PRAVACHOL) tablet 20 mg  20 mg Oral QHS    [Held by provider] tamsulosin (FLOMAX) capsule 0.4 mg  0.4 mg Oral DAILY    [Held by provider] sodium chloride (NS) flush 5-40 mL  5-40 mL IntraVENous Q8H    enoxaparin (LOVENOX) injection 40 mg  40 mg SubCUTAneous DAILY    piperacillin-tazobactam (ZOSYN) 3.375 g in 0.9% sodium chloride (MBP/ADV) 100 mL MBP  3.375 g IntraVENous Q8H    levETIRAcetam (KEPPRA) 500 mg in 0.9% sodium chloride (MBP/ADV) 100 mL MBP  500 mg IntraVENous Q12H     No Known Allergies   Social History     Tobacco Use    Smoking status: Never Smoker    Smokeless tobacco: Never Used   Substance Use Topics    Alcohol use: Never      Family History   Family history unknown: Yes        Review of Systems:  Pertinent items are noted in HPI.     Objective:   Vital Signs:    Visit Vitals  BP (!) 142/76 (BP 1 Location: Left upper arm)   Pulse 86   Temp 99.1 °F (37.3 °C)   Resp 21   Ht 5' 8\" (1.727 m)   Wt 69.4 kg (153 lb)   SpO2 96%   BMI 23.26 kg/m²       O2 Device: Nasal cannula   O2 Flow Rate (L/min): 3 l/min   Temp (24hrs), Av.1 °F (37.3 °C), Min:98.7 °F (37.1 °C), Max:100 °F (37.8 °C)       Intake/Output:   Last shift:      No intake/output data recorded. Last 3 shifts: 05/25 1901 - 05/27 0700  In: -   Out: 400 [Urine:400]    Intake/Output Summary (Last 24 hours) at 5/27/2022 1459  Last data filed at 5/27/2022 6290  Gross per 24 hour   Intake --   Output 400 ml   Net -400 ml       Ventilator Settings:  Mode Rate Tidal Volume Pressure FiO2 PEEP                    Peak airway pressure:      Minute ventilation:        ARDS network Guidelines: Lung protective strategy and Pl pressure goals____________      Physical Exam:    General:  Alert, grunts and moan, non-verbal, garbled voice   Head:  Normocephalic, without obvious abnormality, atraumatic. Eyes:  Conjunctivae/corneas clear. PERRL, EOMs intact. Nose: Nares normal. Septum midline. Mucosa normal. No drainage or sinus tenderness. Throat: Lips, mucosa, and tongue normal. Teeth and gums normal.   Neck: Supple, symmetrical, trachea midline, no adenopathy, thyroid: no enlargment/tenderness/nodules, no carotid bruit and no JVD. Back:   Symmetric, no curvature. ROM normal.   Lungs:   Coarse breath sounds bilaterally. Chest wall:  No tenderness or deformity. Heart:  Regular rate and rhythm, S1, S2 normal, no murmur, click, rub or gallop. Abdomen:   Soft, non-tender. Bowel sounds normal. No masses,  No organomegaly. Extremities: Extremities normal, atraumatic, no cyanosis or edema. +/- restraints   Pulses: 2+ and symmetric all extremities. Skin: Skin color, texture, turgor normal. No rashes or lesions. Normal cap refill.     Lymph nodes: Cervical, supraclavicular, and axillary nodes normal.   Neurologic: Grossly non-focal, lethargic, moves extremities       Data:     Recent Results (from the past 24 hour(s))   METABOLIC PANEL, COMPREHENSIVE    Collection Time: 05/27/22  3:46 AM   Result Value Ref Range    Sodium 135 (L) 136 - 145 mmol/L    Potassium 4.2 3.5 - 5.5 mmol/L    Chloride 102 100 - 111 mmol/L    CO2 26 21 - 32 mmol/L    Anion gap 7 3.0 - 18 mmol/L    Glucose 101 (H) 74 - 99 mg/dL    BUN 20 (H) 7.0 - 18 MG/DL    Creatinine 0.74 0.6 - 1.3 MG/DL    BUN/Creatinine ratio 27 (H) 12 - 20      GFR est AA >60 >60 ml/min/1.73m2    GFR est non-AA >60 >60 ml/min/1.73m2    Calcium 8.7 8.5 - 10.1 MG/DL    Bilirubin, total 0.7 0.2 - 1.0 MG/DL    ALT (SGPT) 76 (H) 16 - 61 U/L    AST (SGOT) 207 (H) 10 - 38 U/L    Alk.  phosphatase 72 45 - 117 U/L    Protein, total 6.8 6.4 - 8.2 g/dL    Albumin 2.3 (L) 3.4 - 5.0 g/dL    Globulin 4.5 (H) 2.0 - 4.0 g/dL    A-G Ratio 0.5 (L) 0.8 - 1.7     CBC W/O DIFF    Collection Time: 05/27/22  3:46 AM   Result Value Ref Range    WBC 7.8 4.6 - 13.2 K/uL    RBC 4.62 4.35 - 5.65 M/uL    HGB 14.0 13.0 - 16.0 g/dL    HCT 42.3 36.0 - 48.0 %    MCV 91.6 78.0 - 100.0 FL    MCH 30.3 24.0 - 34.0 PG    MCHC 33.1 31.0 - 37.0 g/dL    RDW 13.2 11.6 - 14.5 %    PLATELET 865 409 - 966 K/uL    MPV 9.9 9.2 - 11.8 FL    NRBC 0.0 0  WBC    ABSOLUTE NRBC 0.00 0.00 - 0.01 K/uL   GLUCOSE, POC    Collection Time: 05/27/22 10:51 AM   Result Value Ref Range    Glucose (POC) 91 70 - 110 mg/dL   BLOOD GAS, ARTERIAL POC    Collection Time: 05/27/22 12:45 PM   Result Value Ref Range    Device: NASAL CANNULA      FIO2 (POC) 2 %    pH (POC) 7.46 (H) 7.35 - 7.45      pCO2 (POC) 36.1 35.0 - 45.0 MMHG    pO2 (POC) 85 80 - 100 MMHG    HCO3 (POC) 25.5 22 - 26 MMOL/L    sO2 (POC) 97.0 92 - 97 %    Base excess (POC) 1.9 mmol/L    Allens test (POC) Positive      Site RIGHT RADIAL      Specimen type (POC) ARTERIAL      Performed by KRISSY Carlton    Collection Time: 05/27/22  2:10 PM   Result Value Ref Range    Glucose (POC) 106 70 - 110 mg/dL             Telemetry:normal sinus rhythm    Imaging:  I have personally reviewed the patients radiographs and have reviewed the reports:  CXR Results  (Last 48 hours)               05/27/22 4282  XR CHEST PORT Final result    Impression:   IMPRESSION: 1. Minimally worsened congestion. Otherwise no change. Follow-up with plain   imaging. Narrative:  HISTORY: Hypoxia. EXAM: Chest.       TECHNIQUE: Single view AP portable semiupright chest.        COMPARISON: 5/26/2022       FINDINGS: Minimally worsened aeration of bilateral hemithoraces is demonstrated   with sequela of minimal congestion. No pneumothorax pneumonia or pleural   effusions. Heart and mediastinal structures are unremarkable. . Visualized bony   thorax and soft tissues are within normal limits. 05/26/22 1135  XR CHEST PORT Final result    Impression:   IMPRESSION:       1. No acute cardiopulmonary process. No change. Narrative:  HISTORY: Altered mental status. EXAM: Chest.       TECHNIQUE: Single view portable chest.        COMPARISON: 10/19/2020       FINDINGS: There is no pneumothorax, pneumonia or pleural effusions. Heart and   mediastinal structures are unremarkable. . Visualized bony thorax and soft   tissues are within normal limits.                      Total critical care time exclusive of procedures: 15 minutes  Ebenezer Kerr NP  05/27/22  Pulmonary, Critical Care Medicine  [facility]

## 2022-05-27 NOTE — PROGRESS NOTES
Aprox 0200 pt lung sounds very course and congested when nurse entered room for rounding. Rhonchi audible throughout lungs. Hospitalist notified and received one time order for duo neb.

## 2022-05-27 NOTE — PROGRESS NOTES
Upon assessment pt was visibly SOB and grunting, O2 sats checked and pt O2 at 83%. m O2 applied at 3 liters pt at 93%. Lung sounds very wet and coarse. Pt looks to be very pale, Bg spot check done and was 91.  Md notified, will continue to monitor

## 2022-05-27 NOTE — PROGRESS NOTES
Pt lying in bed, resp 20-24,  hob elevated greater than 30degrees. Pt w frequent, gurgling,weak moist cough, diminished gag. Pt requires q one hour deep oral suctioning, has thick, yellow/green sputum. NP and Dr Coulter Medicus aware. O2 sat decreased 87-89% and placed pt on 3liters humidified oxygen with o2 sats increase 93-95%. Placed left ac pil draws blood/ns flushed/capped. IVABX infusing as ordered.    RN shift report given to SURI GAGE [facility] nurse Daylin De Paz

## 2022-05-27 NOTE — PROGRESS NOTES
Noticed pt's abdomen feeling somewhat distended, oncoming nurse made aware in report and states that she will perform bladder scan on pt. Pt has had very little output throughout shift, overnight shift nurse stated that pt urinated WNL.

## 2022-05-27 NOTE — PROGRESS NOTES
RESPIRATORY CARE ASSESSMENT FOR BRONCHIAL HYGIENE OR LUNG EXPANSION THERAPY  Patient  Viktor Montelongo     64 y.o.   male     5/27/2022  11:22 AM  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    Pneumonia J18.9       ABG:  Date:5/27/2022  No results found for: PH, PHI, PCO2, PCO2I, PO2, PO2I, HCO3, HCO3I, FIO2, FIO2I    Chest X-ray:  Date:5/27/2022  Results from Hospital Encounter encounter on 05/26/22    XR CHEST PORT    Narrative  HISTORY: Altered mental status. EXAM: Chest.    TECHNIQUE: Single view portable chest.    COMPARISON: 10/19/2020    FINDINGS: There is no pneumothorax, pneumonia or pleural effusions. Heart and  mediastinal structures are unremarkable. . Visualized bony thorax and soft  tissues are within normal limits. Impression  IMPRESSION:    1. No acute cardiopulmonary process. No change.       Lab Test:  Date:5/27/2022  WBC:   Lab Results   Component Value Date/Time    WBC 7.8 05/27/2022 03:46 AM   HGB:   Lab Results   Component Value Date/Time    HGB 14.0 05/27/2022 03:46 AM    PLTS:   Lab Results   Component Value Date/Time    PLATELET 132 38/52/0417 03:46 AM       SaO2%/flow: @LASTSAO2(1)@    Vital Signs:     Patient Vitals for the past 8 hrs:   Temp Pulse Resp BP SpO2   05/27/22 1119 99.1 °F (37.3 °C) 86 22 (!) 142/76 93 %   05/27/22 0758 99.3 °F (37.4 °C) 93 20 (!) 141/77 94 %   05/27/22 0453 98.7 °F (37.1 °C) 93 20 (!) 160/81 93 %         RA/O2 flow/device:RA        First Inital Assesment:     [x]Yes []No   Reevaluation/Reassessment:    []Yes [x]No     CHART REVIEW   Points 0 X 1 X 2 X 3 X 4 X Points   Pulmonary History Smoking History (1) none  Recent Smoking History <1 PPD  Recent Smoking History >1 PPD  Pulmonary Disease or Impairment  Severe Pulmonary Disease  ***   Surgical History No Surgery  General Surgery  Lower Abdominal  Thoracic or Upper Abdominal  Thoracic & Pulmonary Disease  ***   CXR Clear or not indicated  Chronic changes or CXR Pending  Infiltrate, atelectasis or pleural effusions  Infiltrates in more than 1lobe  Infiltrates +atelectasis  +/or pleural effusions  ***     PATIENT ASSESSMENT    0 X 1 X 2 X 3 X 4 X Points   Respiratory Pattern Regular pattern RR 12-20  Increased RR 21-27   Mild Dyspnea at rest, irregular pattern RR 28-32  Moderate Dyspnea at rest, Use of accessory muscles, RR 33-36  Severe Dyspnea, Use of accessory muscles RR >36  ***   Mental Status Alert Oriented cooperative  Confused, Follows commands  Lethargic, Does not follow commands  Obtunded  Unresponsive  ***   Breath Sounds Clear  Decreased Unilaterally  Decreased Bilaterally  Mild Scattered wheezing or Crackles in bases  Severe Wheezing or rhonchi  ***   Cough Strong dry NPC  Strong Productive  Weak NPC  Weak productive or weak with rhonchi  No cough or may require suctioning  ***   Level of Activity Ambulatory  Ambulatory with assistance  Temporarily Non-ambulatory  Non-Ambulatory, able to position self  Unable to position self, confined to bed  ***   Total Points/Score:   ***     Specific Intervention Chart(***)    Bronchial Hygiene/Secretion Clearance:    []EZPAP []Rotation bed with vibration    []CPT with percussor []CPT via vest   []Oscillastiang positive pressure expiratory device      Lung Expansion:    []Incentive Spirometer w/RT visits []Incentive Spirometer w/nursing    []EZPAP [] Metaneb     *Suctioning:    []Nasal Tracheal []Tracheal     *suctioning will be ordered and done PRN with an associated frequency such as QID/PRN based on score       Other:    Care Plan   Level # Score Modality Frequency Comment   Level 1 >17 *** ***    Level 2 14-17 *** ***    Level 3 10-13 *** ***    Level 4 1-9 *** ***      BRONCHIAL HYGIENE SCORING AND FREQUENCY GUIDELINES   Frequency Indications/Findings Level #   Q4 ATC Copious secretions, SOB, unable to sleep 1   QID & PRN at night Moderate amounts of secretions 2   TID or Q6 wa Small amounts of secretions and poor cough: recent history of secretions 3   BID or Q8 wa Unable to deep breathe and cough effectively 4        Comments:  ***    Respiratory Therapist: Shwetha Swanson RT

## 2022-05-27 NOTE — PROGRESS NOTES
Problem: Dysphagia (Adult)  Goal: *Acute Goals and Plan of Care (Insert Text)  Description: Patient will:  1. Tolerate PO trials with 0 s/s overt distress in 4/5 trials  2. Utilize compensatory swallow strategies/maneuvers (decrease bite/sip, size/rate, alt. liq/sol) with min cues in 4/5 trials  3. Perform oral-motor/laryngeal exercises to increase oropharyngeal swallow function with min cues  4. Complete an objective swallow study (i.e., MBSS) to assess swallow integrity, r/o aspiration, and determine of safest LRD, min A  5. Pt will utilize comp strategies to improve respiration to swallow coordination to reduce aspiration risk and improve activity tolerance for sustained nutrition/ hydration given min-mod cues. Rec:     NPO  Aspiration precautions  HOB >45 during po intake, remain >30 for 30-45 minutes after po   Small bites/sips; alternate liquid/solid with slow feeding rate   Oral care TID  Meds non orally      Outcome: Progressing Towards Goal   SPEECH LANGUAGE PATHOLOGY BEDSIDE SWALLOW EVALUATION    Patient: Jim Grimes (88 y.o. male)  Date: 5/27/2022  Primary Diagnosis: Pneumonia [J18.9]        Precautions: High aspiration Risk    PLOF: Per H&P    ASSESSMENT :  Based on the objective data described below, the patient presents with mod-severe oropharyngeal dysphagia. SLP conducted a Clinical Beside Swallow Evaluation, per MD orders. Pt A&O to self with reduced command following. Speech/voice: unable to assess, pt currently non verbal, but baseline reported to be ~1-2 word utterances. Oral mech examination revealed generalized weakness. Prior to po trials, SLP provided aggressive oral care with Yankauer suction removing thick mucous in oral cavity. Face/lips were washed as well. Pt with poor response to ice chip trial. No reflexive/volitional lingual movement with ice chip. Ice chip removed from oral cavity and suction again provided. Rec NPO; non oral meds;  High aspiration Risk; Oral care with suction 3+ times daily with HOB > 45 at all times. Standard aspiration Precautions    RN unavailable after eval. Recs left with unit secretary to inform RN. ST will continue to follow as medically indicated. Patient will benefit from skilled intervention to address the above impairments. Patient's rehabilitation potential is considered to be Guarded  Factors which may influence rehabilitation potential include:   []            None noted  []            Mental ability/status  [x]            Medical condition  []            Home/family situation and support systems  []            Safety awareness  []            Pain tolerance/management  []            Other:      PLAN :  Recommendations and Planned Interventions:  See above  Frequency/Duration: Patient will be followed by speech-language pathology 1-2 times per day/3-5 days per week to address goals. Discharge Recommendations: To Be Determined     SUBJECTIVE:   Patient currently not verbalizing    OBJECTIVE:     Past Medical History:   Diagnosis Date    Anxiety disorder     Dermatitis     High cholesterol     Intellectual disability     PKU (phenylketonuria) ([facility]     Seizures ([facility]     Urinary retention      Past Surgical History:   Procedure Laterality Date    COLONOSCOPY N/A 10/19/2021    COLONOSCOPY performed by Mariano Zepeda MD at [facility] ENDOSCOPY    HX GI  03/15/2022    Excision and Fulgeration Anal Condyloma     HX PROSTATE SURGERY      bph    HX UROLOGICAL      retention     Home Situation:       Cognitive and Communication Status:  Neurologic State: Drowsy  Orientation Level: Unable to verbalize  Cognition: Decreased attention/concentration,Decreased command following  Perception: Tactile,Verbal  Perseveration: No perseveration noted  Safety/Judgement: Lack of insight into deficits  Oral Assessment:  Oral Assessment  Labial: Decreased seal  Dentition: Limited  Oral Hygiene: fair  Lingual: Decreased strength; Incoordinated  Velum: Unable to visualize  Mandible: No impairment  Gag Reflex: No impairment  P.O. Trials:  Patient Position: 70  Vocal quality prior to P.O.: Other (comment)  Consistency Presented: Ice chips  How Presented: SLP-fed/presented;Spoon  How Much: 1  Bolus Acceptance: Impaired  Bolus Formation/Control: Impaired  Type of Impairment: Poor;Lip closure  Propulsion: No impairment  Oral Residue: None  Initiation of Swallow: Absent  Laryngeal Elevation: Absent  Aspiration Signs/Symptoms: None  Pharyngeal Phase Characteristics: Other (comment)  Effective Modifications: None  Cues for Modifications: Maximal       Oral Phase Severity: Moderate-severe  Pharyngeal Phase Severity : Moderate-severe    PAIN:  Pain level pre-treatment: unable to verbalize/10   Pain level post-treatment: unable to verbalize/10   Pain Intervention(s): Medication (see MAR); Rest, Ice, Repositioning   Response to intervention: Nurse notified, See doc flow    After treatment:   []            Patient left in no apparent distress sitting up in chair  [x]            Patient left in no apparent distress in bed  [x]            Call bell left within reach  [x]            Nursing notified  []            Family present  []            Caregiver present  []            Bed alarm activated    COMMUNICATION/EDUCATION:   [x]            Aspiration precautions; swallow safety; compensatory techniques. []            Patient/family have participated as able in goal setting and plan of care. []            Patient/family agree to work toward stated goals and plan of care. []            Patient understands intent and goals of therapy; neutral about participation. [x]            Patient unable to participate in goal setting/plan of care; educ ongoing with interdisciplinary staff  [x]         Posted safety precautions in patient's room.     Thank you for this referral,  Marsha Allen, SLP  MA, CCC-SLP  Speech-Language Pathologist    Time Calculation: 11 mins

## 2022-05-27 NOTE — CONSULTS
[facility]  (A Division of [address] [place] [address])      Consultation Note      Date of Admission: 5/26/2022    Date of Note: 5/27/2022      Reason for Referral: parainfluenza  Referring Physician: Dr. Saniya Willett from this admission:   5/26 blood cultures: No growth to date  5/26 urine culture no course to date  5/26 respiratory viral culture:  positive for parainfluenza 3 negative for all other pathogens    Current Antimicrobials:    Prior Antimicrobials:  Zosyn 5/26 to present        Assessment:         Acute respiratory failure  Infection with parainfluenza 3 virus: no pneumonia on CXR  Acute metabolic encephalopathy  Transaminitis  Mild hyponatremia  PKU  Mild hematuria    Plan:   Aspiration precautions. Duonebs prn  Swallow eval to r/o aspiration. Continue with Zosyn for possible aspiration. Consider liver US. DO Dilan [facility]  [address], [address] [place] [address]  Office: [phone]  Mobile/Text: [phone]    Lines / Catheters:  Peripheral    HPI:  Mr. Maurice Herrera is a very pleasant 55-year-old gentleman with a history of seizures, anxiety disorder, dyslipidemia, PKU and intellectual disability who is presenting to the emergency department on 5/26 with complaints of increasing shortness of breath and cough. He resides at a group home to which she is relatively new. Would have a difficult historian and is not able to provide fine details of his current history. Per discussion with staff and admitting physician he apparently had started to get increasingly lethargic and confused about 2 days prior to admission. In the ED he was noted to respond only to voice and was not answering questions. The work-up included temp of 100.6, pulse 92, /69, O2 sats are 96% on room air. Ischial WBCs were 10.8 with a hemoglobin of 16.5 and platelets 523.   UA with 4-6 WBCs few bacteria negative nitrite and leukocyte esterase few epithelial cells. Respiratory viral panel was negative for coronavirus however positive for parainfluenza 3. Associated chest x-ray was negative for any acute infiltrates.          Past Medical History:   Diagnosis Date    Anxiety disorder     Dermatitis     High cholesterol     Intellectual disability     [facility]     Seizures ([facility]     Urinary retention      Past Surgical History:   Procedure Laterality Date    COLONOSCOPY N/A 10/19/2021    COLONOSCOPY performed by Sonia Benitez MD at [address] [facility] HX GI  03/15/2022    Excision and Fulgeration Anal Condyloma Dr.Akbari Marlon [facility] PROSTATE SURGERY      bph    HX UROLOGICAL      retention     Family History   Family history unknown: Yes     Medications reviewed as below:   Current Facility-Administered Medications   Medication Dose Route Frequency Provider Last Rate Last Admin    [Held by provider] benztropine (COGENTIN) tablet 1 mg  1 mg Oral TID Sofai Hill, DO        [Held by provider] cetirizine (ZYRTEC) tablet 10 mg  10 mg Oral DAILY Sofia Hill, DO        [Held by provider] divalproex DR (DEPAKOTE) tablet 500 mg  500 mg Oral BID Sofia Hill, DO        [Held by provider] finasteride (PROSCAR) tablet 5 mg  5 mg Oral DAILY Sofia Hill, DO        [Held by provider] levETIRAcetam (KEPPRA) tablet 500 mg  500 mg Oral BID Sofia Hill, DO        [Held by provider] loperamide (IMODIUM) capsule 2 mg  2 mg Oral QID PRN Sofia Hill, DO        [Held by provider] LORazepam (ATIVAN) tablet 0.5 mg  0.5 mg Oral BID Sofia Hill, DO        [Held by provider] LORazepam (ATIVAN) tablet 1 mg  1 mg Oral Q6H PRN Sofia Hill, DO        [Held by provider] trospium (SANCTURA) tablet 20 mg  20 mg Oral DAILY Sofia Hill, DO        [Held by provider] OLANZapine (ZyPREXA) tablet 10 mg  10 mg Oral TID Sofia Hill, DO        [Held by provider] pravastatin (PRAVACHOL) tablet 20 mg 20 mg Oral QHS Sofia Hill, DO        [Held by provider] tamsulosin (FLOMAX) capsule 0.4 mg  0.4 mg Oral DAILY Sofia Hill, DO        [Held by provider] sodium chloride (NS) flush 5-40 mL  5-40 mL IntraVENous Q8H Sofia Hill, DO   10 mL at 05/26/22 2046    sodium chloride (NS) flush 5-40 mL  5-40 mL IntraVENous PRN Sofia Hill, DO        [Held by provider] polyethylene glycol (MIRALAX) packet 17 g  17 g Oral DAILY PRN Sofia Hill, DO        [Held by provider] ondansetron (ZOFRAN ODT) tablet 4 mg  4 mg Oral Q8H PRN Sofia Hill, DO        Or    [Held by provider] ondansetron (ZOFRAN) injection 4 mg  4 mg IntraVENous Q6H PRN Sofia Hill, DO        enoxaparin (LOVENOX) injection 40 mg  40 mg SubCUTAneous DAILY Sofia Hill DO        valproate (DEPACON) 250 mg in 0.9% sodium chloride 50 mL IVPB  250 mg IntraVENous Q6H Sofia Hill, DO 50 mL/hr at 05/27/22 9843 250 mg at 05/27/22 0737    piperacillin-tazobactam (ZOSYN) 3.375 g in 0.9% sodium chloride (MBP/ADV) 100 mL MBP  3.375 g IntraVENous Q8H Sofia Hill, DO 25 mL/hr at 05/27/22 0217 3.375 g at 05/27/22 0217    acetaminophen (TYLENOL) suppository 650 mg  650 mg Rectal Q4H PRN Sofia Hill,         levETIRAcetam (KEPPRA) 500 mg in 0.9% sodium chloride (MBP/ADV) 100 mL MBP  500 mg IntraVENous Q12H Sofia Hill, DO         No Known Allergies  Social History     Socioeconomic History    Marital status: SINGLE     Spouse name: Not on file    Number of children: Not on file    Years of education: Not on file    Highest education level: Not on file   Occupational History    Not on file   Tobacco Use    Smoking status: Never Smoker    Smokeless tobacco: Never Used   Vaping Use    Vaping Use: Never used   Substance and Sexual Activity    Alcohol use: Never    Drug use: Never    Sexual activity: Never   Other Topics Concern     Service No    Blood Transfusions No    Caffeine Concern No  Occupational Exposure No    Hobby Hazards No    Sleep Concern No    Stress Concern No    Weight Concern No    Special Diet No    Back Care No    Exercise No    Bike Helmet No    Seat Belt No    Self-Exams No   Social History Narrative    Not on file     Social Determinants of Health     Financial Resource Strain:     Difficulty of Paying Living Expenses: Not on file   Food Insecurity:     Worried About Running Out of Food in the Last Year: Not on file    Mango of Food in the Last Year: Not on file   Transportation Needs:     Lack of Transportation (Medical): Not on file    Lack of Transportation (Non-Medical): Not on file   Physical Activity:     Days of Exercise per Week: Not on file    Minutes of Exercise per Session: Not on file   Stress:     Feeling of Stress : Not on file   Social Connections:     Frequency of Communication with Friends and Family: Not on file    Frequency of Social Gatherings with Friends and Family: Not on file    Attends Oriental orthodox Services: Not on file    Active Member of [address] [place] [address] or Organizations: Not on file    Attends Club or Organization Meetings: Not on file    Marital Status: Not on file   Intimate Partner Violence:     Fear of Current or Ex-Partner: Not on file    Emotionally Abused: Not on file    Physically Abused: Not on file    Sexually Abused: Not on file   Housing Stability:     Unable to Pay for Housing in the Last Year: Not on file    Number of [place] in the Last Year: Not on file    Unstable Housing in the Last Year: Not on file        Review of Systems    Negative Unless BOLDED    General: fevers, chills, myalgias, arthralgias, unexplained weight loss, malaise, fatigue.   HEENT:  headaches,sinus pain or presure, recent URI, recent dental procedures;  tinnitus, hearing loss , visual changes, catarats, dizziness or blurred vision  PUlMONARY:  cough , shortness of breath, sputum production, hx of asthma or COPD. previous treatement for TB or PPD. Cardiovascular: chest pain, previous CAD/MI, vavlular heart disease,  murmurs  GI:   nausea, vomiting, diarrhea, abdominal pain, prior C.diff  :  urinary frequency, dysuria, hematuria, bladder incontinence. Neurologic:  seizures, syncope or prior CVA/TIA, confusion, memory impairment, neuropathy  Musculoskeletal:  myalgias arthralgias, joint pain/ swelling,  back pain  Skin:  Purities,  recurrent cellulitis,  chronic stasis ulcer, diabetic foot ulcers  Endocrine: polyuria, polydipsia, hair loss, weight gain  Psych: Denies depression or treatment by a psychiatrist/psycologist  Heme-Onc: prior DVT, easy bruising, fatigue, malignancy        Objective:        Visit Vitals  BP (!) 141/77 (BP 1 Location: Left upper arm, BP Patient Position: At rest)   Pulse 93   Temp 99.3 °F (37.4 °C)   Resp 20   Ht 5' 8\" (1.727 m)   Wt 69.4 kg (153 lb)   SpO2 94%   BMI 23.26 kg/m²     Temp (24hrs), Av.3 °F (37.4 °C), Min:98.7 °F (37.1 °C), Max:100.6 °F (38.1 °C)        General:   awake, slow to respond   Skin:   no rashes or skin lesions noted on limited exam   HEENT:  Normocephalic, atraumatic, PERRL, EOMI, no scleral icterus or pallor; no conjunctival hemmohage;  nasal and oral mucous are moist and without evidence of lesions. No thrush. Neck supple, no bruits. Lymph Nodes:   no cervical, axillary or inguinal adenopathy   Lungs: Tachypneic, coarse breath sounds throughout with rare wheezes, diminished at bases   Heart:  RRR, s1 and s2; no murmurs rubs or gallops, no edema, + pedal pulses   Abdomen:  soft, non-distended, active bowel sounds, no hepatomegaly, no splenomegaly. Non-tender   Genitourinary:  deferred   Extremities:   no clubbing, cyanosis; no joint effusions or swelling; Full ROM of all large joints to the upper and lower extremities; muscle mass appropriate for age   Neurologic:  No gross focal sensory abnormalities; moves independently, poor historian.     Psychiatric:   calm       Labs: Results: Chemistry Recent Labs     05/27/22  0346 05/26/22  1100   * 105*   * 131*   K 4.2 5.0    100   CO2 26 26   BUN 20* 18   CREA 0.74 0.86   CA 8.7 9.3   AGAP 7 5   BUCR 27* 21*   AP 72 102   TP 6.8 8.3*   ALB 2.3* 3.1*   GLOB 4.5* 5.2*   AGRAT 0.5* 0.6*      CBC w/Diff Recent Labs     05/27/22  0346 05/26/22  1100   WBC 7.8 10.8   RBC 4.62 5.40   HGB 14.0 16.5*   HCT 42.3 49.7*    UNABLE TO REPORT ACCURATE COUNT DUE TO PLATELET AGGREGATION, HOWEVER, PLATELETS APPEAR DECREASED IN NUMBER ON SMEAR. PLEASE RESUBMIT SODIUM CITRATE (BLUE) AND EDTA (LAVENDAR) TUBES FOR HEMATOLOGICAL TESTING. GRANS  --  80*   LYMPH  --  8*   EOS  --  0            No results found for: SDES Lab Results   Component Value Date/Time    Culture result: NO GROWTH AFTER 16 HOURS 05/26/2022 12:00 PM    Culture result: NO GROWTH AFTER 19 HOURS 05/26/2022 11:00 AM    Culture result: (A) 09/23/2020 08:55 PM     Staphylococcus capitis (Oxacillin resistant) GROWING IN THIS AEROBIC BOTTLE (SITE NOT INDICATED)    Culture result:  09/23/2020 08:55 PM     preliminary report of Gram Positive Cocci in clusters growing in 1 of 2 bottles drawn CALLED TO AND READ BACK BY Isidro Hubbard AT 1856 ON 09/25/2020.  TW    Culture result: ID AND SENSI PER  [facility] 9/26/20 09/23/2020 08:55 PM        Results     Procedure Component Value Units Date/Time    CULTURE, RESPIRATORY/SPUTUM/BRONCH Baird Must [[id number]]     Order Status: Sent Specimen: Sputum     RESPIRATORY VIRUS PANEL W/COVID-19, PCR [[id number]]  (Abnormal) Collected: 05/26/22 1309    Order Status: Completed Specimen: Nasopharyngeal Updated: 05/26/22 1452     Adenovirus Not detected        Coronavirus 229E Not detected        Coronavirus HKU1 Not detected        Coronavirus CVNL63 Not detected        Coronavirus OC43 Not detected        SARS-CoV-2, PCR Not detected        Metapneumovirus Not detected        Rhinovirus and Enterovirus Not detected        Influenza A Not detected Influenza A, subtype H1 Not detected        Influenza A, subtype H3 Not detected        INFLUENZA A H1N1 PCR Not detected        Influenza B Not detected        Parainfluenza 1 Not detected        Parainfluenza 2 Not detected        Parainfluenza 3 Detected        Parainfluenza virus 4 Not detected        RSV by PCR Not detected        B. parapertussis, PCR Not detected        Bordetella pertussis - PCR Not detected        Chlamydophila pneumoniae DNA, QL, PCR Not detected        Mycoplasma pneumoniae DNA, QL, PCR Not detected       CULTURE, URINE [[id number]] Collected: 05/26/22 1234    Order Status: Completed Specimen: Urine from Clean catch Updated: 05/26/22 2132    CULTURE, BLOOD [[id number]] Collected: 05/26/22 1200    Order Status: Completed Specimen: Blood Updated: 05/27/22 0641     Special Requests: NO SPECIAL REQUESTS        Culture result: NO GROWTH AFTER 16 HOURS       CULTURE, BLOOD [[id number]] Collected: 05/26/22 1100    Order Status: Completed Specimen: Blood Updated: 05/27/22 0641     Special Requests: NO SPECIAL REQUESTS        Culture result: NO GROWTH AFTER 19 HOURS               Imaging:      All imaging reviewed from Admission to present as per radiology interpretation in [address] [place] [address]

## 2022-05-27 NOTE — PROGRESS NOTES
ST Note:    ST eval completed; Rec STRICT NPO with consideration for alt means of nutrition/hydration vs comfort feeds. STRICT HOB > 60* at all times. Oral care via Yankauer suction x3+ daily. HIGH Risk for Aspiration. Kaylah Proctor You for this referral,  Gill Goodrich, [address] [facility]  Speech Language Pathologist

## 2022-05-27 NOTE — PROGRESS NOTES
Pt arrived to ICU m, started neb tx Q4 and CPT due to poor cough deon not clearing upper airway well,CXR clear with no pulmonary issues, 02 not keeping in nose but running from 92-96%, some snoring, somewhat tolerant of vest and tx but was willing to wear, he has some Mental incapacity. NO resp distress. HR 85, sats 98 on tx and RR20-22. Oral suction done back of throat for some thick clear secretions moderate.

## 2022-05-27 NOTE — PROGRESS NOTES
[facility]  Progress Note    Patient: Belle Mishra Age: 64 y.o. : 1960 MR#: [id number] SSN: [id number]  Date: 2022     Subjective:   Alert and oriented to self. H&P reports intellectual disability, lives in group home and baseline ambulates with no assistance, speaks 1-2 word sentences. Opens eyes, follows simple commands, and garbled speech. Assessment/Plan:   1. Parainfluenza 3 virus   2. Acute respiratory failure suspect aspiration. 3. Sepsis POA. Fever. PNA ruled out  4. Acute metabolic encephalopathy   5. transaminitis   6. Mild hyponatremia   7. Mild hematuria and few bacteria on urinalysis   8. History of PKU     Consults  ID    Plan to transfer to step down. High risk for aspiration and decompensation. Possibly need further respiratory management. ie bipap   1. ID following. Recommendations continue zosyn, follow cultures, swallow eval, consider US liver. US liver ordered   2. NPO until more alert and swallow evaluation. Speech consult. keppra and valproate IV due to NPO status. 3. RT for bronchial hygiene, chest physiotherapy. High risk for aspiration. duonebs scheduled. Oxygen as needed. 4. Monitor metabolic panel and renal function  5. Caregiver Chayo Michel [phone] is the designated . \"I spent 30 minutes F2F with the patient, over half in discussion of the diagnosis and the importance of compliance with the treatment plan. \"      Past Medical History:   Diagnosis Date    Anxiety disorder     Dermatitis     High cholesterol     Intellectual disability     [facility] [address]     Seizures ([facility] [address]     Urinary retention      Case discussed with:  [x]Patient  []Family  [x]Nursing  []Case Management  DVT Prophylaxis:  [x]Lovenox  []Hep SQ  []SCDs  []Coumadin   []On Heparin gtt    Objective:   VS:   Visit Vitals  BP (!) 141/77 (BP 1 Location: Left upper arm, BP Patient Position: At rest)   Pulse 93   Temp 99.3 °F (37.4 °C)   Resp 20   Ht 5' 8\" (1.727 m)   Wt 69.4 kg (153 lb)   SpO2 94%   BMI 23.26 kg/m²      Tmax/24hrs: Temp (24hrs), Av.3 °F (37.4 °C), Min:98.7 °F (37.1 °C), Max:100.6 °F (38.1 °C)      Intake/Output Summary (Last 24 hours) at 2022 1029  Last data filed at 2022 6230  Gross per 24 hour   Intake --   Output 400 ml   Net -400 ml       General:         Alert, cooperative, no acute distress    HEENT:           NC, Atraumatic. PERRLA, anicteric sclerae. Lungs:            coarse t/o, rhonchi and wheeze t/o, weak cough  Heart:              RRR, No murmur, No Rubs, No Gallops  Abdomen:      Soft, Non distended, Non tender. +Bowel sounds, no HSM  Extremities:   No c/c/e  Psych:              Good insight. Not anxious or agitated. Neurologic:     lethargic, garbled words, follow simple commands. Labs:    Recent Results (from the past 24 hour(s))   POC LACTIC ACID    Collection Time: 22 10:56 AM   Result Value Ref Range    Lactic Acid (POC) 1.43 0.40 - 2.00 mmol/L   CBC WITH AUTOMATED DIFF    Collection Time: 22 11:00 AM   Result Value Ref Range    WBC 10.8 4.6 - 13.2 K/uL    RBC 5.40 4.35 - 5.65 M/uL    HGB 16.5 (H) 13.0 - 16.0 g/dL    HCT 49.7 (H) 36.0 - 48.0 %    MCV 92.0 78.0 - 100.0 FL    MCH 30.6 24.0 - 34.0 PG    MCHC 33.2 31.0 - 37.0 g/dL    RDW 13.0 11.6 - 14.5 %    PLATELET  199 - 535 K/uL     UNABLE TO REPORT ACCURATE COUNT DUE TO PLATELET AGGREGATION, HOWEVER, PLATELETS APPEAR DECREASED IN NUMBER ON SMEAR. PLEASE RESUBMIT SODIUM CITRATE (BLUE) AND EDTA (LAVENDAR) TUBES FOR HEMATOLOGICAL TESTING. MPV 10.9 9.2 - 11.8 FL    NRBC 0.0 0  WBC    ABSOLUTE NRBC 0.00 0.00 - 0.01 K/uL    NEUTROPHILS 80 (H) 40 - 73 %    LYMPHOCYTES 8 (L) 21 - 52 %    MONOCYTES 12 (H) 3 - 10 %    EOSINOPHILS 0 0 - 5 %    BASOPHILS 0 0 - 2 %    IMMATURE GRANULOCYTES 0 0.0 - 0.5 %    ABS. NEUTROPHILS 8.6 (H) 1.8 - 8.0 K/UL    ABS. LYMPHOCYTES 0.9 0.9 - 3.6 K/UL    ABS.  MONOCYTES 1.3 (H) 0.05 - 1.2 K/UL    ABS. EOSINOPHILS 0.0 0.0 - 0.4 K/UL    ABS. BASOPHILS 0.0 0.0 - 0.1 K/UL    ABS. IMM. GRANS. 0.0 0.00 - 0.04 K/UL    DF SMEAR SCANNED      PLATELET COMMENTS DECREASED PLATELETS      RBC COMMENTS NORMOCYTIC, NORMOCHROMIC     METABOLIC PANEL, COMPREHENSIVE    Collection Time: 05/26/22 11:00 AM   Result Value Ref Range    Sodium 131 (L) 136 - 145 mmol/L    Potassium 5.0 3.5 - 5.5 mmol/L    Chloride 100 100 - 111 mmol/L    CO2 26 21 - 32 mmol/L    Anion gap 5 3.0 - 18 mmol/L    Glucose 105 (H) 74 - 99 mg/dL    BUN 18 7.0 - 18 MG/DL    Creatinine 0.86 0.6 - 1.3 MG/DL    BUN/Creatinine ratio 21 (H) 12 - 20      GFR est AA >60 >60 ml/min/1.73m2    GFR est non-AA >60 >60 ml/min/1.73m2    Calcium 9.3 8.5 - 10.1 MG/DL    Bilirubin, total 0.7 0.2 - 1.0 MG/DL    ALT (SGPT) 83 (H) 16 - 61 U/L    AST (SGOT) 243 (H) 10 - 38 U/L    Alk.  phosphatase 102 45 - 117 U/L    Protein, total 8.3 (H) 6.4 - 8.2 g/dL    Albumin 3.1 (L) 3.4 - 5.0 g/dL    Globulin 5.2 (H) 2.0 - 4.0 g/dL    A-G Ratio 0.6 (L) 0.8 - 1.7     MAGNESIUM    Collection Time: 05/26/22 11:00 AM   Result Value Ref Range    Magnesium 2.2 1.6 - 2.6 mg/dL   PHOSPHORUS    Collection Time: 05/26/22 11:00 AM   Result Value Ref Range    Phosphorus 3.6 2.5 - 4.9 MG/DL   CULTURE, BLOOD    Collection Time: 05/26/22 11:00 AM    Specimen: Blood   Result Value Ref Range    Special Requests: NO SPECIAL REQUESTS      Culture result: NO GROWTH AFTER 19 HOURS     CULTURE, BLOOD    Collection Time: 05/26/22 12:00 PM    Specimen: Blood   Result Value Ref Range    Special Requests: NO SPECIAL REQUESTS      Culture result: NO GROWTH AFTER 16 HOURS     EKG, 12 LEAD, INITIAL    Collection Time: 05/26/22 12:11 PM   Result Value Ref Range    Ventricular Rate 93 BPM    Atrial Rate 93 BPM    P-R Interval 162 ms    QRS Duration 92 ms    Q-T Interval 318 ms    QTC Calculation (Bezet) 395 ms    Calculated P Axis 67 degrees    Calculated R Axis 20 degrees    Calculated T Axis 54 degrees Diagnosis       Normal sinus rhythm  Normal ECG  No previous ECGs available  Confirmed by Nikos Davalos M.D., [address] [facility] [id number]) on 5/26/2022 10:19:31 PM     URINALYSIS W/ RFLX MICROSCOPIC    Collection Time: 05/26/22 12:34 PM   Result Value Ref Range    Color YELLOW      Appearance CLEAR      Specific gravity 1.026 1.005 - 1.030      pH (UA) 6.5 5.0 - 8.0      Protein 30 (A) NEG mg/dL    Glucose Negative NEG mg/dL    Ketone TRACE (A) NEG mg/dL    Bilirubin Negative NEG      Blood MODERATE (A) NEG      Urobilinogen 1.0 0.2 - 1.0 EU/dL    Nitrites Negative NEG      Leukocyte Esterase Negative NEG     URINE MICROSCOPIC ONLY    Collection Time: 05/26/22 12:34 PM   Result Value Ref Range    WBC 4 to 6 0 - 4 /hpf    RBC 8 to 10 0 - 5 /hpf    Epithelial cells FEW 0 - 5 /lpf    Bacteria FEW (A) NEG /hpf    Mucus FEW (A) NEG /lpf   RESPIRATORY VIRUS PANEL W/COVID-19, PCR    Collection Time: 05/26/22  1:09 PM    Specimen: Nasopharyngeal   Result Value Ref Range    Adenovirus Not detected NOTD      Coronavirus 229E Not detected NOTD      Coronavirus HKU1 Not detected NOTD      Coronavirus CVNL63 Not detected NOTD      Coronavirus OC43 Not detected NOTD      SARS-CoV-2, PCR Not detected NOTD      Metapneumovirus Not detected NOTD      Rhinovirus and Enterovirus Not detected NOTD      Influenza A Not detected NOTD      Influenza A, subtype H1 Not detected NOTD      Influenza A, subtype H3 Not detected NOTD      INFLUENZA A H1N1 PCR Not detected NOTD      Influenza B Not detected NOTD      Parainfluenza 1 Not detected NOTD      Parainfluenza 2 Not detected NOTD      Parainfluenza 3 Detected (A) NOTD      Parainfluenza virus 4 Not detected NOTD      RSV by PCR Not detected NOTD      B. parapertussis, PCR Not detected NOTD      Bordetella pertussis - PCR Not detected NOTD      Chlamydophila pneumoniae DNA, QL, PCR Not detected NOTD      Mycoplasma pneumoniae DNA, QL, PCR Not detected NOTD     METABOLIC PANEL, COMPREHENSIVE    Collection Time: 05/27/22  3:46 AM   Result Value Ref Range    Sodium 135 (L) 136 - 145 mmol/L    Potassium 4.2 3.5 - 5.5 mmol/L    Chloride 102 100 - 111 mmol/L    CO2 26 21 - 32 mmol/L    Anion gap 7 3.0 - 18 mmol/L    Glucose 101 (H) 74 - 99 mg/dL    BUN 20 (H) 7.0 - 18 MG/DL    Creatinine 0.74 0.6 - 1.3 MG/DL    BUN/Creatinine ratio 27 (H) 12 - 20      GFR est AA >60 >60 ml/min/1.73m2    GFR est non-AA >60 >60 ml/min/1.73m2    Calcium 8.7 8.5 - 10.1 MG/DL    Bilirubin, total 0.7 0.2 - 1.0 MG/DL    ALT (SGPT) 76 (H) 16 - 61 U/L    AST (SGOT) 207 (H) 10 - 38 U/L    Alk.  phosphatase 72 45 - 117 U/L    Protein, total 6.8 6.4 - 8.2 g/dL    Albumin 2.3 (L) 3.4 - 5.0 g/dL    Globulin 4.5 (H) 2.0 - 4.0 g/dL    A-G Ratio 0.5 (L) 0.8 - 1.7     CBC W/O DIFF    Collection Time: 05/27/22  3:46 AM   Result Value Ref Range    WBC 7.8 4.6 - 13.2 K/uL    RBC 4.62 4.35 - 5.65 M/uL    HGB 14.0 13.0 - 16.0 g/dL    HCT 42.3 36.0 - 48.0 %    MCV 91.6 78.0 - 100.0 FL    MCH 30.3 24.0 - 34.0 PG    MCHC 33.1 31.0 - 37.0 g/dL    RDW 13.2 11.6 - 14.5 %    PLATELET 186 548 - 615 K/uL    MPV 9.9 9.2 - 11.8 FL    NRBC 0.0 0  WBC    ABSOLUTE NRBC 0.00 0.00 - 0.01 K/uL       Signed By: Sarai Aguilar NP     May 27, 2022

## 2022-05-27 NOTE — PROGRESS NOTES
RT assessment and discussion with Wallula Certain NP in ICU bed 311, pt is not able to comply with Metaneb, IS or pep therapy, we are however continuing Q4 tx and CPT via vest 3 x per Day, NT suction and back of throat as needed to clear airways. Will also keep bed upright for better air movement at 30degree angle to prevent aspiration. No resp distress.

## 2022-05-27 NOTE — PROGRESS NOTES
Physician Progress Note      PATIENTIsashaun Bullock  CSN #:                  [id number]  :                       1960  ADMIT DATE:       2022 10:35 AM  DISCH DATE:  RESPONDING  PROVIDER #:        Brando CORTEZ NP          QUERY TEXT:    Dear Dr. Gus Trinidad and Umer Morales NP,    Patient admitted with sepsis. Noted documentation of acute respiratory failure in progress note on 2022. In order to support the diagnosis of acute respiratory failure, please include additional clinical indicators in your documentation. Or please document if the diagnosis of acute respiratory failure has been ruled out after further study. The medical record reflects the following:  Risk Factors: sepsis, parainfluenza 3 virus, suspect aspiration, acute metabolic encephalopathy    Clinical Indicators:  per Umer Morales, NP, PN, 2022  \"1. Parainfluenza 3 virus  2. Acute respiratory failure suspect aspiration  3. Sepsis POA. PNA ruled out\"  and \"H&P reports intellectual disability, lives in group home and baseline ambulates with no assistance, speaks 1-2 word sentences. Opens eyes, follows simple commands, and garbled speech\"  and \"lethargic, garbled words, follow simple commands. \"  \"Alert, cooperative, no acute distress\"  and \"Lungs:  coarse t/o, rhonchi and wheeze t/o, weak cough\"  per Rachell Rolle MA CCC-SLP, PN, 2022:  \"HIGH Risk for Aspiration. \"  per Dr. Ayana Mcguire, consult note, 2022:  \"Acute respiratory failure,  Infection with parainfluenza 3 virus: no pneumonia on CXR,  Acute metabolic encephalopathy\"  oxygen saturation:  91 - 100% on room air since admission  respiratory rate:  >20 breaths/min since admission    Treatment: on room air, no supplemental oxygen needs since admission,  maintain HOB >60 degrees at all times, ID following.  Recommendations continue zosyn, follow cultures, NPO until more alert    Acute Respiratory Failure Clinical Indicators per  MS-DRG Training Guide and Quick Reference Guide:  pO2 < 60 mmHg or SpO2 (pulse oximetry) < 91% breathing room air  pCO2 > 50 and pH < 7.35  P/F ratio (pO2 / FIO2) < 300  pO2 decrease or pCO2 increase by 10 mmHg from baseline (if known)  Supplemental oxygen of 40% or more  Presence of respiratory distress, tachypnea, dyspnea, shortness of breath, wheezing  Unable to speak in complete sentences  Use of accessory muscles to breathe  Extreme anxiety and feeling of impending doom  Tripod position  Confusion/altered mental status/obtunded    Thank you,  PETRA Jung RN, CDS  [email or web address]  Options provided:  -- Acute Respiratory Failure as evidenced by, Please document evidence. -- Acute Respiratory Failure ruled out after study  -- Other - I will add my own diagnosis  -- Disagree - Not applicable / Not valid  -- Disagree - Clinically unable to determine / Unknown  -- Refer to Clinical Documentation Reviewer    PROVIDER RESPONSE TEXT:    This patient is in acute respiratory failure as evidenced by SOB, coarse lung sounds, rhonchi and wheeze, altered mental staus change    Query created by:  Ashley Jeff on 5/27/2022 11:20 AM      Electronically signed by:  Nerissa Brown NP 5/27/2022 11:25 AM

## 2022-05-28 NOTE — ROUTINE PROCESS
Rec'd report from offgoing RN, Leland Gloria. Pt awake, in bed, with some difficulty noted to pts respiratory status. Has limited abilityto communicate, mostly words/noises. Suctioned pt orally with 14 F. Secretions are thick and copius. Yellow white and pink tinged. [id number]   Pt will focus/ moves ext's with generalized weakness. Resp's appears to be labored most of time. Continuing deep suctioning- improves pts sats from  Mid 80's to 92-93 range. Pt incontinent of large soft stool. Tolerated moving/turning fairly. [id number]      Pt becoming increasingly sleepy/and diffuclt to arouse. Resp's appear labored/shallow. NP, Margie, updated on pt status. --rec'd new orders including intubation. Pt has since beginning of shift one IV that is accessible. Multiple attempts unsuccessful/pt veins small and blow frequently, NP aware- may need midline insertion available on dayshift. Will continue to monitor. 0430     Chest x ray completed x 2/ ETT placement and OGT placement confirmed. Secretions remainthick /clear /pink tinged from ETT and orally. ABG's completed. VSS/resting.

## 2022-05-28 NOTE — ANESTHESIA PROCEDURE NOTES
Emergent Intubation  Performed by: Zenia Ford CRNA  Authorized by: Zenia Ford CRNA     Emergent Intubation:   Location:  ICU  Date/Time:  5/28/2022 2:00 AM  Indications:  Impending respiratory failure  Spontaneous Ventilation: present    Level of Consciousness: unresponsive  Preoxygenated: Yes      Airway Documentation:   Airway:  ETT - Cuffed  Technique:  Direct laryngoscopy  Insertion Site:  Oral  Blade Type:  Willard  Blade Size:  2  ETT Line Ridge:  Lips  ETT Insertion depth (cm):  24  Placement verified by: auscultation, EtCO2 and BBS    Attempts:  1  Difficult airway: No    Called to ICU for emergent intubation for altered level of consciousness and impending respiratory failure. 100mg propofol and 100mg sux given IV. DVL x1 with gr1 view. Easy intubation. Secured by RT. SBP upper 70's post intubation. Total 1000mcg Neosynephrine given by RN in incremental doses. SBP steady at low 100's. Pt stable.

## 2022-05-28 NOTE — PROGRESS NOTES
PCCM Update:    Pt noted to have several episodes of desaturation throughout the night with escalating O2 requirements. ~01:40 - Pt noted to have worsening encehalopathy, unresponsive to deep sternal rub with O2 sats noted in the 80s. RT and Anesthesia paged - pt subsequently intubated via RSI. Darling-intubation HoTN noted requiring 10 cc's of darwin. Albumin bolus administered. Dr. Mickey Sevilla notified. Patient's brother Jere ) notified. Post intubation CXR and ABG pending.     Murray Anderson PA-C  05/28/22  Pulmonary, Critical Care Medicine  [facility]

## 2022-05-28 NOTE — PROGRESS NOTES
[facility]. Pulmonary, Critical Care, and Sleep Medicine    Name: Sho Pinon MRN: [id number]   : 1960 Hospital: [address] [place] [address] Dr   Date: 2022  Admission Date: 2022     Chart and notes reviewed. Data reviewed. I have evaluated all findings. [x]I have reviewed the flowsheet and previous days notes. [x]The patient is unable to give any meaningful history or review of systems because the patient is:  [x]Intubated [x]Sedated   []Unresponsive      [x]The patient is critically ill on      [x]Mechanical ventilation []Pressors   []BiPAP []         Interval HPI:  61/M with acute hypoxic respiratory failure and acute on chronic toxic-metabolic encephalopathy superimposed on intellectual disability. Sepsis possibly UTI +/- aspiration pneumonia in light of AMS and inability to protect airway. Subjective 22  Hospital Day: admitted   Vent Day:  Overnight events:worsening respiratory distress, intubated and mechanically ventilated  Mentation/Activity: altered  Respiratory/ Secretions: minimal  Hemodynamics: borderline hypotension improved  Urine output, bowel: see below  Diet: NPO  Need for procedures:              ROS:Review of systems not obtained due to patient factors. Events and notes from last 24 hours reviewed. 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   Last 3 shifts:  1901 -  0700  In: 1090 [I.V.:1090]  Out: 1126 [Urine:1125]    Intake/Output Summary (Last 24 hours) at 5/28/2022 1015  Last data filed at 5/28/2022 0600  Gross per 24 hour   Intake 1090 ml   Output 726 ml   Net 364 ml          Current Facility-Administered Medications   Medication Dose Route Frequency    chlorhexidine (PERIDEX) 0.12 % mouthwash 10 mL  10 mL Oral Q12H    fentaNYL (PF) 1,500 mcg/30 mL (50 mcg/mL) infusion  0-200 mcg/hr IntraVENous TITRATE    sodium chloride 3% hypertonic nebulizer soln  4 mL Nebulization Q4H    And    albuterol CONCENTRATE 2.5mg/0.5 mL neb soln  2.5 mg Nebulization Q4H RT    valproate (DEPACON) 250 mg in 0.9% sodium chloride 50 mL IVPB  250 mg IntraVENous Q6H    clotrimazole-betamethasone (LOTRISONE) 1-0.05 % cream   Topical BID    famotidine (PF) (PEPCID) 20 mg in 0.9% sodium chloride 10 mL injection  20 mg IntraVENous DAILY    [Held by provider] benztropine (COGENTIN) tablet 1 mg  1 mg Oral TID    [Held by provider] cetirizine (ZYRTEC) tablet 10 mg  10 mg Oral DAILY    [Held by provider] divalproex DR (DEPAKOTE) tablet 500 mg  500 mg Oral BID    [Held by provider] finasteride (PROSCAR) tablet 5 mg  5 mg Oral DAILY    [Held by provider] levETIRAcetam (KEPPRA) tablet 500 mg  500 mg Oral BID    [Held by provider] LORazepam (ATIVAN) tablet 0.5 mg  0.5 mg Oral BID    [Held by provider] trospium (SANCTURA) tablet 20 mg  20 mg Oral DAILY    [Held by provider] OLANZapine (ZyPREXA) tablet 10 mg  10 mg Oral TID    [Held by provider] pravastatin (PRAVACHOL) tablet 20 mg  20 mg Oral QHS    [Held by provider] tamsulosin (FLOMAX) capsule 0.4 mg  0.4 mg Oral DAILY    [Held by provider] sodium chloride (NS) flush 5-40 mL  5-40 mL IntraVENous Q8H    enoxaparin (LOVENOX) injection 40 mg  40 mg SubCUTAneous DAILY    piperacillin-tazobactam (ZOSYN) 3.375 g in 0.9% sodium chloride (MBP/ADV) 100 mL MBP  3.375 g IntraVENous Q8H    levETIRAcetam (KEPPRA) 500 mg in 0.9% sodium chloride (MBP/ADV) 100 mL MBP  500 mg IntraVENous Q12H         Telemetry: []Sinus []A-flutter []Paced    []A-fib []Multiple PVCs                  Physical Exam:      General: Intubated/sedated; HEENT:  Anicteric sclerae; pink palpebral conjunctivae; mucosa moist  Resp:  Symmetrical chest expansion, no accessory muscle use; good airway entry; no rales/ wheezing/ rhonchi noted  CV:  S1, S2 present; regular rate and rhythm  GI:  Abdomen soft, non-tender; (+) active bowel sounds  Extremities:  +2 pulses on all extremities; no edema/ cyanosis/ clubbing noted   Skin:  Warm; no rashes/ lesions noted, normal turgor/cap refill   Neurologic:  sedated  Devices:  NGT, ETT       DATA:  MAR reviewed and pertinent medications noted or modified as needed    Labs:  Recent Labs     05/28/22  0108 05/27/22  1601 05/27/22  0346   WBC 6.7 5.5 7.8   HGB 13.9 14.2 14.0   HCT 43.5 42.5 42.3    151 163     Recent Labs     05/28/22  0108 05/27/22  1601 05/27/22  0346 05/26/22  1100 05/26/22  1100    136 135*   < > 131*   K 4.3 4.1 4.2   < > 5.0    102 102   < > 100   CO2 28 27 26   < > 26   * 96 101*   < > 105*   BUN 24* 21* 20*   < > 18   CREA 0.70 0.78 0.74   < > 0.86   CA 8.4* 8.7 8.7   < > 9.3   MG 2.3 2.2  --   --  2.2   PHOS 5.0* 2.9  --   --  3.6   ALB 2.3* 2.3* 2.3*   < > 3.1*   ALT 85* 83* 76*   < > 83*    < > = values in this interval not displayed. No results for input(s): PH, PCO2, PO2, HCO3, FIO2 in the last 72 hours.   Recent Labs     05/28/22  0247 05/27/22  1245   FIO2I 100 2   HCO3I 26.8* 25.5   PCO2I 48.5* 36.1   PHI 7.35 7.46*   PO2I 350* 85       Imaging:  [x]   I have personally reviewed the patients radiographs and reports  XR Results (most recent):  XR Results (most recent):  Results from Hospital Encounter encounter on 05/26/22    XR CHEST PORT    Narrative  CHEST PORTABLE    INDICATIONS: Intubated and OG    COMPARISON: Recent prior    FINDINGS:    Support lines/catheters: Tip of the endotracheal tube is in good position  approximately 3 to 4 cm above stew. Tip of the enteric tube overlies the  stomach with the side-port at the level of the gastroesophageal junction. Lungs: Hypoinflated with hazy left lung opacity, predominantly central  distribution sparing the periphery. Mild right basilar opacity. Artifactual  curvilinear lucent line overlies the chest.    Cardiac silhouette and mediastinal contours: Normal.    Pleural spaces: No pneumothorax or pleural effusion. Bones and soft tissues: Unremarkable. Impression  Endotracheal tube is in satisfactory position. Enteric tube side-port is at the gastroesophageal junction. Erwin Barros left greater than right lung opacities could represent mild edema. CT Results (most recent):  Results from Hospital Encounter encounter on 05/26/22    CT HEAD WO CONT    Narrative  CT OF THE HEAD WITHOUT CONTRAST. CLINICAL HISTORY: Altered mental status. TECHNIQUE: Helical scan obtained of the head were obtained from the skull vertex  through the base of the skull without intravenous contrast.    All CT scans are  performed using dose optimization techniques as appropriate to the performed  exam including the following: Automated exposure control, adjustment of mA  and/or kV according to patient size, and use of iterative reconstructive  technique. COMPARISON: 9/22/2020. FINDINGS:    The sulcal pattern and ventricular system are normal in size and configuration  for age. Mild periventricular white matter hypodensities. No intracranial  hemorrhage. No mass effect. The visualized paranasal sinuses are clear. The  mastoid air cells are clear. The visualized bony structures are unremarkable. Impression  No acute findings. Stable mild features of chronic microvascular disease.             IMPRESSION:   · Acute toxic metabolic encephalopathy superimposed on intellectual disability  · Acute hypoxic respiratory failure possibly atypical pneumonia related to parainfluenza infection +/- aspiration pneumonia  · Sepsis with LG fever, AMS, elevated Procacitonin  · Transaminitis improving possibly shock liver vs medication side effects  · Seizure disorder  · Intellectual disability and history of PKU  · Hypercholesterolemia   · Anxiety disorder on home Ativan  · Urinary retension        Patient Active Problem List   Diagnosis Code    UTI (urinary tract infection) N39.0    Sepsis ([facility] [address] A41.9    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 Fentanyl. Resume seizure meds via OGT  Pulm: Aspiration precautions, HOB>30'. VAP Bundle, [employer]DS[employer] vent guidelines. Discontinue Vest and Metaneb, ETT care   CVS:Monitor HD, MAP goal >65. GI: start tube feeds, see orders. Hold statin and monitor liver function  Renal: Trend Cr, UOP. Singleton for urinary retention  Hem/Onc: Trend H/H, monitor for s/o active bleeding. Daily CBC. I/D:Trend WBCs and temperature curve. Continue Pip-Tazo pending cultures   Metabolic: Daily BMP, mag, phos. Trend lytes and replace per protocol. Endocrine:Q6 glucoses. SSI. Avoid hypoglycemia. Musc/Skin: turn Q 2 hours  Full Code  Discussed with nursing and RT     Best practice :    Glycemic control  I[facility] ICU bundles: Singleton Bundle Followed and Vent Bundle Followed, Vent Day 1    [facility] Vent patients-    VAP bundle-Andrew tube to suction at 20-30 cm Hg, Maintain Andrew tube with 5-10ml air every 4 hours, Routine oral care every 4 hours, Elevation of head > 45 degree, Daily sedation holiday and SBT evaluation starting at 6.00am. and ARDS network Guidelines: Lung protective strategy and Plateau  Pressure goal < 30 cm H2O goals, Oxygenation Goals PaO2 55-80 mm Hg or SaO2 88-95%, PH goal 7.30-7.45  Stress ulcer prophylaxis. Pepcid  DVT prophylaxis. Lovenox  Need for Lines, singleton assessed. Palliative care evaluation. Restraints need.   Attending Non-violent Restraint Reevaluation     I have reevaluated the patient one hour after initiation of intervention. The patient is comfortable, uninjured, but continues to pose an imminent risk of injury to self to themselves and/or serious disruption of medical treatment required to keep patient stable. The patient's current medical and behavioral conditions that warrant the use intervention include danger to self and Interference with medical equipment or treatment. Restraint or seclusion will be discontinued at the earliest possible time, regardless of the scheduled expiration of the order. Based on my evaluation, restraints will be continued: NO       Critical care time 44 minutes excluding procedures. This care involved high complexity decision making to assess, manipulate, and support vital system functions, to treat this degreee vital organ system failure and to prevent further life threatening deterioration of the patients condition  The services I provided to this patient were to treat and/or prevent clinically significant deterioration that could result in the failure of one or more body systems and/or organ systems due to respiratory distress, hypoxia, cardiac dysrhythmia.        Marychuy Suggs MD   05/28/22  Pulmonary, Critical Care Medicine  [facility]

## 2022-05-28 NOTE — PROGRESS NOTES
[facility]  (A Division of [address] [place] [address])    Follow-up Note      Date of Admission: 5/26/2022       Date of Note:  5/28/2022          Current Antimicrobials:    Prior Antimicrobials:  Zosyn 5/26 to present      Assessment:         Acute respiratory failure: Intubated on 5/28  Infection with parainfluenza 3 virus: no pneumonia on CXR upon admission. There are hazy opacities noted on post intubation x-ray on 5/28. With possible congestion noted on 5/27 x-rays.   -Lactic acid 1.4, procalcitonin 9.26  Acute metabolic encephalopathy  Transaminitis: Persistent  Mild hyponatremia: Resolved  PKU  Mild hematuria       Plan:   Continue with Zosyn for suspected aspiration. CBC, BMP  Vent support as per ICU team with frequent suctioning  Continue with droplet precautions for parainfluenza      Isela Wade, [facility]  [address], [address] [facility] [place] [address]  Office: [phone]  Mobile/Text: [phone]     Microbiology:  5/26 blood cultures: No growth to date  5/26 urine culture no course to date  5/26 respiratory viral culture:  positive for parainfluenza 3 negative for all other pathogens    Lines / Catheters:  Peripheral  AT 5/28    Subjective:   Patient seen and examined at bedside in ICU this AM.  Interval history has been reviewed. Since last being seen by me he has been transferred from the telemetry floor into the ICU for continuing to have a deterioration in mental status. Speech therapy had evaluated the patient recommended n.p.o. with strict aspiration precautions. He was ultimately intubated earlier this morning at 1:40 AM due to worsening encephalopathy and hypoxia. He is remained afebrile. Blood pressures yesterday were in the 150s to 592 range systolic however briefly this morning blood pressures are now in the low 434F systolic. WBCs remain at 6.7.     Objective:        Visit Vitals  BP (!) 109/56   Pulse 75 Temp 97.8 °F (36.6 °C)   Resp 18   Ht 5' 8\" (1.727 m)   Wt 66.4 kg (146 lb 6.2 oz)   SpO2 98%   BMI 22.26 kg/m²     Temp (24hrs), Av.5 °F (36.9 °C), Min:97.8 °F (36.6 °C), Max:99.1 °F (37.3 °C)        General:   Intubated and sedated   Skin:   no rashes or skin lesions noted on limited exam, dry and warm   HEENT:  No scleral icterus or pallor; oral mucosa moist, lips moist   Lymph Nodes:   not assessed today   Lungs:   + ET tube, coarse breath sounds throughout, occasional wheeze   Heart:  RRR, s1 and s2; no murmurs rubs or gallops; no edema, + pedal pulses   Abdomen:  soft, non-distended, active bowel sounds, non-tender   Genitourinary:  deferred   Extremities:   average muscle tone; no contractures, no joint effusions   Neurologic:   Sedated, withdraws to pain, intermittently moves extremities independently   Psychiatric:  , On sedation         Lab results:    Chemistry  Recent Labs     22  01022  1601 22  0346   * 96 101*    136 135*   K 4.3 4.1 4.2    102 102   CO2 28 27 26   BUN 24* 21* 20*   CREA 0.70 0.78 0.74   CA 8.4* 8.7 8.7   AGAP 6 7 7   BUCR 34* 27* 27*   AP 72 73 72   TP 7.1 7.3 6.8   ALB 2.3* 2.3* 2.3*   GLOB 4.8* 5.0* 4.5*   AGRAT 0.5* 0.5* 0.5*       CBC w/ Diff  Recent Labs     22  0108 22  1601 22  0346 22  1100 22  1100   WBC 6.7 5.5 7.8   < > 10.8   RBC 4.56 4.64 4.62   < > 5.40   HGB 13.9 14.2 14.0   < > 16.5*   HCT 43.5 42.5 42.3   < > 49.7*    151 163   < > UNABLE TO REPORT ACCURATE COUNT DUE TO PLATELET AGGREGATION, HOWEVER, PLATELETS APPEAR DECREASED IN NUMBER ON SMEAR. PLEASE RESUBMIT SODIUM CITRATE (BLUE) AND EDTA (LAVENDAR) TUBES FOR HEMATOLOGICAL TESTING. GRANS 64 67  --   --  80*   LYMPH 6* 13*  --   --  8*   EOS 0 0  --   --  0    < > = values in this interval not displayed.        Microbiology  All Micro Results     Procedure Component Value Units Date/Time    CULTURE, RESPIRATORY/SPUTUM/BRONCH W Kristin Hussein STAIN [[id number]] Collected: 05/28/22 0250    Order Status: Completed Specimen: Sputum Updated: 05/28/22 0620    CULTURE, BLOOD [[id number]] Collected: 05/26/22 1100    Order Status: Completed Specimen: Blood Updated: 05/28/22 0606     Special Requests: NO SPECIAL REQUESTS        Culture result: NO GROWTH 2 DAYS       CULTURE, BLOOD [[id number]] Collected: 05/26/22 1200    Order Status: Completed Specimen: Blood Updated: 05/28/22 0606     Special Requests: NO SPECIAL REQUESTS        Culture result: NO GROWTH 2 DAYS       CULTURE, URINE [[id number]] Collected: 05/26/22 1234    Order Status: Completed Specimen: Urine from Clean catch Updated: 05/27/22 1513    RESPIRATORY VIRUS PANEL W/COVID-19, PCR [[id number]]  (Abnormal) Collected: 05/26/22 1309    Order Status: Completed Specimen: Nasopharyngeal Updated: 05/26/22 1452     Adenovirus Not detected        Coronavirus 229E Not detected        Coronavirus HKU1 Not detected        Coronavirus CVNL63 Not detected        Coronavirus OC43 Not detected        SARS-CoV-2, PCR Not detected        Metapneumovirus Not detected        Rhinovirus and Enterovirus Not detected        Influenza A Not detected        Influenza A, subtype H1 Not detected        Influenza A, subtype H3 Not detected        INFLUENZA A H1N1 PCR Not detected        Influenza B Not detected        Parainfluenza 1 Not detected        Parainfluenza 2 Not detected        Parainfluenza 3 Detected        Parainfluenza virus 4 Not detected        RSV by PCR Not detected        B. parapertussis, PCR Not detected        Bordetella pertussis - PCR Not detected        Chlamydophila pneumoniae DNA, QL, PCR Not detected        Mycoplasma pneumoniae DNA, QL, PCR Not detected              Alla Davila DO   5/28/2022   10:34 AM

## 2022-05-29 NOTE — PROGRESS NOTES
SBT initiated, PS 7, PEEP +5, FiO2 30%. Pt wakes and is able to follow some commands. 05/29/22 1018   Weaning Parameters   Spontaneous Breathing Trial Complete Yes   Resp Rate Observed 9   Ve 5.9      RSBI 13     Tolerating. Will monitor for signs of apnea or distress.

## 2022-05-29 NOTE — PROGRESS NOTES
Problem: Risk for Spread of Infection  Goal: Prevent transmission of infectious organism to others  Description: Prevent the transmission of infectious organisms to other patients, staff members, and visitors. Outcome: Progressing Towards Goal     Problem: Patient Education:  Go to Education Activity  Goal: Patient/Family Education  Outcome: Progressing Towards Goal     Problem: Pressure Injury - Risk of  Goal: *Prevention of pressure injury  Description: Document Otoniel Scale and appropriate interventions in the flowsheet. Outcome: Progressing Towards Goal  Note: Pressure Injury Interventions:  Sensory Interventions: Assess changes in LOC,Assess need for specialty bed,Avoid rigorous massage over bony prominences,Turn and reposition approx. every two hours (pillows and wedges if needed),Monitor skin under medical devices,Minimize linen layers,Keep linens dry and wrinkle-free,Pressure redistribution bed/mattress (bed type)    Moisture Interventions: Absorbent underpads,Apply protective barrier, creams and emollients,Assess need for specialty bed,Maintain skin hydration (lotion/cream),Moisture barrier,Minimize layers    Activity Interventions: Assess need for specialty bed,Pressure redistribution bed/mattress(bed type)    Mobility Interventions: Assess need for specialty bed,Pressure redistribution bed/mattress (bed type),Turn and reposition approx.  every two hours(pillow and wedges)    Nutrition Interventions: Document food/fluid/supplement intake,Discuss nutritional consult with provider    Friction and Shear Interventions: Apply protective barrier, creams and emollients,Foam dressings/transparent film/skin sealants,Transferring/repositioning devices                Problem: Patient Education: Go to Patient Education Activity  Goal: Patient/Family Education  Outcome: Progressing Towards Goal     Problem: Patient Education: Go to Patient Education Activity  Goal: Patient/Family Education  Outcome: Progressing Towards Goal     Problem: Nutrition Deficit  Goal: *Optimize nutritional status  Outcome: Progressing Towards Goal     Problem: Falls - Risk of  Goal: *Absence of Falls  Description: Document Diane Lazaro Fall Risk and appropriate interventions in the flowsheet.   Outcome: Progressing Towards Goal  Note: Fall Risk Interventions:  Mobility Interventions: Assess mobility with egress test,Bed/chair exit alarm    Mentation Interventions: Adequate sleep, hydration, pain control,Evaluate medications/consider consulting pharmacy    Medication Interventions: Bed/chair exit alarm,Assess postural VS orthostatic hypotension,Evaluate medications/consider consulting pharmacy    Elimination Interventions: Bed/chair exit alarm,Call light in reach    History of Falls Interventions: Bed/chair exit alarm,Consult care management for discharge planning,Evaluate medications/consider consulting pharmacy,Vital signs minimum Q4HRs X 24 hrs (comment for end date),Assess for delayed presentation/identification of injury for 48 hrs (comment for end date),Room close to nurse's station         Problem: Ventilator Management  Goal: *Adequate oxygenation and ventilation  Outcome: Progressing Towards Goal  Goal: *Patient maintains clear airway/free of aspiration  Outcome: Progressing Towards Goal  Goal: *Absence of infection signs and symptoms  Outcome: Progressing Towards Goal     Problem: Non-Violent Restraints  Goal: Removal from restraints as soon as assessed to be safe  Outcome: Progressing Towards Goal  Goal: No harm/injury to patient while restraints in use  Outcome: Progressing Towards Goal  Goal: Patient's dignity will be maintained  Outcome: Progressing Towards Goal  Goal: Patient Interventions  Outcome: Progressing Towards Goal

## 2022-05-29 NOTE — PROGRESS NOTES
[facility]  (A Division of [address] [place] [address])    Follow-up Note      Date of Admission: 2022       Date of Note:  2022          Current Antimicrobials:    Prior Antimicrobials:  Zosyn  to present      Assessment:         Acute respiratory failure: Intubated on   Infection with parainfluenza 3 virus: no pneumonia on CXR upon admission. There are hazy opacities noted on post intubation x-ray on . With possible congestion noted on  x-rays.   -Lactic acid 1.4, procalcitonin 6.47  Acute metabolic encephalopathy  Transaminitis: Persistent  Mild hyponatremia: Resolved  PKU  Mild hematuria       Plan:   Continue with Zosyn for suspected aspiration. CBC, BMP  Vent support as per ICU team with frequent suctioning  Continue with droplet precautions for parainfluenza      DO Alexandre Sarkar  [facility]  [address], [address] [facility] [address]  Office: [phone]  Mobile/Text: [phone]     Microbiology:   blood cultures: No growth to date   urine culture no course to date   respiratory viral culture:  positive for parainfluenza 3 negative for all other pathogens    Lines / Catheters:  Peripheral  ET   Right IJ      Subjective:   Patient seen and examined at bedside in ICU this AM.  Interval history has been reviewed. Remains intubated and sedated. No overnight fevers.   WBCs 5.4    Objective:        Visit Vitals  /74   Pulse 75   Temp 98.7 °F (37.1 °C)   Resp 14   Ht 5' 8\" (1.727 m)   Wt 67.7 kg (149 lb 4 oz)   SpO2 96%   BMI 22.69 kg/m²     Temp (24hrs), Av.6 °F (37 °C), Min:97 °F (36.1 °C), Max:99.2 °F (37.3 °C)        General:   Intubated and sedated   Skin:   no rashes or skin lesions noted on limited exam, dry and warm   HEENT:  No scleral icterus or pallor; oral mucosa moist, lips moist   Lymph Nodes:   not assessed today   Lungs:   + ET tube, coarse breath sounds throughout, occasional wheeze   Heart:  RRR, s1 and s2; no murmurs rubs or gallops; no edema, + pedal pulses   Abdomen:  soft, non-distended, active bowel sounds, non-tender   Genitourinary:  deferred   Extremities:   average muscle tone; no contractures, no joint effusions   Neurologic:   Sedated, withdraws to pain, intermittently moves extremities independently   Psychiatric:  , On sedation         Lab results:    Chemistry  Recent Labs     05/29/22  0055 05/28/22  0108 05/27/22  1601   * 104* 96    137 136   K 2.9* 4.3 4.1    103 102   CO2 26 28 27   BUN 26* 24* 21*   CREA 0.98 0.70 0.78   CA 8.2* 8.4* 8.7   AGAP 5 6 7   BUCR 27* 34* 27*   AP 51 72 73   TP 6.3* 7.1 7.3   ALB 2.2* 2.3* 2.3*   GLOB 4.1* 4.8* 5.0*   AGRAT 0.5* 0.5* 0.5*       CBC w/ Diff  Recent Labs     05/29/22  0055 05/28/22  0108 05/27/22  1601   WBC 5.4 6.7 5.5   RBC 3.91* 4.56 4.64   HGB 12.1* 13.9 14.2   HCT 36.3 43.5 42.5    158 151   GRANS 64 64 67   LYMPH 22 6* 13*   EOS 0 0 0       Microbiology  All Micro Results     Procedure Component Value Units Date/Time    CULTURE, BLOOD [[id number]] Collected: 05/26/22 1100    Order Status: Completed Specimen: Blood Updated: 05/29/22 0601     Special Requests: NO SPECIAL REQUESTS        Culture result: NO GROWTH 3 DAYS       CULTURE, BLOOD [[id number]] Collected: 05/26/22 1200    Order Status: Completed Specimen: Blood Updated: 05/29/22 0601     Special Requests: NO SPECIAL REQUESTS        Culture result: NO GROWTH 3 DAYS       CULTURE, RESPIRATORY/SPUTUM/BRONCH Lavada Gambles STAIN [[id number]] Collected: 05/28/22 0250    Order Status: Completed Specimen: Sputum Updated: 05/28/22 1502    CULTURE, URINE [[id number]] Collected: 05/26/22 1234    Order Status: Completed Specimen: Urine from Clean catch Updated: 05/28/22 1226     Special Requests: NO SPECIAL REQUESTS        Culture result: No growth (<1,000 CFU/ML)       RESPIRATORY VIRUS PANEL W/COVID-19, PCR [[id number]]  (Abnormal) Collected: 05/26/22 1309    Order Status: Completed Specimen: Nasopharyngeal Updated: 05/26/22 1450     Adenovirus Not detected        Coronavirus 229E Not detected        Coronavirus HKU1 Not detected        Coronavirus CVNL63 Not detected        Coronavirus OC43 Not detected        SARS-CoV-2, PCR Not detected        Metapneumovirus Not detected        Rhinovirus and Enterovirus Not detected        Influenza A Not detected        Influenza A, subtype H1 Not detected        Influenza A, subtype H3 Not detected        INFLUENZA A H1N1 PCR Not detected        Influenza B Not detected        Parainfluenza 1 Not detected        Parainfluenza 2 Not detected        Parainfluenza 3 Detected        Parainfluenza virus 4 Not detected        RSV by PCR Not detected        B. parapertussis, PCR Not detected        Bordetella pertussis - PCR Not detected        Chlamydophila pneumoniae DNA, QL, PCR Not detected        Mycoplasma pneumoniae DNA, QL, PCR Not detected              Melvina Avendaño DO   5/29/2022   10:34 AM

## 2022-05-29 NOTE — PROGRESS NOTES
[facility]. Pulmonary, Critical Care, and Sleep Medicine    Name: Bina Kay MRN: [id number]   : 1960 Hospital: [address] [place] [address] Dr   Date: 2022  Admission Date: 2022     Chart and notes reviewed. Data reviewed. I have evaluated all findings. [x]I have reviewed the flowsheet and previous days notes. [x]The patient is unable to give any meaningful history or review of systems because the patient is:  [x]Intubated [x]Sedated   []Unresponsive      [x]The patient is critically ill on      [x]Mechanical ventilation []Pressors   []BiPAP []         Interval HPI:  61/M with acute hypoxic respiratory failure and acute on chronic toxic-metabolic encephalopathy superimposed on intellectual disability. Sepsis possibly UTI +/- aspiration pneumonia in light of AMS and inability to protect airway. Sputum NGTD. Subjective 22  Hospital Day: admitted   Vent Day:  Overnight events: intubated on vent support, oxygenation maintained. Mentation/Activity: sedated  Respiratory/ Secretions: minimal  Hemodynamics: stable  Urine output, bowel: see below  Diet: tube feeds  Need for procedures:              ROS:Review of systems not obtained due to patient factors. Events and notes from last 24 hours reviewed. 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   Last 3 shifts:  1901 -  0700  In: 2187.5 [I.V.:1617.5]  Out: 3268 [Urine:955]    Intake/Output Summary (Last 24 hours) at 5/29/2022 1121  Last data filed at 5/29/2022 0600  Gross per 24 hour   Intake 1248.95 ml   Output 930 ml   Net 318.95 ml          Current Facility-Administered Medications   Medication Dose Route Frequency    chlorhexidine (PERIDEX) 0.12 % mouthwash 10 mL  10 mL Oral Q12H    fentaNYL (PF) 1,500 mcg/30 mL (50 mcg/mL) infusion  0-200 mcg/hr IntraVENous TITRATE    benztropine (COGENTIN) tablet 1 mg  1 mg Oral BID    levETIRAcetam (KEPPRA) oral solution 500 mg  500 mg Per G Tube BID    valproic acid (as sodium salt) (DEPAKENE) 250 mg/5 mL (5 mL) oral solution 500 mg  500 mg Per G Tube BID    clotrimazole-betamethasone (LOTRISONE) 1-0.05 % cream   Topical BID    famotidine (PF) (PEPCID) 20 mg in 0.9% sodium chloride 10 mL injection  20 mg IntraVENous DAILY    [Held by provider] cetirizine (ZYRTEC) tablet 10 mg  10 mg Oral DAILY    finasteride (PROSCAR) tablet 5 mg  5 mg Oral DAILY    [Held by provider] LORazepam (ATIVAN) tablet 0.5 mg  0.5 mg Oral BID    [Held by provider] trospium (SANCTURA) tablet 20 mg  20 mg Oral DAILY    OLANZapine (ZyPREXA) tablet 10 mg  10 mg Oral TID    [Held by provider] pravastatin (PRAVACHOL) tablet 20 mg  20 mg Oral QHS    tamsulosin (FLOMAX) capsule 0.4 mg  0.4 mg Oral DAILY    [Held by provider] sodium chloride (NS) flush 5-40 mL  5-40 mL IntraVENous Q8H    enoxaparin (LOVENOX) injection 40 mg  40 mg SubCUTAneous DAILY    piperacillin-tazobactam (ZOSYN) 3.375 g in 0.9% sodium chloride (MBP/ADV) 100 mL MBP  3.375 g IntraVENous Q8H         Telemetry: []Sinus []A-flutter []Paced    []A-fib []Multiple PVCs                  Physical Exam:      General: Intubated/sedated;    HEENT:  Anicteric sclerae; pink palpebral conjunctivae; mucosa moist  Resp:  Symmetrical chest expansion, no accessory muscle use; good airway entry; no rales/ wheezing/ rhonchi noted  CV:  S1, S2 present; regular rate and rhythm  GI:  Abdomen soft, non-tender; (+) active bowel sounds  Extremities:  +2 pulses on all extremities; no edema/ cyanosis/ clubbing noted   Skin:  Warm; no rashes/ lesions noted, normal turgor/cap refill   Neurologic:  sedated  Devices:  NGT, ETT, CVL  R IJ      DATA:  MAR reviewed and pertinent medications noted or modified as needed    Labs:  Recent Labs     05/29/22  0055 05/28/22  0108 05/27/22  1601   WBC 5.4 6.7 5.5   HGB 12.1* 13.9 14.2   HCT 36.3 43.5 42.5    158 151     Recent Labs     05/29/22  0055 05/28/22  0108 05/27/22  1601    137 136   K 2.9* 4.3 4.1    103 102   CO2 26 28 27   * 104* 96   BUN 26* 24* 21*   CREA 0.98 0.70 0.78   CA 8.2* 8.4* 8.7   MG 2.4 2.3 2.2   PHOS 1.0* 5.0* 2.9   ALB 2.2* 2.3* 2.3*   ALT 60 85* 83*     No results for input(s): PH, PCO2, PO2, HCO3, FIO2 in the last 72 hours. Recent Labs     05/29/22  0417 05/28/22  0247 05/27/22  1245   FIO2I 30 100 2   HCO3I 25.1 26.8* 25.5   PCO2I 30.2* 48.5* 36.1   PHI 7.53* 7.35 7.46*   PO2I 87 350* 85       Imaging:  [x]   I have personally reviewed the patients radiographs and reports  XR Results (most recent):  XR Results (most recent):  Results from Hospital Encounter encounter on 05/26/22    XR CHEST PORT    Narrative  PORTABLE CHEST RADIOGRAPH    CPT CODE: 16853    INDICATION: CVL placement. COMPARISON: 5/29/2022 at 01:41. FINDINGS:    Frontal view of the chest obtained at 03:51 hours. ET tube tip projects  approximately 6 cm above stew. New right jugular catheter with tip projecting  at cavoatrial junction. NG tube tip projects over the gastric fundus. The  cardiomediastinal silhouette is within normal limits. The pulmonary vascular  markings are unremarkable. Mild hypoinflation. There is no evidence of focal  consolidation, pleural effusion or pneumothorax. Evaluation of the osseous  structures is stable. Impression  New right jugular catheter. No pneumothorax or acute findings.      CT Results (most recent):  Results from [place] encounter on 05/26/22    CT HEAD WO CONT    Narrative  CT OF THE HEAD WITHOUT CONTRAST. CLINICAL HISTORY: Altered mental status. TECHNIQUE: Helical scan obtained of the head were obtained from the skull vertex  through the base of the skull without intravenous contrast.    All CT scans are  performed using dose optimization techniques as appropriate to the performed  exam including the following: Automated exposure control, adjustment of mA  and/or kV according to patient size, and use of iterative reconstructive  technique. COMPARISON: 9/22/2020. FINDINGS:    The sulcal pattern and ventricular system are normal in size and configuration  for age. Mild periventricular white matter hypodensities. No intracranial  hemorrhage. No mass effect. The visualized paranasal sinuses are clear. The  mastoid air cells are clear. The visualized bony structures are unremarkable. Impression  No acute findings. Stable mild features of chronic microvascular disease. IMPRESSION:   · Acute toxic metabolic encephalopathy superimposed on intellectual disability  · Acute hypoxic respiratory failure possibly atypical pneumonia related to parainfluenza infection +/- aspiration pneumonia  · Sepsis with LG fever, AMS, left shift with bandemia   · Transaminitis improved possibly shock liver vs medication side effects vs sepsis  · Seizure disorder  · Intellectual disability and history of PKU  · Hypercholesterolemia   · Anxiety disorder on home Ativan  · Urinary retension        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 VAP Bundle, ARDSNet vent guidelines. Prognosis for liberation from vent support hinges on mentation and ability to protect airway   CVS:Monitor HD, MAP goal >65. GI: Advance tube feeds, see orders. Monitor LFT's  Renal: Trend Cr, UOP. Singleton for urinary retention  Hem/Onc: Trend H/H, monitor for s/o active bleeding. Daily CBC. I/D:Trend WBCs and temperature curve. Continue Pip-Tazo pending cultures   Metabolic: Daily BMP, mag, phos. Trend lytes and replace per protocol. Endocrine:Q6 glucoses. SSI. Avoid hypoglycemia. Musc/Skin: turn Q 2 hours  Full Code  Discussed with nursing and RT     Best practice :    Glycemic control  IHI ICU bundles:   Central Line Bundle Followed , Singleton Bundle Followed and Vent Bundle Followed, Vent Day 2    [facility] Vent patients-    VAP bundle-[place] tube to suction at 20-30 cm Hg, Maintain [place] tube with 5-10ml air every 4 hours, Routine oral care every 4 hours, Elevation of head > 45 degree, Daily sedation holiday and SBT evaluation starting at 6.00am. and ARDS network Guidelines: Lung protective strategy and Plateau  Pressure goal < 30 cm H2O goals, Oxygenation Goals PaO2 55-80 mm Hg or SaO2 88-95%, PH goal 7.30-7.45  Stress ulcer prophylaxis. Pepcid  DVT prophylaxis. Lovenox  Need for Lines, singleton assessed. Palliative care evaluation. Restraints need. Attending Non-violent Restraint Reevaluation     I have reevaluated the patient one hour after initiation of intervention. The patient is comfortable, uninjured, but continues to pose an imminent risk of injury to self to themselves and/or serious disruption of medical treatment required to keep patient stable. The patient's current medical and behavioral conditions that warrant the use intervention include danger to self and Interference with medical equipment or treatment. Restraint or seclusion will be discontinued at the earliest possible time, regardless of the scheduled expiration of the order. Based on my evaluation, restraints will be continued: YES       Critical care time 48 minutes excluding procedures.   This care involved high complexity decision making to assess, manipulate, and support vital system functions, to treat this degreee vital organ system failure and to prevent further life threatening deterioration of the patients condition  The services I provided to this patient were to treat and/or prevent clinically significant deterioration that could result in the failure of one or more body systems and/or organ systems due to respiratory distress, hypoxia, cardiac dysrhythmia.        Leonides Jackson MD   05/29/22  Pulmonary, Critical Care Medicine  [facility]

## 2022-05-30 NOTE — PROGRESS NOTES
[facility]. Pulmonary, Critical Care, and Sleep Medicine    Name: Pillo Rubi MRN: [id number]   : 1960 Hospital: [address] [place] [address] Dr   Date: 2022  Admission Date: 2022     Chart and notes reviewed. Data reviewed. I have evaluated all findings. [x]I have reviewed the flowsheet and previous days notes. [x]The patient is unable to give any meaningful history or review of systems because the patient is:  [x]Intubated [x]Sedated   []Unresponsive      [x]The patient is critically ill on      [x]Mechanical ventilation []Pressors   []BiPAP []         Interval HPI:  61/M with acute hypoxic respiratory failure and acute on chronic toxic-metabolic encephalopathy superimposed on intellectual disability. Sepsis possibly UTI +/- aspiration pneumonia in light of AMS and inability to protect airway. Sputum NGTD. Subjective 22  Hospital Day: admitted   Vent Day:  Overnight events: intubated on vent support, oxygenation maintained. Mentation/Activity: sedated  Respiratory/ Secretions: minimal  Hemodynamics: stable  Urine output, bowel: see below  Diet: tube feeds  Need for procedures:              ROS:Review of systems not obtained due to patient factors. Events and notes from last 24 hours reviewed.      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    Pneumonia J18.9       Vital Signs:  Visit Vitals  /81 (BP 1 Location: Right upper arm, BP Patient Position: At rest;Semi fowlers;Supine)   Pulse 77   Temp 97.8 °F (36.6 °C)   Resp 16   Ht 5' 8\" (1.727 m)   Wt 67.7 kg (149 lb 4 oz)   SpO2 97%   BMI 22.69 kg/m²       O2 Device: None (Room air)   O2 Flow Rate (L/min): 4 l/min (humidified o2)   Temp (24hrs), Av.2 °F (36.8 °C), Min:97.4 °F (36.3 °C), Max:98.8 °F (37.1 °C)       Intake/Output:   Last shift:       07 -  1900  In: 180 [P.O.:60; I.V.:120]  Out: 0 Last 3 shifts: 05/28 1901 - 05/30 0700  In: 1388.3 [I.V.:858.3]  Out: 1115 [Urine:1065]    Intake/Output Summary (Last 24 hours) at 5/30/2022 1031  Last data filed at 5/30/2022 1016  Gross per 24 hour   Intake 603.03 ml   Output 785 ml   Net -181.97 ml          Current Facility-Administered Medications   Medication Dose Route Frequency    benztropine (COGENTIN) tablet 1 mg  1 mg Oral BID    levETIRAcetam (KEPPRA) oral solution 500 mg  500 mg Per G Tube BID    valproic acid (as sodium salt) (DEPAKENE) 250 mg/5 mL (5 mL) oral solution 500 mg  500 mg Per G Tube BID    clotrimazole-betamethasone (LOTRISONE) 1-0.05 % cream   Topical BID    famotidine (PF) (PEPCID) 20 mg in 0.9% sodium chloride 10 mL injection  20 mg IntraVENous DAILY    [Held by provider] cetirizine (ZYRTEC) tablet 10 mg  10 mg Oral DAILY    finasteride (PROSCAR) tablet 5 mg  5 mg Oral DAILY    [Held by provider] LORazepam (ATIVAN) tablet 0.5 mg  0.5 mg Oral BID    [Held by provider] trospium (SANCTURA) tablet 20 mg  20 mg Oral DAILY    OLANZapine (ZyPREXA) tablet 10 mg  10 mg Oral TID    [Held by provider] pravastatin (PRAVACHOL) tablet 20 mg  20 mg Oral QHS    tamsulosin (FLOMAX) capsule 0.4 mg  0.4 mg Oral DAILY    enoxaparin (LOVENOX) injection 40 mg  40 mg SubCUTAneous DAILY    piperacillin-tazobactam (ZOSYN) 3.375 g in 0.9% sodium chloride (MBP/ADV) 100 mL MBP  3.375 g IntraVENous Q8H         Telemetry: []Sinus []A-flutter []Paced    []A-fib []Multiple PVCs                  Physical Exam:      General: awake, not in distress  HEENT:  Anicteric sclerae; pink palpebral conjunctivae; mucosa moist  Resp:  Symmetrical chest expansion, no accessory muscle use; good airway entry; no rales/ wheezing/ rhonchi noted  CV:  S1, S2 present; regular rate and rhythm  GI:  Abdomen soft, non-tender; (+) active bowel sounds  Extremities:  +2 pulses on all extremities; no edema/ cyanosis/ clubbing noted   Skin:  Warm; no rashes/ lesions noted, normal turgor/cap refill   Neurologic: awake, follows commands and answers in monosyllables  Devices:  CVL  R IJ. Flores      DATA:  MAR reviewed and pertinent medications noted or modified as needed    Labs:  Recent Labs     05/30/22  0155 05/29/22  1545 05/29/22  0055   WBC 7.5 9.2 5.4   HGB 11.7* 12.4* 12.1*   HCT 35.8* 37.9 36.3    202 143     Recent Labs     05/30/22  0155 05/29/22  1545 05/29/22  0055 05/28/22  0108 05/28/22  0108    142 139   < > 137   K 3.7 3.8 2.9*   < > 4.3    107 108   < > 103   CO2 29 29 26   < > 28   GLU 81 96 113*   < > 104*   BUN 21* 21* 26*   < > 24*   CREA 0.62 0.76 0.98   < > 0.70   CA 8.4* 8.4* 8.2*   < > 8.4*   MG 2.3  --  2.4  --  2.3   PHOS 3.3  --  1.0*  --  5.0*   ALB 2.2*  --  2.2*  --  2.3*   ALT 61  --  60  --  85*    < > = values in this interval not displayed. No results for input(s): PH, PCO2, PO2, HCO3, FIO2 in the last 72 hours. Recent Labs     05/29/22  0417 05/28/22  0247 05/27/22  1245   FIO2I 30 100 2   HCO3I 25.1 26.8* 25.5   PCO2I 30.2* 48.5* 36.1   PHI 7.53* 7.35 7.46*   PO2I 87 350* 85       Imaging:  [x]   I have personally reviewed the patients radiographs and reports  XR Results (most recent):  XR Results (most recent):  Results from Hospital Encounter encounter on 05/26/22    XR CHEST PORT    Narrative  PORTABLE CHEST RADIOGRAPH    CPT CODE: 62277    INDICATION: CVL placement. COMPARISON: 5/29/2022 at 01:41. FINDINGS:    Frontal view of the chest obtained at 03:51 hours. ET tube tip projects  approximately 6 cm above stew. New right jugular catheter with tip projecting  at cavoatrial junction. NG tube tip projects over the gastric fundus. The  cardiomediastinal silhouette is within normal limits. The pulmonary vascular  markings are unremarkable. Mild hypoinflation. There is no evidence of focal  consolidation, pleural effusion or pneumothorax. Evaluation of the osseous  structures is stable.     Impression  New right jugular catheter. No pneumothorax or acute findings. CT Results (most recent):  Results from Hospital Encounter encounter on 05/26/22    CT HEAD WO CONT    Narrative  CT OF THE HEAD WITHOUT CONTRAST. CLINICAL HISTORY: Altered mental status. TECHNIQUE: Helical scan obtained of the head were obtained from the skull vertex  through the base of the skull without intravenous contrast.    All CT scans are  performed using dose optimization techniques as appropriate to the performed  exam including the following: Automated exposure control, adjustment of mA  and/or kV according to patient size, and use of iterative reconstructive  technique. COMPARISON: 9/22/2020. FINDINGS:    The sulcal pattern and ventricular system are normal in size and configuration  for age. Mild periventricular white matter hypodensities. No intracranial  hemorrhage. No mass effect. The visualized paranasal sinuses are clear. The  mastoid air cells are clear. The visualized bony structures are unremarkable. Impression  No acute findings. Stable mild features of chronic microvascular disease. IMPRESSION:   · Acute toxic metabolic encephalopathy superimposed on intellectual disability, improving  · Acute hypoxic respiratory failure possibly atypical pneumonia related to parainfluenza infection +/- aspiration pneumonia. Intubated 5/28 and extubated 5/29  · Sepsis with LG fever, AMS, left shift with bandemia   · Transaminitis much improved possibly shock liver vs medication side effects vs sepsis  · Seizure disorder  · Hypokalemia, resolved  · Intellectual disability and history of PKU  · Hypercholesterolemia   · Anxiety disorder on home Ativan  · RUE swelling ? 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 CVS:Monitor HD, MAP goal >65. RUE PVL to R/o DVT  GI: diet easy to chew per SLP recommendations. Monitor LFT's  Renal: Trend Cr, UOP. Singleton for urinary retention  Hem/Onc: Trend H/H, monitor for s/o active bleeding. Daily CBC. I/D:Trend WBCs and temperature curve. Continue Pip-Tazo pending cultures   Metabolic: Daily BMP, mag, phos. Trend lytes and replace per protocol. Endocrine:Q6 glucoses. SSI. Avoid hypoglycemia. Musc/Skin: turn Q 2 hours  Full Code  Discussed with nursing and RT     Best practice :    Glycemic control  IHI ICU bundles:   Central Line Bundle Followed  and Singleton Bundle Followed    [place] Vent patients- NA     Stress ulcer prophylaxis. Pepcid  DVT prophylaxis. Lovenox  Need for Lines, singleton assessed. Palliative care evaluation. Restraints need. Attending Non-violent Restraint Reevaluation     I have reevaluated the patient one hour after initiation of intervention. The patient is comfortable, uninjured, but continues to pose an imminent risk of injury to self to themselves and/or serious disruption of medical treatment required to keep patient stable. The patient's current medical and behavioral conditions that warrant the use intervention include danger to self and Interference with medical equipment or treatment. Restraint or seclusion will be discontinued at the earliest possible time, regardless of the scheduled expiration of the order. Based on my evaluation, restraints will be continued: NO       Critical care time 43 minutes excluding procedures.   This care involved high complexity decision making to assess, manipulate, and support vital system functions, to treat this degreee vital organ system failure and to prevent further life threatening deterioration of the patients condition  The services I provided to this patient were to treat and/or prevent clinically significant deterioration that could result in the failure of one or more body systems and/or organ systems due to respiratory distress, hypoxia, cardiac dysrhythmia.        Nick Garduno MD   05/30/22  Pulmonary, Critical Care Medicine  [facility]

## 2022-05-30 NOTE — PROGRESS NOTES
Nutrition Assessment     Type and Reason for Visit: Reassess,Consult,NPO/clear liquid    Nutrition Recommendations/Plan:   1. Modify po diet; specify for low protein diet r/t PKU diagnosis and limitation/ avoidance of foods high in phenylalanine (meat, dairy, eggs, nuts, legumes and artifical sweeteners) r/t PKU diagnosis      Nutrition Assessment:  Pt was experiencing impaired respiratory function on 5/28 and required intubation. Had improved by 5/29 and was extubated. SLP consulted for swallow evaluation and started pt on easy to chew diet with nectar thick liquids this morning. Plan to specify food limitations in diet order. per RD note on 5/27, recommended tyrosine supplement; discussed with Pharmacy; per report, supplement not available at this facility. Pt would have to obtain as outpatient. received call back from Pharmacy regarding pt being on keppra and valproic acid, both medications containing artificial sweeteners and concern regarding pt having to limit/ avoid artificial sweeteners with his hx of PKU; discussed report with MD and NP, who reported that pt has been on both medications PTA and will be fine to continue medications. Pt unable to answer questions; spoke with his brother, who reported that he does not recall pt having to limit intake of certain foods due to his hx of PKU; but pt would usually need foods to be pureed and had the tendency to pocket foods. Discussed report with RN regarding pt tending to pocket foods and needing reminder to swallow his food prior to taking the next bite. Discussed the thickened liquids with pt brother and explained that foods like ice cream, popsicles, and jello are considered thin liquids; brother verbalized understanding. Malnutrition Assessment:  Malnutrition Status:  At risk for malnutrition (specify) (r/t NPO status)     Estimated Daily Nutrient Needs:  Energy (kcal):  4149-0744  Protein (g):  41-55       Fluid (ml/day):  2737-8435    Nutrition Related Findings:  BM 5/30,  5/29    Current Nutrition Therapies:  ADULT DIET Easy to Chew; Mildly Thick (Nectar)    Anthropometric Measures:  Height:  5' 8\" (172.7 cm)  Current Body Wt:  67.7 kg (149 lb 4 oz)  BMI: 22.7    Nutrition Diagnosis:   · Inadequate oral intake related to impaired nutrient utilization,impaired respiratory function as evidenced by NPO or clear liquid status due to medical condition (during previous days)      Nutrition Interventions:   Food and/or Nutrient Delivery: Continue current diet  Nutrition Education/Counseling: No recommendations at this time,Education not indicated  Coordination of Nutrition Care: Continue to monitor while inpatient,Speech therapy,Swallow evaluation       Goals:  Previous Goal Met: No progress toward goal(s) (po diet recently started)  Goals: Meet at least 75% of estimated needs,PO intake 75% or greater,by next RD assessment       Nutrition Monitoring and Evaluation:   Behavioral-Environmental Outcomes: None identified  Food/Nutrient Intake Outcomes: Diet advancement/tolerance,Food and nutrient intake  Physical Signs/Symptoms Outcomes: Biochemical data,Meal time behavior    Discharge Planning:     Too soon to determine    Kenton Strange, [address]  Contact: [phone]

## 2022-05-30 NOTE — PROGRESS NOTES
[facility]  (A Division of [address] [place] [address])    Follow-up Note      Date of Admission: 2022       Date of Note:  2022          Current Antimicrobials:    Prior Antimicrobials:  Zosyn  to present      Assessment:         Acute respiratory failure: Intubated on , extubated   Infection with parainfluenza 3 virus: no pneumonia on CXR upon admission. There are hazy opacities noted on post intubation x-ray on . With possible congestion noted on  x-rays.   -Lactic acid 1.4, procalcitonin 0.45  Aspiration pneumonia  Acute metabolic encephalopathy  Transaminitis: Persistent-AST trending down somewhat  Mild hyponatremia: Resolved  PKU  Mild hematuria       Plan:   Continue with Zosyn for suspected aspiration.    -Plan for 5 to 7 days. CBC, BMP  Aspiration precautions, speech evaluation due to risk for aspiration. Continue with droplet precautions for parainfluenza      Rina Mckinnon DO  [facility]  [address], [address] [facility] CenterbernadettedonyEn[facility] [address]  Office: [phone]  Mobile/Text: [phone]     Microbiology:   blood cultures: No growth to date   urine culture no course to date   respiratory viral culture:  positive for parainfluenza 3 negative for all other pathogens    Lines / Catheters:  Peripheral  ET   Right IJ      Subjective:   Patient seen and examined at bedside in ICU this AM.  Interval history has been reviewed. Extubated yesterday. No documented fevers. WBC is trending down  He is awake and looking around but not talking too much.   He gets a little anxious on exam.    Objective:        Visit Vitals  BP (!) 154/85   Pulse 80   Temp 97.8 °F (36.6 °C)   Resp 22   Ht 5' 8\" (1.727 m)   Wt 67.7 kg (149 lb 4 oz)   SpO2 95%   BMI 22.69 kg/m²     Temp (24hrs), Av.2 °F (36.8 °C), Min:97.4 °F (36.3 °C), Max:98.8 °F (37.1 °C)        General:   Awake and alert looks around   Skin:   no rashes or skin lesions noted on limited exam, dry and warm   HEENT:  No scleral icterus or pallor; oral mucosa moist, lips moist   Lymph Nodes:   not assessed today   Lungs:   coarse breath sounds throughout, occasional wheeze and irregular respiratory drive   Heart:  RRR, s1 and s2; no murmurs rubs or gallops; no edema, + pedal pulses   Abdomen:  soft, non-distended, active bowel sounds, non-tender   Genitourinary:  deferred   Extremities:   average muscle tone; no contractures, no joint effusions,   Neurologic:   Moves extremities independently, no apparent focal weakness, slow to answer questions, intermittent confusion   Psychiatric:  , Calm, follows directions         Lab results:    Chemistry  Recent Labs     05/30/22  0155 05/29/22  1545 05/29/22  0055 05/28/22  0108 05/28/22  0108   GLU 81 96 113*   < > 104*    142 139   < > 137   K 3.7 3.8 2.9*   < > 4.3    107 108   < > 103   CO2 29 29 26   < > 28   BUN 21* 21* 26*   < > 24*   CREA 0.62 0.76 0.98   < > 0.70   CA 8.4* 8.4* 8.2*   < > 8.4*   AGAP 4 6 5   < > 6   BUCR 34* 28* 27*   < > 34*   AP 54  --  51  --  72   TP 6.1*  --  6.3*  --  7.1   ALB 2.2*  --  2.2*  --  2.3*   GLOB 3.9  --  4.1*  --  4.8*   AGRAT 0.6*  --  0.5*  --  0.5*    < > = values in this interval not displayed.        CBC w/ Diff  Recent Labs     05/30/22  0155 05/29/22  1545 05/29/22  0055   WBC 7.5 9.2 5.4   RBC 3.84* 4.08* 3.91*   HGB 11.7* 12.4* 12.1*   HCT 35.8* 37.9 36.3    202 143   GRANS 67 74* 64   LYMPH 18* 12* 22   EOS 1 0 0       Microbiology  All Micro Results     Procedure Component Value Units Date/Time    CULTURE, RESPIRATORY/SPUTUM/BRONCH [facility] [[id number]] Collected: 05/28/22 0250    Order Status: Completed Specimen: Sputum Updated: 05/30/22 8106     Special Requests: NO SPECIAL REQUESTS        GRAM STAIN RARE WBCS SEEN               RARE EPITHELIAL CELLS SEEN            NO ORGANISMS SEEN        Culture result:       RARE NORMAL RESPIRATORY GENARO CULTURE, BLOOD [[id number]] Collected: 05/26/22 1100    Order Status: Completed Specimen: Blood Updated: 05/30/22 0602     Special Requests: NO SPECIAL REQUESTS        Culture result: NO GROWTH 4 DAYS       CULTURE, BLOOD [[id number]] Collected: 05/26/22 1200    Order Status: Completed Specimen: Blood Updated: 05/30/22 0602     Special Requests: NO SPECIAL REQUESTS        Culture result: NO GROWTH 4 DAYS       CULTURE, URINE [[id number]] Collected: 05/26/22 1234    Order Status: Completed Specimen: Urine from Clean catch Updated: 05/28/22 1226     Special Requests: NO SPECIAL REQUESTS        Culture result: No growth (<1,000 CFU/ML)       RESPIRATORY VIRUS PANEL W/COVID-19, PCR [[id number]]  (Abnormal) Collected: 05/26/22 1309    Order Status: Completed Specimen: Nasopharyngeal Updated: 05/26/22 1452     Adenovirus Not detected        Coronavirus 229E Not detected        Coronavirus HKU1 Not detected        Coronavirus CVNL63 Not detected        Coronavirus OC43 Not detected        SARS-CoV-2, PCR Not detected        Metapneumovirus Not detected        Rhinovirus and Enterovirus Not detected        Influenza A Not detected        Influenza A, subtype H1 Not detected        Influenza A, subtype H3 Not detected        INFLUENZA A H1N1 PCR Not detected        Influenza B Not detected        Parainfluenza 1 Not detected        Parainfluenza 2 Not detected        Parainfluenza 3 Detected        Parainfluenza virus 4 Not detected        RSV by PCR Not detected        B. parapertussis, PCR Not detected        Bordetella pertussis - PCR Not detected        Chlamydophila pneumoniae DNA, QL, PCR Not detected        Mycoplasma pneumoniae DNA, QL, PCR Not detected              Erleen Catching, DO   5/30/2022

## 2022-05-30 NOTE — PROGRESS NOTES
Problem: Mobility Impaired (Adult and Pediatric)  Goal: *Acute Goals and Plan of Care (Insert Text)  Description: Physical Therapy Goals  Initiated 5/30/2022 and to be accomplished within 3 day trial.   If appropriate continue per plan of care to accomplish goals within 7 days. 1.  Patient will follow 90% of commands to maximize safety and active participation in mobility training. 2.  Patient will move from supine to sit and sit to supine in bed with supervision/set-up. 3.  Patient will maintain seated at edge of bed for 8 min with supervision/set-up to prepare for out of bed activity. 4.  Patient will perform sit to stand with supervision/set-up. 5.  Patient will transfer from bed to chair and chair to bed with supervision/set-up using the least restrictive device. 6.  Patient will ambulate with supervision/set-up for 25 feet with the least restrictive device. PLOF: Per chart, resides in group home. Ambulatory. Outcome: Progressing Towards Goal   PHYSICAL THERAPY EVALUATION    Patient: Cathy Garcia (66 y.o. male)  Date: 5/30/2022  Primary Diagnosis: Pneumonia [J18.9]  Precautions: Fall  ASSESSMENT :  Evaluated in ICU. Per ADIN Lantigua, resides in group home and ambulatory prior to admission. Mainly non-verbal; says a few words. Eyes open alert upon entry. Non-verbal entire session. Follows no commands. Multiple attempts to encouraged transfer to EOB. Mod A for supine to sit. Seated EOB with supervision for balance; approximately 1 minute. Then becoming increasingly agitated and snorting and rocking trunk. Returned to supine for safety. Total A x2 for scooting up in bed. SCDs and prevalon boots donned. ADIN Lantigua at bedside. Call bell in reach. Will follow for 3 day trial to better determine active participation in skilled treatment and prior level of function. If appropriate, continue per plan of care 3-5x/week to address goals.  Per patient, ambulatory at baseline; however may not be appropriate for skilled PT treatment in acute setting d/t baseline cognitive level. Patient will benefit from skilled intervention to address the above impairments. Patient's rehabilitation potential is considered to be Guarded  Factors which may influence rehabilitation potential include:   []         None noted  []         Mental ability/status  [x]         Medical condition  []         Home/family situation and support systems  []         Safety awareness  []         Pain tolerance/management  []         Other:      PLAN :  Recommendations and Planned Interventions:   [x]           Bed Mobility Training             [x]    Neuromuscular Re-Education  [x]           Transfer Training                   []    Orthotic/Prosthetic Training  [x]           Gait Training                          []    Modalities  [x]           Therapeutic Exercises           []    Edema Management/Control  [x]           Therapeutic Activities            [x]    Family Training/Education  [x]           Patient Education  []           Other (comment):    Frequency/Duration: Patient will be followed by physical therapy for 3 day trial to better determine active participation in skilled treatment and prior level of function.  If appropriate, continue per plan of care 3-5x/week to address goals  Discharge Recommendations: Prior group home  Further Equipment Recommendations for Discharge: None     SUBJECTIVE:   Alert    OBJECTIVE DATA SUMMARY:     Past Medical History:   Diagnosis Date    Anxiety disorder     Dermatitis     High cholesterol     Intellectual disability     [facility] [address] [facility]     Seizures ([facility] [address] [facility]     Urinary retention      Past Surgical History:   Procedure Laterality Date    COLONOSCOPY N/A 10/19/2021    COLONOSCOPY performed by Wing Pete MD at [address] [place] [address] GI  03/15/2022    Excision and Fulgeration Anal Condyloma     HX PROSTATE SURGERY      bph    HX UROLOGICAL      retention     Barriers to Learning/Limitations: yes;  cognitive, sensory deficits-vision/hearing/speech and altered mental status (i.e.Sedation, Confusion)  Compensate with: Visual Cues, Verbal Cues, Tactile Cues and Kinesthetic Cues    Home Situation:  Home Situation  Home Environment: Group home  One/Two Story Residence: Other (Comment)  Living Alone: No  Support Systems: Other Family Member(s)  Patient Expects to be Discharged to[de-identified] Group home  Current DME Used/Available at Home: Other (comment) (unknown)    Critical Behavior:  Neurologic State: Alert  Orientation Level: Oriented to person;Disoriented to place; Disoriented to situation;Disoriented to time  Cognition: No command following    Strength:    Unable to formally assess d/t no command following  Grossly 3+/5 BLE   Range Of Motion:  BLE functional for seated posture  Functional Mobility:  Bed Mobility:  Supine to Sit: Moderate assistance  Sit to Supine: Moderate assistance  Scooting: Total assistance;Assist x2  Balance:   Sitting: Impaired  Sitting - Static: Good (unsupported)  Neuro Re-education:  Seated EOB 1 minute    Pain:  Pain level pre-treatment: 0/10   Pain level post-treatment: 0/10     Activity Tolerance:   Poor     After treatment:   []         Patient left in no apparent distress sitting up in chair  [x]         Patient left in no apparent distress in bed  [x]         Call bell left within reach  [x]         Nursing notified  []         Caregiver present  [x]         Bed alarm activated  [x]         SCDs applied    COMMUNICATION/EDUCATION:   [x]         Role of physical therapy and plan of care in the acute care setting. [x]         Fall prevention education was provided and the patient/caregiver indicated understanding. [x]         Patient/family have participated as able in goal setting and plan of care. []         Patient/family agree to work toward stated goals and plan of care.   []         Patient understands intent and goals of therapy, but is neutral about his/her participation. []         Patient is unable to participate in goal setting/plan of care: ongoing with therapy staff.     Thank you for this referral.  Jason Dan, PT   Time Calculation: 14 mins    Eval Complexity: History: MEDIUM  Complexity : 1-2 comorbidities / personal factors will impact the outcome/ POC Exam:MEDIUM Complexity : 3 Standardized tests and measures addressing body structure, function, activity limitation and / or participation in recreation  Presentation: MEDIUM Complexity : Evolving with changing characteristics  Clinical Decision Making:Medium Complexity   Clinical judgement; ROM, MMT, functional mobility Overall Complexity:MEDIUM

## 2022-05-30 NOTE — PROGRESS NOTES
Problem: Dysphagia (Adult)  Goal: *Acute Goals and Plan of Care (Insert Text)  Description: Patient will:  1. Tolerate PO trials with 0 s/s overt distress in 4/5 trials  2. Utilize compensatory swallow strategies/maneuvers (decrease bite/sip, size/rate, alt. liq/sol) with min cues in 4/5 trials  3. Perform oral-motor/laryngeal exercises to increase oropharyngeal swallow function with min cues  4. Complete an objective swallow study (i.e., MBSS) to assess swallow integrity, r/o aspiration, and determine of safest LRD, min A  5. Pt will utilize comp strategies to improve respiration to swallow coordination to reduce aspiration risk and improve activity tolerance for sustained nutrition/ hydration given min-mod cues. Rec:     Easy to chew with NTL   Meds with NTL or in puree  Aspiration precautions  Feeding assistance   Small bites/sips, slow rate of intake   Oral care three times daily     Outcome: Progressing Towards Goal    SPEECH LANGUAGE PATHOLOGY DYSPHAGIA TREATMENT    Patient: Bina Kay (89 y.o. male)  Date: 5/30/2022  Diagnosis: Pneumonia [J18.9]   Precautions: Aspiration, Fall  PLOF: As per H&P      ASSESSMENT:  Pt seen for follow up dysphagia treatment. Per RN, pt with cough productive of thick mucous; however, unable to expel. Pt alert and accepting po, nonverbal.  Pt with cough upon arrival, prior to po. Demo x1 cough following >6 consecutive swallows of thin liquids (impulsive with intake). Decreased laryngeal elevation to palpation and audible swallow noted. No overt s/sx aspiration across ~16 oz of NTL. Demo mildly delayed a-p transit of regular solids with mild lingual spread post swallow. Recommend easy to chew diet with NTL with aspiration precautions and MBS when appropriate. D/w pt and RN. Will continue to follow for further dysphagia management.      Progression toward goals:  []         Improving appropriately and progressing toward goals  [x]         Improving slowly and progressing toward goals  []         Not making progress toward goals and plan of care will be adjusted     PLAN:  Recommendations and Planned Interventions:  Patient continues to benefit from skilled intervention to address the above impairments. Continue treatment per established plan of care. Discharge Recommendations: To Be Determined     SUBJECTIVE:   Patient nonverbal    OBJECTIVE:   Cognitive and Communication Status:  Neurologic State: Alert  Orientation Level: Oriented to person,Disoriented to place,Disoriented to situation,Disoriented to time  Cognition: No command following  Perception: Tactile,Verbal  Perseveration: No perseveration noted  Safety/Judgement: Lack of insight into deficits  Dysphagia Treatment:  Oral Assessment:  Oral Assessment  Labial: Decreased seal  Dentition: Limited  Oral Hygiene: fair  Lingual: Decreased strength,Incoordinated  Velum: Unable to visualize  Mandible: No impairment  Gag Reflex: No impairment  P.O. Trials:   Patient Position: 70   Vocal quality prior to P.O.: Other (comment)   Consistency Presented:  Thin liquid,Nectar thick liquid,Solid   How Presented: SLP-fed/presented,Cup/sip,Straw,Successive swallows,Spoon   Bolus Acceptance: No impairment   Bolus Formation/Control: Impaired   Type of Impairment: Mastication   Propulsion: Delayed (# of seconds)   Oral Residue: Less than 10% of bolus,Lingual   Initiation of Swallow: No impairment   Laryngeal Elevation: Decreased   Aspiration Signs/Symptoms: Clear throat   Pharyngeal Phase Characteristics: Audible swallow   Effective Modifications: Small sips and bites   Cues for Modifications: Maximal   Oral Phase Severity: Mild   Pharyngeal Phase Severity : Mild-moderate     PAIN:  Start of Tx: unable to report   End of Tx: unable to report      After treatment:   []              Patient left in no apparent distress sitting up in chair  [x]              Patient left in no apparent distress in bed  [x]              Call bell left within reach  [x]              Nursing notified  []              Family present  []              Caregiver present  []              Bed alarm activated      COMMUNICATION/EDUCATION:   [x] Aspiration precautions; swallow safety; compensatory techniques  []        Patient/family able to participate in training and education   [x]  Patient unable to participate in training and education, education ongoing with staff   [] Patient understands goals and intent of therapy; neutral about participation     Deepika Turner M.S., [id number] [facility]  Speech-Language Pathologist

## 2022-05-30 NOTE — PROGRESS NOTES
5/29/22:  1930: Assumed care of patient after report. Orders reviewed. Patient confused but cooperative. Coughing up thick secretions but swallowing most of them before I can use yankauer suction catheter. Call bell in reach. 2228: Patient NPO until SLP in am - po zyprexa placed on hold and IM dose given per Margie REA.    5/30/22:  0730: Bedside and Verbal shift change report given to Tang Dos Santos RN (oncoming nurse) by Sumit Godinez RN (offgoing nurse).  Report included the following information SBAR, Intake/Output, MAR, Recent Results and Cardiac Rhythm SR.

## 2022-05-31 NOTE — INTERDISCIPLINARY ROUNDS
[facility]  Pulmonary, Critical Care, and Sleep Medicine  Interdisciplinary and Ventilator Weaning Rounds    Patient discussed in morning walking rounds and interdisciplinary rounds. ICU day: 3    Overnight events:   -no acute events    Assessments and best practice:   Ventilator  - -n/a   Sedation  o n/a   Other pertinent drips  o n/a   Lines noted  o IJ CVL and Singleton Catheter   Critical labs assessed  o Yes   Antibiotics  o Zosyn   Medications reviewed  o Yes   Pending imaging  o none   Pending send out labs  o No   Pending Procedures  o None   Glycemic control   Stress ulcer prophylaxis. o Pepcid   DVT prophylaxis.   o Lovenox   Need for Lines, singleton assessed.  o Yes   Restraint Reevaluation     -n/a  Family contact/MPOA: Little Chauhan (brother  Family updated by ICU team    Palliative consult within 3 days of admission to ICU-  Ethics Guidance: 21 days      Daily Plans:   Negative for DVT   Transfer to SDU   D/C CVL   Remove singleton    ARCENIO time 15 minutes        Melyssa Subramanian NP  05/31/22  Pulmonary, [address] [facility]

## 2022-05-31 NOTE — PROGRESS NOTES
Patient arrived unit from ICU. Connected to monitor, skin assessment complete with assist from unit manager.  Vital signs obtained by unit manager

## 2022-05-31 NOTE — PROGRESS NOTES
Spoke with nursing, unable to place NG tube at bedside due to resistance   Spoke with brother with clinical updates including the possible need of PEG tube if his aspiration risk/dysphagia does not resolve.      Will have NG tube place under fluoro tomorrow       Signed By: Carlton Bray MD     May 31, 2022

## 2022-05-31 NOTE — PROGRESS NOTES
Problem: Dysphagia (Adult)  Goal: *Acute Goals and Plan of Care (Insert Text)  Description: Patient will:  1. Tolerate PO trials with 0 s/s overt distress in 4/5 trials  2. Utilize compensatory swallow strategies/maneuvers (decrease bite/sip, size/rate, alt. liq/sol) with min cues in 4/5 trials  3. Perform oral-motor/laryngeal exercises to increase oropharyngeal swallow function with min cues  4. Complete an objective swallow study (i.e., MBSS) to assess swallow integrity, r/o aspiration, and determine of safest LRD, min A  5. Pt will utilize comp strategies to improve respiration to swallow coordination to reduce aspiration risk and improve activity tolerance for sustained nutrition/ hydration given min-mod cues. Rec:     NPO  Consider alternative nutrition/hydration source vs comfort feeds  Strict aspiration precautions (HOB >30 degrees at all times; Oral care TID)   Outcome: Progressing Towards Goal    SPEECH PATHOLOGY MODIFIED BARIUM SWALLOW STUDY & TREATMENT    Patient: Ryan Lovelace (5 y.o. male)  Date: 5/31/2022  Primary Diagnosis: Pneumonia [J18.9]  Precautions: Aspiration, Contact,Fall,Skin    ASSESSMENT :  Based on the objective data described below, the patient presents with mild oral and severe pharyngeal dysphagia characterized by aspiration with cough across all consistencies presented (thin, NTL, HTL and pudding). Pt unable to follow commands for compensatory strategy use and tsp presentation ineffective at improving airway protection. Deficits include poor bolus formation/control with premature spillage and swallow delay, minimal movement of larynx/epiglottis with vestibule gap and decreased pharyngeal strength/motility. Pt at high risk of aspiration with subsequent aspiration pneumonia, dehydration and malnutrition with continued oral intake. Recommend NPO; consider alternative nutrition/hydration source vs comfort feeds. Pt unable to participate in training and education.     TREATMENT :  Treatment provided post diagnostic testing. Pt tolerating oral care with toothette. Pt demo immediate strong cough on 5/5 presentations of ice chips with facial redness. Will follow for further dysphagia management as indicated. Patient will benefit from skilled intervention to address the above impairments. Patient's rehabilitation potential is considered to be Guarded  Factors which may influence rehabilitation potential include:   []              None noted  [x]              Mental ability/status  [x]              Medical condition  []              Home/family situation and support systems  [x]              Safety awareness  []              Pain tolerance/management  []              Other:      PLAN :  Recommendations and Planned Interventions:  As above   Frequency/Duration: Patient will be followed by speech-language pathology 1-2 times per day/3-7 days per week to address goals. Discharge Recommendations:  To Be Determined     SUBJECTIVE:   Patient with unintelligible speech    OBJECTIVE:     Past Medical History:   Diagnosis Date    Anxiety disorder     Dermatitis     High cholesterol     Intellectual disability     PKU (phenylketonuria) ([facility] [address]     Seizures ([facility] [address]     Urinary retention      Past Surgical History:   Procedure Laterality Date    COLONOSCOPY N/A 10/19/2021    COLONOSCOPY performed by Nicolette Coombs MD at [facility] ENDOSCOPY    HX GI  03/15/2022    Excision and Fulgeration Anal Condyloma     HX PROSTATE SURGERY      bph    HX UROLOGICAL      retention     Prior Level of Function/Home Situation:  Home Situation  Home Environment: Group home  One/Two Story Residence: Other (Comment)  Living Alone: No  Support Systems: Other Family Member(s)  Patient Expects to be Discharged to[de-identified] Group home  Current DME Used/Available at Home: Other (comment) (unknown)  Diet prior to admission: unknown   Current Diet:  easy to chew with NTL      05/31/22 1200   Swallowing Study Trial   Type of Study Modified barium swallow   Film Views Lateral;Fluoro   Patient Position 90 in chair   Consistency Presented Thin liquid;Pudding; Honey thick liquid; Nectar thick liquid   How Presented SLP-fed/presented;Cup/sip;Spoon   Bolus Acceptance Impaired   Bolus Formation/Control Impaired   Type of Impairment Delayed;Poor;Premature spillage; Posterior   Propulsion Discoordination;Delayed (# of seconds)   Oral Residue Lingual   Initiation of Swallow Triggered at vallecula   Timing Pooling 6-10 sec;Vallecular   Aspiration/Timing During;Repeated;From initial swallow   Pharyngeal Clearance Pyriform residue ;Vallecular residue;10-50%   Attempted Modifications Small sips and bites; Spoon   Effective Modifications None   Cues for Modifications Maximal        05/31/22 1200   Swallowing Physiology   Decreased Tongue Base Retraction? Yes   Laryngeal Elevation Inadequate epiglottic inversion; Incomplete laryngeal closure;Minimal movement of larynx/epiglottis   Aspiration/Penetration Score 7 (Aspiration-Contrast passes below the cords/, but is not ejected despite attempt)   Pharyngeal Symmetry Not assessed   Pharyngeal-Esophageal Segment Decreased relaxation of upper esophageal segment   Pharyngeal Dysfunction Decreased tongue base retraction;Crico-pharyngeal dysfunction;Decreased elevation/closure;Decreased strength;Decreased pharyngeal wall constriction       8-point Penetration-Aspiration Scale: Score 7    PAIN:  Unable to report       COMMUNICATION/EDUCATION:   [x]  Patient educated regarding MBS results and diet recommendations. []  Patient/family have participated as able in goal setting and plan of care. []  Patient/family agree to work toward stated goals and plan of care. []  Patient understands intent and goals of therapy, but is neutral about his/her participation. [x]  Patient is unable to participate in goal setting and plan of care.     Thank you for this referral,  Yimi Camilo M.S., [address] [facility]  Speech-Language Pathologist

## 2022-05-31 NOTE — PROGRESS NOTES
Hospitalist Progress Note    Patient: Chris Calles Age: 64 y.o. : 1960 MR#: [id number] SSN: [id number]  Date/Time: 2022 12:45 PM    DOA: 2022  PCP: Queen Rancho MD    Subjective:     He is stable to transfer out of ICU today. He was extubated on 22, currently on room air, 97% O2 saturation   No fever. No leukocytosis   He underwent MBS today with severe pharyngeal dysphagia with all consistencies   Speech has recommended NPO and attempt at alternative feeding method. Interval Hospital Course:  64 y.o male with intellectual disability with h/o PKU (phenylketonuria), Seizure disorder, anxiety disorder, chronic urinary retention with singleton catheter requirement, presented to the ER 22 due to progressive lethargy and altered in his mentation. His group home staff reported that he was nonverbal for 48hrs, he was previously conversant and ambulatory. In the ER, he was found to be in acute respiratory failure with concern for Parainfluenza 3 infection. ID consulted and started him on zosyn for concern of aspiration. Due to his persistent respiratory failure and AMS, he was transferred to ICU for further monitoring. He was eventually intubated 22 due to worsened encephalopathy and became unresponsive. He was stabilized and was able to extubate on 22. ID continues him on zosyn. He was found to have severe pharyngeal dysphagia per MBS today. ROS: unable to due to intellectual disability       Assessment/Plan:     1. Acute respiratory failure with hypoxia, intubated for airway protection, extubated 22. 2. Possibly atypical pneumonia related to parainfluenza infection   3. Sepsis ruled out   4. Acute toxic/metabolic encephalopathy on chronic intellectual disability   5. Intellectual disability with h/o PKU  6.  Seizure disorder, stable   7. Elevated LFT's, improving to baseline   8. Hypercholesteremia   9. HypoKalemia   10. Urinary retention   11. Severe pharyngeal dysphagia with aspiration risk     Attempt to reach out to his brother, no answer. Will place NG tube for now. He will need PEG tube if his aspiration persisted   Aspiration precaution, NPO for now   Cont zosyn for now. ID follows  Continue his home medications.  Keppra, Depakote, (check Depakote level)  Cont Proscar, Flomax, Trospium   Speech follows  PT/OT  Will have palliative care consult for further goal of care   Flores in place   Daily lab, replace electrolytes     Full code     Disposition plannin days  Family updated (.attempt to call his brother [email or web address], no answer.)  Additional Notes:    Time spent > 30 minutes    Case discussed with:  [x]Patient  []Family  [x]Nursing  []Case Management  DVT Prophylaxis:  []Lovenox  []Hep SQ  []SCDs  []Coumadin   []On Heparin gtt    Signed By: Duane Blackmon MD     May 31, 2022 12:45 PM              Objective:   VS:   Visit Vitals  BP (!) 144/79 (BP 1 Location: Right leg)   Pulse 73   Temp 98.1 °F (36.7 °C)   Resp 14   Ht 5' 8\" (1.727 m)   Wt 66.5 kg (146 lb 9.7 oz)   SpO2 98%   BMI 22.29 kg/m²      Tmax/24hrs: Temp (24hrs), Av.4 °F (36.9 °C), Min:97.9 °F (36.6 °C), Max:98.7 °F (37.1 °C)      Intake/Output Summary (Last 24 hours) at 2022 1245  Last data filed at 2022 1111  Gross per 24 hour   Intake 1260 ml   Output 2850 ml   Net -1590 ml     Tele:   General:  Cooperative, Not in acute distress,  HEENT: PERRL, EOMI, supple neck, no JVD, dry oral mucosa  Cardiovascular: S1S2 regular, no rub/gallop   Pulmonary: air entry bilaterally, no wheezing, +crackle  GI:  Soft, non tender, non distended, +bs, no guarding   Extremities:  No pedal edema, +distal pulses appreciated   Neuro: Awake but unable to participate with exam    Additional:       Current Facility-Administered Medications   Medication Dose Route Frequency    barium sulfate (VARIBAR HONEY) 40 % (w/v) 29% (w/w) contrast suspension 30 mL  30 mL Oral ONCE    albuterol-ipratropium (DUO-NEB) 2.5 MG-0.5 MG/3 ML  3 mL Nebulization Q4H PRN    benztropine (COGENTIN) tablet 1 mg  1 mg Oral BID    levETIRAcetam (KEPPRA) oral solution 500 mg  500 mg Per G Tube BID    valproic acid (as sodium salt) (DEPAKENE) 250 mg/5 mL (5 mL) oral solution 500 mg  500 mg Per G Tube BID    glucose chewable tablet 16 g  4 Tablet Oral PRN    glucagon (GLUCAGEN) injection 1 mg  1 mg IntraMUSCular PRN    dextrose 10% infusion 0-250 mL  0-250 mL IntraVENous PRN    clotrimazole-betamethasone (LOTRISONE) 1-0.05 % cream   Topical BID    famotidine (PF) (PEPCID) 20 mg in 0.9% sodium chloride 10 mL injection  20 mg IntraVENous DAILY    cetirizine (ZYRTEC) tablet 10 mg  10 mg Oral DAILY    finasteride (PROSCAR) tablet 5 mg  5 mg Oral DAILY    loperamide (IMODIUM) capsule 2 mg  2 mg Oral QID PRN    trospium (SANCTURA) tablet 20 mg  20 mg Oral DAILY    OLANZapine (ZyPREXA) tablet 10 mg  10 mg Oral TID    pravastatin (PRAVACHOL) tablet 20 mg  20 mg Oral QHS    tamsulosin (FLOMAX) capsule 0.4 mg  0.4 mg Oral DAILY    polyethylene glycol (MIRALAX) packet 17 g  17 g Oral DAILY PRN    ondansetron (ZOFRAN ODT) tablet 4 mg  4 mg Oral Q8H PRN    Or    ondansetron (ZOFRAN) injection 4 mg  4 mg IntraVENous Q6H PRN    enoxaparin (LOVENOX) injection 40 mg  40 mg SubCUTAneous DAILY    piperacillin-tazobactam (ZOSYN) 3.375 g in 0.9% sodium chloride (MBP/ADV) 100 mL MBP  3.375 g IntraVENous Q8H    acetaminophen (TYLENOL) suppository 650 mg  650 mg Rectal Q4H PRN            Lab/Data Review:  Labs: Results:       Chemistry Recent Labs     05/31/22  0530 05/30/22  0155 05/29/22  1545 05/29/22  0055 05/29/22  0055   GLU 95 81 96   < > 113*    142 142   < > 139   K 3.8 3.7 3.8   < > 2.9*    109 107   < > 108   CO2 29 29 29   < > 26   BUN 13 21* 21*   < > 26*   CREA 0.65 0.62 0.76   < > 0.98   BUCR 20 34* 28*   < > 27*   AGAP 4 4 6   < > 5   CA 8.6 8.4* 8.4*   < > 8.2*   PHOS 2.6 3.3  --   -- 1.0*    < > = values in this interval not displayed. Recent Labs     05/31/22  0530 05/30/22  0155 05/29/22  0055   ALT 57 61 60   TP 6.5 6.1* 6.3*   ALB 2.2* 2.2* 2.2*   GLOB 4.3* 3.9 4.1*   AGRAT 0.5* 0.6* 0.5*      CBC w/Diff Recent Labs     05/31/22  0530 05/30/22  0155 05/30/22  0155 05/29/22  1545 05/29/22  1545   WBC 8.2  --  7.5  --  9.2   RBC 4.08*  --  3.84*  --  4.08*   HGB 12.4*  --  11.7*  --  12.4*   HCT 37.6  --  35.8*  --  37.9   MCV 92.2   < > 93.2   < > 92.9   MCH 30.4   < > 30.5   < > 30.4   MCHC 33.0   < > 32.7   < > 32.7   RDW 12.4   < > 13.1   < > 13.0     --  185  --  202   GRANS 67  --  67  --  74*   LYMPH 16*  --  18*  --  12*   EOS 3  --  1  --  0    < > = values in this interval not displayed. Coagulation No results for input(s): PTP, INR, APTT, INREXT in the last 72 hours.     Iron/Ferritin No results found for: IRON, FE, TIBC, IBCT, PSAT, FERR    BNP    Cardiac Enzymes No results found for: CPK, RCK1, RCK2, RCK3, RCK4, CKMB, CKNDX, CKND1, TROPT, TROIQ, BNPP, BNP     Lactic Acid    Thyroid Studies          All Micro Results     Procedure Component Value Units Date/Time    CULTURE, BLOOD [[id number]] Collected: 05/26/22 1100    Order Status: Completed Specimen: Blood Updated: 05/31/22 0659     Special Requests: NO SPECIAL REQUESTS        Culture result: NO GROWTH 5 DAYS       CULTURE, BLOOD [[id number]] Collected: 05/26/22 1200    Order Status: Completed Specimen: Blood Updated: 05/31/22 0659     Special Requests: NO SPECIAL REQUESTS        Culture result: NO GROWTH 5 DAYS       CULTURE, RESPIRATORY/SPUTUM/BRONCH Lorrayne Reveal STAIN [[id number]] Collected: 05/28/22 0250    Order Status: Completed Specimen: Sputum Updated: 05/30/22 0927     Special Requests: NO SPECIAL REQUESTS        GRAM STAIN RARE WBCS SEEN               RARE EPITHELIAL CELLS SEEN            NO ORGANISMS SEEN        Culture result:       RARE NORMAL RESPIRATORY GENARO          CULTURE, URINE [[id number]] Collected: 05/26/22 1234    Order Status: Completed Specimen: Urine from Clean catch Updated: 05/28/22 1226     Special Requests: NO SPECIAL REQUESTS        Culture result: No growth (<1,000 CFU/ML)       RESPIRATORY VIRUS PANEL W/COVID-19, PCR [[id number]]  (Abnormal) Collected: 05/26/22 1309    Order Status: Completed Specimen: Nasopharyngeal Updated: 05/26/22 1452     Adenovirus Not detected        Coronavirus 229E Not detected        Coronavirus HKU1 Not detected        Coronavirus CVNL63 Not detected        Coronavirus OC43 Not detected        SARS-CoV-2, PCR Not detected        Metapneumovirus Not detected        Rhinovirus and Enterovirus Not detected        Influenza A Not detected        Influenza A, subtype H1 Not detected        Influenza A, subtype H3 Not detected        INFLUENZA A H1N1 PCR Not detected        Influenza B Not detected        Parainfluenza 1 Not detected        Parainfluenza 2 Not detected        Parainfluenza 3 Detected        Parainfluenza virus 4 Not detected        RSV by PCR Not detected        B. parapertussis, PCR Not detected        Bordetella pertussis - PCR Not detected        Chlamydophila pneumoniae DNA, QL, PCR Not detected        Mycoplasma pneumoniae DNA, QL, PCR Not detected               Images:    CT (Most Recent). CT Results (most recent):  Results from Hospital Encounter encounter on 05/26/22    CT HEAD WO CONT    Narrative  CT OF THE HEAD WITHOUT CONTRAST. CLINICAL HISTORY: Altered mental status. TECHNIQUE: Helical scan obtained of the head were obtained from the skull vertex  through the base of the skull without intravenous contrast.    All CT scans are  performed using dose optimization techniques as appropriate to the performed  exam including the following: Automated exposure control, adjustment of mA  and/or kV according to patient size, and use of iterative reconstructive  technique. COMPARISON: 9/22/2020.     FINDINGS:    The sulcal pattern and ventricular system are normal in size and configuration  for age. Mild periventricular white matter hypodensities. No intracranial  hemorrhage. No mass effect. The visualized paranasal sinuses are clear. The  mastoid air cells are clear. The visualized bony structures are unremarkable. Impression  No acute findings. Stable mild features of chronic microvascular disease. XRAY (Most Recent) XR Results (most recent):  Results from Hospital Encounter encounter on 05/26/22    XR CHEST PORT    Narrative  PORTABLE CHEST RADIOGRAPH    CPT CODE: 92335    INDICATION: CVL placement. COMPARISON: 5/29/2022 at 01:41. FINDINGS:    Frontal view of the chest obtained at 03:51 hours. ET tube tip projects  approximately 6 cm above stew. New right jugular catheter with tip projecting  at cavoatrial junction. NG tube tip projects over the gastric fundus. The  cardiomediastinal silhouette is within normal limits. The pulmonary vascular  markings are unremarkable. Mild hypoinflation. There is no evidence of focal  consolidation, pleural effusion or pneumothorax. Evaluation of the osseous  structures is stable. Impression  New right jugular catheter. No pneumothorax or acute findings. EKG No results found for this or any previous visit.      2D ECHO

## 2022-05-31 NOTE — PROGRESS NOTES
SLP Note:    MBS completed; full report to follow. Recommend NPO; consider alternative nutrition/hyration source vs comfort feeds.      Chris Barboza M.S., [id number] [facility]  Speech-Language Pathologist

## 2022-05-31 NOTE — PROGRESS NOTES
Problem: Self Care Deficits Care Plan (Adult)  Goal: *Acute Goals and Plan of Care (Insert Text)  Description: Occupational Therapy Goals  Initiated 5/31/2022 within 7 day(s). Pt will be placed on 3 day trial to assess ability to meaningful participate in OT sessions. 1.  Patient will perform self-feeding with compensatory strategies for loaded utensil or drink to mouth with contact guard assist.   2.  Patient will perform grooming minimal assistance/contact guard assist.  3.  Patient will perform bed mobility with min A.   4.  Patient will perform sitting edge of bed with G balance x 2-3 min in prep for self care. Prior Level of Function:Due to pt's AMS and baseline intellectual disability pt unable to provide history. Per chart, pt is ambulatory and verbal at baseline and lives in group home. Has brother for social support. Outcome: Progressing Towards Goal   OCCUPATIONAL THERAPY EVALUATION    Patient: Jade Art (23 y.o. male)  Date: 5/31/2022  Primary Diagnosis: Pneumonia [J18.9]        Precautions:   Contact,Fall,Skin  PLOF: see above     ASSESSMENT :  Based on the objective data described below, the patient presents with decreased ability to follow commands, impaired coordination, decreased functional mobility impacting independence in self care. Pt cleared for OT participation by RN. Pt semi reclined in bed and sleeping. Pt spontaneously opened eyes without prompting. Pt with head initially turned to the L and requires cues with visual stimulation to turn head to R. Pt verbalizing, however, unable to discern clear words. Pt asked to squeeze OT hand with B hands, however, pt squeezed hands and then pulled his hand away from therapist. Attempted to have pt roll to R side, however, pt resisting. Pt provided napkin to wipe mouth clean due to dried skin. Pt required [facility] for bringing napkin to mouth. Pt then began shouting \"no, no, no\" and pulling away from OT.  Pt with all needs within reach at end of session. Patient will benefit from skilled intervention to address the above impairments. Patient's rehabilitation potential is considered to be Guarded  Factors which may influence rehabilitation potential include:   []             None noted  [x]             Mental ability/status  []             Medical condition  []             Home/family situation and support systems  []             Safety awareness  []             Pain tolerance/management  []             Other:      PLAN :  Recommendations and Planned Interventions:   [x]               Self Care Training                  [x]      Therapeutic Activities  [x]               Functional Mobility Training   []      Cognitive Retraining  [x]               Therapeutic Exercises           [x]      Endurance Activities  [x]               Balance Training                    [x]      Neuromuscular Re-Education  []               Visual/Perceptual Training     [x]      Home Safety Training  [x]               Patient Education                   [x]      Family Training/Education  []               Other (comment):    Frequency/Duration: Patient will be followed by occupational therapy 1-2 times per day/3-6 days per week for 3 day trial to assess pt's ability to meaningfully participate in OT sessions to address goals.   Discharge Recommendations: [facility] depending on progress   Further Equipment Recommendations for Discharge: TBD     SUBJECTIVE:   Patient stated no, no, no.    OBJECTIVE DATA SUMMARY:     Past Medical History:   Diagnosis Date    Anxiety disorder     Dermatitis     High cholesterol     Intellectual disability     PKU (phenylketonuria) ([facility] [address]     Seizures ([facility] [address]     Urinary retention      Past Surgical History:   Procedure Laterality Date    COLONOSCOPY N/A 10/19/2021    COLONOSCOPY performed by Deb Pisano MD at [address] [place] [address]  03/15/2022    Excision and Fulgeration Anal Condyloma     HX PROSTATE SURGERY      bph HX UROLOGICAL      retention     Barriers to Learning/Limitations: yes;  cognitive and altered mental status (i.e.Sedation, Confusion)  Compensate with: visual, verbal, tactile, kinesthetic cues/model    Home Situation:   Home Situation  Home Environment: Group home  One/Two Story Residence: Other (Comment)  Living Alone: No  Support Systems: Other Family Member(s)  Patient Expects to be Discharged to[de-identified] Group home  Current DME Used/Available at Home: Other (comment) (unknown)  []  Right hand dominant   []  Left hand dominant            unkown     Cognitive/Behavioral Status:  Neurologic State: Alert;Eyes open spontaneously  Orientation Level:  (minimal verbalization)  Cognition: Decreased command following  Safety/Judgement: Not assessed    Skin: intact BUE  Edema: slight edema BUE     Vision/Perceptual:    Tracking: Requires cues, head turns, or add eye shifts to track (to turn head to R)       Coordination: BUE  Fine Motor Skills-Upper: Left Impaired;Right Impaired    Gross Motor Skills-Upper: Left Impaired;Right Impaired    Strength: BUE  Unable to assess due to pt's resistance      Tone & Sensation: BUE  Unable to assess due to pt's resistance    Range of Motion: BUE  Unable to assess due to pt's resistance    Functional Mobility and Transfers for ADLs:  Bed Mobility:  Rolling: Total assistance (pt resistive )   ADL Assessment:   Feeding: Total assistance (based on clinical judgement )  Oral Facial Hygiene/Grooming: Total assistance (hand over hand)  Bathing: Total assistance (based on clinical judgement )  Upper Body Dressing: Total assistance (based on clinical judgement )  Lower Body Dressing: Total assistance (based on clinical judgement )  Toileting: Total assistance (based on clinical judgement )   ADL Intervention:   Grooming  Washing Face:  Total assistance (dependent)  Cognitive Retraining  Safety/Judgement: Not assessed  Pain:  Pain level pre-treatment: _/10   Pain level post-treatment: _/10   Pain Intervention(s): Medication (see MAR); Rest, Ice, Repositioning  Response to intervention: Nurse notified, See doc flow    Activity Tolerance:   Poor   Please refer to the flowsheet for vital signs taken during this treatment. After treatment:   [] Patient left in no apparent distress sitting up in chair  [x] Patient left in no apparent distress in bed  [x] Call bell left within reach  [x] Nursing notified  [] Caregiver present  [] Bed alarm activated    COMMUNICATION/EDUCATION:   [x] Role of Occupational Therapy in the acute care setting  [x] Home safety education was provided and the patient/caregiver indicated understanding. [] Patient/family have participated as able in goal setting and plan of care. [] Patient/family agree to work toward stated goals and plan of care. [] Patient understands intent and goals of therapy, but is neutral about his/her participation. [x] Patient is unable to participate in goal setting and plan of care. Thank you for this referral.  Devin Zavala OT  Time Calculation: 10 mins    Eval Complexity: History: MEDIUM Complexity : Expanded review of history including physical, cognitive and psychosocial  history ; Examination: HIGH Complexity : 5 or more performance deficits relating to physical, cognitive , or psychosocial skils that result in activity limitations and / or participation restrictions; Decision Making:HIGH Complexity : Patient presents with comorbidities that affect occupational performance.  Signifigant modification of tasks or assistance (eg, physical or verbal) with assessment (s) is necessary to enable patient to complete evaluation

## 2022-05-31 NOTE — PROGRESS NOTES
[facility]. Pulmonary, Critical Care, and Sleep Medicine    Name: Veronika Kaur MRN: [id number]   : 1960 Hospital: [facility]   Date: 2022  Admission Date: 2022     Chart and notes reviewed. Data reviewed. I have evaluated all findings. [x]I have reviewed the flowsheet and previous days notes. [x]The patient is unable to give any meaningful history or review of systems because the patient is:  []Intubated [x]Non verbal   []Unresponsive      [x]The patient is critically ill on      []Mechanical ventilation []Pressors   []BiPAP []         Interval HPI:  61/M with acute hypoxic respiratory failure and acute on chronic toxic-metabolic encephalopathy superimposed on intellectual disability. Sepsis possibly UTI +/- aspiration pneumonia in light of AMS and inability to protect airway. Intubated       Subjective 22  Hospital Day: admitted   Vent Day:, extubated   Overnight events: remains off vent support  Mentation/Activity: awake and alert  Respiratory/ Secretions: mild  Hemodynamics: stable  Urine output, bowel: see below  Diet: po feeds  Need for procedures:              ROS:Review of systems not obtained due to patient factors. Events and notes from last 24 hours reviewed. 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 Last 3 shifts:  1901 -  0700  In: 2369 [P.O.:1020;  I.V.:743]  Out: 2735 [Urine:2735]    Intake/Output Summary (Last 24 hours) at 5/31/2022 0931  Last data filed at 5/31/2022 0414  Gross per 24 hour   Intake 1340 ml   Output 2150 ml   Net -810 ml          Current Facility-Administered Medications   Medication Dose Route Frequency    benztropine (COGENTIN) tablet 1 mg  1 mg Oral BID    levETIRAcetam (KEPPRA) oral solution 500 mg  500 mg Per G Tube BID    valproic acid (as sodium salt) (DEPAKENE) 250 mg/5 mL (5 mL) oral solution 500 mg  500 mg Per G Tube BID    clotrimazole-betamethasone (LOTRISONE) 1-0.05 % cream   Topical BID    famotidine (PF) (PEPCID) 20 mg in 0.9% sodium chloride 10 mL injection  20 mg IntraVENous DAILY    cetirizine (ZYRTEC) tablet 10 mg  10 mg Oral DAILY    finasteride (PROSCAR) tablet 5 mg  5 mg Oral DAILY    trospium (SANCTURA) tablet 20 mg  20 mg Oral DAILY    OLANZapine (ZyPREXA) tablet 10 mg  10 mg Oral TID    pravastatin (PRAVACHOL) tablet 20 mg  20 mg Oral QHS    tamsulosin (FLOMAX) capsule 0.4 mg  0.4 mg Oral DAILY    enoxaparin (LOVENOX) injection 40 mg  40 mg SubCUTAneous DAILY    piperacillin-tazobactam (ZOSYN) 3.375 g in 0.9% sodium chloride (MBP/ADV) 100 mL MBP  3.375 g IntraVENous Q8H         Telemetry: []Sinus []A-flutter []Paced    []A-fib []Multiple PVCs                  Physical Exam:      General: awake, not in distress  HEENT:  Anicteric sclerae; pink palpebral conjunctivae; mucosa moist  Resp:  Symmetrical chest expansion, no accessory muscle use; good airway entry; no rales/ wheezing/ rhonchi noted  CV:  S1, S2 present; regular rate and rhythm  GI:  Abdomen soft, non-tender; (+) active bowel sounds  Extremities:  +2 pulses on all extremities; no edema/ cyanosis/ clubbing noted   Skin:  Warm; no rashes/ lesions noted, normal turgor/cap refill   Neurologic: awake, follows commands and answers in monosyllables  Devices:  CVL  R IJ.  Flores      DATA:  MAR reviewed and pertinent medications noted or modified as needed    Labs:  Recent Labs     05/31/22  0530 05/30/22  0155 05/29/22  1545   WBC 8.2 7.5 9.2   HGB 12.4* 11.7* 12.4*   HCT 37.6 35.8* 37.9    185 202     Recent Labs     05/31/22  0530 05/30/22  0155 05/29/22  1545 05/29/22  0055 05/29/22  0055    142 142   < > 139   K 3.8 3.7 3.8   < > 2.9*    109 107   < > 108   CO2 29 29 29   < > 26   GLU 95 81 96   < > 113*   BUN 13 21* 21*   < > 26*   CREA 0.65 0.62 0.76   < > 0.98   CA 8.6 8.4* 8.4*   < > 8.2*   MG 2.2 2.3  --   --  2.4   PHOS 2.6 3.3  --   --  1.0*   ALB 2.2* 2.2*  --   --  2.2*   ALT 57 61  --   --  60    < > = values in this interval not displayed. No results for input(s): PH, PCO2, PO2, HCO3, FIO2 in the last 72 hours. Recent Labs     05/29/22  0417   FIO2I 30   HCO3I 25.1   PCO2I 30.2*   PHI 7.53*   PO2I 87       Imaging:  [x]   I have personally reviewed the patients radiographs and reports  XR Results (most recent):  XR Results (most recent):  Results from Hospital Encounter encounter on 05/26/22    XR CHEST PORT    Narrative  PORTABLE CHEST RADIOGRAPH    CPT CODE: 22132    INDICATION: CVL placement. COMPARISON: 5/29/2022 at 01:41. FINDINGS:    Frontal view of the chest obtained at 03:51 hours. ET tube tip projects  approximately 6 cm above stew. New right jugular catheter with tip projecting  at cavoatrial junction. NG tube tip projects over the gastric fundus. The  cardiomediastinal silhouette is within normal limits. The pulmonary vascular  markings are unremarkable. Mild hypoinflation. There is no evidence of focal  consolidation, pleural effusion or pneumothorax. Evaluation of the osseous  structures is stable. Impression  New right jugular catheter. No pneumothorax or acute findings. CT Results (most recent):  Results from Hospital Encounter encounter on 05/26/22    CT HEAD WO CONT    Narrative  CT OF THE HEAD WITHOUT CONTRAST. CLINICAL HISTORY: Altered mental status.     TECHNIQUE: Helical scan obtained of the head were obtained from the skull vertex  through the base of the skull without intravenous contrast.    All CT scans are  performed using dose optimization techniques as appropriate to the performed  exam including the following: Automated exposure control, adjustment of mA  and/or kV according to patient size, and use of iterative reconstructive  technique. COMPARISON: 9/22/2020. FINDINGS:    The sulcal pattern and ventricular system are normal in size and configuration  for age. Mild periventricular white matter hypodensities. No intracranial  hemorrhage. No mass effect. The visualized paranasal sinuses are clear. The  mastoid air cells are clear. The visualized bony structures are unremarkable. Impression  No acute findings. Stable mild features of chronic microvascular disease. IMPRESSION:   · Acute toxic metabolic encephalopathy superimposed on intellectual disability, mentation at [facility]  · Acute hypoxic respiratory failure requiring intubation, possibly atypical pneumonia related to parainfluenza infection +/- aspiration pneumonia. 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 CVS:Monitor HD, MAP goal >65. DVT ruled out on PVL. Discontinue CVL once peripheral access obtained. GI: diet easy to chew per SLP recommendations. Monitor LFT's  Renal: Trend Cr, UOP.  Continue Flores for urinary retention  Hem/Onc: Trend H/H, monitor for s/o active bleeding. Daily CBC. I/D:Trend WBCs and temperature curve. Continue Pip-Tazo pending cultures   Metabolic: Daily BMP, mag, phos. Trend lytes and replace per protocol. Endocrine:Q6 glucoses. SSI. Avoid hypoglycemia. Musc/Skin: turn Q 2 hours  Full Code   Transfer to SD  Discussed with nursing and RT     Best practice :    Glycemic control  IHI ICU bundles:   Central Line Bundle Followed  and Singleton Bundle Followed    [facility] Vent patients- NA     Stress ulcer prophylaxis. Pepcid  DVT prophylaxis. Lovenox  Need for Lines, singleton assessed. Palliative care evaluation. Restraints need. Attending Non-violent Restraint Reevaluation     I have reevaluated the patient one hour after initiation of intervention. The patient is comfortable, uninjured, but continues to pose an imminent risk of injury to self to themselves and/or serious disruption of medical treatment required to keep patient stable. The patient's current medical and behavioral conditions that warrant the use intervention include danger to self and Interference with medical equipment or treatment. Restraint or seclusion will be discontinued at the earliest possible time, regardless of the scheduled expiration of the order. Based on my evaluation, restraints will be continued: NO       Critical care time 34 minutes excluding procedures. This care involved high complexity decision making to assess, manipulate, and support vital system functions, to treat this degreee vital organ system failure and to prevent further life threatening deterioration of the patients condition  The services I provided to this patient were to treat and/or prevent clinically significant deterioration that could result in the failure of one or more body systems and/or organ systems due to respiratory distress, hypoxia, cardiac dysrhythmia.        Jonny Chaves MD   05/31/22  Pulmonary, Critical Care Medicine  [facility]

## 2022-05-31 NOTE — PROGRESS NOTES
[facility]  (A Division of [address] [place] [address])    Follow-up Note      Date of Admission: 2022       Date of Note:  2022          Current Antimicrobials:    Prior Antimicrobials:  Zosyn  to present      Assessment:         Acute respiratory failure: Intubated on , extubated   Infection with parainfluenza 3 virus: no pneumonia on CXR upon admission. There are hazy opacities noted on post intubation x-ray on . With possible congestion noted on  x-rays.   -Lactic acid 1.4, procalcitonin 0.45  Aspiration pneumonia  Acute metabolic encephalopathy  Transaminitis: Persistent-AST trending down somewhat  Mild hyponatremia: Resolved  PKU  Mild hematuria       Plan:   Continue with Zosyn for suspected aspiration.    -Plan for 2 more days. CBC, BMP  Aspiration precautions, speech evaluation due to risk for aspiration. Continue with droplet precautions for parainfluenza    Discussed with ICU team.       Shahid Malik DO  [facility]  [address] , [address] [facility] [address]  Office: [phone]  Mobile/Text: [phone]     Microbiology:   blood cultures: No growth to date   urine culture no course to date   respiratory viral culture:  positive for parainfluenza 3 negative for all other pathogens    Lines / Catheters:  Peripheral  ET   Right IJ      Subjective:   Patient seen and examined at bedside in ICU this afternoon. Interval history has been reviewed. Looks around. Doesn't really talk too much. No documented fevers.   WBC is stable    Objective:        Visit Vitals  BP (!) 143/84   Pulse 70   Temp 98.1 °F (36.7 °C)   Resp 18   Ht 5' 8\" (1.727 m)   Wt 66.5 kg (146 lb 9.7 oz)   SpO2 97%   BMI 22.29 kg/m²     Temp (24hrs), Av.3 °F (36.8 °C), Min:97.9 °F (36.6 °C), Max:98.7 °F (37.1 °C)        General:   Awake and alert looks around   Skin:   no rashes or skin lesions noted on limited exam, dry and warm   HEENT:  No scleral icterus or pallor; oral mucosa moist, lips moist   Lymph Nodes:   not assessed today   Lungs:   coarse breath sounds throughout, occasional wheeze and irregular respiratory drive   Heart:  RRR, s1 and s2; no murmurs rubs or gallops; no edema, + pedal pulses   Abdomen:  soft, non-distended, active bowel sounds, non-tender   Genitourinary:  deferred   Extremities:   average muscle tone; no contractures, no joint effusions,   Neurologic:   Moves extremities independently, no apparent focal weakness, slow to answer questions, intermittent confusion   Psychiatric:  , Calm, follows directions         Lab results:    Chemistry  Recent Labs     05/31/22  0530 05/30/22  0155 05/29/22  1545 05/29/22  0055 05/29/22  0055   GLU 95 81 96   < > 113*    142 142   < > 139   K 3.8 3.7 3.8   < > 2.9*    109 107   < > 108   CO2 29 29 29   < > 26   BUN 13 21* 21*   < > 26*   CREA 0.65 0.62 0.76   < > 0.98   CA 8.6 8.4* 8.4*   < > 8.2*   AGAP 4 4 6   < > 5   BUCR 20 34* 28*   < > 27*   AP 59 54  --   --  51   TP 6.5 6.1*  --   --  6.3*   ALB 2.2* 2.2*  --   --  2.2*   GLOB 4.3* 3.9  --   --  4.1*   AGRAT 0.5* 0.6*  --   --  0.5*    < > = values in this interval not displayed.        CBC w/ Diff  Recent Labs     05/31/22 0530 05/30/22  0155 05/29/22  1545   WBC 8.2 7.5 9.2   RBC 4.08* 3.84* 4.08*   HGB 12.4* 11.7* 12.4*   HCT 37.6 35.8* 37.9    185 202   GRANS 67 67 74*   LYMPH 16* 18* 12*   EOS 3 1 0       Microbiology  All Micro Results     Procedure Component Value Units Date/Time    CULTURE, BLOOD [[id number]] Collected: 05/26/22 1100    Order Status: Completed Specimen: Blood Updated: 05/31/22 0659     Special Requests: NO SPECIAL REQUESTS        Culture result: NO GROWTH 5 DAYS       CULTURE, BLOOD [[id number]] Collected: 05/26/22 1200    Order Status: Completed Specimen: Blood Updated: 05/31/22 0659     Special Requests: NO SPECIAL REQUESTS        Culture result: NO GROWTH 5 DAYS       CULTURE, RESPIRATORY/SPUTUM/BRONCH Mercedes Maya [[id number]] Collected: 05/28/22 0250    Order Status: Completed Specimen: Sputum Updated: 05/30/22 0927     Special Requests: NO SPECIAL REQUESTS        GRAM STAIN RARE WBCS SEEN               RARE EPITHELIAL CELLS SEEN            NO ORGANISMS SEEN        Culture result:       RARE NORMAL RESPIRATORY GENARO          CULTURE, URINE [[id number]] Collected: 05/26/22 1234    Order Status: Completed Specimen: Urine from Clean catch Updated: 05/28/22 1226     Special Requests: NO SPECIAL REQUESTS        Culture result: No growth (<1,000 CFU/ML)       RESPIRATORY VIRUS PANEL W/COVID-19, PCR [[id number]]  (Abnormal) Collected: 05/26/22 1309    Order Status: Completed Specimen: Nasopharyngeal Updated: 05/26/22 1452     Adenovirus Not detected        Coronavirus 229E Not detected        Coronavirus HKU1 Not detected        Coronavirus CVNL63 Not detected        Coronavirus OC43 Not detected        SARS-CoV-2, PCR Not detected        Metapneumovirus Not detected        Rhinovirus and Enterovirus Not detected        Influenza A Not detected        Influenza A, subtype H1 Not detected        Influenza A, subtype H3 Not detected        INFLUENZA A H1N1 PCR Not detected        Influenza B Not detected        Parainfluenza 1 Not detected        Parainfluenza 2 Not detected        Parainfluenza 3 Detected        Parainfluenza virus 4 Not detected        RSV by PCR Not detected        B. parapertussis, PCR Not detected        Bordetella pertussis - PCR Not detected        Chlamydophila pneumoniae DNA, QL, PCR Not detected        Mycoplasma pneumoniae DNA, QL, PCR Not detected              Flavio Adams DO   5/31/2022

## 2022-05-31 NOTE — PROGRESS NOTES
Problem: Risk for Spread of Infection  Goal: Prevent transmission of infectious organism to others  Description: Prevent the transmission of infectious organisms to other patients, staff members, and visitors. Outcome: Progressing Towards Goal     Problem: Patient Education:  Go to Education Activity  Goal: Patient/Family Education  Outcome: Progressing Towards Goal     Problem: Patient Education: Go to Patient Education Activity  Goal: Patient/Family Education  Outcome: Progressing Towards Goal     Problem: Falls - Risk of  Goal: *Absence of Falls  Description: Document Ag Pereiraer Fall Risk and appropriate interventions in the flowsheet.   Outcome: Progressing Towards Goal  Note: Fall Risk Interventions:  Mobility Interventions: Bed/chair exit alarm,Communicate number of staff needed for ambulation/transfer,Patient to call before getting OOB,Strengthening exercises (ROM-active/passive)    Mentation Interventions: Adequate sleep, hydration, pain control,Bed/chair exit alarm,Door open when patient unattended,Evaluate medications/consider consulting pharmacy,Familiar objects from home,Gait belt with transfers/ambulation,Increase mobility,Reorient patient,Room close to nurse's station,Update white board    Medication Interventions: Assess postural VS orthostatic hypotension,Bed/chair exit alarm,Evaluate medications/consider consulting pharmacy,Patient to call before getting OOB,Teach patient to arise slowly,Utilize gait belt for transfers/ambulation    Elimination Interventions: Bed/chair exit alarm,Call light in reach,Patient to call for help with toileting needs,Stay With Me (per policy),Toileting schedule/hourly rounds    History of Falls Interventions: Bed/chair exit alarm,Consult care management for discharge planning,Door open when patient unattended,Evaluate medications/consider consulting pharmacy,Investigate reason for fall,Room close to nurse's station,Utilize gait belt for transfer/ambulation,Assess for delayed presentation/identification of injury for 48 hrs (comment for end date),Vital signs minimum Q4HRs X 24 hrs (comment for end date)

## 2022-05-31 NOTE — CONSULTS
Nutrition Note    Pt discussed this morning during interdisciplinary rounds; had good po intake while on po diet per RN report. Pt sounding more congested today. Progressing from ICU status; downgraded to stepdown status. S/p MBS today; SLP recommended NPO. Consult received for management of tube feeds. Noted order placed for NGT insertion. BM 5/31. Progressing towards nutrition goals. Nutrition Recommendations/Plan:   1.  Once NGT placement is verified by KUB and okay to use, start tube feeds of Jevity 1.5 at 20 mL/hr, advancing as pt tolerates by 10 mL q 8 hours to goal rate of 50 mL/hr with water flushes of 75 mL q 4 hours.    (goal EN provides:  1800 kcal, 77 gm protein, 912 mL free water, 100% RDIs)          Electronically signed by Chapo Najera RD on 5/31/2022 at 3:17 PM    Contact: [phone]

## 2022-05-31 NOTE — PROGRESS NOTES
RN report to [employer] on cvtsd. Pt sitting up in bed grt than 30degrees,  vss, o2sat 96% room air, coughing up copious secretions refuses oral/nasal suction. Left pil cdi flushed/capped  Condom cath on w no urine drainage yet. Will monitor and bladder scan. NO po meds given today as pt unable to tolerate-npo. Pt transferred on monitored bed w/o incident and  had no personal belongings. Brother Hardy Robison notified of transfer. Bedside update report to [facility] and [employer].

## 2022-05-31 NOTE — PROGRESS NOTES
1751  Multiple attempts to insert NGT without success. Charge nurse also attempted to insert NGT without success. Resistance met with both nostrils. Pt not cooperative also. 1805  Doctor Reji Rosas at nurses station, made aware.

## 2022-05-31 NOTE — PROGRESS NOTES
Lying in bed in nad, hob elevated >30degrees.   Vss, on room air 97%  Off tele for transport to rad/swallow study

## 2022-05-31 NOTE — PROGRESS NOTES
Problem: Dysphagia (Adult)  Goal: *Acute Goals and Plan of Care (Insert Text)  Description: Patient will:  1. Tolerate PO trials with 0 s/s overt distress in 4/5 trials  2. Utilize compensatory swallow strategies/maneuvers (decrease bite/sip, size/rate, alt. liq/sol) with min cues in 4/5 trials  3. Perform oral-motor/laryngeal exercises to increase oropharyngeal swallow function with min cues  4. Complete an objective swallow study (i.e., MBSS) to assess swallow integrity, r/o aspiration, and determine of safest LRD, min A  5. Pt will utilize comp strategies to improve respiration to swallow coordination to reduce aspiration risk and improve activity tolerance for sustained nutrition/ hydration given min-mod cues. Rec:     Continue easy to chew with NTL   Aspiration precautions  Feeding assist   Meds with NTL or in puree  HOB >45 degrees during intake and for at least 30 min after intake  Small bites/sips, slow rate of intake  Oral care three times daily   MBS    5/31/2022 1301 by Shahab Guerrero SLP  Outcome: Not Progressing Towards Goal    SPEECH [address] TREATMENT    Patient: Eva Bass (37 y.o. male)  Date: 5/31/2022  Diagnosis: Pneumonia [J18.9]  Precautions: Aspiration, Contact,Fall,Skin  PLOF: As per H&P      ASSESSMENT:  Pt seen for follow up dysphagia treatment with breakfast meal.  No cough noted prior to po intake. Demo immediate strong cough across NTL, HTL and regular solids. Unable to follow commands for use of compensatory strategies. Recommend continue current diet with aspiration precautions in place with MBS later this date to objectively assess oropharyngeal swallow integrity and rule out silent aspiration. D/w pt and RN.      Progression toward goals:  []         Improving appropriately and progressing toward goals  []         Improving slowly and progressing toward goals  [x]         Not making progress toward goals and plan of care will be adjusted PLAN:  Recommendations and Planned Interventions:  Patient continues to benefit from skilled intervention to address the above impairments. Continue treatment per established plan of care. Discharge Recommendations: To Be Determined     SUBJECTIVE:   Patient with unintelligible speech     OBJECTIVE:   Cognitive and Communication Status:  Neurologic State: Alert,Eyes open spontaneously  Orientation Level:  (minimal verbalization)  Cognition: Decreased command following  Perception: Tactile,Verbal  Perseveration: No perseveration noted  Safety/Judgement: Not assessed  Dysphagia Treatment:  Oral Assessment:  Oral Assessment  Labial: Decreased seal  Dentition: Limited  Oral Hygiene: fair  Lingual: Decreased strength,Incoordinated  Velum: Unable to visualize  Mandible: No impairment  Gag Reflex: No impairment  P.O.  Trials:   Patient Position: 70   Vocal quality prior to P.O.: Other (comment)   Consistency Presented: Nectar thick liquid,Honey thick liquid, Easy to chew    How Presented: SLP-fed/presented,Cup/sip,Straw,Successive swallows,Spoon   How Much: 1   Bolus Acceptance: No impairment   Bolus Formation/Control: Impaired   Type of Impairment: Mastication   Propulsion: Delayed (# of seconds)   Oral Residue: Less than 10% of bolus,Lingual   Initiation of Swallow: No impairment   Laryngeal Elevation: Decreased   Aspiration Signs/Symptoms: Strong cough    Pharyngeal Phase Characteristics: Audible swallow   Effective Modifications: Small sips and bites   Cues for Modifications: Maximal   Oral Phase Severity: Mild   Pharyngeal Phase Severity : Moderate     PAIN:  Start of Tx: unable to report   End of Tx: unable to report      After treatment:   []              Patient left in no apparent distress sitting up in chair  [x]              Patient left in no apparent distress in bed  [x]              Call bell left within reach  [x]              Nursing notified  []              Family present  []              Caregiver present  []              Bed alarm activated      COMMUNICATION/EDUCATION:   [x] Aspiration precautions; swallow safety; compensatory techniques  []        Patient/family able to participate in training and education   [x]  Patient unable to participate in training and education, education ongoing with staff   [] Patient understands goals and intent of therapy; neutral about participation     Crystal Kirby M.S., [id number] [facility]  Speech-Language Pathologist

## 2022-05-31 NOTE — DIABETES MGMT
Glycemic Control:     GLU 95 05/31/2022 05:30 AM    GLUCPOC 85 05/31/2022 08:00 AM    GLUCPOC 91 05/31/2022 05:50 AM    GLUCPOC 89 05/31/2022 12:28 AM     Pt's BG is in the target range,  has not  required insulin.  Ginger PEÑA BC-ADM

## 2022-05-31 NOTE — PROGRESS NOTES
Transfer in report received on Giuliana Trujillo coming from ICU for routine progression of care. Report included the following information.  Sbar, Mar, Kardex and Recent Results

## 2022-06-01 NOTE — PROGRESS NOTES
NGT tube out again with restraints in place and well secured. Spoke to attending. States, no need to place another NGT.

## 2022-06-01 NOTE — ROUTINE PROCESS
1925: Bedside report given by off going nurse Kyung Bartlett , RN to Julian Coral of this note  On coming nurse Celeste METCALF RN. Patient is sleeping awakes to nurse's voice. Patient is calm allowing nurse to assess him. Nurse reviews POC with patient for the shift and offers to turn on TV.    2050: nurse at bedside repositioning patient forward d/t excessive coughing. 2230: No change in status. 2330: Nurse rounds, no change in status. 2654-5019: Bedside care performed/ A complete bath , linen change and reposition to right side. Condom cath was changed. Patient was cleared by nursing supervisor to be off isolation. Patient r/o for COVID.    0400: no changed    0245-8649: bedside care. Repositioned to left side. HOB 45 degrees.

## 2022-06-01 NOTE — PROGRESS NOTES
[facility]  (A Division of [address] [place] [address])    Follow-up Note      Date of Admission: 5/26/2022       Date of Note:  6/1/2022          Current Antimicrobials:    Prior Antimicrobials:  Zosyn 5/26 to present      Assessment:         Acute respiratory failure: Intubated on 5/28, extubated 5/29  Infection with parainfluenza 3 virus: no pneumonia on CXR upon admission. There are hazy opacities noted on post intubation x-ray on 5/28. With possible congestion noted on 5/27 x-rays.   -Lactic acid 1.4, procalcitonin 0.45  Aspiration pneumonia  Acute metabolic encephalopathy  Transaminitis: Persistent-AST trending down somewhat  Mild hyponatremia: Resolved  PKU  Mild hematuria       Plan:   Continue with Zosyn for suspected aspiration.    -Plan for 1 more day. CBC, BMP  Aspiration precautions, .   -Agree with need for ongoing assessments and potential PEG tube placement as his risk for recurrent aspiration remains fairly high. Continue with droplet precautions for parainfluenza as per hospital infection control policy      DO Alexandre Carrera [facility]  [address], [address] [facility] [address]  Office: [phone]  Mobile/Text: [phone]     Microbiology:  5/26 blood cultures: No growth to date  5/26 urine culture no course to date  5/26 respiratory viral culture:  positive for parainfluenza 3 negative for all other pathogens    Lines / Catheters:  Peripheral  ET 5/28  Right IJ 5/29     Subjective:   Patient seen and examined at bedside this AM.  He was transferred out of the ICU yesterday. Overall is doing about the same. He is awake and looks around but still does not talk very much. No new fevers. Overnight events reviewed.     Objective:        Visit Vitals  /72 (BP 1 Location: Right upper arm, BP Patient Position: At rest)   Pulse 71   Temp 98.5 °F (36.9 °C)   Resp 26   Ht 5' 8\" (1.727 m)   Wt 63.1 kg (139 lb 3.2 oz) SpO2 90%   BMI 21.17 kg/m²     Temp (24hrs), Av.3 °F (36.8 °C), Min:97.9 °F (36.6 °C), Max:98.7 °F (37.1 °C)        General:   Awake and alert looks around   Skin:   no rashes or skin lesions noted on limited exam, dry and warm   HEENT:  No scleral icterus or pallor; oral mucosa moist, lips moist   Lymph Nodes:   not assessed today   Lungs:   coarse breath sounds throughout, occasional wheeze and irregular respiratory drive   Heart:  RRR, s1 and s2; no murmurs rubs or gallops; no edema, + pedal pulses   Abdomen:  soft, non-distended, active bowel sounds, non-tender   Genitourinary:  deferred   Extremities:   average muscle tone; no contractures, no joint effusions,   Neurologic:   Moves extremities independently, no apparent focal weakness, slow to answer questions, intermittent confusion   Psychiatric:  , Calm, follows directions         Lab results:    Chemistry  Recent Labs     22   GLU 84 95 81    139 142   K 4.0 3.8 3.7    106 109   CO2 27 29 29   BUN 18 13 21*   CREA 0.60 0.65 0.62   CA 8.5 8.6 8.4*   AGAP 5 4 4   BUCR 30* 20 34*   AP  --  59 54   TP  --  6.5 6.1*   ALB  --  2.2* 2.2*   GLOB  --  4.3* 3.9   AGRAT  --  0.5* 0.6*       CBC w/ Diff  Recent Labs     22  01522  1545 22  1545   WBC 9.1 8.2 7.5   < > 9.2   RBC 4.14* 4.08* 3.84*   < > 4.08*   HGB 12.6* 12.4* 11.7*   < > 12.4*   HCT 37.9 37.6 35.8*   < > 37.9    201 185   < > 202   GRANS  --  67 67  --  74*   LYMPH  --  16* 18*  --  12*   EOS  --  3 1  --  0    < > = values in this interval not displayed.        Microbiology  All Micro Results     Procedure Component Value Units Date/Time    CULTURE, BLOOD [[id number]] Collected: 22 1100    Order Status: Completed Specimen: Blood Updated: 22 0641     Special Requests: NO SPECIAL REQUESTS        Culture result: NO GROWTH 6 DAYS       CULTURE, BLOOD [[id number]] Collected: 05/26/22 1200    Order Status: Completed Specimen: Blood Updated: 06/01/22 0641     Special Requests: NO SPECIAL REQUESTS        Culture result: NO GROWTH 6 DAYS       CULTURE, RESPIRATORY/SPUTUM/BRONCH [place] STAIN [[id number]] Collected: 05/28/22 0250    Order Status: Completed Specimen: Sputum Updated: 05/30/22 0927     Special Requests: NO SPECIAL REQUESTS        GRAM STAIN RARE WBCS SEEN               RARE EPITHELIAL CELLS SEEN            NO ORGANISMS SEEN        Culture result:       RARE NORMAL RESPIRATORY GENARO          CULTURE, URINE [[id number]] Collected: 05/26/22 1234    Order Status: Completed Specimen: Urine from Clean catch Updated: 05/28/22 1226     Special Requests: NO SPECIAL REQUESTS        Culture result: No growth (<1,000 CFU/ML)       RESPIRATORY VIRUS PANEL W/COVID-19, PCR [[id number]]  (Abnormal) Collected: 05/26/22 1309    Order Status: Completed Specimen: Nasopharyngeal Updated: 05/26/22 1452     Adenovirus Not detected        Coronavirus 229E Not detected        Coronavirus HKU1 Not detected        Coronavirus CVNL63 Not detected        Coronavirus OC43 Not detected        SARS-CoV-2, PCR Not detected        Metapneumovirus Not detected        Rhinovirus and Enterovirus Not detected        Influenza A Not detected        Influenza A, subtype H1 Not detected        Influenza A, subtype H3 Not detected        INFLUENZA A H1N1 PCR Not detected        Influenza B Not detected        Parainfluenza 1 Not detected        Parainfluenza 2 Not detected        Parainfluenza 3 Detected        Parainfluenza virus 4 Not detected        RSV by PCR Not detected        B. parapertussis, PCR Not detected        Bordetella pertussis - PCR Not detected        Chlamydophila pneumoniae DNA, QL, PCR Not detected        Mycoplasma pneumoniae DNA, QL, PCR Not detected              [address], DO   6/1/2022

## 2022-06-01 NOTE — PROGRESS NOTES
spoke with Madyson Palmer at the patient's group home.  informed Madyson Palmer that the patient may have to come back with peg tube.  inquired if the group home will be able to accept the patient with peg tube. Flakitonayla Yann indicated that she will have to speak with the staff and will follow up with .         EARNEST Reyes

## 2022-06-01 NOTE — PROGRESS NOTES
Reason for Admission:  Pneumonia [J18.9]                 RUR Score:    12           Plan for utilizing home health: To be determine                      Likelihood of Readmission:   LOW                         Transition of Care Plan:              Initial assessment completed with patient's brother, Francisco J Gonzalez. Cognitive status of patient: only aware of  person. Face sheet information confirmed:  yes. The patient's brother could not confirm that pcp. The patient designates brother, Francisco J Gonzalez, to participate in his discharge plan and to receive any needed information. This patient lives in a group home. Patient is not able to navigate steps as needed. Prior to hospitalization, patient was considered to be independent with ADLs/IADLS : no . The patient's group home assist with ADLs and IADLS. If not independent,  patient needs assist with : dressing, bathing, food preparation, cooking, toileting and grooming. Patient has a current ACP document on file: no      Healthcare Decision Maker:     Click here to complete [address] including selection of the Healthcare Decision Maker Relationship (ie \"Primary\")    The patient will need medical transport upon discharge. The patient's brother reported no known medical equipment available in the home. Patient is not currently active with home health. Patient has not stayed in a skilled nursing facility or rehab. Was  stay within last 60 days : no. This patient is on dialysis :no     Currently, the discharge plan is Home and Home with [facility]. The patient states that he can obtain his medications from the pharmacy, and take his medications as directed. Patient's current insurance is Medicare and Medicaid. The patient's brother provided contact information for the patient's group home. The patient's brother indicated that the  for the group home is [facility].   The patient's brother reported that the patient moved in the group home about a week ago.       Care Management Interventions  Mode of Transport at Discharge: ALS  Transition of Care Consult (CM Consult): Discharge Planning  Support Systems: Adult Group Home,Other Family Member(s) (brother)  Confirm Follow Up Transport: Other (see comment) (medical transport)  Discharge Location  Patient Expects to be Discharged to[de-identified] Home        EARNEST Caro

## 2022-06-01 NOTE — PROGRESS NOTES
NGT tube out laying on patient with restraints in place and well secured. New NGT placed with assist from  [place], On license of [facility] charge nurse.  Will order KUB/wet read

## 2022-06-01 NOTE — PROGRESS NOTES
[facility] Hospitalists  Progress Note    Patient: Greta Grace Age: 64 y.o. : 1960 MR#: [id number] SSN: [id number]  Date: 2022     Subjective/24-hour events:     Had NG tube placed this morning by IR. Notified by nursing that the tube was pulled out despite patient being in restraints. Nursing staff was able to place another tube on unit but was also able to be removed by patient. Assessment:   Aspiration pneumonia  Acute respiratory failure with hypoxia requiring intubation, extubated 2022  Acute toxic/metabolic encephalopathy on chronic intellectual disability  Dysphagia  Hypercholesterolemia  Elevated LFTs, improved  Hypokalemia, resolved  Seizure disorder    Plan: Will hold off on reattempting NG tube placement again today. GI evaluation for possible PEG placement. Discussed with GI APC via phone this afternoon. Assistance appreciated in advance. Continue antibiotic therapy per ID. Continued assistance appreciated. Monitor electrolytes and renal indices. Supportive care otherwise. Case discussed with patient's brother via phone. Wishes to proceed with having patient evaluated for permanent feeding tube placement. Patient has already demonstrated propensity to pull at NG tube -would also be a concern if they PEG tube were in place. Mentioned that patient could be allowed to eat but that this is typically done under comfort measures/hospice care. Brother does not appear to be interested in this option at this time.     Case discussed with:  []Patient  [x]Family  [x]Nursing  [x]Case Management  DVT Prophylaxis:  []Lovenox  []Hep SQ  []SCDs  []Coumadin   []On Heparin gtt    Objective:   VS:   Visit Vitals  /78   Pulse 70   Temp 97.5 °F (36.4 °C)   Resp 24   Ht 5' 8\" (1.727 m)   Wt 63.1 kg (139 lb 3.2 oz)   SpO2 90%   BMI 21.17 kg/m²      Tmax/24hrs: Temp (24hrs), Av.1 °F (36.7 °C), Min:97.5 °F (36.4 °C), Max:98.7 °F (37.1 °C)      Intake/Output Summary (Last 24 hours) at 6/1/2022 1314  Last data filed at 6/1/2022 0832  Gross per 24 hour   Intake --   Output 750 ml   Net -750 ml       General:  In NAD. Cardiovascular:  RRR. Pulmonary: Coarse breath sounds bilaterally but no labored/increased effort. GI:  Abdomen soft, NTTP. Extremities:  Warm, no edema or ischemia. Neuro:  Awake and alert.   Nonverbal on my exam.    Labs:    Recent Results (from the past 24 hour(s))   VALPROIC ACID    Collection Time: 05/31/22  6:10 PM   Result Value Ref Range    Valproic acid 26 (L) 50 - 100 ug/ml   GLUCOSE, POC    Collection Time: 06/01/22 12:00 AM   Result Value Ref Range    Glucose (POC) 92 70 - 110 mg/dL   PHOSPHORUS    Collection Time: 06/01/22  4:42 AM   Result Value Ref Range    Phosphorus 2.9 2.5 - 4.9 MG/DL   METABOLIC PANEL, BASIC    Collection Time: 06/01/22  4:42 AM   Result Value Ref Range    Sodium 139 136 - 145 mmol/L    Potassium 4.0 3.5 - 5.5 mmol/L    Chloride 107 100 - 111 mmol/L    CO2 27 21 - 32 mmol/L    Anion gap 5 3.0 - 18 mmol/L    Glucose 84 74 - 99 mg/dL    BUN 18 7.0 - 18 MG/DL    Creatinine 0.60 0.6 - 1.3 MG/DL    BUN/Creatinine ratio 30 (H) 12 - 20      GFR est AA >60 >60 ml/min/1.73m2    GFR est non-AA >60 >60 ml/min/1.73m2    Calcium 8.5 8.5 - 10.1 MG/DL   MAGNESIUM    Collection Time: 06/01/22  4:42 AM   Result Value Ref Range    Magnesium 2.3 1.6 - 2.6 mg/dL   CBC W/O DIFF    Collection Time: 06/01/22  4:42 AM   Result Value Ref Range    WBC 9.1 4.6 - 13.2 K/uL    RBC 4.14 (L) 4.35 - 5.65 M/uL    HGB 12.6 (L) 13.0 - 16.0 g/dL    HCT 37.9 36.0 - 48.0 %    MCV 91.5 78.0 - 100.0 FL    MCH 30.4 24.0 - 34.0 PG    MCHC 33.2 31.0 - 37.0 g/dL    RDW 12.5 11.6 - 14.5 %    PLATELET 237 764 - 953 K/uL    MPV 9.6 9.2 - 11.8 FL    NRBC 0.0 0  WBC    ABSOLUTE NRBC 0.00 0.00 - 0.01 K/uL   GLUCOSE, POC    Collection Time: 06/01/22  5:47 AM   Result Value Ref Range    Glucose (POC) 82 70 - 110 mg/dL   GLUCOSE, POC    Collection Time: 06/01/22 11:47 AM   Result Value Ref Range    Glucose (POC) 80 70 - 110 mg/dL       Signed By: Kun Wood MD     June 1, 2022

## 2022-06-01 NOTE — PROGRESS NOTES
Problem: Dysphagia (Adult)  Goal: *Acute Goals and Plan of Care (Insert Text)  Description: Patient will:  1. Tolerate PO trials with 0 s/s overt distress in 4/5 trials  2. Utilize compensatory swallow strategies/maneuvers (decrease bite/sip, size/rate, alt. liq/sol) with min cues in 4/5 trials  3. Perform oral-motor/laryngeal exercises to increase oropharyngeal swallow function with min cues  4. Complete an objective swallow study (i.e., MBSS) to assess swallow integrity, r/o aspiration, and determine of safest LRD, min A  5. Pt will utilize comp strategies to improve respiration to swallow coordination to reduce aspiration risk and improve activity tolerance for sustained nutrition/ hydration given min-mod cues. Rec:     NPO  Consider alternative nutrition/hydration source vs comfort feeds  Strict aspiration precautions (HOB >30 degrees at all times; Oral care TID)   Outcome: Not Progressing Towards Goal     SPEECH LANGUAGE PATHOLOGY DYSPHAGIA TREATMENT    Patient: Nicholas Ponce (3 y.o. male)  Date: 6/1/2022  Diagnosis: Pneumonia [J18.9]   Precautions: Aspiration, Contact,Fall,Skin  PLOF: As per H&P      ASSESSMENT:  Pt seen for follow up dysphagia treatment. Pt has pulled out NG x2 per nursing report. Unable to follow commands. Tolerating oral care with no overt distress. Demo immediate wet/weak cough on ice chip presentation. Continues to be high risk of aspiration with oral intake. Recommend continue NPO; consider alternative nutrition/hydration source vs comfort feeds. D/w pt and nursing. Progression toward goals:  []         Improving appropriately and progressing toward goals  []         Improving slowly and progressing toward goals  [x]         Not making progress toward goals and plan of care will be adjusted     PLAN:  Recommendations and Planned Interventions:    Patient continues to benefit from skilled intervention to address the above impairments.  Continue treatment per established plan of care.  Discharge Recommendations: To Be Determined     SUBJECTIVE:   Patient nonverbal    OBJECTIVE:   Cognitive and Communication Status:  Neurologic State: Sleeping  Orientation Level: Oriented to person  Cognition: Impaired decision making,Impulsive  Perception: Tactile,Verbal  Perseveration: No perseveration noted  Safety/Judgement: Not assessed  Dysphagia Treatment:  Oral Assessment:  Oral Assessment  Labial: Decreased seal  Dentition: Limited  Oral Hygiene: fair  Lingual: Decreased strength,Incoordinated  Velum: Unable to visualize  Mandible: No impairment  Gag Reflex: No impairment  P.O.  Trials:   See above     PAIN:  Start of Tx: unable to report  End of Tx: unable to report      After treatment:   []              Patient left in no apparent distress sitting up in chair  [x]              Patient left in no apparent distress in bed  [x]              Call bell left within reach  [x]              Nursing notified  []              Family present  []              Caregiver present  []              Bed alarm activated      COMMUNICATION/EDUCATION:   [x] Aspiration precautions; swallow safety; compensatory techniques  []        Patient/family able to participate in training and education   [x]  Patient unable to participate in training and education, education ongoing with staff   [] Patient understands goals and intent of therapy; neutral about participation     Mita Mendez M.S., [address] [facility]  Speech-Language Pathologist

## 2022-06-01 NOTE — PROGRESS NOTES
Problem: Mobility Impaired (Adult and Pediatric)  Goal: *Acute Goals and Plan of Care (Insert Text)  Description: Physical Therapy Goals  Initiated 5/30/2022 and to be accomplished within 3 day trial.   If appropriate continue per plan of care to accomplish goals within 7 days. 1.  Patient will follow 90% of commands to maximize safety and active participation in mobility training. 2.  Patient will move from supine to sit and sit to supine in bed with supervision/set-up. 3.  Patient will maintain seated at edge of bed for 8 min with supervision/set-up to prepare for out of bed activity. 4.  Patient will perform sit to stand with supervision/set-up. 5.  Patient will transfer from bed to chair and chair to bed with supervision/set-up using the least restrictive device. 6.  Patient will ambulate with supervision/set-up for 25 feet with the least restrictive device. PLOF: Per chart, resides in group home. Ambulatory. Outcome: Progressing Towards Goal   PHYSICAL THERAPY TREATMENT    Patient: Jim Grimes (16 y.o. male)  Date: 6/1/2022  Diagnosis: Pneumonia [J18.9] <principal problem not specified>       Precautions: Contact,Fall,Skin    ASSESSMENT:  Pt cleared to participate in PT session, pt received semi-reclined in bed, B wrist restraints doffed for session. Poor communication, grunting most of session. Pt needing maxA for supine to sit, fair sitting balance needing constant support for safety. Standing with handhold assist x2 reps with modA. Pt standing for less than 5 seconds and impulsively returning to sitting EOB. MaxA back to supine, found to be soiled of urine. Pt resisting rolling to each side, maxA to change chux and bed linens. Restraints donned at end of session. Pt positioned for comfort and educated to call for assist before getting up, pt verbalized understanding. Pt left with all needs met and call bell in reach. RN notified of position and participation. Bed alarm activated.      Day 2 of 3 day trial. Continues to have decreased command following, poor carry over     Progression toward goals:     []      Improving appropriately and progressing toward goals  [x]      Improving slowly and progressing toward goals  []      Not making progress toward goals and plan of care will be adjusted     PLAN:  Patient continues to benefit from skilled intervention to address the above impairments. Continue treatment per established plan of care. Discharge Recommendations:  Rehab  Further Equipment Recommendations for Discharge:  TBD pending progress      SUBJECTIVE:   Patient non verbal this session, grunting    OBJECTIVE DATA SUMMARY:   Critical Behavior:  Neurologic State: Alert  Orientation Level: Oriented to person  Cognition: Impaired decision making  Safety/Judgement: Not assessed  Functional Mobility Training:  Bed Mobility:  Rolling: Total assistance (pt resisting roll to each side )  Supine to Sit: Maximum assistance  Sit to Supine: Maximum assistance  Scooting: Total assistance    Transfers:  Sit to Stand: Moderate assistance  Stand to Sit: Minimum assistance    Balance:  Sitting: Impaired; With support  Sitting - Static: Fair (occasional)  Sitting - Dynamic: Fair (occasional)  Standing: Impaired; With support  Standing - Static: Poor   Range Of Motion:   AROM: Generally decreased, functional   PROM: Within functional limits     Ambulation/Gait Training:    Gait Description (WDL):  (pt would not attempt )      Pain:  Pain level pre-treatment: 0/10  Pain level post-treatment: 0/10   FLACC     Activity Tolerance:   Poor     Please refer to the flowsheet for vital signs taken during this treatment.   After treatment:   [] Patient left in no apparent distress sitting up in chair  [x] Patient left in no apparent distress in bed  [x] Call bell left within reach  [x] Nursing notified  [] Caregiver present  [x] Bed alarm activated  [] SCDs applied      COMMUNICATION/EDUCATION:   [x]         Role of Physical Therapy in the acute care setting. [x]         Fall prevention education was provided and the patient/caregiver indicated understanding. [x]         Patient/family have participated as able in working toward goals and plan of care. [x]         Patient/family agree to work toward stated goals and plan of care. []         Patient understands intent and goals of therapy, but is neutral about his/her participation.   []         Patient is unable to participate in stated goals/plan of care: ongoing with therapy staff.  []         Other:        Zeeshan Canales, PT   Time Calculation: 28 mins

## 2022-06-02 NOTE — PROGRESS NOTES
[facility]  (A Division of [address] [place] [address])    Follow-up Note      Date of Admission: 2022       Date of Note:  2022          Current Antimicrobials:    Prior Antimicrobials:  Zosyn  to present      Assessment:         Acute respiratory failure: Intubated on , extubated   Infection with parainfluenza 3 virus: no pneumonia on CXR upon admission. There are hazy opacities noted on post intubation x-ray on . With possible congestion noted on  x-rays.   -Lactic acid 1.4, procalcitonin 0.45  Aspiration pneumonia  Acute metabolic encephalopathy  Transaminitis: Persistent-AST trending down somewhat  Mild hyponatremia: Resolved  PKU  Mild hematuria       Plan:   Continue with Zosyn for suspected aspiration.    -complete after today's dose. CBC, BMP  Aspiration precautions, .   -Agree with need for ongoing assessments and potential PEG tube placement as his risk for recurrent aspiration remains fairly high. Continue with droplet precautions for parainfluenza as per hospital infection control policy      [facility], [address] [place] [address] Infectious Disease Physicians  [address], [address] [facility]  Office: [phone]  Mobile/Text: [phone]     Microbiology:   blood cultures: No growth to date   urine culture no course to date   respiratory viral culture:  positive for parainfluenza 3 negative for all other pathogens    Lines / Catheters:  Peripheral  ET   Right IJ      Subjective:   Patient seen and examined at bedside this AM.   He is awake and looks around but still does not talk very much but mumbles and shakes his head yes and no. No new fevers.   Overnight events reviewed- NG pulled out x2     Objective:        Visit Vitals  BP (!) 140/69   Pulse 78   Temp 98.1 °F (36.7 °C)   Resp 20   Ht 5' 8\" (1.727 m)   Wt 59.8 kg (131 lb 12.8 oz)   SpO2 95%   BMI 20.04 kg/m²     Temp (24hrs), Av.9 °F (36.6 °C), Min:97.5 °F (36.4 °C), Max:98.5 °F (36.9 °C)        General:   Awake and alert looks around   Skin:   no rashes or skin lesions noted on limited exam, dry and warm   HEENT:  No scleral icterus or pallor; oral mucosa moist, lips moist   Lymph Nodes:   not assessed today   Lungs:   coarse breath sounds throughout, no wheeze and irregular respiratory drive   Heart:  RRR, s1 and s2; no murmurs rubs or gallops; no edema, + pedal pulses   Abdomen:  soft, non-distended, active bowel sounds, non-tender   Genitourinary:  deferred   Extremities:   average muscle tone; no contractures, no joint effusions,   Neurologic:   Moves extremities independently, no apparent focal weakness, slow to answer questions, intermittent confusion   Psychiatric:  , Calm, follows directions         Lab results:    Chemistry  Recent Labs     06/02/22  0633 06/01/22 0442 05/31/22  0530   GLU 99 84 95    139 139   K 3.9 4.0 3.8    107 106   CO2 26 27 29   BUN 19* 18 13   CREA 0.67 0.60 0.65   CA 8.7 8.5 8.6   AGAP 5 5 4   BUCR 28* 30* 20   AP  --   --  59   TP  --   --  6.5   ALB  --   --  2.2*   GLOB  --   --  4.3*   AGRAT  --   --  0.5*       CBC w/ Diff  Recent Labs     06/01/22 0442 05/31/22  0530   WBC 9.1 8.2   RBC 4.14* 4.08*   HGB 12.6* 12.4*   HCT 37.9 37.6    201   GRANS  --  67   LYMPH  --  16*   EOS  --  3       Microbiology  All Micro Results     Procedure Component Value Units Date/Time    CULTURE, BLOOD [[id number]] Collected: 05/26/22 1100    Order Status: Completed Specimen: Blood Updated: 06/01/22 0641     Special Requests: NO SPECIAL REQUESTS        Culture result: NO GROWTH 6 DAYS       CULTURE, BLOOD [[id number]] Collected: 05/26/22 1200    Order Status: Completed Specimen: Blood Updated: 06/01/22 0641     Special Requests: NO SPECIAL REQUESTS        Culture result: NO GROWTH 6 DAYS       CULTURE, RESPIRATORY/SPUTUM/BRONCH Ezequiel Loupe STAIN [[id number]] Collected: 05/28/22 0250    Order Status: Completed Specimen: Sputum Updated: 05/30/22 0927     Special Requests: NO SPECIAL REQUESTS        GRAM STAIN RARE WBCS SEEN               RARE EPITHELIAL CELLS SEEN            NO ORGANISMS SEEN        Culture result:       RARE NORMAL RESPIRATORY GENARO          CULTURE, URINE [[id number]] Collected: 05/26/22 1234    Order Status: Completed Specimen: Urine from Clean catch Updated: 05/28/22 1226     Special Requests: NO SPECIAL REQUESTS        Culture result: No growth (<1,000 CFU/ML)       RESPIRATORY VIRUS PANEL W/COVID-19, PCR [[id number]]  (Abnormal) Collected: 05/26/22 1309    Order Status: Completed Specimen: Nasopharyngeal Updated: 05/26/22 1452     Adenovirus Not detected        Coronavirus 229E Not detected        Coronavirus HKU1 Not detected        Coronavirus CVNL63 Not detected        Coronavirus OC43 Not detected        SARS-CoV-2, PCR Not detected        Metapneumovirus Not detected        Rhinovirus and Enterovirus Not detected        Influenza A Not detected        Influenza A, subtype H1 Not detected        Influenza A, subtype H3 Not detected        INFLUENZA A H1N1 PCR Not detected        Influenza B Not detected        Parainfluenza 1 Not detected        Parainfluenza 2 Not detected        Parainfluenza 3 Detected        Parainfluenza virus 4 Not detected        RSV by PCR Not detected        B. parapertussis, PCR Not detected        Bordetella pertussis - PCR Not detected        Chlamydophila pneumoniae DNA, QL, PCR Not detected        Mycoplasma pneumoniae DNA, QL, PCR Not detected              Sarath Loomis DO   6/2/2022

## 2022-06-02 NOTE — PROGRESS NOTES
received a call from [employer], , at [facility].  returned called to [facility] at [phone].  spoke with [facility] at [facility] regarding the patient's readmission.  inquired if they would be able to take the patient back with peg tube. Mirian Sanchez inquired if the peg tube would be permanent.  informed Mirian Sanchez that she is unsure if it will be permanent at this time. Mirian Sanchez stated that she does not know at this time if they will be able to take the patient back.  informed Mirian Sanchez that she will follow up with her when he is stable to discuss the peg tube. Mirian Sanchez inquired if  has spoke with anyone else from the group home.  notified her that the patient's brother provide her as the . Mirian Daniel voiced an understand and indicated that she will discuss peg tube once the patient is stable.       EARNEST Schultz 72F PMHx Crohns a/w COVID-19 pneumonia, c/b acute hypoxic respiratory failure. intubated on 3/30.       NEURO: off sedation, precedex ordered if agitated, currently only pupillary response, CT head pending, vEEG resulted with multifocal cerebral dysfunction with some periodic discharges, recommending further clinical correlation with CT Head  CV:A-fib with RVR; amiod gtt 4/9-4/10; c/w Amio 400mg q12; Trops peaked at 0.02 likely demand ischemia  RESP: Hypoxic respiratory failure due to COVID pneumonia intubated on VC AC, NAC BID. -- 4/11 failed CPAP trial   ID: vanc / zosyn (4/6 -4/13) for aspiration pneumonia  COVID: s/p vitamin C, thiamine, hydroxychloroquine, azithromycin (3/29 - 4/2), solumedrol (3/30 - 4/3)  GI:tube feeds Glucerna, d/c'ed reglan, senna / Miralax 2/2 diarrhea on 4/8  RENAL: Hypernatremia: free water 350q4.; ARF (renal US neg for hydronephrosis, mild kidney disease)  : John in place -- strict is and os.   HEME: Full AC for afib with lovenox 70mg BID  ENDO: mISS, lantus 15U QHS, NPH 4U q6; Thyroid studies- TSH suppressed but with low/normal free t4. Thyroid US with non-sig result.   PPx: SQL 70q12' SCDs, famotidine 20mg BID  LINES/WOUNDS: RIJ (3/30), R axillary a line (3/30), john (3/30), OGT (3/30), DTI  DISPO: MICU  CODE: full code

## 2022-06-02 NOTE — PROGRESS NOTES
received a voicemail from Marcos Sorto, the , at [facility] regarding patient.  returned called to Marcos Sorto and left a voicemail for a return call.     EARNEST Zaragoza

## 2022-06-02 NOTE — PROGRESS NOTES
Problem: Pressure Injury - Risk of  Goal: *Prevention of pressure injury  Description: Document Otoniel Scale and appropriate interventions in the flowsheet. Outcome: Progressing Towards Goal  Note: Pressure Injury Interventions:  Sensory Interventions: Minimize linen layers,Pressure redistribution bed/mattress (bed type)    Moisture Interventions: Absorbent underpads,Check for incontinence Q2 hours and as needed,Assess need for specialty bed    Activity Interventions: Assess need for specialty bed,Pressure redistribution bed/mattress(bed type)    Mobility Interventions: Assess need for specialty bed,Pressure redistribution bed/mattress (bed type)    Nutrition Interventions: Document food/fluid/supplement intake    Friction and Shear Interventions: Foam dressings/transparent film/skin sealants,Minimize layers                Problem: Falls - Risk of  Goal: *Absence of Falls  Description: Document Syl Fall Risk and appropriate interventions in the flowsheet.   Outcome: Progressing Towards Goal  Note: Fall Risk Interventions:  Mobility Interventions: Bed/chair exit alarm    Mentation Interventions: Bed/chair exit alarm,More frequent rounding,Reorient patient    Medication Interventions: Bed/chair exit alarm,Patient to call before getting OOB    Elimination Interventions: Bed/chair exit alarm,Call light in reach    History of Falls Interventions: Bed/chair exit alarm

## 2022-06-02 NOTE — PROGRESS NOTES
[facility]  [facility]    Patient: Chris Calles Age: 64 y.o. : 1960 MR#: [id number] SSN: [id number]  Date/Time: 2022     C/C: AMS       Subjective:   HPI : 77-year-old male past medical history of phenylketonuria, leading to progressive intellectual disability functional disability on currently severe dysphagia leading to aspiration of all consistencies. Patient also has possible history of seizures, according to patient's brother patient may have severe sleep apnea\" stop breathing\" . Currently was admitted with altered mental status, requiring oral intubation to prevent aspiration however patient has developed aspiration pneumonia which is being treated actively by ID. Currently patient also has issues regarding dysphagia severe oropharyngeal dysphagia NG tube was placed twice for nutrition another medication adjustment twice patient pulled out NG tube restraints were not effective, further risk of trauma aspiration through NG tube is very high and this uncooperative patient so I discussed this with patient's brother and for now we are going to hold back NG tube placement and for same reason PEG tube. Review of Systems:    Patient opens eyes opens mouth does not speak over does not follow commands  Patient lying in bed,   Divista friability I could not find any focal deficit apart from left upper extremity weakness        Assessment/Plan:     1.   Altered mental status-acute encephalopathy-probably progressive PKU disease related complication  -CT head-no acute CVA but microvascular disease    2 s/p intubation and extubation -intubation was done due to risk of aspiration/acute hypoxic respiratory failure    3 aspiration pneumonia    4 severe oropharyngeal dysphagia    5 seizure disorder    6 parainfluenza 3 viral infection-leading to respiratory compromise    7 hypertension    8 bacteriuria    Plan    -Improving mentation to baseline, enough evidence from the history and after talking to brother that this process is very progressive, process manic patient is becoming more and more dependent week losing muscular functionality. Probably in the same respect he developed parainfluenza infection which led to respiratory compromise also developed aspiration aspiration pneumonia versus pneumonitis leading to further deterioration of his oxygenation and causing poor mentation when he was brought to the hospital.    -Showing some recovery to baseline however persistent severe oropharyngeal dysphagia probably this was present before intubation before ventilator support requirement and before even hospital visit or this could be triggering mechanism leading to current events.    -I had a very long discussion with patient's brother who is only family along with brother's wife 2 of them make decision for patient, long-term care plan including DNR/DNI comfort feed etc. Discussed. Brother wants to discuss this with his  and make correct decision. I also explained lack of nutrition since we cannot put NG tube without high risk of complications and similarly PEG tube placement due to high risk. He understood well verbalized well he also consulted his wife regarding same and ultimately related decided that in person he will come tomorrow 6/3/2022 at 6 discussed case with me.       Objective:       General: Awake but orientation cannot be judged  HEENT: No facial asymmetry, KATIE Tylor, External ears - WNL    Cardiovascular: S1S2 - regular , No Murmur   Pulmonary: Equal expansion , No Use of accessory muscles , bilateral coarse rales ? secretions  GI:  +BS in all four quadrants, soft, non-tender  Extremities:  No edema; 2+ dorsalis pedis pulses bilaterally  Neuro: No focal deficit      DVT Prophylaxis:  []Lovenox  []Hep SQ  []SCDs  []Coumadin   []On Heparin gtt    [] Eliquis [] Xarelto   Vitals:         VS:   Visit Vitals  BP (!) 140/69   Pulse 78   Temp 98.1 °F (36.7 °C)   Resp 20   Ht 5' 8\" (1.727 m)   Wt 59.8 kg (131 lb 12.8 oz)   SpO2 95%   BMI 20.04 kg/m²      Tmax/24hrs: Temp (24hrs), Av.9 °F (36.6 °C), Min:97.5 °F (36.4 °C), Max:98.5 °F (36.9 °C)        Medications:   Current Facility-Administered Medications   Medication Dose Route Frequency    dextrose 5% lactated ringers infusion  75 mL/hr IntraVENous CONTINUOUS    levETIRAcetam (KEPPRA) 500 mg in 0.9% sodium chloride (MBP/ADV) 100 mL MBP  500 mg IntraVENous Q12H    valproate (DEPACON) 500 mg in 0.9% sodium chloride 50 mL IVPB  500 mg IntraVENous Q12H    albuterol-ipratropium (DUO-NEB) 2.5 MG-0.5 MG/3 ML  3 mL Nebulization Q4H PRN    [Held by provider] benztropine (COGENTIN) tablet 1 mg  1 mg Oral BID    [Held by provider] valproic acid (as sodium salt) (DEPAKENE) 250 mg/5 mL (5 mL) oral solution 500 mg  500 mg Per G Tube BID    glucose chewable tablet 16 g  4 Tablet Oral PRN    glucagon (GLUCAGEN) injection 1 mg  1 mg IntraMUSCular PRN    dextrose 10% infusion 0-250 mL  0-250 mL IntraVENous PRN    clotrimazole-betamethasone (LOTRISONE) 1-0.05 % cream   Topical BID    famotidine (PF) (PEPCID) 20 mg in 0.9% sodium chloride 10 mL injection  20 mg IntraVENous DAILY    [Held by provider] cetirizine (ZYRTEC) tablet 10 mg  10 mg Oral DAILY    [Held by provider] finasteride (PROSCAR) tablet 5 mg  5 mg Oral DAILY    loperamide (IMODIUM) capsule 2 mg  2 mg Oral QID PRN    [Held by provider] trospium (SANCTURA) tablet 20 mg  20 mg Oral DAILY    [Held by provider] OLANZapine (ZyPREXA) tablet 10 mg  10 mg Oral TID    [Held by provider] pravastatin (PRAVACHOL) tablet 20 mg  20 mg Oral QHS    [Held by provider] tamsulosin (FLOMAX) capsule 0.4 mg  0.4 mg Oral DAILY    polyethylene glycol (MIRALAX) packet 17 g  17 g Oral DAILY PRN    ondansetron (ZOFRAN ODT) tablet 4 mg  4 mg Oral Q8H PRN    Or    ondansetron (ZOFRAN) injection 4 mg  4 mg IntraVENous Q6H PRN    enoxaparin (LOVENOX) injection 40 mg  40 mg SubCUTAneous DAILY    piperacillin-tazobactam (ZOSYN) 3.375 g in 0.9% sodium chloride (MBP/ADV) 100 mL MBP  3.375 g IntraVENous Q8H    acetaminophen (TYLENOL) suppository 650 mg  650 mg Rectal Q4H PRN       Labs:    Recent Labs     06/01/22  0442 05/31/22  0530   WBC 9.1 8.2   HGB 12.6* 12.4*   HCT 37.9 37.6    201     Recent Labs     06/02/22  0633 06/01/22 0442 05/31/22  0530    139 139   K 3.9 4.0 3.8    107 106   CO2 26 27 29   GLU 99 84 95   BUN 19* 18 13   CREA 0.67 0.60 0.65   CA 8.7 8.5 8.6   MG 2.2 2.3 2.2   PHOS 2.7 2.9 2.6   ALB  --   --  2.2*   ALT  --   --  57         Time spent on direct patient care >30 mints     Complexity : High complex - due to multiple medical issues outlined above. CODE Status : Full code    Case discussed with:  [x]Patient  [x] Family  [x]Nursing  []Case Management         Disclaimer: Sections of this note are dictated utilizing voice recognition software, which may have resulted in some phonetic based errors in grammar and contents. Even though attempts were made to correct all the mistakes, some may have been missed, and remained in the body of the document. If questions arise, please contact our department.     Signed By: Femi Tapia MD     June 2, 2022

## 2022-06-02 NOTE — PROGRESS NOTES
Problem: Self Care Deficits Care Plan (Adult)  Goal: *Acute Goals and Plan of Care (Insert Text)  Description: Occupational Therapy Goals  Initiated 5/31/2022 within 7 day(s). Pt will be placed on 3 day trial to assess ability to meaningful participate in OT sessions. 1.  Patient will perform self-feeding with compensatory strategies for loaded utensil or drink to mouth with contact guard assist.   2.  Patient will perform grooming minimal assistance/contact guard assist.  3.  Patient will perform bed mobility with min A.   4.  Patient will perform sitting edge of bed with G balance x 2-3 min in prep for self care. Prior Level of Function:Due to pt's AMS and baseline intellectual disability pt unable to provide history. Per chart, pt is ambulatory and verbal at baseline and lives in group home. Has brother for social support. Outcome: Progressing Towards Goal   OCCUPATIONAL THERAPY TREATMENT    Patient: Niharika Gage (72 y.o. male)  Date: 6/2/2022  Diagnosis: Pneumonia [J18.9] <principal problem not specified>       Precautions: Contact,Fall,Skin  Chart, occupational therapy assessment, plan of care, and goals were reviewed. ASSESSMENT:  Pt seen for day 2 of 3-day trial. Pt is non-verbal and minimally interactive, however, was able to track this OT walking from L side of bed to R. Pt with ~50% commands following during the session, required Max A with [place] assist for simple grooming task at bed level. Attempted to engage pt in bed mobility for repositioning, pt required Max A to roll and Mod A to long sit on bed with BUE support. Progression toward goals:  []          Improving appropriately and progressing toward goals  [x]          Improving slowly and progressing toward goals  []          Not making progress toward goals and plan of care will be adjusted     PLAN:  Patient continues to benefit from skilled intervention to address the above impairments.   Continue treatment per established plan of care.  Discharge Recommendations:  Skilled Nursing Facility/LTC  Further Equipment Recommendations for Discharge:  hospital bed     SUBJECTIVE:   Patient did not verbalize, nodded \"yes\" when asked if he wants to wash his face. OBJECTIVE DATA SUMMARY:   Cognitive/Behavioral Status:  Neurologic State: Alert  Orientation Level: Unable to verbalize  Cognition: Impaired decision making,Decreased command following,Decreased attention/concentration  Safety/Judgement: Not assessed    Functional Mobility and Transfers for ADLs:   Bed Mobility:  Rolling: Maximum assistance  Supine to Sit: Moderate assistance (long sit)   ADL Intervention:   Grooming  Washing Face: Maximum assistance  Washing Hands: Maximum assistance  UE Therapeutic Exercises:   BUE shoulder flexion AAROM Mod A x5 each    Pain:  Pain level pre-treatment: not rated   Pain level post-treatment: not rated  Activity Tolerance:    NA  Please refer to the flowsheet for vital signs taken during this treatment. After treatment:   []  Patient left in no apparent distress sitting up in chair  [x]  Patient left in no apparent distress in bed  [x]  Call bell left within reach  [x]  Nursing notified  []  Caregiver present  [x]  Bed alarm activated    COMMUNICATION/EDUCATION:   [x] Role of Occupational Therapy in the acute care setting  [x] Home safety education was provided and the patient/caregiver indicated understanding. [] Patient/family have participated as able in working towards goals and plan of care. [] Patient/family agree to work toward stated goals and plan of care. [] Patient understands intent and goals of therapy, but is neutral about his/her participation. [x] Patient is unable to participate in goal setting and plan of care.       Thank you for this referral.  Kiley Ramos, OTR/L  Time Calculation: 8 mins

## 2022-06-03 NOTE — ROUTINE PROCESS
1918: SBAR report received from off going nurse , Mina Lesches , RN to on coming nurse Celeste METCALF    2000: Shift Assessment completed. Patient is asleep but does respond to voice. Verbalizes no concerns and is unable to follow commands. Lungs are coarse with patient cough and appears to be swallowing secretions. 2200: no changes in status. Nurse notes that patient has not voided. 0000: No change in status. Patient has not voided. 0100: No change in status. Patient has not voided. Nurse notes that patient has self reposition his self to right side. 0300: Reposition to left side. Oral care provided. No UOP. Bladder scan shows >286. Hospitalist was notified and per hospitalist continue to monitor until 07am.     0510: Patient became agressive and was yelling out. Nurse assessed patient who was grabbing at the nurse. Nurse used music therapy to calm patient who eventually went to sleep.    0600: Patient is asleep.

## 2022-06-03 NOTE — PROGRESS NOTES
received call back from Phani Trivedi,  at [facility], regarding readmission.  informed Obed Mohan that the patient is ready for discharge and will not discharge on peg tube. Obed Mohan indicated that she is going to speak with her  to decide which day they can accept the patient this weekend.       EARNEST Crain

## 2022-06-03 NOTE — CONSULTS
[facility]: [phone])  [facility]: [phone]     Patient Name: Wiliam Smith  YOB: 1960    Date of Initial Consult : 6/3/2022  Reason for Consult: Care decisions/POST completion  Requesting Provider: Dr. Abbe Maurer  Primary Care Physician: Kathleen St MD      SUMMARY:   Wiliam Smith is a 64 y.o. male with a past history of PKU, seizures and anxiety disorder, who was admitted on 5/26/2022 from his group home with a diagnosis of pneumonia. Current medical issues leading to Palliative Medicine involvement include: Intellectually disabled 64year old gentleman who resides in a group home presented to the ED with decreased alertness and found to have sepsis, pneumonia likely source. Palliative medicine is consulted for care decisions/POST form completion. CHIEF COMPLAINT: decreased alertness    HPI/SUBJECTIVE:    Past history as above. Mr. Lua Client is an intellectually disabled 64year old gentleman who resides in a group home. At baseline he walks freely and speaks 1-2 word sentences. He presented to the ED with concerns of decreased alertness and found to have pneumonia, likely multifactorial to parainfluenza and aspiration. Patient is s/p intubation due to risk of aspiration/acute hypoxic respiratory failure and successful extubation. The patient is:   [] Verbal and participatory  [x] Non-participatory due to: Intellectual disability    GOALS OF CARE:  DNR/DNI, limited interventions, no feeding tube (comfort feeding)  Patient/Health Care Proxy Stated Goals: Prolong life      TREATMENT PREFERENCES:   Code Status: DNRDNI         PALLIATIVE DIAGNOSES:   1. Encounter for palliative care/goals of care  2. Pneumonia  3. Dysphagia  4. Debility       PLAN:   1. Encounter for palliative care/goals of care - seen at bedside along with Mr. Olga MSW. Mr. Stoney Morales is lying in bed, awake and alert with minimal verbalization.   Unable to assess orientation. History of PKU and intellectual disability and patient currently resides in a group home setting. Per review of the record, patient was admitted with sepsis and pneumonia and was subsequently intubated due to risk of aspiration and acute hypoxic respiratory failure and was successfully extubated. No family at bedside at time of visit. Discussed with attending hospitalist who met with patient's brother//Deborah LOCKHART earlier today and completed a DDNR form with him for patient (appreciate assistance). Palliative medicine is consulted for completion of POST form. Telephone call placed to patient's brother/Deborah LOCKHART. Reviewed conversation he had with hospitalist earlier today and confirmed decision for DNR/DNI. Patient's brother explained that patient has a history of aspiration and they understand the risk of aspiration however they have elected to forego artificial nutrition and feeding tubes because patient will pull those out. Patient's brother wishes to proceed with comfort feeding understanding the risk of aspiration with all consistencies. Introduced and explained the physician order for scope of treatment form and patient's brother agreeable to complete today to formalize decisions for DNR/DNI, limited medical interventions and no feeding tubes (comfort feeding). Patient's brother is accepting of patient returning to the hospital if needed however he does not wish for his brother to undergo resuscitation with CPR, shock, ACLS medications or be intubated with mechanical ventilation for any reason. POST form completed and sent to patient's brother/CHANTELLE via HIPAA compliant Docu sign at [email or web address]. Awaiting return of signed POST form. Goals of care are DNR/DNI, limited interventions, no feeding tube (comfort feeding). 2. Pneumonia - primary team managing, IV antibiotics, s/p intubation and extubation  3.  Dysphagia - SLP evaluation, aspiration of all consistencies, recommend NPO, family elects for comfort feeding and no feeding tube  4. Debility - PPS 50, resident of a group home, at baseline patient walks freely and speaks 1-2 word sentences (per review of record)  5. Initial consult note routed to primary continuity provider  6. Communicated plan of care with: Palliative IDT      Advance Care Planning:  [] The [facility] Interdisciplinary Team has updated the ACP Navigator with [address] and Patient Capacity    Primary Decision [facility] ([address]): Francisco J Gonzalez (brother)  [phone]    Medical Interventions: Limited additional interventions   Artificially Administered Nutrition: No feeding tube     As far as possible, the palliative care team has discussed with patient / health care proxy about goals of care / treatment preferences for patient. HISTORY:     History obtained from: Chart and brother - Francisco J Gonzalez    Active Problems:    Pneumonia (5/26/2022)      Past Medical History:   Diagnosis Date    Anxiety disorder     Dermatitis     High cholesterol     Intellectual disability     PKU (phenylketonuria) ([address]     Seizures ([address]     Urinary retention       Past Surgical History:   Procedure Laterality Date    COLONOSCOPY N/A 10/19/2021    COLONOSCOPY performed by Sarah Kwon MD at [address] [place] [address] HX GI  03/15/2022    Excision and Fulgeration Anal Condyloma     HX PROSTATE SURGERY      bph    HX UROLOGICAL      retention      Family History   Family history unknown: Yes     History reviewed, no pertinent family history.   Social History     Tobacco Use    Smoking status: Never Smoker    Smokeless tobacco: Never Used   Substance Use Topics    Alcohol use: Never     Allergies   Allergen Reactions    Phenylalanine Other (comments)     PKU      Current Facility-Administered Medications   Medication Dose Route Frequency    levETIRAcetam (KEPPRA) 500 mg in 0.9% sodium chloride (MBP/ADV) 100 mL MBP  500 mg IntraVENous Q12H    valproate (DEPACON) 500 mg in 0.9% sodium chloride 50 mL IVPB  500 mg IntraVENous Q12H    albuterol-ipratropium (DUO-NEB) 2.5 MG-0.5 MG/3 ML  3 mL Nebulization Q4H PRN    [Held by provider] benztropine (COGENTIN) tablet 1 mg  1 mg Oral BID    [Held by provider] valproic acid (as sodium salt) (DEPAKENE) 250 mg/5 mL (5 mL) oral solution 500 mg  500 mg Per G Tube BID    glucose chewable tablet 16 g  4 Tablet Oral PRN    glucagon (GLUCAGEN) injection 1 mg  1 mg IntraMUSCular PRN    dextrose 10% infusion 0-250 mL  0-250 mL IntraVENous PRN    clotrimazole-betamethasone (LOTRISONE) 1-0.05 % cream   Topical BID    famotidine (PF) (PEPCID) 20 mg in 0.9% sodium chloride 10 mL injection  20 mg IntraVENous DAILY    [Held by provider] cetirizine (ZYRTEC) tablet 10 mg  10 mg Oral DAILY    [Held by provider] finasteride (PROSCAR) tablet 5 mg  5 mg Oral DAILY    loperamide (IMODIUM) capsule 2 mg  2 mg Oral QID PRN    [Held by provider] trospium (SANCTURA) tablet 20 mg  20 mg Oral DAILY    [Held by provider] OLANZapine (ZyPREXA) tablet 10 mg  10 mg Oral TID    [Held by provider] pravastatin (PRAVACHOL) tablet 20 mg  20 mg Oral QHS    [Held by provider] tamsulosin (FLOMAX) capsule 0.4 mg  0.4 mg Oral DAILY    polyethylene glycol (MIRALAX) packet 17 g  17 g Oral DAILY PRN    ondansetron (ZOFRAN ODT) tablet 4 mg  4 mg Oral Q8H PRN    Or    ondansetron (ZOFRAN) injection 4 mg  4 mg IntraVENous Q6H PRN    enoxaparin (LOVENOX) injection 40 mg  40 mg SubCUTAneous DAILY    acetaminophen (TYLENOL) suppository 650 mg  650 mg Rectal Q4H PRN          Clinical Pain Assessment (nonverbal scale for nonverbal patients): Clinical Pain Assessment  Severity: 0     Activity (Movement): Lying quietly, normal position    Duration: for how long has pt been experiencing pain (e.g., 2 days, 1 month, years)  Frequency: how often pain is an issue (e.g., several times per day, once every few days, constant)     FUNCTIONAL ASSESSMENT:     Palliative Performance Scale (PPS):  PPS: 50    ECOG  ECOG Status : Completely disabled     PSYCHOSOCIAL/SPIRITUAL SCREENING:      Any spiritual / Samaritan concerns: unable to assess  [] Yes /  [] No    Caregiver Burnout:  [] Yes /  [] No /  [x] No Caregiver Present      Anticipatory grief assessment:  Unable to assess  [] Normal  / [] Maladaptive        REVIEW OF SYSTEMS:     Systems: constitutional, ears/nose/mouth/throat, respiratory, gastrointestinal, genitourinary, musculoskeletal, integumentary, neurologic, psychiatric, endocrine. Positive findings noted below. Modified ESAS Completed by: provider           Pain: 0           Dyspnea: 0           Stool Occurrence(s): 1   Positive and pertinent negative findings in ROS are noted above in HPI. The following systems were [] reviewed / [x] unable to be reviewed as noted in HPI  Other findings are noted below. PHYSICAL EXAM:     Constitutional: lying in bed, awake and alert, unable to assess orientation, minimal verbalization  Eyes: pupils equal, anicteric  ENMT: no nasal discharge, moist mucous membranes  Cardiovascular: regular rhythm, distal pulses intact  Respiratory: breathing not labored, symmetric  Gastrointestinal: soft   Last bowel movement: none recorded  Musculoskeletal: no deformity  Skin: warm, dry  Neurologic: following simple commands, moving all extremities  Psychiatric: minimal verbalization, unable to assess    Other: Wt Readings from Last 3 Encounters:   06/02/22 59.8 kg (131 lb 12.8 oz)   03/30/22 68.9 kg (151 lb 12.8 oz)   03/15/22 76.2 kg (168 lb)     Blood pressure (!) 143/72, pulse 65, temperature 98 °F (36.7 °C), resp. rate 20, height 5' 8\" (1.727 m), weight 59.8 kg (131 lb 12.8 oz), SpO2 96 %.   Pain:  Pain Scale 1: Visual  Pain Intensity 1: 0                      LAB AND IMAGING FINDINGS:     Lab Results   Component Value Date/Time    WBC 9.1 06/01/2022 04:42 AM    HGB 12.6 (L) 06/01/2022 04:42 AM    PLATELET 059 06/01/2022 04:42 AM     Lab Results   Component Value Date/Time    Sodium 140 06/02/2022 06:33 AM    Potassium 3.9 06/02/2022 06:33 AM    Chloride 109 06/02/2022 06:33 AM    CO2 26 06/02/2022 06:33 AM    BUN 19 (H) 06/02/2022 06:33 AM    Creatinine 0.67 06/02/2022 06:33 AM    Calcium 8.7 06/02/2022 06:33 AM    Magnesium 2.2 06/02/2022 06:33 AM    Phosphorus 2.7 06/02/2022 06:33 AM      Lab Results   Component Value Date/Time    Alk. phosphatase 59 05/31/2022 05:30 AM    Protein, total 6.5 05/31/2022 05:30 AM    Albumin 2.2 (L) 05/31/2022 05:30 AM    Globulin 4.3 (H) 05/31/2022 05:30 AM     No results found for: INR, PTMR, PTP, PT1, PT2, APTT, INREXT, INREXT   No results found for: IRON, FE, TIBC, IBCT, PSAT, FERR   No results found for: PH, PCO2, PO2  No components found for: GLPOC   No results found for: CPK, CKMB           Total time: 30 minutes    > 50% counseling / coordination:  Time spent in direct consultation with the patient, medical team, and family     Prolonged service was provided for  []30 min   []75 min in face to face time in the presence of the patient, spent as noted above. Time Start:   Time End:     Disclaimer: Sections of this note are dictated using utilizing voice recognition software, which may have resulted in some phonetic based errors in grammar and contents. Even though attempts were made to correct all the mistakes, some may have been missed, and remained in the body of the document. If questions arise, please contact our department.

## 2022-06-03 NOTE — ACP (ADVANCE CARE PLANNING)
Advance Care Planning     General Advance Care Planning (ACP) Conversation      Date of Conversation: 6/3/2022    Conducted with: Patient's brother Nilam Dickson), patient's sister-in-law Danielle Romero), Arslan Ochoa NP (Palliative) and this writer. Healthcare Decision Maker:     Patient has no formal healthcare decision maker document, on file. Patient's legal next of kin and default decision maker is his brother Nilam Dickson, GG#[phone]). [facility] (Physician Orders for Scope of Treatment)       Date of conversation: 6/3/2022  Location: [facility]                                 Length (minutes): 20      Participants:                  [x] Other Surrogate Decision Maker / Next of Kin      Name: Nilam Dickson via phone    Relationship to Patient: Brother    Phone Number: [phone]                  Other persons present:                   Name: Maria Isabel Patterson (Sister-in-Law) via phone                           Name:  Arslan Ochoa NP (Palliative)                           Name: Hunter Naik. Maryann Mckeon. MSW (Palliative)                                  Kritsa Monroe ACP Facilitator: Hunter Naik. Maryann Mckeon MSW      Conversation Topics   (If Patient does not have decision making capacity, Agent/Surrogate responds based on understanding of how patient would respond if capable)      Understanding of Medical Condition(s) AND Potential Complications:      Healthcare Agent/Other Surrogate: Patient's brother Negra Pena and sister-in-law Erick Lima have full understanding of patient's medical condition(s) and the potential complications. They have been getting regular medical updates and recommendations from the medical team. Patient's brother and sister-in-law have decided to have patient return to the group home and receive comfort feedings. It is recommended that patient not receive any feeding tubes.        Cardiopulmonary Resuscitation      \"What do you understand about CPR? \" Response: Patient's brother and sister-in-law have full understanding of the risks and burdens of CPR. They agree that patient should not be subjected to any attempts at resuscitation, in the event that patient's heart and breathing stops. They are okay with patient coming back and forth to the hospital and treating what can be treated. They do not want patient to have a feeding tube. Family is okay with patient returning to the group home, receiving comfort feedings. A POST was completed, with patient's brother and sister-in-law via phone. The document was sent to them via [employer]. The Palliative Care team is now waiting for the signed POST form to be returned, via [employer]. Once the POST form has been returned, a copy will be placed on the chart to be sent with patient upon discharge and to be scanned in to the EMR. At this time, patient is a DNR/DNI with Limited Additional Interventions and NO feeding tubes. Order Elected for CPR:  []  Attempt Resuscitation [x]  Do Not Attempt Resuscitation      When NOT in Cardiopulmonary Arrest, Order Elected:      [] Comfort Measures  [x] Limited Additional Interventions  [] Full Interventions    Artificially Administered Nutrition, Order Elected:    [x] No Feeding Tube  (Comfort Feedings at home)  [] Feeding Tube for a defined trial period  [] Feeding Tube long-term if indicated      The following was provided (check all that apply):      Healthcare Decision Information Cards:   [] Help with Breathing Facts   [] Tube Feeding Facts   [] CPR Facts               [] Review of existing Advance Directive              [] Assistance with Completion of New Advance Directive               [x] Review of [place] POST Form         Meeting Outcomes:     [] ACP discussion completed   [x] [place] form completed              [x] Ramone prepared for Provider review and signature, via [employer].    [] Original placed on Chart, if in facility (form to be sent with patient at discharge)              [] Copy given to healthcare agent    [] Copy scanned to electronic medical record      If ACP discussion not completed, last interview topic discussed: POST completion. Follow-up plan:       [] Schedule follow-up conversation to continue planning   [] Referred individual to Provider for additional questions/concerns               [x] Advised patient/agent/surrogate to review completed POST form and update if needed with changes in condition, patient preferences or care setting       Recommendations: The Palliative Care team will continue to offer support to patient and his family, at this time. [x] This note routed to one or more involved healthcare providers          Gisela Pink [facility]  Palliative Medicine Inpatient   Alexandre Moffett [address]: [phone])

## 2022-06-03 NOTE — PROGRESS NOTES
received a call back from Kathy Pelletier,  at [facility], regarding readmission. [facility] inquired if the patient is ambulatory, if the patient will need emergency transport and if the patient will need dme.  informed [facility] that the patient is standing with handhold assist x2 reps with modA, the patient's discharge transport will be schedule through the hospital, and the patient will need a hospital bed. [facility] inquired when the hospital bed could be delivered.  informed [facility] it would take a couple days for the hospital bed to be delivered. Michelle indicated that the facility would need the hospital bed prior to discharge.  informed Alex Bateman that she would notify the attending.       EARNEST Cazares

## 2022-06-03 NOTE — PROGRESS NOTES
spoke with the patient's brother, Aarti Flores, regarding therapy recommendations.  informed the patient's brother that therapy is recommendation skilled nursing home/LTC.  notified the patient's brother that she spoke with Rui Atkinson the  at [address] [place] [address] and she informed  that she is unable to determine at this time if she is able to take the patient with a peg tube. The patient's brother indicated that he would like his brother to return to the group home.  informed the patient's brother that once the patient is stable and the group home agrees to accept the patient he will be transferred back to the group home.  notified that if the group home is unable to accept the patient she will discuss other discharge options. The patient voiced an understand.     EARNEST Jean Baptiste

## 2022-06-03 NOTE — PROGRESS NOTES
[facility]      Patient has been evaluated to have a hospital bed for home. Patient has a chronic medical condition(s) of: ______________________________________________________________________ Patient's medical condition requires positioning of the body in ways not feasible with an ordinary bed due to (please select a condition)     ___  Requires positioning of the body in ways not feasible with an ordinary bed to alleviate pain. ___  Requires the head of the bed to be elevated more than 30 degrees most of the time due to congestive heart failure, chronic pulmonary disease, or problems with aspiration.   ___  Requires traction equipment, which can only be attached to a hospital bed.                                                                                                                    AND     __X_  Patient has need for frequent changes of body position and/or an immediate need for a change in body position.                 Physician Signature________________________________ NPI________________     Name Printed _________________________________ Date___________________

## 2022-06-03 NOTE — PROGRESS NOTES
left a message for George Abdalla,  at [facility], regarding readmission to group home.       EARNEST Cazares

## 2022-06-03 NOTE — PROGRESS NOTES
notified Dr. Lisa Elizondo that the patient's group home can not accept the patient without the patient getting a hospital bed prior to discharge.  notified Dr. Lisa Elizondo that  need order for hospital bed for patient. Dr. Deo Almonte voiced an understanding.       EARNEST Vanegas

## 2022-06-03 NOTE — PROGRESS NOTES
Assisting primary RN, Dr Al Hair aware patient continues to pull IVs out, OK for patient to have no IV access per MD. Patient has been downgraded to medical level of care. Primary RN notified.

## 2022-06-03 NOTE — PROGRESS NOTES
Nutrition Assessment     Type and Reason for Visit: Reassess,Consult,NPO/clear liquid    Nutrition Recommendations/Plan:   1. Resume protein restriction in diet order due to hx PKU diagnosis  2. Provide nutrition interventions consistent with plan of care goals for patient; diet for comfort feeds as pt tolerates. 3. Discontinue tube feeding order given goals of care to include no feeding tubes and pt refusing NGT. Nutrition Assessment:  Per nursing and chart notation, pt pulled out NGT x2, even with restraints. Pt has pulled out IV lines. Pt noted to have been yelling at RN overnight per notation. Pt remained NPO per SLP recommendation. Palliative following; discussed pt with his brother, who decided on limited medical interventions, No feeding tube. POST completed. Diet for comfort feeds ordered today per Palliative. Pt discussed with Dr Radha Tello; discussed discontinuing tube feeding order given pt is now no feeding tubes and has diet ordered for comfort feeds; MD agreed with plan. Malnutrition Assessment:  Malnutrition Status: At risk for malnutrition (specify) (r/t NPO status)     Estimated Daily Nutrient Needs:  Energy (kcal):  8679-3629  Protein (g):  36-48       Fluid (ml/day):  8688-9431    Nutrition Related Findings:  BM 5/31    Current Nutrition Therapies:  DIET NPO  ADULT TUBE FEEDING Nasogastric; Standard with Fiber; Delivery Method: Continuous; Continuous Initial Rate (mL/hr): 20; Continuous Advance Tube Feeding: Yes; Advancement Volume (mL/hr): 10; Advancement Frequency: Q 8 hours; Continuous Goal Rate (mL. .. ADULT DIET Dysphagia - Pureed; No artificial sweeteners, legumes, nuts. LIMIT eggs, dairy, meat.     Anthropometric Measures:  Height:  5' 8\" (172.7 cm)  Current Body Wt:  59.8 kg (131 lb 13.4 oz)  BMI: 20.1    Nutrition Diagnosis:   · Inadequate oral intake related to swallowing difficulty as evidenced by  (pt NPO during previous days, but refusing NGT/ EN support)      Nutrition Interventions:   Food and/or Nutrient Delivery: Continue current diet  Nutrition Education/Counseling: Education not indicated  Coordination of Nutrition Care: Continue to monitor while inpatient  Plan of Care discussed with: MD    Goals:  Previous Goal Met: No progress toward goal(s)  Goals: Meet at least 75% of estimated needs,PO intake 75% or greater,by next RD assessment       Nutrition Monitoring and Evaluation:   Behavioral-Environmental Outcomes: None identified  Food/Nutrient Intake Outcomes: Diet advancement/tolerance,Food and nutrient intake  Physical Signs/Symptoms Outcomes: Biochemical data,Meal time behavior    Discharge Planning:     (diet for comfort feeds as pt tolerates)    Jaquelin Coppola, [address]  Contact: [phone]

## 2022-06-03 NOTE — PROGRESS NOTES
[facility] Hospitalist Group  Progress Note    Patient: Rhiannon Stout Age: 64 y.o. 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 Patient also has possible history of seizures, according to patient's brother patient may have severe sleep apnea\" stop breathing\" . Currently was admitted with altered mental status, requiring oral intubation to prevent aspiration however patient has developed aspiration pneumonia which is being treated actively by ID. Currently patient also has issues regarding dysphagia severe oropharyngeal dysphagia NG tube was placed twice for nutrition another medication adjustment twice patient pulled out NG tube restraints were not effective, further risk of trauma aspiration through NG tube is very high and this uncooperative patient so I discussed this with patient's brother and for now we are going to hold back NG tube placement and for same reason PEG tube. Review of Systems:    Today patient is more awake alert verbal,  He does not appear to be in distress still is trying to pull on IV lines  No respiratory distress    Proper ROS cannot be obtained due to patient's mental condition        Assessment/Plan:     1.   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 Patient's brother had some concerns regarding patient's condition, I explained him regarding progressive disease of patient especially in the face of oropharyngeal dysphagia patient may not able to get adequate nutrition attempts were made to put NG tube twice both times patient pulled out vigorously probably will do the same thing to PEG tube will not serve purpose for long-term nutritional support. I also discussed regarding patient's long-term care plan with his brother. After long discussion after answering to the questions and them(patient's brother and patient's brother's wife) understanding the gravity of situation decided they do not want any resuscitation or intubation making patient DNR/DNI. They also decided that at this point no aggressive treatment in form of artificial feeding. Their goal for comfort feeding.     -Improving mentation to baseline, enough evidence from the history and after talking to brother that this process is very progressive, process manic patient is becoming more and more dependent week losing muscular functionality.   Probably in the same respect he developed parainfluenza infection which led to respiratory compromise also developed aspiration aspiration pneumonia versus pneumonitis leading to further deterioration of his oxygenation and causing poor mentation when he was brought to the hospital.    -Showing some recovery to baseline however persistent severe oropharyngeal dysphagia probably this was present before intubation before ventilator support requirement and before even hospital visit or this could be triggering mechanism leading to current events.    -I had a very long discussion with patient's brother who is only family along with brother's wife 2 of them make decision for patient, long-term care plan including DNR/DNI comfort feed etc. Discussed. Brother wants to discuss this with his  and make correct decision. I also explained lack of nutrition since we cannot put NG tube without high risk of complications and similarly PEG tube placement due to high risk. He understood well verbalized well he also consulted his wife regarding same and ultimately related decided that in person he will come tomorrow 6/3/2022 at 6 discussed case with me.       Objective:       General: Awake but orientation cannot be judged  HEENT: No facial asymmetry, KATIE Tylor, External ears - WNL    Cardiovascular: S1S2 - regular , No Murmur   Pulmonary: Equal expansion , No Use of accessory muscles , bilateral coarse rales ? secretions  GI:  +BS in all four quadrants, soft, non-tender  Extremities:  No edema; 2+ dorsalis pedis pulses bilaterally  Neuro: No focal deficit      DVT Prophylaxis:  []Lovenox  []Hep SQ  []SCDs  []Coumadin   []On Heparin gtt    [] Eliquis [] Xarelto       Vitals:         VS:   Visit Vitals  BP (!) 139/56   Pulse 70   Temp 97.6 °F (36.4 °C)   Resp 18   Ht 5' 8\" (1.727 m)   Wt 59.8 kg (131 lb 12.8 oz)   SpO2 98%   BMI 20.04 kg/m²      Tmax/24hrs: Temp (24hrs), Av.6 °F (36.4 °C), Min:97.3 °F (36.3 °C), Max:98 °F (36.7 °C)        Medications:   Current Facility-Administered Medications   Medication Dose Route Frequency    dextrose 5% lactated ringers infusion  75 mL/hr IntraVENous CONTINUOUS    levETIRAcetam (KEPPRA) 500 mg in 0.9% sodium chloride (MBP/ADV) 100 mL MBP  500 mg IntraVENous Q12H    valproate (DEPACON) 500 mg in 0.9% sodium chloride 50 mL IVPB  500 mg IntraVENous Q12H    albuterol-ipratropium (DUO-NEB) 2.5 MG-0.5 MG/3 ML  3 mL Nebulization Q4H PRN    [Held by provider] benztropine (COGENTIN) tablet 1 mg  1 mg Oral BID    [Held by provider] valproic acid (as sodium salt) (DEPAKENE) 250 mg/5 mL (5 mL) oral solution 500 mg  500 mg Per G Tube BID    glucose chewable tablet 16 g  4 Tablet Oral PRN    glucagon (GLUCAGEN) injection 1 mg  1 mg IntraMUSCular PRN    dextrose 10% infusion 0-250 mL  0-250 mL IntraVENous PRN    clotrimazole-betamethasone (LOTRISONE) 1-0.05 % cream   Topical BID    famotidine (PF) (PEPCID) 20 mg in 0.9% sodium chloride 10 mL injection  20 mg IntraVENous DAILY    [Held by provider] cetirizine (ZYRTEC) tablet 10 mg  10 mg Oral DAILY    [Held by provider] finasteride (PROSCAR) tablet 5 mg  5 mg Oral DAILY    loperamide (IMODIUM) capsule 2 mg  2 mg Oral QID PRN    [Held by provider] trospium (SANCTURA) tablet 20 mg  20 mg Oral DAILY    [Held by provider] OLANZapine (ZyPREXA) tablet 10 mg  10 mg Oral TID    [Held by provider] pravastatin (PRAVACHOL) tablet 20 mg  20 mg Oral QHS    [Held by provider] tamsulosin (FLOMAX) capsule 0.4 mg  0.4 mg Oral DAILY    polyethylene glycol (MIRALAX) packet 17 g  17 g Oral DAILY PRN    ondansetron (ZOFRAN ODT) tablet 4 mg  4 mg Oral Q8H PRN    Or    ondansetron (ZOFRAN) injection 4 mg  4 mg IntraVENous Q6H PRN    enoxaparin (LOVENOX) injection 40 mg  40 mg SubCUTAneous DAILY    acetaminophen (TYLENOL) suppository 650 mg  650 mg Rectal Q4H PRN       Labs:    Recent Labs     06/01/22  0442   WBC 9.1   HGB 12.6*   HCT 37.9        Recent Labs     06/02/22  0633 06/01/22  0442    139   K 3.9 4.0    107   CO2 26 27   GLU 99 84   BUN 19* 18   CREA 0.67 0.60   CA 8.7 8.5   MG 2.2 2.3   PHOS 2.7 2.9         Time spent on direct patient care >30 mints     Complexity : High complex - due to multiple medical issues outlined above.      CODE Status : DNR/DNI    Case discussed with:  [x]Patient  [x] Family patient's brother and patient's brother's wife[x]Nursing  [x]Case Management         Disclaimer: Sections of this note are dictated utilizing voice recognition software, which may have resulted in some phonetic based errors in grammar and contents. Even though attempts were made to correct all the mistakes, some may have been missed, and remained in the body of the document. If questions arise, please contact our department.     Signed By: Rubi Sequeira MD     Andreina 3, 2022

## 2022-06-03 NOTE — PROGRESS NOTES
[facility]  (A Division of [address] [place] [address])    Follow-up Note      Date of Admission: 2022       Date of Note:  6/3/2022          Current Antimicrobials:    Prior Antimicrobials:  Zosyn  to present      Assessment:         Acute respiratory failure: Intubated on , extubated   Infection with parainfluenza 3 virus: no pneumonia on CXR upon admission. There are hazy opacities noted on post intubation x-ray on . With possible congestion noted on  x-rays.   -Lactic acid 1.4, procalcitonin 0.45  Aspiration pneumonia  Acute metabolic encephalopathy  Transaminitis: Persistent-AST trending down somewhat  Mild hyponatremia: Resolved  PKU  Mild hematuria       Plan:   Completed course of Zosyn . CBC, BMP every 2-3 days  Aspiration precautions, .   -Agree with need for ongoing assessments and potential PEG tube placement as his risk for recurrent aspiration remains fairly high. Continue with droplet precautions for parainfluenza as per hospital infection control policy      Little Pablo DO  [facility]  [address], [address] [place] [address]  Office: [phone]  Mobile/Text: [phone]     Microbiology:   blood cultures: No growth to date   urine culture no course to date   respiratory viral culture:  positive for parainfluenza 3 negative for all other pathogens    Lines / Catheters:  Peripheral  ET   Right IJ      Subjective:   Patient seen and examined at bedside this AM.   Looking around. Tries to answer questions. Playing with a toy airplane. Wet cough noted while in the room.      Objective:        Visit Vitals  BP (!) 139/56   Pulse 70   Temp 97.6 °F (36.4 °C)   Resp 18   Ht 5' 8\" (1.727 m)   Wt 59.8 kg (131 lb 12.8 oz)   SpO2 98%   BMI 20.04 kg/m²     Temp (24hrs), Av.6 °F (36.4 °C), Min:97.3 °F (36.3 °C), Max:98 °F (36.7 °C)        General:   Awake and alert looks around Skin:   no rashes or skin lesions noted on limited exam, dry and warm   HEENT:  No scleral icterus or pallor; oral mucosa moist, lips moist   Lymph Nodes:   not assessed today   Lungs:   coarse breath sounds throughout, no wheeze and irregular respiratory drive   Heart:  RRR, s1 and s2; no murmurs rubs or gallops; no edema, + pedal pulses   Abdomen:  soft, non-distended, active bowel sounds, non-tender   Genitourinary:  deferred   Extremities:   average muscle tone; no contractures, no joint effusions,   Neurologic:   Moves extremities independently, no apparent focal weakness, slow to answer questions, intermittent confusion   Psychiatric:  , Calm, follows directions         Lab results:    Chemistry  Recent Labs     06/02/22  0633 06/01/22  0442   GLU 99 84    139   K 3.9 4.0    107   CO2 26 27   BUN 19* 18   CREA 0.67 0.60   CA 8.7 8.5   AGAP 5 5   BUCR 28* 30*       CBC w/ Diff  Recent Labs     06/01/22  0442   WBC 9.1   RBC 4.14*   HGB 12.6*   HCT 37.9          Microbiology  All Micro Results     Procedure Component Value Units Date/Time    CULTURE, BLOOD [[id number]] Collected: 05/26/22 1100    Order Status: Completed Specimen: Blood Updated: 06/01/22 0641     Special Requests: NO SPECIAL REQUESTS        Culture result: NO GROWTH 6 DAYS       CULTURE, BLOOD [[id number]] Collected: 05/26/22 1200    Order Status: Completed Specimen: Blood Updated: 06/01/22 0641     Special Requests: NO SPECIAL REQUESTS        Culture result: NO GROWTH 6 DAYS       CULTURE, RESPIRATORY/SPUTUM/BRONCH Tianna Pucker STAIN [[id number]] Collected: 05/28/22 0250    Order Status: Completed Specimen: Sputum Updated: 05/30/22 0927     Special Requests: NO SPECIAL REQUESTS        GRAM STAIN RARE WBCS SEEN               RARE EPITHELIAL CELLS SEEN            NO ORGANISMS SEEN        Culture result:       RARE NORMAL RESPIRATORY GENARO          CULTURE, URINE [[id number]] Collected: 05/26/22 1234    Order Status: Completed Specimen: Urine from Clean catch Updated: 05/28/22 1226     Special Requests: NO SPECIAL REQUESTS        Culture result: No growth (<1,000 CFU/ML)       RESPIRATORY VIRUS PANEL W/COVID-19, PCR [[id number]]  (Abnormal) Collected: 05/26/22 1309    Order Status: Completed Specimen: Nasopharyngeal Updated: 05/26/22 1452     Adenovirus Not detected        Coronavirus 229E Not detected        Coronavirus HKU1 Not detected        Coronavirus CVNL63 Not detected        Coronavirus OC43 Not detected        SARS-CoV-2, PCR Not detected        Metapneumovirus Not detected        Rhinovirus and Enterovirus Not detected        Influenza A Not detected        Influenza A, subtype H1 Not detected        Influenza A, subtype H3 Not detected        INFLUENZA A H1N1 PCR Not detected        Influenza B Not detected        Parainfluenza 1 Not detected        Parainfluenza 2 Not detected        Parainfluenza 3 Detected        Parainfluenza virus 4 Not detected        RSV by PCR Not detected        B. parapertussis, PCR Not detected        Bordetella pertussis - PCR Not detected        Chlamydophila pneumoniae DNA, QL, PCR Not detected        Mycoplasma pneumoniae DNA, QL, PCR Not detected              Antolin Avalos DO   6/3/2022

## 2022-06-03 NOTE — PROGRESS NOTES
notified Dr. Gabby Cade that the patient's group home would need a rx for the thickness of the patient's food when the patient discharge.       EARNEST Reyes

## 2022-06-03 NOTE — ROUTINE PROCESS
Bedside and Verbal shift change report given to Yesy Vasques (oncoming nurse) by Patrick Hernadez (offgoing nurse). Report included the following information SBAR, ED Summary and Recent Results.

## 2022-06-03 NOTE — PROGRESS NOTES
left a message for the patient's brother, Nima Garrett, regarding therapy recommendations.       EARNEST Daniel

## 2022-06-03 NOTE — PROGRESS NOTES
TRANSFER - IN REPORT:    Verbal report received from Nara Castro RN(name) on Pantera Sebastian  being received from Sanjana Espinosa RN(unit) for routine progression of care      Report consisted of patients Situation, Background, Assessment and   Recommendations(SBAR). Information from the following report(s) SBAR, Kardex and Recent Results was reviewed with the receiving nurse. Opportunity for questions and clarification was provided. Assessment completed upon patients arrival to unit and care assumed.

## 2022-06-03 NOTE — PROGRESS NOTES
Problem: Dysphagia (Adult)  Goal: *Acute Goals and Plan of Care (Insert Text)  Description: Patient will:  1. Tolerate PO trials with 0 s/s overt distress in 4/5 trials  2. Utilize compensatory swallow strategies/maneuvers (decrease bite/sip, size/rate, alt. liq/sol) with min cues in 4/5 trials  3. Perform oral-motor/laryngeal exercises to increase oropharyngeal swallow function with min cues  4. Complete an objective swallow study (i.e., MBSS) to assess swallow integrity, r/o aspiration, and determine of safest LRD, min A  5. Pt will utilize comp strategies to improve respiration to swallow coordination to reduce aspiration risk and improve activity tolerance for sustained nutrition/ hydration given min-mod cues. Rec:     NPO  Consider alternative nutrition/hydration source vs comfort feeds  Strict aspiration precautions (HOB >30 degrees at all times; Oral care TID)   Outcome: Progressing Towards Goal  SPEECH LANGUAGE PATHOLOGY DYSPHAGIA TREATMENT    Patient: Miles Velásquez (37 y.o. male)  Date: 6/3/2022  Diagnosis: Pneumonia [J18.9] <principal problem not specified>      Precautions: Aspiration Risk; Contact,Fall,Skin  PLOF:Per H&P     ASSESSMENT:  Pt seen at bedside this date. Pt more alert and engaging, however not following commands well. Pt tolerated aggressive oral care via Yankauer suction prior to po trials with min resistance. Pt noted to be vocalizing more and did repsond \"Yea\" x1 this date. Pt demo'd bolus control with ice chips x5 with audible, but efficient swallow without change in VQ. Tsp of HTL given x1 with poor orolingual control resulting in premature spillage, delayed/weak laryngeal elevation, and immediate strong cough/epiphora. At this time, continue to rec: NPO with alt means of nutrition, Oral care TID, and HOB > 45* at all times; pt is a high aspiration risk. D/W ADIN Foster.  ST will continue to follow    Progression toward goals:  []         Improving appropriately and progressing toward goals  [x]         Improving slowly and progressing toward goals  []         Not making progress toward goals and plan of care will be adjusted     PLAN:  Recommendations and Planned Interventions:  See above   Patient continues to benefit from skilled intervention to address the above impairments. Continue treatment per established plan of care. Discharge Recommendations:  Skilled Nursing Facility     SUBJECTIVE:   Patient stated Doug Cook. OBJECTIVE:   Cognitive and Communication Status:  Neurologic State: Sleeping  Orientation Level: Oriented to person  Cognition: Decreased command following,Decreased attention/concentration  Perception: Tactile,Verbal  Perseveration: No perseveration noted  Safety/Judgement: Not assessed  Dysphagia Treatment:  Oral Assessment:  Oral Assessment  Labial: Decreased seal  Dentition: Limited  Oral Hygiene: fair  Lingual: Decreased strength,Incoordinated  Velum: Unable to visualize  Mandible: No impairment  Gag Reflex: No impairment  P.O. Trials:   Patient Position: 70   Vocal quality prior to P.O.: Other (comment)   Consistency Presented:  Thin liquid,Nectar thick liquid,Solid   How Presented: SLP-fed/presented,Cup/sip,Straw,Successive swallows,Spoon   How Much: 1   Bolus Acceptance: No impairment   Bolus Formation/Control: Impaired   Type of Impairment: Mastication   Propulsion: Delayed (# of seconds)   Oral Residue: Less than 10% of bolus,Lingual   Initiation of Swallow: No impairment   Laryngeal Elevation: Decreased   Aspiration Signs/Symptoms: Clear throat   Pharyngeal Phase Characteristics: Audible swallow   Effective Modifications: Small sips and bites   Cues for Modifications: Maximal         Oral Phase Severity: Mild   Pharyngeal Phase Severity : Mild-moderate   Oral Motor Exercises:                                        Exercises:  Laryngeal Exercises:    PAIN:  Pain level pre-treatment: unable to verbalize/10   Pain level post-treatment: unable to verbalize/10   Pain Intervention(s): Medication (see MAR); Rest, Ice, Repositioning   Response to intervention: Nurse notified, See doc flow    After treatment:   []              Patient left in no apparent distress sitting up in chair  [x]              Patient left in no apparent distress in bed  [x]              Call bell left within reach  [x]              Nursing notified  []              Family present  []              Caregiver present  []              Bed alarm activated      COMMUNICATION/EDUCATION:   [x] Aspiration precautions; swallow safety; compensatory techniques  [x]        Patient unable to participate in education; education ongoing with staff  []  Posted safety precautions in patient's room.   [x] Oral-motor/laryngeal strengthening exercises      Reyes Boer, SLP   MA, CCC-SLP  Speech-Language Pathologist    Time Calculation: 10 mins

## 2022-06-03 NOTE — PROGRESS NOTES
received a call from Dawn Bledsoe,  at [facility] [employer], regarding readmission. She followed up on dme.  informed Loral Members that she has informed the doctor and will be placing order for wheelchair. Loral Members also indicate dthat she would need a rx for the thickness of the patient's food when the patient discharge.  informed Loral Members that she would notify the doctor.       EARNEST Reyes

## 2022-06-03 NOTE — PROGRESS NOTES
Bedside and Verbal shift change report given to Eliza Hood RN (oncoming nurse) by ADIN Alonso (offgoing nurse). Report included the following information SBAR, Intake/Output and Cardiac Rhythm Normal Sinus Rhythm. Night nurse acknowledged \"lack of urine all night\". At shift change, patient urinated and soaked a barney pad. Will continue to monitor. 1130- nurse went into room and noticed wet on the floor. Patient pulled out all IVs. New IV placed. 1200- Nurse went in room to give meds and noticed IV ripped out that was just placed. Doctor notified.         Report given to Gardenia Lane RN on 4S.      3183 pt taken off unit

## 2022-06-03 NOTE — PROGRESS NOTES
Md called to have IV medication changed to po due to the patient pulling out IV's. All IV meds were changed to po.

## 2022-06-04 NOTE — PROGRESS NOTES
Problem: Mobility Impaired (Adult and Pediatric)  Goal: *Acute Goals and Plan of Care (Insert Text)  Description: Physical Therapy Goals  Initiated 5/30/2022 and to be accomplished within 3 day trial.   If appropriate continue per plan of care to accomplish goals within 7 days. 1.  Patient will follow 90% of commands to maximize safety and active participation in mobility training. 2.  Patient will move from supine to sit and sit to supine in bed with supervision/set-up. 3.  Patient will maintain seated at edge of bed for 8 min with supervision/set-up to prepare for out of bed activity. 4.  Patient will perform sit to stand with supervision/set-up. 5.  Patient will transfer from bed to chair and chair to bed with supervision/set-up using the least restrictive device. 6.  Patient will ambulate with supervision/set-up for 25 feet with the least restrictive device. PLOF: Per chart, resides in group home. Ambulatory. Outcome: Progressing Towards Goal   PHYSICAL THERAPY TREATMENT    Patient: Serafin Sanford (68 y.o. male)  Date: 6/4/2022  Diagnosis: Pneumonia [J18.9] <principal problem not specified>       Precautions: Contact,Fall,Skin  PLOF: No change    ASSESSMENT:  Patient found lying supine in bed in NAD. Patient participated in supine therex with focus on improving LE strength, mobility and circulation in prep for functional mobility training. LPTA instructed patient in the following exercises: heel slides, hip abd/add slides, hip IR/ER routine with LEs flexed and SLRs 1x10 varying to ea LE, as patient began to show signs of displeasure to exercises through intense grunting and verbal expression--LPTA offered multiple rest breaks in between routine in order to allow for patient to recover; upon rest breaks, patient more receptive to carrying out therex with min A to occ tactile cuing for carryout of each exercise.  Upon attempting to perform EOB sitting, patient quickly covered upper body up with sheet and showed addtl signs of displeasure to ongoing activity-- all other activity deferred. Patient left in bed with bed alarm set, call bell within reach and all needs met. Patient education on importance of utilizing baljinder bell to gain assistance- grunted \"yes\". Progression toward goals:   []      Improving appropriately and progressing toward goals  [x]      Improving slowly and progressing toward goals  []      Not making progress toward goals and plan of care will be adjusted     PLAN:  Patient continues to benefit from skilled intervention to address the above impairments. Continue treatment per established plan of care. Discharge Recommendations:  Rehab  Further Equipment Recommendations for Discharge:  TBD     SUBJECTIVE:   Pt is nonverbal    OBJECTIVE DATA SUMMARY:   Critical Behavior:  Neurologic State: Alert  Orientation Level: Unable to verbalize  Cognition: Unable to assess (comment)  Safety/Judgement: Not assessed  Therapeutic Exercises:   See above narrative for exercises completed      EXERCISE   Sets   Reps   Active Active Assist   Passive Self ROM   Comments   Ankle Pumps    [] [] [] []    Quad Sets/Glut Sets    [] [] [] [] Hold for 5 secs   Hamstring Sets   [] [] [] []    Short Arc Quads   [] [] [] []    Heel Slides   [] [] [] []    Straight Leg Raises   [] [] [] []    Hip Add   [] [] [] [] Hold for 5 secs, w/ pillow squeeze   Long Arc Quads   [] [] [] []    Seated Marching   [] [] [] []    Standing Marching   [] [] [] []       [] [] [] []        Pain:  Pt unable to express/verbalize pain    Activity Tolerance:   Poor  Please refer to the flowsheet for vital signs taken during this treatment.   After treatment:   [] Patient left in no apparent distress sitting up in chair  [x] Patient left in no apparent distress in bed  [x] Call bell left within reach  [x] Nursing notified  [] Caregiver present  [x] Bed alarm activated  [] SCDs applied      COMMUNICATION/EDUCATION:   [x]         Role of Physical Therapy in the acute care setting. [x]         Fall prevention education was provided and the patient/caregiver indicated understanding. [x]         Patient/family have participated as able in working toward goals and plan of care. [x]         Patient/family agree to work toward stated goals and plan of care. []         Patient understands intent and goals of therapy, but is neutral about his/her participation. []         Patient is unable to participate in stated goals/plan of care: ongoing with therapy staff.  []         Other:        Fuentes Moran.  Candance Phoenix, ERASTO   Time Calculation: 23 mins

## 2022-06-04 NOTE — ROUTINE PROCESS
Bedside and Verbal shift change report given to [facility] LPN (oncoming nurse) by Keron Ann RN (offgoing nurse). Report given with SBAR, Kardex, Intake/Output, MAR, Accordion and Recent Results.

## 2022-06-04 NOTE — PROGRESS NOTES
Problem: Pressure Injury - Risk of  Goal: *Prevention of pressure injury  Description: Document Otoniel Scale and appropriate interventions in the flowsheet. Outcome: Progressing Towards Goal  Note: Pressure Injury Interventions:  Sensory Interventions: Assess changes in LOC    Moisture Interventions: Absorbent underpads    Activity Interventions: Increase time out of bed    Mobility Interventions: PT/OT evaluation    Nutrition Interventions: Document food/fluid/supplement intake    Friction and Shear Interventions: Minimize layers                Problem: Patient Education: Go to Patient Education Activity  Goal: Patient/Family Education  Outcome: Progressing Towards Goal     Problem: Falls - Risk of  Goal: *Absence of Falls  Description: Document Syl Fall Risk and appropriate interventions in the flowsheet.   Outcome: Progressing Towards Goal  Note: Fall Risk Interventions:  Mobility Interventions: OT consult for ADLs,PT Consult for mobility concerns    Mentation Interventions: Bed/chair exit alarm,Door open when patient unattended,More frequent rounding,Room close to nurse's station,Update white board    Medication Interventions: Bed/chair exit alarm    Elimination Interventions: Bed/chair exit alarm,Call light in reach,Urinal in reach    History of Falls Interventions: Door open when patient unattended         Problem: Patient Education: Go to Patient Education Activity  Goal: Patient/Family Education  Outcome: Progressing Towards Goal     Problem: Injury - Risk of, Adverse Drug Event  Goal: *Absence of adverse drug events  Outcome: Progressing Towards Goal  Goal: *Absence of medication errors  Outcome: Progressing Towards Goal  Goal: *Knowledge of prescribed medications  Outcome: Progressing Towards Goal     Problem: Patient Education: Go to Patient Education Activity  Goal: Patient/Family Education  Outcome: Progressing Towards Goal     Problem: Pain  Goal: *Control of Pain  Outcome: Progressing Towards Goal  Goal: *PALLIATIVE CARE:  Alleviation of Pain  Outcome: Progressing Towards Goal     Problem: Patient Education: Go to Patient Education Activity  Goal: Patient/Family Education  Outcome: Progressing Towards Goal

## 2022-06-04 NOTE — PROGRESS NOTES
[facility] Hospitalist Group  Progress Note    Patient: Erum Santiago Age: 64 y.o. : 1960 MR#: [id number] SSN: [id number]  Date/Time: 2022     C/C: AMS       Subjective:   HPI : 70-year-old male past medical history of phenylketonuria, leading to progressive intellectual disability functional disability on currently severe dysphagia leading to aspiration of all consistencies. Patient also has possible history of seizures, according to patient's brother patient may have severe sleep apnea\" stop breathing\" . Currently was admitted with altered mental status, requiring oral intubation to prevent aspiration however patient has developed aspiration pneumonia which is being treated actively by ID. Currently patient also has issues regarding dysphagia severe oropharyngeal dysphagia NG tube was placed twice for nutrition another medication adjustment twice patient pulled out NG tube restraints were not effective, further risk of trauma aspiration through NG tube is very high and this uncooperative patient so I discussed this with patient's brother and for now we are going to hold back NG tube placement and for same reason PEG tube. Review of Systems:    Compared to yesterday patient is more awake  Probably is trying to answer but he has speech difficulty so I cannot  patient's orientation or his involvement  No distress noted  No cough no expectoration no fever chills        Assessment/Plan:     1.   Altered mental status-acute encephalopathy-probably progressive PKU disease related complication  -CT head-no acute CVA but microvascular disease    2 s/p intubation and extubation -intubation was done due to risk of aspiration/acute hypoxic respiratory failure    3 aspiration pneumonia    4 severe oropharyngeal dysphagia    5 seizure disorder    6 parainfluenza 3 viral infection-leading to respiratory compromise    7 hypertension    8 bacteriuria    Plan    -Patient now on comfort feed, soft dysphagia diet with assistant with aspiration precaution has been ordered, discussed with nursing. This has been discussed with patient's brother and patient's brother's wife who were only caretakers-and inform decision was done to feed him.     -Once equipments are available then patient can be discharged back to his assisted living or group home    -Improving mentation to baseline, enough evidence from the history and after talking to brother that this process is very progressive, process manic patient is becoming more and more dependent week losing muscular functionality. Probably in the same respect he developed parainfluenza infection which led to respiratory compromise also developed aspiration aspiration pneumonia versus pneumonitis leading to further deterioration of his oxygenation and causing poor mentation when he was brought to the hospital.    -Showing some recovery to baseline however persistent severe oropharyngeal dysphagia probably this was present before intubation before ventilator support requirement and before even hospital visit or this could be triggering mechanism leading to current events.    -I had a very long discussion with patient's brother who is only family along with brother's wife 2 of them make decision for patient, long-term care plan including DNR/DNI comfort feed etc. Discussed. Brother wants to discuss this with his  and make correct decision. I also explained lack of nutrition since we cannot put NG tube without high risk of complications and similarly PEG tube placement due to high risk. He understood well verbalized well he also consulted his wife regarding same and ultimately related decided that in person he will come tomorrow 6/3/2022 at 6 discussed case with me.       Objective:       General: Awake but orientation cannot be judged  HEENT: No facial asymmetry, KATIE Tylor, External ears - WNL    Cardiovascular: S1S2 - regular , No Murmur Pulmonary: Equal expansion , No Use of accessory muscles , bilateral coarse rales ? secretions  GI:  +BS in all four quadrants, soft, non-tender  Extremities:  No edema; 2+ dorsalis pedis pulses bilaterally  Neuro: No focal deficit      DVT Prophylaxis:  []Lovenox  []Hep SQ  []SCDs  []Coumadin   []On Heparin gtt    [] Eliquis [] Xarelto     Vitals:         VS:   Visit Vitals  /74   Pulse 75   Temp 97.6 °F (36.4 °C)   Resp 19   Ht 5' 8\" (1.727 m)   Wt 60.3 kg (133 lb)   SpO2 96%   BMI 20.22 kg/m²      Tmax/24hrs: Temp (24hrs), Av.8 °F (36.6 °C), Min:97.6 °F (36.4 °C), Max:98.2 °F (36.8 °C)        Medications:   Current Facility-Administered Medications   Medication Dose Route Frequency    famotidine (PEPCID) 40 mg/5 mL (8 mg/mL) oral suspension 20 mg  20 mg Oral DAILY    levETIRAcetam (KEPPRA) oral solution 500 mg  500 mg Oral BID    valproic acid (as sodium salt) (DEPAKENE) 250 mg/5 mL (5 mL) oral solution 500 mg  500 mg Oral Q12H    albuterol-ipratropium (DUO-NEB) 2.5 MG-0.5 MG/3 ML  3 mL Nebulization Q4H PRN    [Held by provider] benztropine (COGENTIN) tablet 1 mg  1 mg Oral BID    [Held by provider] valproic acid (as sodium salt) (DEPAKENE) 250 mg/5 mL (5 mL) oral solution 500 mg  500 mg Per G Tube BID    glucose chewable tablet 16 g  4 Tablet Oral PRN    glucagon (GLUCAGEN) injection 1 mg  1 mg IntraMUSCular PRN    dextrose 10% infusion 0-250 mL  0-250 mL IntraVENous PRN    clotrimazole-betamethasone (LOTRISONE) 1-0.05 % cream   Topical BID    [Held by provider] cetirizine (ZYRTEC) tablet 10 mg  10 mg Oral DAILY    [Held by provider] finasteride (PROSCAR) tablet 5 mg  5 mg Oral DAILY    loperamide (IMODIUM) capsule 2 mg  2 mg Oral QID PRN    [Held by provider] trospium (SANCTURA) tablet 20 mg  20 mg Oral DAILY    [Held by provider] OLANZapine (ZyPREXA) tablet 10 mg  10 mg Oral TID    [Held by provider] pravastatin (PRAVACHOL) tablet 20 mg  20 mg Oral QHS    [Held by provider] tamsulosin (FLOMAX) capsule 0.4 mg  0.4 mg Oral DAILY    polyethylene glycol (MIRALAX) packet 17 g  17 g Oral DAILY PRN    ondansetron (ZOFRAN ODT) tablet 4 mg  4 mg Oral Q8H PRN    Or    ondansetron (ZOFRAN) injection 4 mg  4 mg IntraVENous Q6H PRN    enoxaparin (LOVENOX) injection 40 mg  40 mg SubCUTAneous DAILY    acetaminophen (TYLENOL) suppository 650 mg  650 mg Rectal Q4H PRN       Labs:    No results for input(s): WBC, HGB, HCT, PLT, HGBEXT, HCTEXT, PLTEXT in the last 72 hours. Recent Labs     06/02/22  0633      K 3.9      CO2 26   GLU 99   BUN 19*   CREA 0.67   CA 8.7   MG 2.2   PHOS 2.7         Time spent on direct patient care >30 mints     Complexity : High complex - due to multiple medical issues outlined above. CODE Status : DNR/DNI on comfort feed    Case discussed with:  [x]Patient  [] Family  [x]Nursing  []Case Management         Disclaimer: Sections of this note are dictated utilizing voice recognition software, which may have resulted in some phonetic based errors in grammar and contents. Even though attempts were made to correct all the mistakes, some may have been missed, and remained in the body of the document. If questions arise, please contact our department.     Signed By: Ruddy Velazquez MD     June 4, 2022

## 2022-06-05 NOTE — PROGRESS NOTES
Diminished co  Problem: Risk for Spread of Infection  Goal: Prevent transmission of infectious organism to others  Description: Prevent the transmission of infectious organisms to other patients, staff members, and visitors. Outcome: Not Progressing Towards Goal     Problem: Patient Education:  Go to Education Activity  Goal: Patient/Family Education  Outcome: Not Progressing Towards Goal     Problem: Pressure Injury - Risk of  Goal: *Prevention of pressure injury  Description: Document Otoniel Scale and appropriate interventions in the flowsheet. Outcome: Not Progressing Towards Goal  Note: Pressure Injury Interventions:  Sensory Interventions: Assess changes in LOC    Moisture Interventions: Absorbent underpads,Minimize layers    Activity Interventions: Pressure redistribution bed/mattress(bed type),Assess need for specialty bed    Mobility Interventions: Assess need for specialty bed    Nutrition Interventions: Document food/fluid/supplement intake    Friction and Shear Interventions: Minimize layers,HOB 30 degrees or less                Problem: Patient Education: Go to Patient Education Activity  Goal: Patient/Family Education  Outcome: Not Progressing Towards Goal     Problem: Patient Education: Go to Patient Education Activity  Goal: Patient/Family Education  Outcome: Not Progressing Towards Goal     Problem: Nutrition Deficit  Goal: *Optimize nutritional status  Outcome: Not Progressing Towards Goal     Problem: Falls - Risk of  Goal: *Absence of Falls  Description: Document Syl Fall Risk and appropriate interventions in the flowsheet.   Outcome: Not Progressing Towards Goal  Note: Fall Risk Interventions:  Mobility Interventions: Bed/chair exit alarm    Mentation Interventions: Bed/chair exit alarm,More frequent rounding,Room close to nurse's station,Update white board    Medication Interventions: Bed/chair exit alarm,Patient to call before getting OOB    Elimination Interventions: Call light in reach    History of Falls Interventions: Bed/chair exit alarm (video camera)         Problem: Patient Education: Go to Patient Education Activity  Goal: Patient/Family Education  Outcome: Not Progressing Towards Goal     Problem: Injury - Risk of, Adverse Drug Event  Goal: *Absence of adverse drug events  Outcome: Not Progressing Towards Goal  Goal: *Absence of medication errors  Outcome: Not Progressing Towards Goal     Problem: Patient Education: Go to Patient Education Activity  Goal: Patient/Family Education  Outcome: Not Progressing Towards Goal     Problem: Pain  Goal: *Control of Pain  Outcome: Not Progressing Towards Goal  Goal: *PALLIATIVE CARE:  Alleviation of Pain  Outcome: Not Progressing Towards Goal     Problem: Patient Education: Go to Patient Education Activity  Goal: Patient/Family Education  Outcome: Not Progressing Towards Goal

## 2022-06-05 NOTE — ROUTINE PROCESS
Bedside and Verbal shift change report given to [facility] LPN (oncoming nurse) by Gabriela Mccloud RN (offgoing nurse). Report given with SBAR, Kardex, Intake/Output, MAR, Accordion and Recent Results.

## 2022-06-05 NOTE — PROGRESS NOTES
[facility]  Progress Note    Patient: Lois Humphries Age: 64 y.o. : 1960 MR#: [id number] SSN: [id number]  Date/Time: 2022     C/C: AMS       Subjective:   HPI : 51-year-old male past medical history of phenylketonuria, leading to progressive intellectual disability functional disability on currently severe dysphagia leading to aspiration of all consistencies. Patient also has possible history of seizures, according to patient's brother patient may have severe sleep apnea\" stop breathing\" . Currently was admitted with altered mental status, requiring oral intubation to prevent aspiration however patient has developed aspiration pneumonia which is being treated actively by ID. Currently patient also has issues regarding dysphagia severe oropharyngeal dysphagia NG tube was placed twice for nutrition another medication adjustment twice patient pulled out NG tube restraints were not effective, further risk of trauma aspiration through NG tube is very high and this uncooperative patient so I discussed this with patient's brother and for now we are going to hold back NG tube placement and for same reason PEG tube. Review of Systems:    Patient opens eyes opens mouth does not speak over does not follow commands  Patient lying in bed,   Divista friability I could not find any focal deficit apart from left upper extremity weakness        Assessment/Plan:     1.   Altered mental status-acute encephalopathy-probably progressive PKU disease related complication  -CT head-no acute CVA but microvascular disease    2 s/p intubation and extubation -intubation was done due to risk of aspiration/acute hypoxic respiratory failure    3 aspiration pneumonia    4 severe oropharyngeal dysphagia    5 seizure disorder    6 parainfluenza 3 viral infection-leading to respiratory compromise     7 hypertension    8 bacteriuria    Plan    -Today patient is more alert awake, patient cannot speak but he responds with his usual voice in my opinion he looks like he is at baseline    -Improving mentation to baseline, enough evidence from the history and after talking to brother that this process is very progressive, process manic patient is becoming more and more dependent week losing muscular functionality. Probably in the same respect he developed parainfluenza infection which led to respiratory compromise also developed aspiration aspiration pneumonia versus pneumonitis leading to further deterioration of his oxygenation and causing poor mentation when he was brought to the hospital.    -Showing some recovery to baseline however persistent severe oropharyngeal dysphagia probably this was present before intubation before ventilator support requirement and before even hospital visit or this could be triggering mechanism leading to current events.    -I had a very long discussion with patient's brother who is only family along with brother's wife 2 of them make decision for patient, long-term care plan including DNR/DNI comfort feed etc. Discussed. Brother wants to discuss this with his  and make correct decision. I also explained lack of nutrition since we cannot put NG tube without high risk of complications and similarly PEG tube placement due to high risk. He understood well verbalized well he also consulted his wife regarding same and ultimately related decided that in person he will come tomorrow 6/3/2022 at 6 discussed case with me.       Objective:       General: Awake but orientation cannot be judged  HEENT: No facial asymmetry, KATIE Tylor, External ears - WNL    Cardiovascular: S1S2 - regular , No Murmur   Pulmonary: Equal expansion , No Use of accessory muscles , bilateral coarse rales ? secretions  GI:  +BS in all four quadrants, soft, non-tender  Extremities:  No edema; 2+ dorsalis pedis pulses bilaterally  Neuro: No focal deficit      DVT Prophylaxis:  []Lovenox  []Hep SQ []SCDs  []Coumadin   []On Heparin gtt    [] Eliquis [] Xarelto     Vitals:         VS:   Visit Vitals  /72   Pulse 60   Temp 97.7 °F (36.5 °C)   Resp 18   Ht 5' 8\" (1.727 m)   Wt 60.3 kg (133 lb)   SpO2 96%   BMI 20.22 kg/m²      Tmax/24hrs: Temp (24hrs), Av.8 °F (36.6 °C), Min:97.7 °F (36.5 °C), Max:98.1 °F (36.7 °C)        Medications:   Current Facility-Administered Medications   Medication Dose Route Frequency    famotidine (PEPCID) 40 mg/5 mL (8 mg/mL) oral suspension 20 mg  20 mg Oral DAILY    levETIRAcetam (KEPPRA) oral solution 500 mg  500 mg Oral BID    valproic acid (as sodium salt) (DEPAKENE) 250 mg/5 mL (5 mL) oral solution 500 mg  500 mg Oral Q12H    albuterol-ipratropium (DUO-NEB) 2.5 MG-0.5 MG/3 ML  3 mL Nebulization Q4H PRN    [Held by provider] benztropine (COGENTIN) tablet 1 mg  1 mg Oral BID    [Held by provider] valproic acid (as sodium salt) (DEPAKENE) 250 mg/5 mL (5 mL) oral solution 500 mg  500 mg Per G Tube BID    glucose chewable tablet 16 g  4 Tablet Oral PRN    glucagon (GLUCAGEN) injection 1 mg  1 mg IntraMUSCular PRN    dextrose 10% infusion 0-250 mL  0-250 mL IntraVENous PRN    clotrimazole-betamethasone (LOTRISONE) 1-0.05 % cream   Topical BID    [Held by provider] cetirizine (ZYRTEC) tablet 10 mg  10 mg Oral DAILY    [Held by provider] finasteride (PROSCAR) tablet 5 mg  5 mg Oral DAILY    loperamide (IMODIUM) capsule 2 mg  2 mg Oral QID PRN    [Held by provider] trospium (SANCTURA) tablet 20 mg  20 mg Oral DAILY    [Held by provider] OLANZapine (ZyPREXA) tablet 10 mg  10 mg Oral TID    [Held by provider] pravastatin (PRAVACHOL) tablet 20 mg  20 mg Oral QHS    [Held by provider] tamsulosin (FLOMAX) capsule 0.4 mg  0.4 mg Oral DAILY    polyethylene glycol (MIRALAX) packet 17 g  17 g Oral DAILY PRN    ondansetron (ZOFRAN ODT) tablet 4 mg  4 mg Oral Q8H PRN    Or    ondansetron (ZOFRAN) injection 4 mg  4 mg IntraVENous Q6H PRN    enoxaparin (LOVENOX) injection 40 mg  40 mg SubCUTAneous DAILY    acetaminophen (TYLENOL) suppository 650 mg  650 mg Rectal Q4H PRN       Labs:    No results for input(s): WBC, HGB, HCT, PLT, HGBEXT, HCTEXT, PLTEXT, HGBEXT, HCTEXT, PLTEXT in the last 72 hours. No results for input(s): NA, K, CL, CO2, GLU, BUN, CREA, CA, MG, PHOS, ALB, TBIL, ALT, INR, INREXT, INREXT in the last 72 hours. No lab exists for component: SGOT      Time spent on direct patient care >30 mints     Complexity : High complex - due to multiple medical issues outlined above. CODE Status : DNR/DNI on comfort feed    Case discussed with:  [x]Patient  [] Family  [x]Nursing  []Case Management         Disclaimer: Sections of this note are dictated utilizing voice recognition software, which may have resulted in some phonetic based errors in grammar and contents. Even though attempts were made to correct all the mistakes, some may have been missed, and remained in the body of the document. If questions arise, please contact our department.     Signed By: Ruddy Velazquez MD     June 5, 2022

## 2022-06-06 NOTE — ACP (ADVANCE CARE PLANNING)
Advance Care Planning     General Advance Care Planning (ACP) Conversation      Date of Conversation: 6/3/2022    Conducted with: Mcarthur Lefort and Chantelle Hooker (via phone), Ancelmo Koch NP (Palliative) and this writer. The Palliative Care team completed a POST form, with patient's brother and sister-in-law, on Friday, Andreina 3, 2022; over the phone. The document was sent for electronic signature, via [employer]. Patient's brother electronically signed the POST and returned the POST, late Friday. This writer obtained the signed POST and will now place the POST on patient's chart to be scanned in to the EMR and to also be sent with patient upon discharge. At this time, patient is a DNR/DNI with Limited Additional Interventions and NO feeding tubes. Lemmie M. Brigido Goldberg., MSW  Palliative Care   KK#[phone]

## 2022-06-06 NOTE — PROGRESS NOTES
CM called Martínez Carson at [facility], received voicemail, left message asking for assistance, about patient and Hospital bed delivery, and that CM called Odette earlier left voicemail, and CM did not receive a call back. CM left phone number for a return call.            Beth Rivera, RN  Case Management [phone]

## 2022-06-06 NOTE — PROGRESS NOTES
No evidence of learning   Problem: Risk for Spread of Infection  Goal: Prevent transmission of infectious organism to others  Description: Prevent the transmission of infectious organisms to other patients, staff members, and visitors.   Outcome: Progressing Towards Goal     Problem: Patient Education:  Go to Education Activity  Goal: Patient/Family Education  Outcome: Progressing Towards Goal

## 2022-06-06 NOTE — PROGRESS NOTES
[facility]  (A Division of [address] [place] [address])    Follow-up Note      Date of Admission: 2022       Date of Note:  2022          Current Antimicrobials:    Prior Antimicrobials:  Zosyn  to present      Assessment:         Acute respiratory failure: Intubated on , extubated   Infection with parainfluenza 3 virus: no pneumonia on CXR upon admission. There are hazy opacities noted on post intubation x-ray on . With possible congestion noted on  x-rays.   -Lactic acid 1.4, procalcitonin 0.45  Aspiration pneumonia  Acute metabolic encephalopathy  Transaminitis: Persistent-AST trending down somewhat  Mild hyponatremia: Resolved  PKU  Mild hematuria       Plan:   Completed course of Zosyn . CBC, BMP every 2-3 days  Aspiration precautions, .   -Agree with need for ongoing assessments and potential PEG tube placement as his risk for recurrent aspiration remains fairly high. Continue with droplet precautions for parainfluenza as per hospital infection control policy      Dempsey Boeck, DO [address] [facility]  [address], [address] [facility] [address]  Office: [phone]  Mobile/Text: [phone]     Microbiology:   blood cultures: No growth to date   urine culture no course to date   respiratory viral culture:  positive for parainfluenza 3 negative for all other pathogens    Lines / Catheters:  Peripheral  ET   Right IJ      Subjective:   Patient seen and examined at bedside this AM.   Looking around. Tries to answer questions. Wet cough noted while in the room.      Objective:        Visit Vitals  BP (!) 148/94 (BP 1 Location: Left upper arm)   Pulse 79   Temp 97.7 °F (36.5 °C)   Resp 18   Ht 5' 8\" (1.727 m)   Wt 60.3 kg (133 lb)   SpO2 98%   BMI 20.22 kg/m²     Temp (24hrs), Av.8 °F (36.6 °C), Min:97.7 °F (36.5 °C), Max:97.9 °F (36.6 °C)        General:   Awake and alert looks around Skin:   no rashes or skin lesions noted on limited exam, dry and warm   HEENT:  No scleral icterus or pallor; oral mucosa moist, lips moist   Lymph Nodes:   not assessed today   Lungs:   coarse breath sounds throughout, no wheeze and irregular respiratory drive   Heart:  RRR, s1 and s2; no murmurs rubs or gallops; no edema, + pedal pulses   Abdomen:  soft, non-distended, active bowel sounds, non-tender   Genitourinary:  deferred   Extremities:   average muscle tone; no contractures, no joint effusions,   Neurologic:   Moves extremities independently, no apparent focal weakness, slow to answer questions, intermittent confusion   Psychiatric:  , Calm, follows directions         Lab results:    Chemistry  No results for input(s): GLU, NA, K, CL, CO2, BUN, CREA, CA, AGAP, BUCR, TBIL, AP, TP, ALB, GLOB, AGRAT in the last 72 hours. No lab exists for component: GPT    CBC w/ Diff  No results for input(s): WBC, RBC, HGB, HCT, PLT, GRANS, LYMPH, EOS, HGBEXT, HCTEXT, PLTEXT, HGBEXT, HCTEXT, PLTEXT in the last 72 hours.     Microbiology  All Micro Results     Procedure Component Value Units Date/Time    CULTURE, BLOOD [[id number]] Collected: 05/26/22 1100    Order Status: Completed Specimen: Blood Updated: 06/01/22 0641     Special Requests: NO SPECIAL REQUESTS        Culture result: NO GROWTH 6 DAYS       CULTURE, BLOOD [[id number]] Collected: 05/26/22 1200    Order Status: Completed Specimen: Blood Updated: 06/01/22 0641     Special Requests: NO SPECIAL REQUESTS        Culture result: NO GROWTH 6 DAYS       CULTURE, RESPIRATORY/SPUTUM/BRONCH Lorrayne Reveal STAIN [[id number]] Collected: 05/28/22 0250    Order Status: Completed Specimen: Sputum Updated: 05/30/22 0927     Special Requests: NO SPECIAL REQUESTS        GRAM STAIN RARE WBCS SEEN               RARE EPITHELIAL CELLS SEEN            NO ORGANISMS SEEN        Culture result:       RARE NORMAL RESPIRATORY GENARO          CULTURE, URINE [[id number]] Collected: 05/26/22 1234 Order Status: Completed Specimen: Urine from Clean catch Updated: 05/28/22 1226     Special Requests: NO SPECIAL REQUESTS        Culture result: No growth (<1,000 CFU/ML)       RESPIRATORY VIRUS PANEL W/COVID-19, PCR [[id number]]  (Abnormal) Collected: 05/26/22 1309    Order Status: Completed Specimen: Nasopharyngeal Updated: 05/26/22 1452     Adenovirus Not detected        Coronavirus 229E Not detected        Coronavirus HKU1 Not detected        Coronavirus CVNL63 Not detected        Coronavirus OC43 Not detected        SARS-CoV-2, PCR Not detected        Metapneumovirus Not detected        Rhinovirus and Enterovirus Not detected        Influenza A Not detected        Influenza A, subtype H1 Not detected        Influenza A, subtype H3 Not detected        INFLUENZA A H1N1 PCR Not detected        Influenza B Not detected        Parainfluenza 1 Not detected        Parainfluenza 2 Not detected        Parainfluenza 3 Detected        Parainfluenza virus 4 Not detected        RSV by PCR Not detected        B. parapertussis, PCR Not detected        Bordetella pertussis - PCR Not detected        Chlamydophila pneumoniae DNA, QL, PCR Not detected        Mycoplasma pneumoniae DNA, QL, PCR Not detected              Wilhemina Ani DO   6/6/2022

## 2022-06-06 NOTE — PROGRESS NOTES
MARISABEL called [facility] [phone], left message for a return call, checking status on Hospital bed order faxed to them on Friday, let them know that patient needs Hospital Bed delivered for patient discharge. MARISABEL left phone number for a return call.              Latricia Stephenson, RN  Case Management [phone]

## 2022-06-06 NOTE — PROGRESS NOTES
Problem: Self Care Deficits Care Plan (Adult)  Goal: *Acute Goals and Plan of Care (Insert Text)  Description: Occupational Therapy Goals  Initiated 5/31/2022 within 7 day(s). Pt will be placed on 3 day trial to assess ability to meaningful participate in OT sessions. 1.  Patient will perform self-feeding with compensatory strategies for loaded utensil or drink to mouth with contact guard assist.   2.  Patient will perform grooming minimal assistance/contact guard assist.  3.  Patient will perform bed mobility with min A.   4.  Patient will perform sitting edge of bed with G balance x 2-3 min in prep for self care. Prior Level of Function:Due to pt's AMS and baseline intellectual disability pt unable to provide history. Per chart, pt is ambulatory and verbal at baseline and lives in group home. Has brother for social support. Outcome: Resolved/Not Met   OCCUPATIONAL THERAPY TREATMENT/DISCHARGE    Patient: Palmira Bledsoe (16 y.o. male)  Date: 6/6/2022  Diagnosis: Pneumonia [J18.9] <principal problem not specified>       Precautions: Contact,Fall,Skin    Chart, occupational therapy assessment, plan of care, and goals were reviewed. ASSESSMENT:  Pt seen at bed level for self-feeding. Pt alert, ? Orientation to self 2/2 no command following. Pt requires hand over hand assist w/all aspects of self-feeding, including distal support at elbow bringing cup/spoon to mouth. Pt w/1 episode of coughing. Will advise SLP. Alerted Elsie JEFFRIES, pt will require Total Assist w/self-feeding and HOB at 90 degrees. PLAN:  Patient will be discharged from occupational therapy at this time.   Rationale for discharge:  [] Goals Achieved  [] Plateau Reached  [x] Patient not participating in therapy  [] Other:  Discharge Recommendations:  return to group home  Further Equipment Recommendations for Discharge:  N/A     SUBJECTIVE:   Patient nonverbal.    OBJECTIVE DATA SUMMARY:   Cognitive/Behavioral Status:  Neurologic State: Alert  Orientation Level: Unable to verbalize  Cognition: No command following  Safety/Judgement: Not assessed    ADL Intervention:  Feeding  Utensil Management: Maximum assistance (w/hand over hand assist)  Food to Mouth: Maximum assistance (w/hand over hand assist)  Drink to Mouth: Maximum assistance (w/hand over hand assist)    Pain:  Pain level pre-treatment: 0/10   Pain level post-treatment: 0/10     Activity Tolerance:    Poor    Please refer to the flowsheet for vital signs taken during this treatment. After treatment:   []  Patient left in no apparent distress sitting up in chair  [x]  Patient left in no apparent distress in bed  [x]  Call bell left within reach  [x]  Nursing, CNA, Marietta Kim, notified  []  Caregiver present  []  Bed alarm activated    COMMUNICATION/EDUCATION:   []      Role of Occupational Therapy in the acute care setting  []      Home safety education was provided and the patient/caregiver indicated understanding. []      Patient/family have participated as able and agree with findings and recommendations. [x]      Patient is unable to participate in plan of care at this time. Thank you for allowing me to assist in the care of this patient.   ALISA Mojica  Time Calculation: 12 mins

## 2022-06-06 NOTE — PROGRESS NOTES
Problem: Mobility Impaired (Adult and Pediatric)  Goal: *Acute Goals and Plan of Care (Insert Text)  Description: Physical Therapy Goals  Initiated 5/30/2022 and to be accomplished within 3 day trial.   If appropriate continue per plan of care to accomplish goals within 7 days. 1.  Patient will follow 90% of commands to maximize safety and active participation in mobility training. 2.  Patient will move from supine to sit and sit to supine in bed with supervision/set-up. 3.  Patient will maintain seated at edge of bed for 8 min with supervision/set-up to prepare for out of bed activity. 4.  Patient will perform sit to stand with supervision/set-up. 5.  Patient will transfer from bed to chair and chair to bed with supervision/set-up using the least restrictive device. 6.  Patient will ambulate with supervision/set-up for 25 feet with the least restrictive device. PLOF: Per chart, resides in group home. Ambulatory. Outcome: Resolved/Met   PHYSICAL THERAPY RE-EVALUATION AND DISCHARGE    Patient: Artie Lilly (76 y.o. male)  Date: 6/6/2022  Primary Diagnosis: Pneumonia [J18.9]  Precautions: Contact,Fall,Skin  ASSESSMENT :  Re-evaluation s/p 3 day trial; goals reviewed. Remains with no command following. Non-verbal. Alert; tracks with eyes. However no AROM or functional mobility to commands. Max A for supine to sit. No command following in sitting. Resists attempt to stand. Spontaneously returns to supine with supervision. No AROM BLE to command; resists PROM. Seated in bed at end of session. Call bell in reach. Further skilled physical therapy is not indicated at this time. Not appropriate for skilled PT treatment in acute setting d/t baseline cognitive level. PLAN :  Recommendations and Planned Interventions:   Further skilled physical therapy is not indicated.    Discharge Recommendations: Prior residence; group home  Further Equipment Recommendations for Discharge: hospital bed and wheelchair SUBJECTIVE:   Non-verbal    OBJECTIVE DATA SUMMARY:     Past Medical History:   Diagnosis Date    Anxiety disorder     Dermatitis     High cholesterol     Intellectual disability     PKU (phenylketonuria) ([facility] [address]     Seizures ([facility] [address]     Urinary retention      Past Surgical History:   Procedure Laterality Date    COLONOSCOPY N/A 10/19/2021    COLONOSCOPY performed by Guzman Farrell MD at [facility] ENDOSCOPY    HX GI  03/15/2022    Excision and Fulgeration Anal Condyloma     HX PROSTATE SURGERY      bph    HX UROLOGICAL      retention       Critical Behavior:  Neurologic State: Alert  Orientation Level: Unable to verbalize  Cognition: No command following     Psychosocial  Patient Behaviors: Calm;Agitated    Strength:    Unable to assess d/t no command following  Range Of Motion:  BLE AROM WFL   Functional Mobility:  Bed Mobility:  Supine to Sit: Maximum assistance  Sit to Supine: Supervision  Balance:   Sitting: Impaired  Sitting - Static: Fair (occasional)  Neuro Re-education:   Seated balance 1 minute    Pain:  Pain level pre-treatment: 0/10   Pain level post-treatment: 0/10     Activity Tolerance:   Poor     After treatment:   []         Patient left in no apparent distress sitting up in chair  [x]         Patient left in no apparent distress in bed  [x]         Call bell left within reach  [x]         Nursing notified  []         Caregiver present  []         Bed alarm activated  []         SCDs applied    COMMUNICATION/EDUCATION:   [x]         Role of physical therapy in the acute care setting. [x]         Fall prevention education was provided and the patient/caregiver indicated understanding. [x]         Patient/family have participated as able in goal setting and plan of care. [x]         Patient/family agree to work toward stated goals and plan of care. []         Patient understands intent and goals of therapy, but is neutral about his/her participation.   []         Patient is unable to participate in goal setting/plan of care: ongoing with therapy staff.     Thank you for this referral.  Jimmy Downey, PT   Time Calculation: 13 mins

## 2022-06-06 NOTE — ROUTINE PROCESS
Bedside and Verbal shift change report given to [facility] LPN (oncoming nurse) by Paul Romano RN (offgoing nurse). Report given with SBAR, Kardex, Intake/Output, MAR, Accordion and Recent Results.

## 2022-06-06 NOTE — PROGRESS NOTES
SLP Note:    Pt has been transitioned to comfort feeds. Will sign off as no further skilled SLP needs identified for dysphagia.      Vilma Sierra M.S., [address] [facility]  Speech-Language Pathologist

## 2022-06-06 NOTE — PROGRESS NOTES
[facility] Hospitalist Group  Progress Note    Patient: Alejandra Kim Age: 64 y.o. : 1960 MR#: [id number] SSN: [id number]  Date/Time: 2022     C/C: AMS       Subjective:   HPI : 75-year-old male past medical history of phenylketonuria, leading to progressive intellectual disability functional disability on currently severe dysphagia leading to aspiration of all consistencies. Patient also has possible history of seizures, according to patient's brother patient may have severe sleep apnea\" stop breathing\" . Currently was admitted with altered mental status, requiring oral intubation to prevent aspiration however patient has developed aspiration pneumonia which is being treated actively by ID. Currently patient also has issues regarding dysphagia severe oropharyngeal dysphagia NG tube was placed twice for nutrition another medication adjustment twice patient pulled out NG tube restraints were not effective, further risk of trauma aspiration through NG tube is very high and this uncooperative patient so I discussed this with patient's brother and for now we are going to hold back NG tube placement and for same reason PEG tube. Review of Systems: Patient is alert awake orientation cannot be discharged, he has a chronic developmental delay due to cocaine use disease, family is aware of this, patient is on supportive care. For last 2 3 days patient is on p.o. comfort diet which she is tolerating to some extent without any major issues. On asking question patient focus is at examiner response questionable appropriateness however response in form of making sound but no speech. Assessment/Plan:     1.   Altered mental status-acute encephalopathy-probably progressive PKU disease related complication  -CT head-no acute CVA but microvascular disease    2 s/p intubation and extubation -intubation was done due to risk of aspiration/acute hypoxic respiratory failure    3 aspiration pneumonia    4 severe oropharyngeal dysphagia    5 seizure disorder    6 parainfluenza 3 viral infection-leading to respiratory compromise    7 hypertension    8 bacteriuria    9 DNR/DNI-comfort feeding and limited intervention    Plan    6/6/2022: Patient's condition remains same would continue current management, currently awaiting for DME-hospital bed      -Improving mentation to baseline, enough evidence from the history and after talking to brother that this process is very progressive, process manic patient is becoming more and more dependent week losing muscular functionality. Probably in the same respect he developed parainfluenza infection which led to respiratory compromise also developed aspiration aspiration pneumonia versus pneumonitis leading to further deterioration of his oxygenation and causing poor mentation when he was brought to the hospital.    -Showing some recovery to baseline however persistent severe oropharyngeal dysphagia probably this was present before intubation before ventilator support requirement and before even hospital visit or this could be triggering mechanism leading to current events.    -I had a very long discussion with patient's brother who is only family along with brother's wife 2 of them make decision for patient, long-term care plan including DNR/DNI comfort feed etc. Discussed. Brother wants to discuss this with his  and make correct decision. I also explained lack of nutrition since we cannot put NG tube without high risk of complications and similarly PEG tube placement due to high risk.   He understood well verbalized well he also consulted his wife regarding same and ultimately related decided that in person he will come tomorrow 6/3/2022 at 6 discussed case with me.    -Palliative care reviewed and appreciated    Objective:       General: Awake but orientation cannot be judged  HEENT: No facial asymmetry, KATIE Tylor, External ears - WNL Cardiovascular: S1S2 - regular , No Murmur   Pulmonary: Equal expansion , No Use of accessory muscles , bilateral coarse rales ? secretions  GI:  +BS in all four quadrants, soft, non-tender  Extremities:  No edema; 2+ dorsalis pedis pulses bilaterally  Neuro: No focal deficit      DVT Prophylaxis:  [x]Lovenox  []Hep SQ  []SCDs  []Coumadin   []On Heparin gtt    [] Eliquis [] Xarelto        Vitals:         VS:   Visit Vitals  /76   Pulse 70   Temp 97.7 °F (36.5 °C)   Resp 18   Ht 5' 8\" (1.727 m)   Wt 60.3 kg (133 lb)   SpO2 98%   BMI 20.22 kg/m²      Tmax/24hrs: Temp (24hrs), Av.9 °F (36.6 °C), Min:97.7 °F (36.5 °C), Max:98.1 °F (36.7 °C)        Medications:   Current Facility-Administered Medications   Medication Dose Route Frequency    famotidine (PEPCID) 40 mg/5 mL (8 mg/mL) oral suspension 20 mg  20 mg Oral DAILY    levETIRAcetam (KEPPRA) oral solution 500 mg  500 mg Oral BID    valproic acid (as sodium salt) (DEPAKENE) 250 mg/5 mL (5 mL) oral solution 500 mg  500 mg Oral Q12H    albuterol-ipratropium (DUO-NEB) 2.5 MG-0.5 MG/3 ML  3 mL Nebulization Q4H PRN    [Held by provider] benztropine (COGENTIN) tablet 1 mg  1 mg Oral BID    [Held by provider] valproic acid (as sodium salt) (DEPAKENE) 250 mg/5 mL (5 mL) oral solution 500 mg  500 mg Per G Tube BID    glucose chewable tablet 16 g  4 Tablet Oral PRN    glucagon (GLUCAGEN) injection 1 mg  1 mg IntraMUSCular PRN    dextrose 10% infusion 0-250 mL  0-250 mL IntraVENous PRN    clotrimazole-betamethasone (LOTRISONE) 1-0.05 % cream   Topical BID    [Held by provider] cetirizine (ZYRTEC) tablet 10 mg  10 mg Oral DAILY    [Held by provider] finasteride (PROSCAR) tablet 5 mg  5 mg Oral DAILY    loperamide (IMODIUM) capsule 2 mg  2 mg Oral QID PRN    [Held by provider] trospium (SANCTURA) tablet 20 mg  20 mg Oral DAILY    [Held by provider] OLANZapine (ZyPREXA) tablet 10 mg  10 mg Oral TID    [Held by provider] pravastatin (PRAVACHOL) tablet 20 mg  20 mg Oral QHS    [Held by provider] tamsulosin (FLOMAX) capsule 0.4 mg  0.4 mg Oral DAILY    polyethylene glycol (MIRALAX) packet 17 g  17 g Oral DAILY PRN    ondansetron (ZOFRAN ODT) tablet 4 mg  4 mg Oral Q8H PRN    Or    ondansetron (ZOFRAN) injection 4 mg  4 mg IntraVENous Q6H PRN    enoxaparin (LOVENOX) injection 40 mg  40 mg SubCUTAneous DAILY    acetaminophen (TYLENOL) suppository 650 mg  650 mg Rectal Q4H PRN       Labs:    No results for input(s): WBC, HGB, HCT, PLT, HGBEXT, HCTEXT, PLTEXT in the last 72 hours. No results for input(s): NA, K, CL, CO2, GLU, BUN, CREA, CA, MG, PHOS, ALB, TBIL, ALT, INR, INREXT in the last 72 hours. No lab exists for component: SGOT      Time spent on direct patient care >30 mints     Complexity : High complex - due to multiple medical issues outlined above. CODE Status : DNR/DNI/comfort feed/limited intervention    Case discussed with:  [x]Patient  [] Family  [x]Nursing  [x]Case Management         Disclaimer: Sections of this note are dictated utilizing voice recognition software, which may have resulted in some phonetic based errors in grammar and contents. Even though attempts were made to correct all the mistakes, some may have been missed, and remained in the body of the document. If questions arise, please contact our department.     Signed By: Delia Moulton MD     June 6, 2022

## 2022-06-06 NOTE — PROGRESS NOTES
Discharge Summary     Patient ID:  Nicholas Ponce  [id number]  68 y.o.  1960  Body mass index is 20.22 kg/m². PCP on record: Fany Carpenter MD    Admit date: 5/26/2022  Discharge date and time: 6/6/2022      Reason for admission: AMS / Acute respiratory failure     Discharge Diagnoses:                                             1.  Altered mental status-acute encephalopathy-probably progressive PKU disease related complication  -CT head-no acute CVA but microvascular disease     2 s/p intubation and extubation -intubation was done due to risk of aspiration/acute hypoxic respiratory failure     3 aspiration pneumonia     4 severe oropharyngeal dysphagia     5 seizure disorder     6 parainfluenza 3 viral infection-leading to respiratory compromise     7 hypertension     8 bacteriuria     9 DNR/DNI-comfort feeding and limited intervention    Consults: ID and [id number] [facility] [place],[address] Course by problems:  HPI : 25-year-old male past medical history of phenylketonuria, leading to progressive intellectual disability functional disability on currently severe dysphagia leading to aspiration of all consistencies. Patient also has possible history of seizures, according to patient's brother patient may have severe sleep apnea\" stop breathing\" .      Currently was admitted with altered mental status, requiring oral intubation to prevent aspiration however patient has developed aspiration pneumonia which is being treated actively by ID.   Currently patient also has issues regarding dysphagia severe oropharyngeal dysphagia NG tube was placed twice for nutrition another medication adjustment twice patient pulled out NG tube restraints were not effective, further risk of trauma aspiration through NG tube is very high and this uncooperative patient so I discussed this with patient's brother and for now we are going to hold back NG tube placement and for same reason PEG tube.            Patient seen and examined by me on discharge day. Pertinent Findings:  Stable / guarded prognosis     Significant Diagnostic Studies:  CT HEAD WO CONT (Accession [id number]) (Order [id number])    Allergies       Not Specified: Phenylalanine     Exam Information    Status Exam Begun  Exam Ended    Final [99] 5/26/2022 11:15 5/26/2022 11:28 AM [id number] 11:28 AM     Result Information    Status: Final result (Exam End: 5/26/2022 11:28) Provider Status: Open       CT HEAD WO CONT: Patient Communication     Released  Not seen     Study Result    Narrative & Impression   CT OF THE HEAD WITHOUT CONTRAST.     CLINICAL HISTORY: Altered mental status.     TECHNIQUE: Helical scan obtained of the head were obtained from the skull vertex  through the base of the skull without intravenous contrast.    All CT scans are  performed using dose optimization techniques as appropriate to the performed  exam including the following: Automated exposure control, adjustment of mA  and/or kV according to patient size, and use of iterative reconstructive  technique.      COMPARISON: 9/22/2020.     FINDINGS:      The sulcal pattern and ventricular system are normal in size and configuration  for age. Mild periventricular white matter hypodensities. No intracranial  hemorrhage. No mass effect. The visualized paranasal sinuses are clear. The  mastoid air cells are clear. The visualized bony structures are unremarkable.     IMPRESSION     No acute findings. Stable mild features of chronic microvascular disease. Imaging    CT HEAD WO CONT (Order: [id number]) - 5/26/2022    Result History    CT HEAD WO CONT (Order #[id number]) on 5/26/2022 - Order Result History         Pertinent Lab Data:  No results for input(s): WBC, HGB, HCT, PLT, HGBEXT, HCTEXT, PLTEXT in the last 72 hours. No results for input(s): NA, K, CL, CO2, GLU, BUN, CREA, CA, MG, PHOS, ALB, TBIL, ALT, INR, INREXT in the last 72 hours.     No lab exists for component: SGOT    DISCHARGE MEDICATIONS:   @  Current Discharge Medication List      START taking these medications    Details   valproic acid, as sodium salt, (DEPAKENE) 250 mg/5 mL (5 mL) soln oral solution Take 10 mL by mouth every twelve (12) hours. Qty: 60 mL, Refills: 0  Start date: 6/6/2022         CONTINUE these medications which have NOT CHANGED    Details   finasteride (PROSCAR) 5 mg tablet Take 5 mg by mouth daily. Myrbetriq 25 mg ER tablet Take 25 mg by mouth daily. triamcinolone acetonide (KENALOG) 0.1 % topical cream Apply  to affected area two (2) times a day. Apply to face      polyethylene glycol (MIRALAX) 17 gram packet Take 17 g by mouth daily. levETIRAcetam (KEPPRA) 500 mg tablet Take 500 mg by mouth two (2) times a day. cetirizine (ZYRTEC) 10 mg tablet Take  by mouth every six (6) hours as needed for Allergies. !! LORazepam (ATIVAN) 0.5 mg tablet Take 0.5 mg by mouth two (2) times a day. OLANZapine (ZyPREXA) 5 mg tablet Take 10 mg by mouth three (3) times daily. benztropine (COGENTIN) 1 mg tablet Take 1 mg by mouth three (3) times daily. nystatin (MYCOSTATIN) topical cream Apply  to affected area two (2) times a day. To axilla and both arms      acetaminophen (TYLENOL) 325 mg tablet Take 650 mg by mouth every six (6) hours as needed for Pain.      !! LORazepam (ATIVAN) 1 mg tablet Take 1 mg by mouth every six (6) hours as needed for Anxiety. pravastatin (PravachoL) 20 mg tablet Take 20 mg by mouth nightly. tamsulosin (FLOMAX) 0.4 mg capsule Take 0.4 mg by mouth two (2) times a day. hydrocortisone (CORTAID) 1 % topical cream Apply  to affected area two (2) times a day. use thin layer      guaiFENesin (ROBITUSSIN) 100 mg/5 mL liquid Take 100 mg by mouth four (4) times daily as needed for Cough. bismuth subsalicylate (BismatroL) 262 mg/15 mL suspension Take 10 mL by mouth every six (6) hours as needed for Indigestion.       loperamide (IMODIUM) 2 mg capsule Take 2 mg by mouth four (4) times daily as needed for Diarrhea. ondansetron hcl (Zofran) 4 mg tablet Take 4 mg by mouth every eight (8) hours as needed for Nausea or Vomiting.      magnesium hydroxide ([employer] Milk of Magnesia) 400 mg/5 mL suspension Take 10 mL by mouth daily as needed for Constipation. !! - Potential duplicate medications found. Please discuss with provider. STOP taking these medications       divalproex DR (DEPAKOTE) 500 mg tablet Comments:   Reason for Stopping:                 My Recommended Diet, Activity, Wound Care, and follow-up labs are listed in the patient's Discharge Insturctions which I have personally completed and reviewed. Disposition:     [x] Home with family     [] [facility] PT/RN   [] SNF/NH   [] Inpatient Rehab/ILA  Condition at Discharge:  Stable    Follow up with:   PCP : Jada Casey MD      Please follow-up tests/labs that are still pendin. None  2.    >30 minutes spent coordinating this discharge (review instructions/follow-up, prescriptions, preparing report for sign off)    Disclaimer: Sections of this note are dictated utilizing voice recognition software, which may have resulted in some phonetic based errors in grammar and contents. Even though attempts were made to correct all the mistakes, some may have been missed, and remained in the body of the document. If questions arise, please contact our department.     Signed:  Jc Raya MD

## 2022-06-07 NOTE — PROGRESS NOTES
followed up with Sandy Sierra,  at [facility], regarding discharge planning.  informed Danyelle De La Cruz that the patient is not aspirating and there is no plan for feeding tube at this time. Danyelle De La Cruz indicated that she would follow up tomorrow with a decision on whether they can accept him back at the group home.       EARNEST Chaves

## 2022-06-07 NOTE — PROGRESS NOTES
Problem: Risk for Spread of Infection  Goal: Prevent transmission of infectious organism to others  Description: Prevent the transmission of infectious organisms to other patients, staff members, and visitors. Outcome: Progressing Towards Goal     Problem: Patient Education:  Go to Education Activity  Goal: Patient/Family Education  Outcome: Progressing Towards Goal     Problem: Pressure Injury - Risk of  Goal: *Prevention of pressure injury  Description: Document Otoniel Scale and appropriate interventions in the flowsheet. Outcome: Progressing Towards Goal  Note: Pressure Injury Interventions:  Sensory Interventions: Assess changes in LOC    Moisture Interventions: Absorbent underpads    Activity Interventions: Pressure redistribution bed/mattress(bed type)    Mobility Interventions: Assess need for specialty bed    Nutrition Interventions: Document food/fluid/supplement intake    Friction and Shear Interventions: HOB 30 degrees or less                Problem: Patient Education: Go to Patient Education Activity  Goal: Patient/Family Education  Outcome: Progressing Towards Goal     Problem: Patient Education: Go to Patient Education Activity  Goal: Patient/Family Education  Outcome: Progressing Towards Goal     Problem: Nutrition Deficit  Goal: *Optimize nutritional status  Outcome: Progressing Towards Goal     Problem: Falls - Risk of  Goal: *Absence of Falls  Description: Document Syl Fall Risk and appropriate interventions in the flowsheet.   Outcome: Progressing Towards Goal  Note: Fall Risk Interventions:  Mobility Interventions: Bed/chair exit alarm    Mentation Interventions: Bed/chair exit alarm    Medication Interventions: Bed/chair exit alarm    Elimination Interventions: Bed/chair exit alarm,Call light in reach    History of Falls Interventions: Bed/chair exit alarm         Problem: Patient Education: Go to Patient Education Activity  Goal: Patient/Family Education  Outcome: Progressing Towards Goal     Problem: Injury - Risk of, Adverse Drug Event  Goal: *Absence of adverse drug events  Outcome: Progressing Towards Goal  Goal: *Absence of medication errors  Outcome: Progressing Towards Goal  Goal: *Knowledge of prescribed medications  Outcome: Progressing Towards Goal     Problem: Patient Education: Go to Patient Education Activity  Goal: Patient/Family Education  Outcome: Progressing Towards Goal     Problem: Pain  Goal: *Control of Pain  Outcome: Progressing Towards Goal  Goal: *PALLIATIVE CARE:  Alleviation of Pain  Outcome: Progressing Towards Goal     Problem: Patient Education: Go to Patient Education Activity  Goal: Patient/Family Education  Outcome: Progressing Towards Goal

## 2022-06-07 NOTE — DISCHARGE INSTRUCTIONS
DISCHARGE SUMMARY from Nurse    PATIENT INSTRUCTIONS:    After general anesthesia or intravenous sedation, for 24 hours or while taking prescription Narcotics:  · Limit your activities  · Do not drive and operate hazardous machinery  · Do not make important personal or business decisions  · Do  not drink alcoholic beverages  · If you have not urinated within 8 hours after discharge, please contact your surgeon on call. Report the following to your surgeon:  · Excessive pain, swelling, redness or odor of or around the surgical area  · Temperature over 100.5  · Nausea and vomiting lasting longer than 4 hours or if unable to take medications  · Any signs of decreased circulation or nerve impairment to extremity: change in color, persistent  numbness, tingling, coldness or increase pain  · Any questions    What to do at Home:  Recommended activity: Activity as tolerated, ***    If you experience any of the following symptoms chest pain, shortness of breath, fever greater than, please follow up with Carrol Martinez MD 3-5 days    *  Please give a list of your current medications to your Primary Care Provider. *  Please update this list whenever your medications are discontinued, doses are      changed, or new medications (including over-the-counter products) are added. *  Please carry medication information at all times in case of emergency situations. These are general instructions for a healthy lifestyle:    No smoking/ No tobacco products/ Avoid exposure to second hand smoke  Surgeon General's Warning:  Quitting smoking now greatly reduces serious risk to your health.     Obesity, smoking, and sedentary lifestyle greatly increases your risk for illness    A healthy diet, regular physical exercise & weight monitoring are important for maintaining a healthy lifestyle    You may be retaining fluid if you have a history of heart failure or if you experience any of the following symptoms:  Weight gain of 3 pounds or more overnight or 5 pounds in a week, increased swelling in our hands or feet or shortness of breath while lying flat in bed. Please call your doctor as soon as you notice any of these symptoms; do not wait until your next office visit. Patient armband removed and shredded  MyChart Activation    Thank you for requesting access to [email or web address]. Please follow the instructions below to securely access and download your online medical record. [email or web address] allows you to send messages to your doctor, view your test results, renew your prescriptions, schedule appointments, and more. How Do I Sign Up? 1. In your internet browser, go to [email or web address]  2. Click on the First Time User? Click Here link in the Sign In box. You will be redirect to the New Member Sign Up page. 3. Enter your [employer] Access Code exactly as it appears below. You will not need to use this code after youve completed the sign-up process. If you do not sign up before the expiration date, you must request a new code. [email or web address] Access Code: Activation code not generated  Current [employer] Status: Active (This is the date your [employer] access code will )    4. Enter the last four digits of your Social Security Number (xxxx) and Date of Birth (mm/dd/yyyy) as indicated and click Submit. You will be taken to the next sign-up page. 5. Create a [employer] ID. This will be your [employer] login ID and cannot be changed, so think of one that is secure and easy to remember. 6. Create a [employer] password. You can change your password at any time. 7. Enter your Password Reset Question and Answer. This can be used at a later time if you forget your password. 8. Enter your e-mail address. You will receive e-mail notification when new information is available in [address] 9. Click Sign Up. You can now view and download portions of your medical record.   10. Click the Download Summary menu link to download a portable copy of your medical information. Additional Information    If you have questions, please visit the Frequently Asked Questions section of the [employer] website at [email or web address] Remember, [employer] is NOT to be used for urgent needs. For medical emergencies, dial 911. The discharge information has been reviewed with the {PATIENT PARENT GUARDIAN:12148}. The {PATIENT PARENT GUARDIAN:63164} verbalized understanding. Discharge medications reviewed with the {Dishcarge meds reviewed PGBQ:33495} and appropriate educational materials and side effects teaching were provided.   ___________________________________________________________________________________________________________________________________

## 2022-06-07 NOTE — PROGRESS NOTES
[facility]  (A Division of [address] [place] [address])    Follow-up Note      Date of Admission: 2022       Date of Note:  2022          Current Antimicrobials:    Prior Antimicrobials:  Zosyn  to       Assessment:         Acute respiratory failure: Intubated on , extubated   Infection with parainfluenza 3 virus: no pneumonia on CXR upon admission. There are hazy opacities noted on post intubation x-ray on . With possible congestion noted on  x-rays.   -Lactic acid 1.4, procalcitonin 0.45  Aspiration pneumonia  Acute metabolic encephalopathy  Transaminitis: Persistent-AST trending down somewhat  Mild hyponatremia: Resolved  PKU  Mild hematuria       Plan:   Completed course of Zosyn . CBC, BMP every 2-3 days  Aspiration precautions, .   -no plans for NG/PEG as patient has pulled them out. Family prefers to avoid further trauma    Continue with droplet precautions for parainfluenza as per hospital infection control policy      Rinku Jarvis, [address] [facility]  [address], [address] [place] [address]  Office: [phone]  Mobile/Text: [phone]     Microbiology:   blood cultures: No growth to date   urine culture no course to date   respiratory viral culture:  positive for parainfluenza 3 negative for all other pathogens    Lines / Catheters:  Peripheral  ET   Right IJ      Subjective:   Patient seen and examined at bedside   Looking around. Tries to answer questions. Playing with a toy. Wet cough noted while in the room.      Objective:        Visit Vitals  /71   Pulse 76   Temp 97.8 °F (36.6 °C)   Resp 19   Ht 5' 8\" (1.727 m)   Wt 56.7 kg (125 lb)   SpO2 96%   BMI 19.01 kg/m²     Temp (24hrs), Av.6 °F (36.4 °C), Min:97.3 °F (36.3 °C), Max:97.8 °F (36.6 °C)        General:   Awake and alert looks around   Skin:   no rashes or skin lesions noted on limited exam, dry and warm   HEENT:  No scleral icterus or pallor; oral mucosa moist, lips moist   Lymph Nodes:   not assessed today   Lungs:   coarse breath sounds throughout, no wheeze and irregular respiratory drive   Heart:  RRR, s1 and s2; no murmurs rubs or gallops; no edema, + pedal pulses   Abdomen:  soft, non-distended, active bowel sounds, non-tender   Genitourinary:  deferred   Extremities:   average muscle tone; no contractures, no joint effusions,   Neurologic:   Moves extremities independently, no apparent focal weakness, slow to answer questions, intermittent confusion   Psychiatric:  , Calm, follows directions         Lab results:    Chemistry  No results for input(s): GLU, NA, K, CL, CO2, BUN, CREA, CA, AGAP, BUCR, TBIL, AP, TP, ALB, GLOB, AGRAT in the last 72 hours. No lab exists for component: GPT    CBC w/ Diff  No results for input(s): WBC, RBC, HGB, HCT, PLT, GRANS, LYMPH, EOS, HGBEXT, HCTEXT, PLTEXT, HGBEXT, HCTEXT, PLTEXT in the last 72 hours.     Microbiology  All Micro Results     Procedure Component Value Units Date/Time    CULTURE, BLOOD [[id number]] Collected: 05/26/22 1100    Order Status: Completed Specimen: Blood Updated: 06/01/22 0641     Special Requests: NO SPECIAL REQUESTS        Culture result: NO GROWTH 6 DAYS       CULTURE, BLOOD [[id number]] Collected: 05/26/22 1200    Order Status: Completed Specimen: Blood Updated: 06/01/22 0641     Special Requests: NO SPECIAL REQUESTS        Culture result: NO GROWTH 6 DAYS       CULTURE, RESPIRATORY/SPUTUM/BRONCH [place] STAIN [[id number]] Collected: 05/28/22 0250    Order Status: Completed Specimen: Sputum Updated: 05/30/22 0927     Special Requests: NO SPECIAL REQUESTS        GRAM STAIN RARE WBCS SEEN               RARE EPITHELIAL CELLS SEEN            NO ORGANISMS SEEN        Culture result:       RARE NORMAL RESPIRATORY GENARO          CULTURE, URINE [[id number]] Collected: 05/26/22 1234    Order Status: Completed Specimen: Urine from Clean catch Updated: 05/28/22 1226     Special Requests: NO SPECIAL REQUESTS        Culture result: No growth (<1,000 CFU/ML)       RESPIRATORY VIRUS PANEL W/COVID-19, PCR [[id number]]  (Abnormal) Collected: 05/26/22 1309    Order Status: Completed Specimen: Nasopharyngeal Updated: 05/26/22 1452     Adenovirus Not detected        Coronavirus 229E Not detected        Coronavirus HKU1 Not detected        Coronavirus CVNL63 Not detected        Coronavirus OC43 Not detected        SARS-CoV-2, PCR Not detected        Metapneumovirus Not detected        Rhinovirus and Enterovirus Not detected        Influenza A Not detected        Influenza A, subtype H1 Not detected        Influenza A, subtype H3 Not detected        INFLUENZA A H1N1 PCR Not detected        Influenza B Not detected        Parainfluenza 1 Not detected        Parainfluenza 2 Not detected        Parainfluenza 3 Detected        Parainfluenza virus 4 Not detected        RSV by PCR Not detected        B. parapertussis, PCR Not detected        Bordetella pertussis - PCR Not detected        Chlamydophila pneumoniae DNA, QL, PCR Not detected        Mycoplasma pneumoniae DNA, QL, PCR Not detected              [address], DO   6/7/2022

## 2022-06-07 NOTE — PROGRESS NOTES
provided the patient's brother, Snow Santos, with an update on the discharge.  informed the patient's brother that the patient is medical ready for discharge and the facility will not accept him until he has a hospital bed.  notified the patient's brother that the hospital bed was faxed over Friday, Andreina 3, 2022 and requires authorization.  informed the patient's brother that she sent over wheelchair order today.  notified the patient's brother that the  Geraldine Quintanilla,  at [facility], informed  last week that they are able to accept the patient but must have hospital bed delivered prior to admission.  informed the patient's brother that today Yoni Pfeiffer indicated that the group home is unsure if they can accept him and will have to discuss his case. The patient's brother voiced an understanding.        EARNEST Molina

## 2022-06-07 NOTE — PROGRESS NOTES
Call made to Francisco J Gonzalez to make aware of Important Message, no answer, left voice mail to return my call.

## 2022-06-07 NOTE — PROGRESS NOTES
followed up with Ramos Hawley,  at [facility], regarding discharge planning.  informed Cassidy Leggett that the hospital bed was sent over on Friday, Andreina 3, 2022.  informed Cassidy Leggett that Quin Kawasaki has to get authorization for hospital bed before bed can be delivered. Cassidy Leggett voiced an understanding.  inquired if patient has wheelchair at facility. Cassidy Leggett indicated that he does not have wheelchair.  informed Cassidy Leggett that she would order wheelchair. Cassidy Leggett indicated that they are having a meeting regarding the patient at the moment and is unsure at this time if they can take the patient back.  informed Cassidy Leggett that she said they would be able to take the patient back if the DSP does not have to provide medication in a feeding tube and they are able to take readmit the patient once the hospital bed is delivered. Cassidy Leggett indicated that she is unsure at this time and they will not be able to take the patient back if he is aspirating or has a feeding tube.  informed Cassidy Leggett that the patient does not need a feeding tube at the moment and does not have a feeding tube. Cassidy Leggett indicated that she would follow up after the meeting but would like  to confirm the patient does not need feeding tube and is not aspirating.           EARNEST Jean Baptiste

## 2022-06-07 NOTE — ROUTINE PROCESS
Bedside and Verbal shift change report given to [facility] LPN   (oncoming nurse) by Hannah Smith RN (offgoing nurse). Report given with SBAR, Kardex, Intake/Output, MAR, Accordion and Recent Results.

## 2022-06-07 NOTE — PROGRESS NOTES
spoke with Martínez Carson with [facility] regarding status of hospital bed. Martínez Carson indicated that the account was created and they are still processing the order. Martínez Carson indicated that a dmas form and authorization is need because of the patient's insurance. Martínez Carson indicated that Tomjeanette will have to create a dmas and authorization form to send over to . Maribell Stinson said that he will ask [facility] to follow up with  on hospital bed.       EARNEST Colin

## 2022-06-07 NOTE — PROGRESS NOTES
left a message for the patient's brother, Francisco J Gonzalez, regarding discharge planning.     EARNEST Gerardo

## 2022-06-07 NOTE — PROGRESS NOTES
[facility]  Progress Note    Patient: Anai Chavarria Age: 64 y.o. : 1960 MR#: [id number] SSN: [id number]  Date/Time: 2022     C/C: AMS       Subjective:   HPI : 80-year-old male past medical history of phenylketonuria, leading to progressive intellectual disability functional disability on currently severe dysphagia leading to aspiration of all consistencies. Patient also has possible history of seizures, according to patient's brother patient may have severe sleep apnea\" stop breathing\" . Currently was admitted with altered mental status, requiring oral intubation to prevent aspiration however patient has developed aspiration pneumonia which is being treated actively by ID. Currently patient also has issues regarding dysphagia severe oropharyngeal dysphagia NG tube was placed twice for nutrition another medication adjustment twice patient pulled out NG tube restraints were not effective, further risk of trauma aspiration through NG tube is very high and this uncooperative patient so I discussed this with patient's brother and for now we are going to hold back NG tube placement and for same reason PEG tube. Review of Systems:   Patient with chronic neurological deficits secondary to PKU appears at his baseline does not speak much however when asked something he acknowledges with grunting sound does not appear to be in any distress. On asking question patient focus is at examiner response questionable appropriateness however response in form of making sound but no speech. Assessment/Plan:     1.   Altered mental status-acute encephalopathy-probably progressive PKU disease related complication  -CT head-no acute CVA but microvascular disease    2 s/p intubation and extubation -intubation was done due to risk of aspiration/acute hypoxic respiratory failure    3 aspiration pneumonia    4 severe oropharyngeal dysphagia    5 seizure disorder    6 parainfluenza 3 viral infection-leading to respiratory compromise    7 hypertension    8 bacteriuria    9 DNR/DNI-comfort feeding and limited intervention    Plan    6/7/2022  -Appears stable  - Appears to be tolerating PO diet to some extent , No Artificial means of feeding , per family . 6/6/2022: Patient's condition remains same would continue current management, currently awaiting for [facility]hospital bed      -Improving mentation to baseline, enough evidence from the history and after talking to brother that this process is very progressive, process manic patient is becoming more and more dependent week losing muscular functionality. Probably in the same respect he developed parainfluenza infection which led to respiratory compromise also developed aspiration aspiration pneumonia versus pneumonitis leading to further deterioration of his oxygenation and causing poor mentation when he was brought to the hospital.    -Showing some recovery to baseline however persistent severe oropharyngeal dysphagia probably this was present before intubation before ventilator support requirement and before even hospital visit or this could be triggering mechanism leading to current events.    -I had a very long discussion with patient's brother who is only family along with brother's wife 2 of them make decision for patient, long-term care plan including DNR/DNI comfort feed etc. Discussed. Brother wants to discuss this with his  and make correct decision. I also explained lack of nutrition since we cannot put NG tube without high risk of complications and similarly PEG tube placement due to high risk.   He understood well verbalized well he also consulted his wife regarding same and ultimately related decided that in person he will come tomorrow 6/3/2022 at 6 discussed case with me.    -Palliative care reviewed and appreciated    Objective:       General: Awake but orientation cannot be judged  HEENT: No facial asymmetry, KATIE Tylor, External ears - WNL    Cardiovascular: S1S2 - regular , No Murmur   Pulmonary: Equal expansion , No Use of accessory muscles , bilateral coarse rales ? secretions  GI:  +BS in all four quadrants, soft, non-tender  Extremities:  No edema; 2+ dorsalis pedis pulses bilaterally  Neuro: No focal deficit      DVT Prophylaxis:  []Lovenox  []Hep SQ  []SCDs  []Coumadin   []On Heparin gtt    [] Eliquis [] Xarelto     Vitals:         VS:   Visit Vitals  /71   Pulse 76   Temp 97.8 °F (36.6 °C)   Resp 19   Ht 5' 8\" (1.727 m)   Wt 56.7 kg (125 lb)   SpO2 96%   BMI 19.01 kg/m²      Tmax/24hrs: Temp (24hrs), Av.6 °F (36.4 °C), Min:97.3 °F (36.3 °C), Max:97.8 °F (36.6 °C)        Medications:   Current Facility-Administered Medications   Medication Dose Route Frequency    famotidine (PEPCID) 40 mg/5 mL (8 mg/mL) oral suspension 20 mg  20 mg Oral DAILY    levETIRAcetam (KEPPRA) oral solution 500 mg  500 mg Oral BID    valproic acid (as sodium salt) (DEPAKENE) 250 mg/5 mL (5 mL) oral solution 500 mg  500 mg Oral Q12H    albuterol-ipratropium (DUO-NEB) 2.5 MG-0.5 MG/3 ML  3 mL Nebulization Q4H PRN    [Held by provider] benztropine (COGENTIN) tablet 1 mg  1 mg Oral BID    [Held by provider] valproic acid (as sodium salt) (DEPAKENE) 250 mg/5 mL (5 mL) oral solution 500 mg  500 mg Per G Tube BID    glucose chewable tablet 16 g  4 Tablet Oral PRN    glucagon (GLUCAGEN) injection 1 mg  1 mg IntraMUSCular PRN    dextrose 10% infusion 0-250 mL  0-250 mL IntraVENous PRN    clotrimazole-betamethasone (LOTRISONE) 1-0.05 % cream   Topical BID    [Held by provider] cetirizine (ZYRTEC) tablet 10 mg  10 mg Oral DAILY    [Held by provider] finasteride (PROSCAR) tablet 5 mg  5 mg Oral DAILY    loperamide (IMODIUM) capsule 2 mg  2 mg Oral QID PRN    [Held by provider] trospium (SANCTURA) tablet 20 mg  20 mg Oral DAILY    [Held by provider] OLANZapine (ZyPREXA) tablet 10 mg  10 mg Oral TID    [Held by provider] pravastatin (PRAVACHOL) tablet 20 mg  20 mg Oral QHS    [Held by provider] tamsulosin (FLOMAX) capsule 0.4 mg  0.4 mg Oral DAILY    polyethylene glycol (MIRALAX) packet 17 g  17 g Oral DAILY PRN    ondansetron (ZOFRAN ODT) tablet 4 mg  4 mg Oral Q8H PRN    Or    ondansetron (ZOFRAN) injection 4 mg  4 mg IntraVENous Q6H PRN    enoxaparin (LOVENOX) injection 40 mg  40 mg SubCUTAneous DAILY    acetaminophen (TYLENOL) suppository 650 mg  650 mg Rectal Q4H PRN       Labs:    No results for input(s): WBC, HGB, HCT, PLT, HGBEXT, HCTEXT, PLTEXT in the last 72 hours. No results for input(s): NA, K, CL, CO2, GLU, BUN, CREA, CA, MG, PHOS, ALB, TBIL, ALT, INR, INREXT in the last 72 hours. No lab exists for component: SGOT      Time spent on direct patient care >30 mints     Complexity : High complex - due to multiple medical issues outlined above. CODE Status : DNR /DNI     Case discussed with:  [x]Patient  [] Family  []Nursing  [x]Case Management         Disclaimer: Sections of this note are dictated utilizing voice recognition software, which may have resulted in some phonetic based errors in grammar and contents. Even though attempts were made to correct all the mistakes, some may have been missed, and remained in the body of the document. If questions arise, please contact our department.     Signed By: Aidan Mckeon MD     June 7, 2022

## 2022-06-08 NOTE — PROGRESS NOTES
was notified by nursing staff that there were some staff in the patient room that may be from his facility.  went to the patient room. Upon entering the room there were 3 people in the room. The 3 people in the room was Cate Santana,  at [employer], [employer], , at [facility], and nurse from [facility].  informed Keily Winn that she was not aware that they were stopping by to see the patient. Keily Winn indicated that she called and left a voicemail. Keily Winn indicated that they decided to stop by an evaluate the patient.  inquired if they would be able to accept the patient back to [facility]. Keily Winn indicated that they having a meeting on Friday to decide and they still have to wait on hospital bed.  explained that the order for hospital bed was sent over on Andreina 3 and is still processing.  inquired if they would have a answer on Friday, Andreina 10, 2022 regarding readmission status.  informed Keily Winn that she would need to know the status so she can give the brother alternative placement options. Keily Winn indicated that she already spoke with the patient's brother last night and they should have an answer on Friday, Andreina 10, 2022.       EARNEST Jean-Baptiste

## 2022-06-08 NOTE — ROUTINE PROCESS
Bedside and Verbal shift change report given to Camilla RN (oncoming nurse) by Tigre Regan RN (offgoing nurse). Report given with SBAR, Kardex, Intake/Output, MAR, Accordion and Recent Results.

## 2022-06-08 NOTE — PROGRESS NOTES
left a message Emily Santana,  at [facility], regarding the readmission status of the patient to [facility].         EARNEST Schultz

## 2022-06-08 NOTE — PROGRESS NOTES
[facility] Hospitalist [facility]  Progress Note    Patient: Brenda Higgins Age: 64 y.o. : 1960 MR#: [id number] SSN: [id number]  Date/Time: 2022     C/C: AMS       Subjective:     Pt seen w/ nurse. Per nurse, pt w/ episode of post tussive emesis. At the time of my eval, he appeared comfortable. He is non verbal.            Assessment/Plan:   HPI : 77-year-old male past medical history of phenylketonuria, leading to progressive intellectual disability functional disability on currently severe dysphagia leading to aspiration of all consistencies. Patient also has possible history of seizures, according to patient's brother patient may have severe sleep apnea\" stop breathing\" . Currently was admitted with altered mental status, requiring oral intubation to prevent aspiration however patient has developed aspiration pneumonia which is being treated actively by ID. Currently patient also has issues regarding dysphagia severe oropharyngeal dysphagia NG tube was placed twice for nutrition another medication adjustment twice patient pulled out NG tube restraints were not effective, further risk of trauma aspiration through NG tube is very high and this uncooperative patient so I discussed this with patient's brother and for now we are going to hold back NG tube placement and for same reason PEG tube. -  Altered mental status-acute encephalopathy-probably progressive PKU disease related complication   -CT head-no acute CVA but microvascular disease    -Acute hypoxic respiratory failure:  S/p ventilatory support    -Aspiration pneumonia    -Severe oropharyngeal dysphagia: did not tolerate tube feeding as he kept pulling an NG tube out. At this time after discussion with family member/brother plan is for no feeding tubes and limited intervention    -Parainfluenza 3 viral infection-leading to respiratory compromise now stable        Hypertension  Seizure d/o: on keppra  PKU:  On selective diet    ADVANCED DIRECTIVE:   DNR/DNI-comfort feeding and limited intervention with no feeding tubes due to patient pulling out NGT    Plan  -Cont supportive care    Dispo: Group home staff/administration evaluated pt to see if appropriate for him to go back to group home. Final decision on Friday per staff.               Objective:       General: Awake but orientation cannot be judged  HEENT: No facial asymmetry, KATIE Tylor, External ears - WNL    Cardiovascular: S1S2 - regular , No Murmur   Pulmonary: Equal expansion , No Use of accessory muscles , bilateral coarse rales ? secretions  GI:  +BS in all four quadrants, soft, non-tender  Extremities:  No edema; 2+ dorsalis pedis pulses bilaterally  Neuro: No focal deficit      DVT Prophylaxis:  [x]Lovenox  []Hep SQ  []SCDs  []Coumadin   []On Heparin gtt    [] Eliquis [] Xarelto     Vitals:         VS:   Visit Vitals  /65   Pulse 82   Temp 97.7 °F (36.5 °C)   Resp 18   Ht 5' 8\" (1.727 m)   Wt 56.7 kg (125 lb)   SpO2 96%   BMI 19.01 kg/m²      Tmax/24hrs: Temp (24hrs), Av.6 °F (36.4 °C), Min:97.4 °F (36.3 °C), Max:97.7 °F (36.5 °C)        Medications:   Current Facility-Administered Medications   Medication Dose Route Frequency    famotidine (PEPCID) 40 mg/5 mL (8 mg/mL) oral suspension 20 mg  20 mg Oral DAILY    levETIRAcetam (KEPPRA) oral solution 500 mg  500 mg Oral BID    valproic acid (as sodium salt) (DEPAKENE) 250 mg/5 mL (5 mL) oral solution 500 mg  500 mg Oral Q12H    albuterol-ipratropium (DUO-NEB) 2.5 MG-0.5 MG/3 ML  3 mL Nebulization Q4H PRN    [Held by provider] benztropine (COGENTIN) tablet 1 mg  1 mg Oral BID    [Held by provider] valproic acid (as sodium salt) (DEPAKENE) 250 mg/5 mL (5 mL) oral solution 500 mg  500 mg Per G Tube BID    glucose chewable tablet 16 g  4 Tablet Oral PRN    glucagon (GLUCAGEN) injection 1 mg  1 mg IntraMUSCular PRN    dextrose 10% infusion 0-250 mL  0-250 mL IntraVENous PRN    clotrimazole-betamethasone (LOTRISONE) 1-0.05 % cream   Topical BID    [Held by provider] cetirizine (ZYRTEC) tablet 10 mg  10 mg Oral DAILY    [Held by provider] finasteride (PROSCAR) tablet 5 mg  5 mg Oral DAILY    loperamide (IMODIUM) capsule 2 mg  2 mg Oral QID PRN    [Held by provider] trospium (SANCTURA) tablet 20 mg  20 mg Oral DAILY    [Held by provider] OLANZapine (ZyPREXA) tablet 10 mg  10 mg Oral TID    [Held by provider] pravastatin (PRAVACHOL) tablet 20 mg  20 mg Oral QHS    [Held by provider] tamsulosin (FLOMAX) capsule 0.4 mg  0.4 mg Oral DAILY    polyethylene glycol (MIRALAX) packet 17 g  17 g Oral DAILY PRN    ondansetron (ZOFRAN ODT) tablet 4 mg  4 mg Oral Q8H PRN    Or    ondansetron (ZOFRAN) injection 4 mg  4 mg IntraVENous Q6H PRN    enoxaparin (LOVENOX) injection 40 mg  40 mg SubCUTAneous DAILY    acetaminophen (TYLENOL) suppository 650 mg  650 mg Rectal Q4H PRN       Labs:    No results for input(s): WBC, HGB, HCT, PLT, HGBEXT, HCTEXT, PLTEXT, HGBEXT, HCTEXT, PLTEXT in the last 72 hours. No results for input(s): NA, K, CL, CO2, GLU, BUN, CREA, CA, MG, PHOS, ALB, TBIL, ALT, INR, INREXT, INREXT in the last 72 hours. No lab exists for component: SGOT      Time spent on direct patient care >30 mints     Complexity : High complex - due to multiple medical issues outlined above. CODE Status : DNR /DNI     Case discussed with:  [x]Patient  [] Family  []Nursing  [x]Case Management         Disclaimer: Sections of this note are dictated utilizing voice recognition software, which may have resulted in some phonetic based errors in grammar and contents. Even though attempts were made to correct all the mistakes, some may have been missed, and remained in the body of the document. If questions arise, please contact our department.     Signed By: Lawson Jules MD     June 8, 2022

## 2022-06-08 NOTE — PROGRESS NOTES
was informed by [facility] that the hospital bed is ready for delivery but the facility requested for the bed to be postpone until they have a meetin Cate Santana,  at [facility] on Friday regarding the patient.  called  Jessica Bangeas Director at [facility], and informed her that [facility] said the hospital bed is ready to be delivered. Mary Gonzalez indicated that she told Angelo that they are having a meeting on Friday to decide and they will follow up with Angelo after they make a discussion.  inquired about the status of their assessment today.  inquired if they would be able to take the patient based on the evaluation today.  informed Mary Gonzalez she needs to know so she can give the brother time to find alternative placements.  inquired if [facility] will be able to accept the patient based on his progress today. Mary Gonzalez indicated \"unlikely\".  informed Mary Gonzalez that she would let the brother know.         EARNEST Patterson

## 2022-06-08 NOTE — PROGRESS NOTES
Bedside and Verbal shift change report given to [address] (oncoming nurse) by Nikki Baez RN   (offgoing nurse). Report given with SBAR, Kardex, MAR and Recent Results.

## 2022-06-08 NOTE — PROGRESS NOTES
[facility]  (A Division of [address] [place] [address])    Follow-up Note      Date of Admission: 2022       Date of Note:  2022          Current Antimicrobials:    Prior Antimicrobials:  Zosyn  to       Assessment:         Acute respiratory failure: Intubated on , extubated   Infection with parainfluenza 3 virus: no pneumonia on CXR upon admission. There are hazy opacities noted on post intubation x-ray on . With possible congestion noted on  x-rays.   -Lactic acid 1.4, procalcitonin 0.45  Aspiration pneumonia  Acute metabolic encephalopathy  Transaminitis: Persistent-AST trending down somewhat  Mild hyponatremia: Resolved  PKU  Mild hematuria       Plan:   Completed course of Zosyn . CBC, BMP every 2-3 days  Aspiration precautions, .   -no plans for NG/PEG as patient has pulled them out. Family prefers to avoid further trauma    Continue with droplet precautions for parainfluenza as per hospital infection control policy      Summer Ross, [facility]  [address], [address] [facility] [address]  Office: [phone]  Mobile/Text: [phone]     Microbiology:   blood cultures: No growth to date   urine culture no course to date   respiratory viral culture:  positive for parainfluenza 3 negative for all other pathogens    Lines / Catheters:  Peripheral  ET   Right IJ      Subjective:   Patient seen and examined at bedside   Comfortable. Watching TV. Gives short answers to question. Still has intermittent cough. No new events per nursing. No documented fevers.      Objective:        Visit Vitals  /65   Pulse 82   Temp 97.7 °F (36.5 °C)   Resp 18   Ht 5' 8\" (1.727 m)   Wt 56.7 kg (125 lb)   SpO2 96%   BMI 19.01 kg/m²     Temp (24hrs), Av.7 °F (36.5 °C), Min:97.4 °F (36.3 °C), Max:98 °F (36.7 °C)        General:   Awake and alert looks around   Skin:   no rashes or skin lesions noted on limited exam, dry and warm   HEENT:  No scleral icterus or pallor; oral mucosa moist, lips moist   Lymph Nodes:   not assessed today   Lungs:   coarse breath sounds throughout, no wheeze and irregular respiratory drive   Heart:  RRR, s1 and s2; no murmurs rubs or gallops; no edema, + pedal pulses   Abdomen:  soft, non-distended, active bowel sounds, non-tender   Genitourinary:  deferred   Extremities:   average muscle tone; no contractures, no joint effusions,   Neurologic:   Moves extremities independently, no apparent focal weakness, slow to answer questions, intermittent confusion   Psychiatric:  , Calm, follows directions         Lab results:    Chemistry  No results for input(s): GLU, NA, K, CL, CO2, BUN, CREA, CA, AGAP, BUCR, TBIL, AP, TP, ALB, GLOB, AGRAT in the last 72 hours. No lab exists for component: GPT    CBC w/ Diff  No results for input(s): WBC, RBC, HGB, HCT, PLT, GRANS, LYMPH, EOS, HGBEXT, HCTEXT, PLTEXT, HGBEXT, HCTEXT, PLTEXT in the last 72 hours.     Microbiology  All Micro Results     Procedure Component Value Units Date/Time    CULTURE, BLOOD [[id number]] Collected: 05/26/22 1100    Order Status: Completed Specimen: Blood Updated: 06/01/22 0641     Special Requests: NO SPECIAL REQUESTS        Culture result: NO GROWTH 6 DAYS       CULTURE, BLOOD [[id number]] Collected: 05/26/22 1200    Order Status: Completed Specimen: Blood Updated: 06/01/22 0641     Special Requests: NO SPECIAL REQUESTS        Culture result: NO GROWTH 6 DAYS       CULTURE, RESPIRATORY/SPUTUM/BRONCH [place] STAIN [[id number]] Collected: 05/28/22 0250    Order Status: Completed Specimen: Sputum Updated: 05/30/22 0927     Special Requests: NO SPECIAL REQUESTS        GRAM STAIN RARE WBCS SEEN               RARE EPITHELIAL CELLS SEEN            NO ORGANISMS SEEN        Culture result:       RARE NORMAL RESPIRATORY GENARO          CULTURE, URINE [[id number]] Collected: 05/26/22 1234    Order Status: Completed Specimen: Urine from Clean catch Updated: 05/28/22 1226     Special Requests: NO SPECIAL REQUESTS        Culture result: No growth (<1,000 CFU/ML)       RESPIRATORY VIRUS PANEL W/COVID-19, PCR [[id number]]  (Abnormal) Collected: 05/26/22 1309    Order Status: Completed Specimen: Nasopharyngeal Updated: 05/26/22 1452     Adenovirus Not detected        Coronavirus 229E Not detected        Coronavirus HKU1 Not detected        Coronavirus CVNL63 Not detected        Coronavirus OC43 Not detected        SARS-CoV-2, PCR Not detected        Metapneumovirus Not detected        Rhinovirus and Enterovirus Not detected        Influenza A Not detected        Influenza A, subtype H1 Not detected        Influenza A, subtype H3 Not detected        INFLUENZA A H1N1 PCR Not detected        Influenza B Not detected        Parainfluenza 1 Not detected        Parainfluenza 2 Not detected        Parainfluenza 3 Detected        Parainfluenza virus 4 Not detected        RSV by PCR Not detected        B. parapertussis, PCR Not detected        Bordetella pertussis - PCR Not detected        Chlamydophila pneumoniae DNA, QL, PCR Not detected        Mycoplasma pneumoniae DNA, QL, PCR Not detected              Abimael Carvalho DO   6/8/2022

## 2022-06-08 NOTE — PROGRESS NOTES
Problem: Risk for Spread of Infection  Goal: Prevent transmission of infectious organism to others  Description: Prevent the transmission of infectious organisms to other patients, staff members, and visitors. Outcome: Progressing Towards Goal     Problem: Patient Education:  Go to Education Activity  Goal: Patient/Family Education  Outcome: Progressing Towards Goal     Problem: Pressure Injury - Risk of  Goal: *Prevention of pressure injury  Description: Document Otoniel Scale and appropriate interventions in the flowsheet. Outcome: Progressing Towards Goal  Note: Pressure Injury Interventions:  Sensory Interventions: Minimize linen layers    Moisture Interventions: Absorbent underpads    Activity Interventions: Pressure redistribution bed/mattress(bed type),Assess need for specialty bed    Mobility Interventions: HOB 30 degrees or less,Assess need for specialty bed    Nutrition Interventions: Document food/fluid/supplement intake    Friction and Shear Interventions: HOB 30 degrees or less,Minimize layers                Problem: Nutrition Deficit  Goal: *Optimize nutritional status  Outcome: Progressing Towards Goal     Problem: Falls - Risk of  Goal: *Absence of Falls  Description: Document Syl Fall Risk and appropriate interventions in the flowsheet.   Outcome: Progressing Towards Goal  Note: Fall Risk Interventions:  Mobility Interventions: Bed/chair exit alarm    Mentation Interventions: Bed/chair exit alarm,Door open when patient unattended,More frequent rounding,Update white board    Medication Interventions: Bed/chair exit alarm,Patient to call before getting OOB    Elimination Interventions: Bed/chair exit alarm,Call light in reach    History of Falls Interventions: Bed/chair exit alarm         Problem: Injury - Risk of, Adverse Drug Event  Goal: *Absence of adverse drug events  Outcome: Progressing Towards Goal  Goal: *Absence of medication errors  Outcome: Progressing Towards Goal     Problem: Pain  Goal: *Control of Pain  Outcome: Progressing Towards Goal

## 2022-06-08 NOTE — PROGRESS NOTES
MARISABEL received a voicemail from Yasir Eastman from [facility], stating they are still working on [employer], and mentioned a WC, but no order. MARISABEL was updated by MARISABEL Garcia that patient cannot return to previous Group Home, and will try to see if patient's brother is agreeable for LTC for patient. MARISABEL called Rudy at [facility], received voicemail, left message to stop order for Hospital Bed at this time, and there was not an order for a [facility] for patient, and that discharge plan is no longer for patient to return to previous Group Home.                Rosemary Arrington, RN  Case Management [phone]

## 2022-06-08 NOTE — PROGRESS NOTES
left a message for the patient's brother, Deborah Merrill, regarding discharge planning,      EARNEST Reagan

## 2022-06-09 NOTE — PROGRESS NOTES
Patient's brother Ellie Watts returned call, made him aware that previous group home is unable to accept patient back (as stated in CM notes on 6/08/22). Patient's brother, Ellie Watts stated that he is unable to care for patient at his home, patient has a  Vanna Pinto who is responsible for patient's housing. Mr. Ellie Watts stated that his brother was in a group home for 15 years prior to his recent group home, maybe he can go back to that group home.  Mr. Ellie Watts stated that he does not have Georgia Lunsford's number at this time, he will call Case Dong Haas back today around 11:30 am.

## 2022-06-09 NOTE — PROGRESS NOTES
spoke with the patient's brother, Usman Steele, regarding discharge planning. The patient's brother indicated that he is not able to accept him into his home. The patient's brother indicated that his  is working on another group home placement that can provide nursing services.  informed the patient's brother that she would follow up with patient's . The patient's brother voiced an understanding.     EARNEST Mo

## 2022-06-09 NOTE — PROGRESS NOTES
[facility]  (A Division of [address] [place] [address])    Follow-up Note      Date of Admission: 2022       Date of Note:  2022          Current Antimicrobials:    Prior Antimicrobials:  Zosyn  to       Assessment:         Acute respiratory failure: Intubated on , extubated   Infection with parainfluenza 3 virus: no pneumonia on CXR upon admission. There are hazy opacities noted on post intubation x-ray on . With possible congestion noted on  x-rays.   -Lactic acid 1.4, procalcitonin 0.45  Aspiration pneumonia  Acute metabolic encephalopathy  Transaminitis: Persistent-AST trending down somewhat  Mild hyponatremia: Resolved  PKU  Mild hematuria       Plan:   Completed course of Zosyn . CBC, BMP every 2-3 days  Aspiration precautions, .   -no plans for NG/PEG as patient has pulled them out. Family prefers to avoid further trauma    Continue with droplet precautions for parainfluenza as per hospital infection control policy      Margaret Brooks, [address] [facility]  [address], [address] [place] [address]  Office: [phone]  Mobile/Text: [phone]     Microbiology:   blood cultures: No growth to date   urine culture no course to date   respiratory viral culture:  positive for parainfluenza 3 negative for all other pathogens    Lines / Catheters:  Peripheral  ET   Right IJ      Subjective:   Patient seen and examined at bedside   Comfortable. Active. Still has intermittent cough. No new events per nursing. No documented fevers.    Pending    Objective:        Visit Vitals  BP 93/61 (BP 1 Location: Left upper arm, BP Patient Position: At rest)   Pulse 67   Temp 97.4 °F (36.3 °C)   Resp 16   Ht 5' 8\" (1.727 m)   Wt 56.7 kg (125 lb)   SpO2 98%   BMI 19.01 kg/m²     Temp (24hrs), Av.6 °F (36.4 °C), Min:97.4 °F (36.3 °C), Max:97.8 °F (36.6 °C)        General:   Awake and alert looks around   Skin:   no rashes or skin lesions noted on limited exam, dry and warm   HEENT:  No scleral icterus or pallor; oral mucosa moist, lips moist   Lymph Nodes:   not assessed today   Lungs:   coarse breath sounds throughout, no wheeze and irregular respiratory drive   Heart:  RRR, s1 and s2; no murmurs rubs or gallops; no edema, + pedal pulses   Abdomen:  soft, non-distended, active bowel sounds, non-tender   Genitourinary:  deferred   Extremities:   average muscle tone; no contractures, no joint effusions,   Neurologic:   Moves extremities independently, no apparent focal weakness, slow to answer questions, intermittent confusion   Psychiatric:  , Calm, follows directions         Lab results:    Chemistry  No results for input(s): GLU, NA, K, CL, CO2, BUN, CREA, CA, AGAP, BUCR, TBIL, AP, TP, ALB, GLOB, AGRAT in the last 72 hours.     No lab exists for component: GPT    CBC w/ Diff  Recent Labs     06/09/22  0510   WBC 12.0   RBC 4.86   HGB 14.8   HCT 44.2          Microbiology  All Micro Results     Procedure Component Value Units Date/Time    CULTURE, BLOOD [[id number]] Collected: 05/26/22 1100    Order Status: Completed Specimen: Blood Updated: 06/01/22 0641     Special Requests: NO SPECIAL REQUESTS        Culture result: NO GROWTH 6 DAYS       CULTURE, BLOOD [[id number]] Collected: 05/26/22 1200    Order Status: Completed Specimen: Blood Updated: 06/01/22 0641     Special Requests: NO SPECIAL REQUESTS        Culture result: NO GROWTH 6 DAYS       CULTURE, RESPIRATORY/SPUTUM/BRONCH [place] STAIN [[id number]] Collected: 05/28/22 0250    Order Status: Completed Specimen: Sputum Updated: 05/30/22 0927     Special Requests: NO SPECIAL REQUESTS        GRAM STAIN RARE WBCS SEEN               RARE EPITHELIAL CELLS SEEN            NO ORGANISMS SEEN        Culture result:       RARE NORMAL RESPIRATORY GENARO          CULTURE, URINE [[id number]] Collected: 05/26/22 1234    Order Status: Completed Specimen: Urine from Clean catch Updated: 05/28/22 1226     Special Requests: NO SPECIAL REQUESTS        Culture result: No growth (<1,000 CFU/ML)       RESPIRATORY VIRUS PANEL W/COVID-19, PCR [[id number]]  (Abnormal) Collected: 05/26/22 1309    Order Status: Completed Specimen: Nasopharyngeal Updated: 05/26/22 1452     Adenovirus Not detected        Coronavirus 229E Not detected        Coronavirus HKU1 Not detected        Coronavirus CVNL63 Not detected        Coronavirus OC43 Not detected        SARS-CoV-2, PCR Not detected        Metapneumovirus Not detected        Rhinovirus and Enterovirus Not detected        Influenza A Not detected        Influenza A, subtype H1 Not detected        Influenza A, subtype H3 Not detected        INFLUENZA A H1N1 PCR Not detected        Influenza B Not detected        Parainfluenza 1 Not detected        Parainfluenza 2 Not detected        Parainfluenza 3 Detected        Parainfluenza virus 4 Not detected        RSV by PCR Not detected        B. parapertussis, PCR Not detected        Bordetella pertussis - PCR Not detected        Chlamydophila pneumoniae DNA, QL, PCR Not detected        Mycoplasma pneumoniae DNA, QL, PCR Not detected              Marco Antonio Torrez DO   6/9/2022

## 2022-06-09 NOTE — ROUTINE PROCESS
Bedside and Verbal shift change report given to ADIN Sampson (oncoming nurse) by Arnaldo Ricks RN (offgoing nurse). Report included the following information SBAR.

## 2022-06-09 NOTE — PROGRESS NOTES
Bedside and Verbal shift change report given to [address] (oncoming nurse) by Marion Naranjo RN   (offgoing nurse). Report given with SBAR, Kardex, MAR and Recent Results.

## 2022-06-09 NOTE — PROGRESS NOTES
left a message for the patient's brother Mancil Flood regarding discharge planning.       EARNEST Villalba

## 2022-06-09 NOTE — PROGRESS NOTES
spoke with Kelsey Franklin,  at [facility] regarding the status of group home placement. Javi Beebe indicated that she is working on finding him a group home placement with nursing services.  informed Javi Beebe that the patient is ready for discharge.  informed Javi Beebe that she would follow up with her tomorrow regarding placement. Javi Beebe voiced an understanding.       EARNEST Zaragoza

## 2022-06-09 NOTE — PROGRESS NOTES
Patient unable to sign, made brother Marlene Carrier 2nd IM letter informing them of their right to appeal the discharge. Mr. Scott Wei stated he is not going to take the information right now.

## 2022-06-09 NOTE — PROGRESS NOTES
[facility]  Progress Note    Patient: Palmira Bledsoe Age: 64 y.o. : 1960 MR#: [id number] SSN: [id number]  Date/Time: 2022     C/C: AMS       Subjective:     Pt is non verbal. Alert and awake. Appears comfortable. 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 Patient also has possible history of seizures, according to patient's brother patient may have severe sleep apnea\" stop breathing\" . Currently was admitted with altered mental status, requiring oral intubation to prevent aspiration however patient has developed aspiration pneumonia which is being treated actively by ID. Currently patient also has issues regarding dysphagia severe oropharyngeal dysphagia NG tube was placed twice for nutrition another medication adjustment twice patient pulled out NG tube restraints were not effective, further risk of trauma aspiration through NG tube is very high and this uncooperative patient so I discussed this with patient's brother and for now we are going to hold back NG tube placement and for same reason PEG tube. -  Altered mental status-acute encephalopathy-   -CT head-no acute CVA but microvascular disease    -Acute hypoxic respiratory failure:  S/p ventilatory support    -Aspiration pneumonia    -Severe oropharyngeal dysphagia: did not tolerate tube feeding as he kept pulling an NG tube out. At this time after discussion with family member/brother plan is for no feeding tubes and limited intervention    -Parainfluenza 3 viral infection-contributing to respiratory compromise now stable        Hypertension  Seizure d/o: on keppra  PKU:  On selective diet    ADVANCED DIRECTIVE:   DNR/DNI-comfort feeding and limited intervention with no feeding tubes due to patient pulling out NGT    Plan  -Cont supportive care    Dispo: Group home staff/administration evaluated pt to see if appropriate for him to go back to group home. Final decision on Friday per staff.               Objective:       General: Awake but orientation cannot be judged  HEENT: No facial asymmetry, KATIE Tylor, External ears - WNL    Cardiovascular: S1S2 - regular , No Murmur   Pulmonary: Equal expansion , No Use of accessory muscles , bilateral coarse rales ? secretions  GI:  +BS in all four quadrants, soft, non-tender  Extremities:  No edema; 2+ dorsalis pedis pulses bilaterally  Neuro: No focal deficit      DVT Prophylaxis:  [x]Lovenox  []Hep SQ  []SCDs  []Coumadin   []On Heparin gtt    [] Eliquis [] Xarelto     Vitals:         VS:   Visit Vitals  BP 93/61 (BP 1 Location: Left upper arm, BP Patient Position: At rest)   Pulse 67   Temp 97.4 °F (36.3 °C)   Resp 16   Ht 5' 8\" (1.727 m)   Wt 56.7 kg (125 lb)   SpO2 98%   BMI 19.01 kg/m²      Tmax/24hrs: Temp (24hrs), Av.6 °F (36.4 °C), Min:97.4 °F (36.3 °C), Max:97.8 °F (36.6 °C)        Medications:   Current Facility-Administered Medications   Medication Dose Route Frequency    famotidine (PEPCID) 40 mg/5 mL (8 mg/mL) oral suspension 20 mg  20 mg Oral DAILY    levETIRAcetam (KEPPRA) oral solution 500 mg  500 mg Oral BID    valproic acid (as sodium salt) (DEPAKENE) 250 mg/5 mL (5 mL) oral solution 500 mg  500 mg Oral Q12H    albuterol-ipratropium (DUO-NEB) 2.5 MG-0.5 MG/3 ML  3 mL Nebulization Q4H PRN    [Held by provider] benztropine (COGENTIN) tablet 1 mg  1 mg Oral BID    [Held by provider] valproic acid (as sodium salt) (DEPAKENE) 250 mg/5 mL (5 mL) oral solution 500 mg  500 mg Per G Tube BID    glucose chewable tablet 16 g  4 Tablet Oral PRN    glucagon (GLUCAGEN) injection 1 mg  1 mg IntraMUSCular PRN    dextrose 10% infusion 0-250 mL  0-250 mL IntraVENous PRN    clotrimazole-betamethasone (LOTRISONE) 1-0.05 % cream   Topical BID    [Held by provider] cetirizine (ZYRTEC) tablet 10 mg  10 mg Oral DAILY    [Held by provider] finasteride (PROSCAR) tablet 5 mg  5 mg Oral DAILY    loperamide (IMODIUM) capsule 2 mg  2 mg Oral QID PRN    [Held by provider] trospium (SANCTURA) tablet 20 mg  20 mg Oral DAILY    [Held by provider] OLANZapine (ZyPREXA) tablet 10 mg  10 mg Oral TID    [Held by provider] pravastatin (PRAVACHOL) tablet 20 mg  20 mg Oral QHS    [Held by provider] tamsulosin (FLOMAX) capsule 0.4 mg  0.4 mg Oral DAILY    polyethylene glycol (MIRALAX) packet 17 g  17 g Oral DAILY PRN    ondansetron (ZOFRAN ODT) tablet 4 mg  4 mg Oral Q8H PRN    Or    ondansetron (ZOFRAN) injection 4 mg  4 mg IntraVENous Q6H PRN    enoxaparin (LOVENOX) injection 40 mg  40 mg SubCUTAneous DAILY    acetaminophen (TYLENOL) suppository 650 mg  650 mg Rectal Q4H PRN       Labs:    Recent Labs     06/09/22  0510   WBC 12.0   HGB 14.8   HCT 44.2        No results for input(s): NA, K, CL, CO2, GLU, BUN, CREA, CA, MG, PHOS, ALB, TBIL, ALT, INR, INREXT, INREXT in the last 72 hours. No lab exists for component: SGOT      Time spent on direct patient care >30 mints     Complexity : High complex - due to multiple medical issues outlined above. CODE Status : DNR /DNI     Case discussed with:  [x]Patient  [] Family  []Nursing  [x]Case Management         Disclaimer: Sections of this note are dictated utilizing voice recognition software, which may have resulted in some phonetic based errors in grammar and contents. Even though attempts were made to correct all the mistakes, some may have been missed, and remained in the body of the document. If questions arise, please contact our department.     Signed By: Ene Baum MD     June 9, 2022

## 2022-06-10 NOTE — PROGRESS NOTES
Problem: Risk for Spread of Infection  Goal: Prevent transmission of infectious organism to others  Description: Prevent the transmission of infectious organisms to other patients, staff members, and visitors. Outcome: Progressing Towards Goal     Problem: Patient Education:  Go to Education Activity  Goal: Patient/Family Education  Outcome: Progressing Towards Goal     Problem: Pressure Injury - Risk of  Goal: *Prevention of pressure injury  Description: Document Otoniel Scale and appropriate interventions in the flowsheet. Outcome: Progressing Towards Goal  Note: Pressure Injury Interventions:  Sensory Interventions: Pressure redistribution bed/mattress (bed type)    Moisture Interventions: Absorbent underpads    Activity Interventions: Pressure redistribution bed/mattress(bed type)    Mobility Interventions: HOB 30 degrees or less    Nutrition Interventions: Document food/fluid/supplement intake    Friction and Shear Interventions: HOB 30 degrees or less                Problem: Patient Education: Go to Patient Education Activity  Goal: Patient/Family Education  Outcome: Progressing Towards Goal     Problem: Patient Education: Go to Patient Education Activity  Goal: Patient/Family Education  Outcome: Progressing Towards Goal     Problem: Nutrition Deficit  Goal: *Optimize nutritional status  Outcome: Progressing Towards Goal     Problem: Falls - Risk of  Goal: *Absence of Falls  Description: Document Syl Fall Risk and appropriate interventions in the flowsheet.   Outcome: Progressing Towards Goal  Note: Fall Risk Interventions:  Mobility Interventions: Bed/chair exit alarm    Mentation Interventions: Adequate sleep, hydration, pain control    Medication Interventions: Bed/chair exit alarm    Elimination Interventions: Bed/chair exit alarm    History of Falls Interventions: Bed/chair exit alarm         Problem: Patient Education: Go to Patient Education Activity  Goal: Patient/Family Education  Outcome: Progressing Towards Goal     Problem: Injury - Risk of, Adverse Drug Event  Goal: *Absence of adverse drug events  Outcome: Progressing Towards Goal  Goal: *Absence of medication errors  Outcome: Progressing Towards Goal  Goal: *Knowledge of prescribed medications  Outcome: Progressing Towards Goal     Problem: Patient Education: Go to Patient Education Activity  Goal: Patient/Family Education  Outcome: Progressing Towards Goal     Problem: Pain  Goal: *Control of Pain  Outcome: Progressing Towards Goal  Goal: *PALLIATIVE CARE:  Alleviation of Pain  Outcome: Progressing Towards Goal     Problem: Patient Education: Go to Patient Education Activity  Goal: Patient/Family Education  Outcome: Progressing Towards Goal     Problem: Patient Education: Go to Patient Education Activity  Goal: Patient/Family Education  Outcome: Progressing Towards Goal     Problem: Patient Education: Go to Patient Education Activity  Goal: Patient/Family Education  Outcome: Progressing Towards Goal

## 2022-06-10 NOTE — PROGRESS NOTES
[facility]  (A Division of [address] [place] [address])    Follow-up Note      Date of Admission: 2022       Date of Note:  6/10/2022          Current Antimicrobials:    Prior Antimicrobials:  Zosyn  to       Assessment:         Acute respiratory failure: Intubated on , extubated   Infection with parainfluenza 3 virus: no pneumonia on CXR upon admission. There are hazy opacities noted on post intubation x-ray on . With possible congestion noted on  x-rays.   -Lactic acid 1.4, procalcitonin 0.45  Aspiration pneumonia  Acute metabolic encephalopathy  Transaminitis: Persistent-AST trending down somewhat  Mild hyponatremia: Resolved  PKU  Mild hematuria       Plan:   Completed course of Zosyn . CBC, BMP every 2-3 days  Aspiration precautions, .   -no plans for NG/PEG as patient has pulled them out. Family prefers to avoid further trauma    Continue with droplet precautions for parainfluenza as per hospital infection control policy      Aaliyah Cordoba, [address] [facility]  [address], [address] [facility] [address]  Office: [phone]  Mobile/Text: [phone]     Microbiology:   blood cultures: No growth to date   urine culture no course to date   respiratory viral culture:  positive for parainfluenza 3 negative for all other pathogens    Lines / Catheters:  Peripheral  ET   Right IJ      Subjective:   Patient seen and examined at bedside   Comfortable. Still has intermittent cough. Playing with a toy  No new events per nursing. No documented fevers.    Pending discharge    Objective:        Visit Vitals  /67   Pulse 74   Temp 98.1 °F (36.7 °C)   Resp 18   Ht 5' 8\" (1.727 m)   Wt 56.7 kg (125 lb)   SpO2 94%   BMI 19.01 kg/m²     Temp (24hrs), Av.8 °F (36.6 °C), Min:97.4 °F (36.3 °C), Max:98.1 °F (36.7 °C)        General:   Awake and alert looks around   Skin:   no rashes or skin lesions noted on limited exam, dry and warm   HEENT:  No scleral icterus or pallor; oral mucosa moist, lips moist   Lymph Nodes:   not assessed today   Lungs:   coarse breath sounds throughout, no wheeze and irregular respiratory drive   Heart:  RRR, s1 and s2; no murmurs rubs or gallops; no edema, + pedal pulses   Abdomen:  soft, non-distended, active bowel sounds, non-tender   Genitourinary:  deferred   Extremities:   average muscle tone; no contractures, no joint effusions,   Neurologic:   Moves extremities independently, no apparent focal weakness, slow to answer questions, intermittent confusion   Psychiatric:  , Calm, follows directions         Lab results:    Chemistry  No results for input(s): GLU, NA, K, CL, CO2, BUN, CREA, CA, AGAP, BUCR, TBIL, AP, TP, ALB, GLOB, AGRAT in the last 72 hours.     No lab exists for component: GPT    CBC w/ Diff  Recent Labs     06/09/22  0510   WBC 12.0   RBC 4.86   HGB 14.8   HCT 44.2          Microbiology  All Micro Results     Procedure Component Value Units Date/Time    CULTURE, BLOOD [[id number]] Collected: 05/26/22 1100    Order Status: Completed Specimen: Blood Updated: 06/01/22 0641     Special Requests: NO SPECIAL REQUESTS        Culture result: NO GROWTH 6 DAYS       CULTURE, BLOOD [[id number]] Collected: 05/26/22 1200    Order Status: Completed Specimen: Blood Updated: 06/01/22 0641     Special Requests: NO SPECIAL REQUESTS        Culture result: NO GROWTH 6 DAYS       CULTURE, RESPIRATORY/SPUTUM/BRONCH Tirso Betts STAIN [[id number]] Collected: 05/28/22 0250    Order Status: Completed Specimen: Sputum Updated: 05/30/22 0927     Special Requests: NO SPECIAL REQUESTS        GRAM STAIN RARE WBCS SEEN               RARE EPITHELIAL CELLS SEEN            NO ORGANISMS SEEN        Culture result:       RARE NORMAL RESPIRATORY GENARO          CULTURE, URINE [[id number]] Collected: 05/26/22 1234    Order Status: Completed Specimen: Urine from Clean catch Updated: 05/28/22 1226 Special Requests: NO SPECIAL REQUESTS        Culture result: No growth (<1,000 CFU/ML)       RESPIRATORY VIRUS PANEL W/COVID-19, PCR [[id number]]  (Abnormal) Collected: 05/26/22 1309    Order Status: Completed Specimen: Nasopharyngeal Updated: 05/26/22 4638     Adenovirus Not detected        Coronavirus 229E Not detected        Coronavirus HKU1 Not detected        Coronavirus CVNL63 Not detected        Coronavirus OC43 Not detected        SARS-CoV-2, PCR Not detected        Metapneumovirus Not detected        Rhinovirus and Enterovirus Not detected        Influenza A Not detected        Influenza A, subtype H1 Not detected        Influenza A, subtype H3 Not detected        INFLUENZA A H1N1 PCR Not detected        Influenza B Not detected        Parainfluenza 1 Not detected        Parainfluenza 2 Not detected        Parainfluenza 3 Detected        Parainfluenza virus 4 Not detected        RSV by PCR Not detected        B. parapertussis, PCR Not detected        Bordetella pertussis - PCR Not detected        Chlamydophila pneumoniae DNA, QL, PCR Not detected        Mycoplasma pneumoniae DNA, QL, PCR Not detected              Margaret Brooks DO   6/10/2022

## 2022-06-10 NOTE — PROGRESS NOTES
David Feng,  at [facility] regarding the status of group home placement. Samuel Delarosa indicated that she [facility]. Samuel Delarosa indicated that [address] group home decline and she is waiting to hear back from [facility] [place].  inquired if she was also looking for LTC. Samuel Delarosa indicated that she is looking for SNF group homes.  informed her that she would follow up with her on Monday, June 13, 2022 regarding placement status. Jose Eduardoghassan Washington voiced an understanding.       EARNEST Jean Baptiste

## 2022-06-10 NOTE — ROUTINE PROCESS
Bedside and Verbal shift change report given to [facility] (oncoming nurse) by Ludy Barth (offgoing nurse). Report included the following information SBAR, Kardex, Intake/Output and MAR.

## 2022-06-11 NOTE — PROGRESS NOTES
[facility] Hospitalist Group  Progress Note    Patient: Miles Velásquez Age: 64 y.o. : 1960 MR#: [id number] SSN: [id number]  Date/Time: 6/10/2022     C/C: AMS       Subjective:     Pt is non verbal. Alert and awake. Appears comfortable. Assessment/Plan:   HPI : 70-year-old male past medical history of phenylketonuria, leading to progressive intellectual disability functional disability on currently severe dysphagia leading to aspiration of all consistencies. Patient also has possible history of seizures, according to patient's brother patient may have severe sleep apnea\" stop breathing\" . Currently was admitted with altered mental status, requiring oral intubation to prevent aspiration however patient has developed aspiration pneumonia which is being treated actively by ID. Currently patient also has issues regarding dysphagia severe oropharyngeal dysphagia NG tube was placed twice for nutrition another medication adjustment twice patient pulled out NG tube restraints were not effective, further risk of trauma aspiration through NG tube is very high and this uncooperative patient so I discussed this with patient's brother and for now we are going to hold back NG tube placement and for same reason PEG tube. -  Altered mental status-acute encephalopathy-   -CT head-no acute CVA but microvascular disease    -Acute hypoxic respiratory failure:  S/p ventilatory support    -Aspiration pneumonia    -Severe oropharyngeal dysphagia: did not tolerate tube feeding as he kept pulling an NG tube out. At this time after discussion with family member/brother plan is for no feeding tubes and limited intervention    -Parainfluenza 3 viral infection-contributing to respiratory compromise now stable        Hypertension  Seizure d/o: on keppra  PKU:  On selective diet    ADVANCED DIRECTIVE:   DNR/DNI-comfort feeding and limited intervention with no feeding tubes due to patient pulling out NGT    Plan  -Cont supportive care    Dispo: Group home staff/administration evaluated pt to see if appropriate for him to go back to group home. Final decision on Friday per staff.               Objective:       General: Awake but orientation cannot be judged  HEENT: No facial asymmetry, KATIE Tylor, External ears - WNL    Cardiovascular: S1S2 - regular , No Murmur   Pulmonary: Equal expansion , No Use of accessory muscles , bilateral coarse rales ? secretions  GI:  +BS in all four quadrants, soft, non-tender  Extremities:  No edema; 2+ dorsalis pedis pulses bilaterally  Neuro: No focal deficit      DVT Prophylaxis:  [x]Lovenox  []Hep SQ  []SCDs  []Coumadin   []On Heparin gtt    [] Eliquis [] Xarelto     Vitals:         VS:   Visit Vitals  /78   Pulse 78   Temp 98.4 °F (36.9 °C)   Resp 16   Ht 5' 8\" (1.727 m)   Wt 56.7 kg (125 lb)   SpO2 94%   BMI 19.01 kg/m²      Tmax/24hrs: Temp (24hrs), Av °F (36.7 °C), Min:97.7 °F (36.5 °C), Max:98.4 °F (36.9 °C)        Medications:   Current Facility-Administered Medications   Medication Dose Route Frequency    famotidine (PEPCID) 40 mg/5 mL (8 mg/mL) oral suspension 20 mg  20 mg Oral DAILY    levETIRAcetam (KEPPRA) oral solution 500 mg  500 mg Oral BID    valproic acid (as sodium salt) (DEPAKENE) 250 mg/5 mL (5 mL) oral solution 500 mg  500 mg Oral Q12H    albuterol-ipratropium (DUO-NEB) 2.5 MG-0.5 MG/3 ML  3 mL Nebulization Q4H PRN    [Held by provider] benztropine (COGENTIN) tablet 1 mg  1 mg Oral BID    [Held by provider] valproic acid (as sodium salt) (DEPAKENE) 250 mg/5 mL (5 mL) oral solution 500 mg  500 mg Per G Tube BID    glucose chewable tablet 16 g  4 Tablet Oral PRN    glucagon (GLUCAGEN) injection 1 mg  1 mg IntraMUSCular PRN    dextrose 10% infusion 0-250 mL  0-250 mL IntraVENous PRN    clotrimazole-betamethasone (LOTRISONE) 1-0.05 % cream   Topical BID    [Held by provider] cetirizine (ZYRTEC) tablet 10 mg  10 mg Oral DAILY    [Held by provider] finasteride (PROSCAR) tablet 5 mg  5 mg Oral DAILY    loperamide (IMODIUM) capsule 2 mg  2 mg Oral QID PRN    [Held by provider] trospium (SANCTURA) tablet 20 mg  20 mg Oral DAILY    [Held by provider] OLANZapine (ZyPREXA) tablet 10 mg  10 mg Oral TID    [Held by provider] pravastatin (PRAVACHOL) tablet 20 mg  20 mg Oral QHS    [Held by provider] tamsulosin (FLOMAX) capsule 0.4 mg  0.4 mg Oral DAILY    polyethylene glycol (MIRALAX) packet 17 g  17 g Oral DAILY PRN    ondansetron (ZOFRAN ODT) tablet 4 mg  4 mg Oral Q8H PRN    Or    ondansetron (ZOFRAN) injection 4 mg  4 mg IntraVENous Q6H PRN    enoxaparin (LOVENOX) injection 40 mg  40 mg SubCUTAneous DAILY    acetaminophen (TYLENOL) suppository 650 mg  650 mg Rectal Q4H PRN       Labs:    Recent Labs     06/09/22  0510   WBC 12.0   HGB 14.8   HCT 44.2        No results for input(s): NA, K, CL, CO2, GLU, BUN, CREA, CA, MG, PHOS, ALB, TBIL, ALT, INR, INREXT, INREXT in the last 72 hours. No lab exists for component: SGOT      Time spent on direct patient care >30 mints     Complexity : High complex - due to multiple medical issues outlined above. CODE Status : DNR /DNI     Case discussed with:  [x]Patient  [] Family  []Nursing  [x]Case Management         Disclaimer: Sections of this note are dictated utilizing voice recognition software, which may have resulted in some phonetic based errors in grammar and contents. Even though attempts were made to correct all the mistakes, some may have been missed, and remained in the body of the document. If questions arise, please contact our department.     Signed By: Sergo Mckay MD     Andreina 10, 2022

## 2022-06-11 NOTE — ROUTINE PROCESS
Bedside and Verbal shift change report given to Marleni Alaotrre RN (oncoming nurse) by KENDRICK Eason RN (offgoing nurse). Report included the following information SBAR, Kardex, MAR and Recent Results.

## 2022-06-11 NOTE — ROUTINE PROCESS
Bedside and Verbal shift change report given to ADIN Rao (oncoming nurse) by KENDRICK Eason RN (offgoing nurse). Report included the following information SBAR, Kardex, MAR and Recent Results.

## 2022-06-11 NOTE — ROUTINE PROCESS
Bedside shift change report given to ADIN Virk (oncoming nurse) by David Sanchez RN (offgoing nurse). Report included the following information SBAR, Kardex, MAR and Cardiac Rhythm NSR.

## 2022-06-12 NOTE — PROGRESS NOTES
Hospitalist Progress Note    Patient: Kun Sage Age: 64 y.o. : 1960 MR#: [id number] SSN: [id number]  Date/Time: 2022 3:00 PM    DOA: 2022  PCP: Joe Barlow MD    Subjective:     He remains awake and able to answer queries   He is able to vocalize but not comprehensible   When asked about his name, he was barely audible \"Don\"    He has been on comfort feed with family knowing the potential risk of aspiration. Nursing reports that he has cough whenever he drink liquid   No fever overnight     NOTE per record, he is currently DNR/DNI with comfort feed, limited intervention with no feeding tubes per family wishes       Interval Hospital Course:        ROS: limited due to intellectual disability       Assessment/Plan:   1. Acute respiratory failure with hypoxia,        S/p intubated for airway protection, Extubated 22. 2.  Atypical pneumonia related to parainfluenza infection, resolved         Aspiration pneumonia concern   3. Sepsis ruled out   4. Acute toxic/metabolic encephalopathy on chronic intellectual disability   5. Intellectual disability with h/o PKU  6.  Seizure disorder, stable   7. Elevated LFT's, improving to baseline   8. Hypercholesteremia   9. Hypokalemia, resolved   10. Urinary retention  11. Severe pharyngeal dysphagia with aspiration risk, unable to maintain NG Tube and family has forgone PEG tube placement. He remains on comfort feed only    He completed zosyn on 22 for aspiration PNA concern  Family has agreed to NO PEG TUBE. Comfort feed only. He is currently tolerating keppra, and depakene, no report of seizure   He is to resume his other home medications as tolerated.    DNR/DNI  He is still under droplet precaution, unclear to duration of precaution within hospital protocol, will have further follow up for clarity       Disposition planning: pending facility placement   Family updated (.NONE.)  Additional Notes:    Time spent >30 minutes    Case discussed with:  [x]Patient  []Family  [x]Nursing  []Case Management  DVT Prophylaxis:  [x]Lovenox  []Hep SQ  []SCDs  []Coumadin   []On Heparin gtt    Signed By: Obdulio Rosado MD     2022 3:00 PM              Objective:   VS:   Visit Vitals  BP (P) 129/78 (BP 1 Location: Left upper arm, BP Patient Position: At rest)   Pulse (P) 70   Temp (P) 97.7 °F (36.5 °C)   Resp (P) 14   Ht 5' 8\" (1.727 m)   Wt 56.7 kg (125 lb)   SpO2 98%   BMI 19.01 kg/m²      Tmax/24hrs: Temp (24hrs), Av.9 °F (36.6 °C), Min:97.6 °F (36.4 °C), Max:98.1 °F (36.7 °C)      Intake/Output Summary (Last 24 hours) at 2022 1500  Last data filed at 2022 0716  Gross per 24 hour   Intake --   Output 700 ml   Net -700 ml       Tele:   General:  Cooperative, Not in acute distress,   HEENT: PERRL, EOMI, supple neck, no JVD, dry oral mucosa  Cardiovascular: S1S2 regular, no rub/gallop   Pulmonary: air entry bilaterally, no wheezing, no crackle  GI:  Soft, non tender, non distended, +bs, no guarding   Extremities:  No pedal edema, +distal pulses appreciated   Neuro: Awake and able to vocalize but no comprehensible     Additional:       Current Facility-Administered Medications   Medication Dose Route Frequency    famotidine (PEPCID) 40 mg/5 mL (8 mg/mL) oral suspension 20 mg  20 mg Oral DAILY    levETIRAcetam (KEPPRA) oral solution 500 mg  500 mg Oral BID    valproic acid (as sodium salt) (DEPAKENE) 250 mg/5 mL (5 mL) oral solution 500 mg  500 mg Oral Q12H    albuterol-ipratropium (DUO-NEB) 2.5 MG-0.5 MG/3 ML  3 mL Nebulization Q4H PRN    glucose chewable tablet 16 g  4 Tablet Oral PRN    glucagon (GLUCAGEN) injection 1 mg  1 mg IntraMUSCular PRN    dextrose 10% infusion 0-250 mL  0-250 mL IntraVENous PRN    clotrimazole-betamethasone (LOTRISONE) 1-0.05 % cream   Topical BID    loperamide (IMODIUM) capsule 2 mg  2 mg Oral QID PRN    OLANZapine (ZyPREXA) tablet 10 mg  10 mg Oral TID    tamsulosin (FLOMAX) capsule 0.4 mg  0.4 mg Oral DAILY    polyethylene glycol (MIRALAX) packet 17 g  17 g Oral DAILY PRN    ondansetron (ZOFRAN ODT) tablet 4 mg  4 mg Oral Q8H PRN    Or    ondansetron (ZOFRAN) injection 4 mg  4 mg IntraVENous Q6H PRN    enoxaparin (LOVENOX) injection 40 mg  40 mg SubCUTAneous DAILY    acetaminophen (TYLENOL) suppository 650 mg  650 mg Rectal Q4H PRN            Lab/Data Review:  Labs: Results:       Chemistry No results for input(s): GLU, NA, K, CL, CO2, BUN, CREA, BUCR, AGAP, CA, PHOS in the last 72 hours. No results for input(s): TBIL, ALT, ALKP, TP, ALB, GLOB, AGRAT in the last 72 hours. No lab exists for component: SGOT   CBC w/Diff No results for input(s): WBC, RBC, HGB, HCT, MCV, MCH, MCHC, RDW, PLT, BANDS, GRANS, LYMPH, EOS, HGBEXT, HCTEXT, PLTEXT in the last 72 hours. Coagulation No results for input(s): PTP, INR, APTT, INREXT in the last 72 hours.     Iron/Ferritin No results found for: IRON, FE, TIBC, IBCT, PSAT, FERR    BNP    Cardiac Enzymes No results found for: CPK, RCK1, RCK2, RCK3, RCK4, CKMB, CKNDX, CKND1, TROPT, TROIQ, BNPP, BNP     Lactic Acid    Thyroid Studies          All Micro Results     Procedure Component Value Units Date/Time    CULTURE, BLOOD [[id number]] Collected: 05/26/22 1100    Order Status: Completed Specimen: Blood Updated: 06/01/22 0641     Special Requests: NO SPECIAL REQUESTS        Culture result: NO GROWTH 6 DAYS       CULTURE, BLOOD [[id number]] Collected: 05/26/22 1200    Order Status: Completed Specimen: Blood Updated: 06/01/22 0641     Special Requests: NO SPECIAL REQUESTS        Culture result: NO GROWTH 6 DAYS       CULTURE, RESPIRATORY/SPUTUM/BRONCH Lavada Gambles STAIN [[id number]] Collected: 05/28/22 0250    Order Status: Completed Specimen: Sputum Updated: 05/30/22 1946     Special Requests: NO SPECIAL REQUESTS        GRAM STAIN RARE WBCS SEEN               RARE EPITHELIAL CELLS SEEN            NO ORGANISMS SEEN        Culture result:       RARE NORMAL RESPIRATORY GENARO          CULTURE, URINE [[id number]] Collected: 05/26/22 1234    Order Status: Completed Specimen: Urine from Clean catch Updated: 05/28/22 1226     Special Requests: NO SPECIAL REQUESTS        Culture result: No growth (<1,000 CFU/ML)       RESPIRATORY VIRUS PANEL W/COVID-19, PCR [[id number]]  (Abnormal) Collected: 05/26/22 1309    Order Status: Completed Specimen: Nasopharyngeal Updated: 05/26/22 1452     Adenovirus Not detected        Coronavirus 229E Not detected        Coronavirus HKU1 Not detected        Coronavirus CVNL63 Not detected        Coronavirus OC43 Not detected        SARS-CoV-2, PCR Not detected        Metapneumovirus Not detected        Rhinovirus and Enterovirus Not detected        Influenza A Not detected        Influenza A, subtype H1 Not detected        Influenza A, subtype H3 Not detected        INFLUENZA A H1N1 PCR Not detected        Influenza B Not detected        Parainfluenza 1 Not detected        Parainfluenza 2 Not detected        Parainfluenza 3 Detected        Parainfluenza virus 4 Not detected        RSV by PCR Not detected        B. parapertussis, PCR Not detected        Bordetella pertussis - PCR Not detected        Chlamydophila pneumoniae DNA, QL, PCR Not detected        Mycoplasma pneumoniae DNA, QL, PCR Not detected               Images:    CT (Most Recent). XRAY (Most Recent)      EKG No results found for this or any previous visit.      2D ECHO

## 2022-06-12 NOTE — PROGRESS NOTES
Problem: Pressure Injury - Risk of  Goal: *Prevention of pressure injury  Description: Document Otoniel Scale and appropriate interventions in the flowsheet. Outcome: Progressing Towards Goal  Note: Pressure Injury Interventions:  Sensory Interventions: Assess changes in LOC,Minimize linen layers    Moisture Interventions: Absorbent underpads,Minimize layers    Activity Interventions: Pressure redistribution bed/mattress(bed type)    Mobility Interventions: Assess need for specialty bed,Pressure redistribution bed/mattress (bed type)    Nutrition Interventions: Document food/fluid/supplement intake    Friction and Shear Interventions: Apply protective barrier, creams and emollients,Minimize layers                Problem: Falls - Risk of  Goal: *Absence of Falls  Description: Document Syl Fall Risk and appropriate interventions in the flowsheet.   Outcome: Progressing Towards Goal  Note: Fall Risk Interventions:  Mobility Interventions: Bed/chair exit alarm    Mentation Interventions: Bed/chair exit alarm    Medication Interventions: Bed/chair exit alarm    Elimination Interventions: Bed/chair exit alarm    History of Falls Interventions: Bed/chair exit alarm

## 2022-06-12 NOTE — PROGRESS NOTES
Bedside shift report given to Vicente Larson. Oliver OAKLEY from [address] [place] [address]. Report including the following information SBAR, Kardex, recent results, MAR, intake/output .

## 2022-06-12 NOTE — PROGRESS NOTES
[facility] Hospitalist Group  Progress Note    Patient: Wiliam Smith Age: 64 y.o. : 1960 MR#: [id number] SSN: [id number]  Date/Time: 2022     C/C: AMS       Subjective:     Pt is non verbal. Alert and awake. Seen during being cleaned by nursing, somewhat agitated. Assessment/Plan:   HPI : 80-year-old male past medical history of phenylketonuria, leading to progressive intellectual disability functional disability on currently severe dysphagia leading to aspiration of all consistencies. Patient also has possible history of seizures, according to patient's brother patient may have severe sleep apnea\" stop breathing\" . Currently was admitted with altered mental status, requiring oral intubation to prevent aspiration however patient has developed aspiration pneumonia which is being treated actively by ID. Currently patient also has issues regarding dysphagia severe oropharyngeal dysphagia NG tube was placed twice for nutrition another medication adjustment twice patient pulled out NG tube restraints were not effective, further risk of trauma aspiration through NG tube is very high and this uncooperative patient so I discussed this with patient's brother and for now we are going to hold back NG tube placement and for same reason PEG tube. -  Altered mental status-acute encephalopathy-   -CT head-no acute CVA but microvascular disease    -Acute hypoxic respiratory failure:  S/p ventilatory support    -Aspiration pneumonia    -Severe oropharyngeal dysphagia: did not tolerate tube feeding as he kept pulling an NG tube out. At this time after discussion with family member/brother plan is for no feeding tubes and limited intervention    -Parainfluenza 3 viral infection diagnosed at time of admission-likely contributed to respiratory compromise now stable        Hypertension  Seizure d/o: on keppra  PKU:  On selective diet    ADVANCED DIRECTIVE:   DNR/DNI-comfort feeding and limited intervention with no feeding tubes due to patient pulling out NGT    Plan  -Cont supportive care    Dispo: Group home staff/administration evaluated pt to see if appropriate for him to go back to group home. Per IDR discussion, likely to be denied to group home. Will cont to work w/ CM.               Objective:       General: Awake but orientation cannot be judged  HEENT: No facial asymmetry, KATIE Tylor, External ears - WNL    Cardiovascular: S1S2 - regular , No Murmur   Pulmonary: Equal expansion , No Use of accessory muscles , bilateral coarse rales ? secretions  GI:  +BS in all four quadrants, soft, non-tender  Extremities:  No edema; 2+ dorsalis pedis pulses bilaterally  Neuro: No focal deficit      DVT Prophylaxis:  [x]Lovenox  []Hep SQ  []SCDs  []Coumadin   []On Heparin gtt    [] Eliquis [] Xarelto     Vitals:         VS:   Visit Vitals  /65   Pulse 72   Temp 97.6 °F (36.4 °C)   Resp 16   Ht 5' 8\" (1.727 m)   Wt 56.7 kg (125 lb)   SpO2 99%   BMI 19.01 kg/m²      Tmax/24hrs: Temp (24hrs), Av.6 °F (36.4 °C), Min:97.5 °F (36.4 °C), Max:97.7 °F (36.5 °C)        Medications:   Current Facility-Administered Medications   Medication Dose Route Frequency    famotidine (PEPCID) 40 mg/5 mL (8 mg/mL) oral suspension 20 mg  20 mg Oral DAILY    levETIRAcetam (KEPPRA) oral solution 500 mg  500 mg Oral BID    valproic acid (as sodium salt) (DEPAKENE) 250 mg/5 mL (5 mL) oral solution 500 mg  500 mg Oral Q12H    albuterol-ipratropium (DUO-NEB) 2.5 MG-0.5 MG/3 ML  3 mL Nebulization Q4H PRN    [Held by provider] benztropine (COGENTIN) tablet 1 mg  1 mg Oral BID    [Held by provider] valproic acid (as sodium salt) (DEPAKENE) 250 mg/5 mL (5 mL) oral solution 500 mg  500 mg Per G Tube BID    glucose chewable tablet 16 g  4 Tablet Oral PRN    glucagon (GLUCAGEN) injection 1 mg  1 mg IntraMUSCular PRN    dextrose 10% infusion 0-250 mL  0-250 mL IntraVENous PRN    clotrimazole-betamethasone (LOTRISONE) 1-0.05 % cream   Topical BID    [Held by provider] cetirizine (ZYRTEC) tablet 10 mg  10 mg Oral DAILY    [Held by provider] finasteride (PROSCAR) tablet 5 mg  5 mg Oral DAILY    loperamide (IMODIUM) capsule 2 mg  2 mg Oral QID PRN    [Held by provider] trospium (SANCTURA) tablet 20 mg  20 mg Oral DAILY    [Held by provider] OLANZapine (ZyPREXA) tablet 10 mg  10 mg Oral TID    [Held by provider] pravastatin (PRAVACHOL) tablet 20 mg  20 mg Oral QHS    [Held by provider] tamsulosin (FLOMAX) capsule 0.4 mg  0.4 mg Oral DAILY    polyethylene glycol (MIRALAX) packet 17 g  17 g Oral DAILY PRN    ondansetron (ZOFRAN ODT) tablet 4 mg  4 mg Oral Q8H PRN    Or    ondansetron (ZOFRAN) injection 4 mg  4 mg IntraVENous Q6H PRN    enoxaparin (LOVENOX) injection 40 mg  40 mg SubCUTAneous DAILY    acetaminophen (TYLENOL) suppository 650 mg  650 mg Rectal Q4H PRN       Labs:    Recent Labs     06/09/22  0510   WBC 12.0   HGB 14.8   HCT 44.2        No results for input(s): NA, K, CL, CO2, GLU, BUN, CREA, CA, MG, PHOS, ALB, TBIL, ALT, INR, INREXT, INREXT in the last 72 hours. No lab exists for component: SGOT      Time spent on direct patient care >30 mints     Complexity : High complex - due to multiple medical issues outlined above. CODE Status : DNR /DNI     Case discussed with:  [x]Patient  [] Family  []Nursing  [x]Case Management         Disclaimer: Sections of this note are dictated utilizing voice recognition software, which may have resulted in some phonetic based errors in grammar and contents. Even though attempts were made to correct all the mistakes, some may have been missed, and remained in the body of the document. If questions arise, please contact our department.     Signed By: Corona Mcelroy MD     June 11, 2022

## 2022-06-13 NOTE — PROGRESS NOTES
[facility]  (A Division of [address] [address])    Follow-up Note      Date of Admission: 2022       Date of Note:  2022          Current Antimicrobials:    Prior Antimicrobials:  Zosyn  to       Assessment:         Acute respiratory failure: Intubated on , extubated   Infection with parainfluenza 3 virus: no pneumonia on CXR upon admission. There are hazy opacities noted on post intubation x-ray on . With possible congestion noted on  x-rays.   -Lactic acid 1.4, procalcitonin 0.45  Aspiration pneumonia  Acute metabolic encephalopathy  Transaminitis: Persistent-AST trending down somewhat  Mild hyponatremia: Resolved  PKU  Mild hematuria       Plan:   Completed course of Zosyn . CBC, BMP every 2-3 days  Aspiration precautions, .   -no plans for NG/PEG as patient has pulled them out. Family prefers to avoid further trauma    Continue with droplet precautions for parainfluenza as per hospital infection control policy      Cheri Glover, [facility]  [address], [address] [place] [address]  Office: [phone]  Mobile/Text: [phone]     Microbiology:   blood cultures: No growth to date   urine culture no course to date   respiratory viral culture:  positive for parainfluenza 3 negative for all other pathogens    Lines / Catheters:  Peripheral  ET   Right IJ      Subjective:   Patient seen and examined at bedside   Weekend notes reviewed. Patient continues to cough intermittently. I noticed that he was coughing when he tried to drink some of his juice this morning. Comfortable. No new events per nursing. No documented fevers.    Pending discharge    Objective:        Visit Vitals  BP (!) 147/90   Pulse 72   Temp 97.9 °F (36.6 °C)   Resp 17   Ht 5' 8\" (1.727 m)   Wt 56.7 kg (125 lb)   SpO2 98%   BMI 19.01 kg/m²     Temp (24hrs), Av.8 °F (36.6 °C), Min:97.6 °F (36.4 °C), Max:97.9 °F (36.6 °C)        General:   Awake and alert looks around   Skin:   no rashes or skin lesions noted on limited exam, dry and warm   HEENT:  No scleral icterus or pallor; oral mucosa moist, lips moist   Lymph Nodes:   not assessed today   Lungs:   coarse breath sounds throughout, no wheeze and irregular respiratory drive   Heart:  RRR, s1 and s2; no murmurs rubs or gallops; no edema, + pedal pulses   Abdomen:  soft, non-distended, active bowel sounds, non-tender   Genitourinary:  deferred   Extremities:   average muscle tone; no contractures, no joint effusions,   Neurologic:   Moves extremities independently, no apparent focal weakness, slow to answer questions, intermittent confusion   Psychiatric:  , Calm, follows directions         Lab results:    Chemistry  No results for input(s): GLU, NA, K, CL, CO2, BUN, CREA, CA, AGAP, BUCR, TBIL, AP, TP, ALB, GLOB, AGRAT in the last 72 hours. No lab exists for component: GPT    CBC w/ Diff  No results for input(s): WBC, RBC, HGB, HCT, PLT, GRANS, LYMPH, EOS, HGBEXT, HCTEXT, PLTEXT, HGBEXT, HCTEXT, PLTEXT in the last 72 hours.     Microbiology  All Micro Results     Procedure Component Value Units Date/Time    CULTURE, BLOOD [[id number]] Collected: 05/26/22 1100    Order Status: Completed Specimen: Blood Updated: 06/01/22 0641     Special Requests: NO SPECIAL REQUESTS        Culture result: NO GROWTH 6 DAYS       CULTURE, BLOOD [[id number]] Collected: 05/26/22 1200    Order Status: Completed Specimen: Blood Updated: 06/01/22 0641     Special Requests: NO SPECIAL REQUESTS        Culture result: NO GROWTH 6 DAYS       CULTURE, RESPIRATORY/SPUTUM/BRONCH Arch Pickles STAIN [[id number]] Collected: 05/28/22 0250    Order Status: Completed Specimen: Sputum Updated: 05/30/22 9644     Special Requests: NO SPECIAL REQUESTS        GRAM STAIN RARE WBCS SEEN               RARE EPITHELIAL CELLS SEEN            NO ORGANISMS SEEN        Culture result:       RARE NORMAL RESPIRATORY GENARO          CULTURE, URINE [[id number]] Collected: 05/26/22 1234    Order Status: Completed Specimen: Urine from Clean catch Updated: 05/28/22 1226     Special Requests: NO SPECIAL REQUESTS        Culture result: No growth (<1,000 CFU/ML)       RESPIRATORY VIRUS PANEL W/COVID-19, PCR [[id number]]  (Abnormal) Collected: 05/26/22 1309    Order Status: Completed Specimen: Nasopharyngeal Updated: 05/26/22 1452     Adenovirus Not detected        Coronavirus 229E Not detected        Coronavirus HKU1 Not detected        Coronavirus CVNL63 Not detected        Coronavirus OC43 Not detected        SARS-CoV-2, PCR Not detected        Metapneumovirus Not detected        Rhinovirus and Enterovirus Not detected        Influenza A Not detected        Influenza A, subtype H1 Not detected        Influenza A, subtype H3 Not detected        INFLUENZA A H1N1 PCR Not detected        Influenza B Not detected        Parainfluenza 1 Not detected        Parainfluenza 2 Not detected        Parainfluenza 3 Detected        Parainfluenza virus 4 Not detected        RSV by PCR Not detected        B. parapertussis, PCR Not detected        Bordetella pertussis - PCR Not detected        Chlamydophila pneumoniae DNA, QL, PCR Not detected        Mycoplasma pneumoniae DNA, QL, PCR Not detected              Jeovany Greene DO   6/13/2022

## 2022-06-13 NOTE — PROGRESS NOTES
0720- Bedside, Verbal and Written shift change report received by Candy Solo RN (oncoming nurse) by Jacki Musa (offgoing nurse). Report included the following information SBAR, Kardex, Intake/Output, MAR and Recent Results.   -Assessment completed, call bell within reach, no distress noted. --1920 Bedside, Verbal and Written shift change report given to  Kaleb Cerda RN(oncoming nurse) by Candy Solo RN (offgoing nurse). Report included the following information SBAR, Kardex, Intake/Output, MAR and Recent Results. Skin assessment completed.

## 2022-06-13 NOTE — PROGRESS NOTES
left a message for the [employer] , Kasey Runner, regarding the status of group home placement.       EARNEST Cazares

## 2022-06-13 NOTE — PROGRESS NOTES
Beside report given to Era OAKLEY from [place]. Oilver RN to includes SBAR, Kardex, recent results, MAR, intake/output.

## 2022-06-13 NOTE — PROGRESS NOTES
Bedside shift report given to Maxim Nelson. Oliver OAKLEY from [facility]. Report including the following information SBAR, Kardex, recent results, MAR, intake/output.

## 2022-06-13 NOTE — PROGRESS NOTES
Problem: Risk for Spread of Infection  Goal: Prevent transmission of infectious organism to others  Description: Prevent the transmission of infectious organisms to other patients, staff members, and visitors. Outcome: Progressing Towards Goal     Problem: Patient Education:  Go to Education Activity  Goal: Patient/Family Education  Outcome: Progressing Towards Goal     Problem: Pressure Injury - Risk of  Goal: *Prevention of pressure injury  Description: Document Otoniel Scale and appropriate interventions in the flowsheet. Outcome: Progressing Towards Goal  Note: Pressure Injury Interventions:  Sensory Interventions: Assess changes in LOC,Pressure redistribution bed/mattress (bed type)    Moisture Interventions: Absorbent underpads,Check for incontinence Q2 hours and as needed    Activity Interventions: Pressure redistribution bed/mattress(bed type)    Mobility Interventions: Pressure redistribution bed/mattress (bed type)    Nutrition Interventions: Document food/fluid/supplement intake    Friction and Shear Interventions: Minimize layers                Problem: Patient Education: Go to Patient Education Activity  Goal: Patient/Family Education  Outcome: Progressing Towards Goal     Problem: Patient Education: Go to Patient Education Activity  Goal: Patient/Family Education  Outcome: Progressing Towards Goal     Problem: Nutrition Deficit  Goal: *Optimize nutritional status  Outcome: Progressing Towards Goal     Problem: Falls - Risk of  Goal: *Absence of Falls  Description: Document Syl Fall Risk and appropriate interventions in the flowsheet.   Outcome: Progressing Towards Goal  Note: Fall Risk Interventions:  Mobility Interventions: Bed/chair exit alarm    Mentation Interventions: Bed/chair exit alarm,Door open when patient unattended,Update white board,More frequent rounding,Room close to nurse's station    Medication Interventions: Bed/chair exit alarm,Teach patient to arise slowly    Elimination Interventions: Call light in reach,Bed/chair exit alarm,Patient to call for help with toileting needs    History of Falls Interventions: Bed/chair exit alarm,Room close to nurse's station,Door open when patient unattended         Problem: Patient Education: Go to Patient Education Activity  Goal: Patient/Family Education  Outcome: Progressing Towards Goal     Problem: Injury - Risk of, Adverse Drug Event  Goal: *Absence of adverse drug events  Outcome: Progressing Towards Goal  Goal: *Absence of medication errors  Outcome: Progressing Towards Goal  Goal: *Knowledge of prescribed medications  Outcome: Progressing Towards Goal     Problem: Patient Education: Go to Patient Education Activity  Goal: Patient/Family Education  Outcome: Progressing Towards Goal     Problem: Pain  Goal: *Control of Pain  Outcome: Progressing Towards Goal  Goal: *PALLIATIVE CARE:  Alleviation of Pain  Outcome: Progressing Towards Goal     Problem: Patient Education: Go to Patient Education Activity  Goal: Patient/Family Education  Outcome: Progressing Towards Goal     Problem: Patient Education: Go to Patient Education Activity  Goal: Patient/Family Education  Outcome: Progressing Towards Goal     Problem: Patient Education: Go to Patient Education Activity  Goal: Patient/Family Education  Outcome: Progressing Towards Goal

## 2022-06-13 NOTE — PROGRESS NOTES
Hospitalist Progress Note    Patient: Nicholas Ponce Age: 64 y.o. : 1960 MR#: [id number] SSN: [id number]  Date/Time: 2022 3:01 PM    DOA: 2022  PCP: Fany Carpenter MD    Subjective:     He remains stable. No overnight event  Still has cough and scream when he drink his water. Per notes:  He has been on comfort feed and family members is knowing the potential risk of aspiration. No fever overnight     NOTE per record, he is currently DNR/DNI with comfort feed, limited intervention with no feeding tubes per family wishes       Interval Hospital Course:        ROS: limited due to intellectual disability       Assessment/Plan:     1. Acute respiratory failure with hypoxia,        S/p intubated for airway protection, Extubated 22. 2.  Atypical pneumonia related to parainfluenza infection, resolved         Aspiration pneumonia concern   3. Sepsis ruled out   4. Acute toxic/metabolic encephalopathy on chronic intellectual disability   5. Intellectual disability with h/o PKU  6.  Seizure disorder, stable   7. Elevated LFT's, improving to baseline   8. Hypercholesteremia   9. Hypokalemia, resolved   10. Urinary retention  11. Severe pharyngeal dysphagia with aspiration risk, unable to maintain NG Tube and family has forgone PEG tube placement. He remains on comfort feed only      Continue supportive care  He completed Zosyn on 22 for aspiration PNA  Family has agreed to NO PEG TUBE. Comfort feed only. He is currently tolerating keppra, and depakene, no report of seizure   Resume his other home medications as tolerated.    DNR/DNI  He is still under droplet precaution, unclear to duration of precaution within hospital protocol, will have further follow up for clarity       Disposition planning: pending facility placement   Family updated (.NONE.)  Additional Notes:    Time spent >30  minutes    Case discussed with:  [x]Patient  []Family  [x]Nursing  []Case Management  DVT Prophylaxis:  [x]Lovenox  []Hep SQ  []SCDs  []Coumadin   []On Heparin gtt    Signed By: Joshua Haider MD     2022 3:01 PM              Objective:   VS:   Visit Vitals  /73   Pulse 78   Temp 97.8 °F (36.6 °C)   Resp 17   Ht 5' 8\" (1.727 m)   Wt 56.7 kg (125 lb)   SpO2 98%   BMI 19.01 kg/m²      Tmax/24hrs: Temp (24hrs), Av.7 °F (36.5 °C), Min:97.4 °F (36.3 °C), Max:97.9 °F (36.6 °C)      Intake/Output Summary (Last 24 hours) at 2022  Last data filed at 2022 1747  Gross per 24 hour   Intake 720 ml   Output 1300 ml   Net -580 ml       Tele:   General:  Cooperative, Not in acute distress,   HEENT: PERRL, EOMI, supple neck, no JVD, dry oral mucosa  Cardiovascular: S1S2 regular, no rub/gallop   Pulmonary: air entry bilaterally, no wheezing, no crackle  GI:  Soft, non tender, non distended, +bs, no guarding   Extremities:  No pedal edema, +distal pulses appreciated   Neuro: Awake and able to vocalize but no comprehensible     Additional:       Current Facility-Administered Medications   Medication Dose Route Frequency    famotidine (PEPCID) 40 mg/5 mL (8 mg/mL) oral suspension 20 mg  20 mg Oral DAILY    levETIRAcetam (KEPPRA) oral solution 500 mg  500 mg Oral BID    valproic acid (as sodium salt) (DEPAKENE) 250 mg/5 mL (5 mL) oral solution 500 mg  500 mg Oral Q12H    albuterol-ipratropium (DUO-NEB) 2.5 MG-0.5 MG/3 ML  3 mL Nebulization Q4H PRN    glucose chewable tablet 16 g  4 Tablet Oral PRN    glucagon (GLUCAGEN) injection 1 mg  1 mg IntraMUSCular PRN    dextrose 10% infusion 0-250 mL  0-250 mL IntraVENous PRN    clotrimazole-betamethasone (LOTRISONE) 1-0.05 % cream   Topical BID    loperamide (IMODIUM) capsule 2 mg  2 mg Oral QID PRN    OLANZapine (ZyPREXA) tablet 10 mg  10 mg Oral TID    tamsulosin (FLOMAX) capsule 0.4 mg  0.4 mg Oral DAILY    polyethylene glycol (MIRALAX) packet 17 g  17 g Oral DAILY PRN    ondansetron (ZOFRAN ODT) tablet 4 mg  4 mg Oral Q8H PRN    Or    ondansetron (ZOFRAN) injection 4 mg  4 mg IntraVENous Q6H PRN    enoxaparin (LOVENOX) injection 40 mg  40 mg SubCUTAneous DAILY    acetaminophen (TYLENOL) suppository 650 mg  650 mg Rectal Q4H PRN            Lab/Data Review:  Labs: Results:       Chemistry No results for input(s): GLU, NA, K, CL, CO2, BUN, CREA, BUCR, AGAP, CA, PHOS in the last 72 hours. No results for input(s): TBIL, ALT, ALKP, TP, ALB, GLOB, AGRAT in the last 72 hours. No lab exists for component: SGOT   CBC w/Diff No results for input(s): WBC, RBC, HGB, HCT, MCV, MCH, MCHC, RDW, PLT, BANDS, GRANS, LYMPH, EOS, HGBEXT, HCTEXT, PLTEXT, HGBEXT, HCTEXT, PLTEXT in the last 72 hours. Coagulation No results for input(s): PTP, INR, APTT, INREXT, INREXT in the last 72 hours.     Iron/Ferritin No results found for: IRON, FE, TIBC, IBCT, PSAT, FERR    BNP    Cardiac Enzymes No results found for: CPK, RCK1, RCK2, RCK3, RCK4, CKMB, CKNDX, CKND1, TROPT, TROIQ, BNPP, BNP     Lactic Acid    Thyroid Studies          All Micro Results     Procedure Component Value Units Date/Time    CULTURE, BLOOD [[id number]] Collected: 05/26/22 1100    Order Status: Completed Specimen: Blood Updated: 06/01/22 0641     Special Requests: NO SPECIAL REQUESTS        Culture result: NO GROWTH 6 DAYS       CULTURE, BLOOD [[id number]] Collected: 05/26/22 1200    Order Status: Completed Specimen: Blood Updated: 06/01/22 0641     Special Requests: NO SPECIAL REQUESTS        Culture result: NO GROWTH 6 DAYS       CULTURE, RESPIRATORY/SPUTUM/BRONCH Mliss Eleonora STAIN [[id number]] Collected: 05/28/22 0250    Order Status: Completed Specimen: Sputum Updated: 05/30/22 0927     Special Requests: NO SPECIAL REQUESTS        GRAM STAIN RARE WBCS SEEN               RARE EPITHELIAL CELLS SEEN            NO ORGANISMS SEEN        Culture result:       RARE NORMAL RESPIRATORY GENARO          CULTURE, URINE [[id number]] Collected: 05/26/22 1234    Order Status: Completed Specimen: Urine from Clean catch Updated: 05/28/22 1226     Special Requests: NO SPECIAL REQUESTS        Culture result: No growth (<1,000 CFU/ML)       RESPIRATORY VIRUS PANEL W/COVID-19, PCR [[id number]]  (Abnormal) Collected: 05/26/22 1309    Order Status: Completed Specimen: Nasopharyngeal Updated: 05/26/22 1452     Adenovirus Not detected        Coronavirus 229E Not detected        Coronavirus HKU1 Not detected        Coronavirus CVNL63 Not detected        Coronavirus OC43 Not detected        SARS-CoV-2, PCR Not detected        Metapneumovirus Not detected        Rhinovirus and Enterovirus Not detected        Influenza A Not detected        Influenza A, subtype H1 Not detected        Influenza A, subtype H3 Not detected        INFLUENZA A H1N1 PCR Not detected        Influenza B Not detected        Parainfluenza 1 Not detected        Parainfluenza 2 Not detected        Parainfluenza 3 Detected        Parainfluenza virus 4 Not detected        RSV by PCR Not detected        B. parapertussis, PCR Not detected        Bordetella pertussis - PCR Not detected        Chlamydophila pneumoniae DNA, QL, PCR Not detected        Mycoplasma pneumoniae DNA, QL, PCR Not detected               Images:    CT (Most Recent). XRAY (Most Recent)      EKG No results found for this or any previous visit.      2D ECHO

## 2022-06-13 NOTE — PROGRESS NOTES
left a message for , Eveline Bell, at [facility] regarding the status of group home placement.        EARNEST Dyer

## 2022-06-13 NOTE — PROGRESS NOTES
Comprehensive Nutrition Assessment    Type and Reason for Visit: Reassess,Consult    Nutrition Recommendations/Plan:   1. Continue nutrition intervention consistent with goals of care - diet for comfort. Malnutrition Assessment:  Malnutrition Status: At risk for malnutrition (specify) (r/t NPO status) (05/27/22 1401)      Nutrition Assessment:    Pt continues to receives comfort measures with a diet for comfort. Good meal intake. Noted plan for discharge with hospice. Nutrition Related Findings:    BM 6/13. Wound Type:  (abrasion)    Current Nutrition Intake & Therapies:  Average Meal Intake: %  Average Supplement Intake: None ordered  ADULT DIET Dysphagia - Pureed; Less than 60 gm; No artificial sweeteners, legumes, nuts. LIMIT eggs, dairy, meat. Anthropometric Measures:  Height: 5' 8\" (172.7 cm)  Ideal Body Weight (IBW): 154 lbs (70 kg)     Current Body Wt:  59.8 kg (131 lb 13.4 oz), 85.6 % IBW. Not specified  Current BMI (kg/m2): 20.1        Weight Adjustment: No adjustment                 BMI Category: Normal weight (BMI 18.5-24. 9)    Estimated Daily Nutrient Needs:  Energy Requirements Based On: Kcal/kg (x25-35)  Weight Used for Energy Requirements: Current  Energy (kcal/day): 7897-9177  Weight Used for Protein Requirements: Current (x0.6-0.8)  Protein (g/day): 36-48  Method Used for Fluid Requirements: 1 ml/kcal  Fluid (ml/day): 1031-7906    Nutrition Diagnosis:   No nutrition diagnosis at this time     Nutrition Interventions:   Food and/or Nutrient Delivery: Continue current diet  Nutrition Education/Counseling: Education not indicated  Coordination of Nutrition Care: Continue to monitor while inpatient  Plan of Care discussed with: MD    Goals:  Previous Goal Met: Goal(s) achieved  Goals: Meet at least 75% of estimated needs,by next RD assessment       Nutrition Monitoring and Evaluation:   Behavioral-Environmental Outcomes: None identified  Food/Nutrient Intake Outcomes: Food and nutrient intake,Diet advancement/tolerance  Physical Signs/Symptoms Outcomes: Biochemical data,Meal time behavior    Discharge Planning:    No discharge needs at this time    Gwendolyn Jay RD  Contact: [phone]

## 2022-06-13 NOTE — PROGRESS NOTES
Problem: Pressure Injury - Risk of  Goal: *Prevention of pressure injury  Description: Document Otoniel Scale and appropriate interventions in the flowsheet. Outcome: Progressing Towards Goal  Note: Pressure Injury Interventions:  Sensory Interventions: Keep linens dry and wrinkle-free,Minimize linen layers,Pad between skin to skin    Moisture Interventions: Absorbent underpads,Apply protective barrier, creams and emollients    Activity Interventions: Pressure redistribution bed/mattress(bed type),PT/OT evaluation    Mobility Interventions: Pressure redistribution bed/mattress (bed type),PT/OT evaluation    Nutrition Interventions: Document food/fluid/supplement intake    Friction and Shear Interventions: Apply protective barrier, creams and emollients                Problem: Patient Education: Go to Patient Education Activity  Goal: Patient/Family Education  Outcome: Progressing Towards Goal     Problem: Nutrition Deficit  Goal: *Optimize nutritional status  Outcome: Progressing Towards Goal     Problem: Falls - Risk of  Goal: *Absence of Falls  Description: Document Syl Fall Risk and appropriate interventions in the flowsheet.   Outcome: Progressing Towards Goal  Note: Fall Risk Interventions:  Mobility Interventions: Bed/chair exit alarm,Communicate number of staff needed for ambulation/transfer,Patient to call before getting OOB    Mentation Interventions: Bed/chair exit alarm,Door open when patient unattended    Medication Interventions: Bed/chair exit alarm,Patient to call before getting OOB,Teach patient to arise slowly    Elimination Interventions: Bed/chair exit alarm,Call light in reach    History of Falls Interventions: Bed/chair exit alarm

## 2022-06-14 NOTE — PROGRESS NOTES
Hospitalist Progress Note    Patient: Miles Velásquez Age: 64 y.o. : 1960 MR#: [id number] SSN: [id number]  Date/Time: 2022 1:27 PM    DOA: 2022  PCP: Imtiaz Marie MD    Subjective:     No overnight event, No fever overnight   Still has cough and scream when he drink his water. He has been on comfort feed and family members is knowing the potential risk of aspiration. NOTE per record, he is currently DNR/DNI with comfort feed, limited intervention with no feeding tubes per family wishes       Interval Hospital Course:        ROS: limited due to intellectual disability       Assessment/Plan:     1. Acute respiratory failure with hypoxia,        S/p intubated for airway protection, Extubated 22. 2.  Atypical pneumonia related to parainfluenza infection, resolved         Aspiration pneumonia concern   3. Sepsis ruled out   4. Acute toxic/metabolic encephalopathy on chronic intellectual disability   5. Intellectual disability with h/o PKU  6.  Seizure disorder, stable   7. Elevated LFT's, improving to baseline   8. Hypercholesteremia   9. Hypokalemia, resolved   10. Urinary retention  11. Severe pharyngeal dysphagia with aspiration risk, unable to maintain NG Tube and family has forgone PEG tube placement. He remains on comfort feed only      Continue supportive care  Completed Zosyn on 22 for aspiration PNA  Family has agreed to NO PEG TUBE. Comfort feed only. He is currently tolerating keppra, and depakene, no report of seizure   Resume his other home medications as tolerated.    DNR/DNI  He is still under droplet precaution, unclear to duration of precaution within hospital protocol, will have further follow up for clarity       Disposition planning: pending facility placement   Family updated (.NONE.)  Additional Notes:    Time spent >30  minutes    Case discussed with:  [x]Patient  []Family  [x]Nursing  []Case Management  DVT Prophylaxis:  [x]Lovenox  []Hep SQ  []SCDs []Coumadin   []On Heparin gtt    Signed By: Tori Mohamud MD     2022 1:27 PM              Objective:   VS:   Visit Vitals  BP (!) 148/76 (BP 1 Location: Left arm, BP Patient Position: At rest)   Pulse 82   Temp 97.6 °F (36.4 °C)   Resp 18   Ht 5' 8\" (1.727 m)   Wt 56.7 kg (125 lb)   SpO2 93%   BMI 19.01 kg/m²      Tmax/24hrs: Temp (24hrs), Av.7 °F (36.5 °C), Min:97.2 °F (36.2 °C), Max:98.1 °F (36.7 °C)      Intake/Output Summary (Last 24 hours) at 2022 1328  Last data filed at 2022 1228  Gross per 24 hour   Intake 480 ml   Output --   Net 480 ml       Tele:   General:  Cooperative, Not in acute distress,   HEENT: PERRL, EOMI, supple neck, no JVD, dry oral mucosa  Cardiovascular: S1S2 regular, no rub/gallop   Pulmonary: air entry bilaterally, no wheezing, no crackle  GI:  Soft, non tender, non distended, +bs, no guarding   Extremities:  No pedal edema, +distal pulses appreciated   Neuro: Awake and able to vocalize but no comprehensible     Additional:       Current Facility-Administered Medications   Medication Dose Route Frequency    famotidine (PEPCID) 40 mg/5 mL (8 mg/mL) oral suspension 20 mg  20 mg Oral DAILY    levETIRAcetam (KEPPRA) oral solution 500 mg  500 mg Oral BID    valproic acid (as sodium salt) (DEPAKENE) 250 mg/5 mL (5 mL) oral solution 500 mg  500 mg Oral Q12H    albuterol-ipratropium (DUO-NEB) 2.5 MG-0.5 MG/3 ML  3 mL Nebulization Q4H PRN    glucose chewable tablet 16 g  4 Tablet Oral PRN    glucagon (GLUCAGEN) injection 1 mg  1 mg IntraMUSCular PRN    dextrose 10% infusion 0-250 mL  0-250 mL IntraVENous PRN    clotrimazole-betamethasone (LOTRISONE) 1-0.05 % cream   Topical BID    loperamide (IMODIUM) capsule 2 mg  2 mg Oral QID PRN    OLANZapine (ZyPREXA) tablet 10 mg  10 mg Oral TID    tamsulosin (FLOMAX) capsule 0.4 mg  0.4 mg Oral DAILY    polyethylene glycol (MIRALAX) packet 17 g  17 g Oral DAILY PRN    ondansetron (ZOFRAN ODT) tablet 4 mg  4 mg Oral Q8H PRN Or    ondansetron (ZOFRAN) injection 4 mg  4 mg IntraVENous Q6H PRN    enoxaparin (LOVENOX) injection 40 mg  40 mg SubCUTAneous DAILY    acetaminophen (TYLENOL) suppository 650 mg  650 mg Rectal Q4H PRN            Lab/Data Review:  Labs: Results:       Chemistry No results for input(s): GLU, NA, K, CL, CO2, BUN, CREA, BUCR, AGAP, CA, PHOS in the last 72 hours. No results for input(s): TBIL, ALT, ALKP, TP, ALB, GLOB, AGRAT in the last 72 hours. No lab exists for component: SGOT   CBC w/Diff No results for input(s): WBC, RBC, HGB, HCT, MCV, MCH, MCHC, RDW, PLT, BANDS, GRANS, LYMPH, EOS, HGBEXT, HCTEXT, PLTEXT, HGBEXT, HCTEXT, PLTEXT in the last 72 hours. Coagulation No results for input(s): PTP, INR, APTT, INREXT, INREXT in the last 72 hours.     Iron/Ferritin No results found for: IRON, FE, TIBC, IBCT, PSAT, FERR    BNP    Cardiac Enzymes No results found for: CPK, RCK1, RCK2, RCK3, RCK4, CKMB, CKNDX, CKND1, TROPT, TROIQ, BNPP, BNP     Lactic Acid    Thyroid Studies          All Micro Results     Procedure Component Value Units Date/Time    CULTURE, BLOOD [[id number]] Collected: 05/26/22 1100    Order Status: Completed Specimen: Blood Updated: 06/01/22 0641     Special Requests: NO SPECIAL REQUESTS        Culture result: NO GROWTH 6 DAYS       CULTURE, BLOOD [[id number]] Collected: 05/26/22 1200    Order Status: Completed Specimen: Blood Updated: 06/01/22 0641     Special Requests: NO SPECIAL REQUESTS        Culture result: NO GROWTH 6 DAYS       CULTURE, RESPIRATORY/SPUTUM/BRONCH Zac López STAIN [[id number]] Collected: 05/28/22 0250    Order Status: Completed Specimen: Sputum Updated: 05/30/22 0927     Special Requests: NO SPECIAL REQUESTS        GRAM STAIN RARE WBCS SEEN               RARE EPITHELIAL CELLS SEEN            NO ORGANISMS SEEN        Culture result:       RARE NORMAL RESPIRATORY GENARO          CULTURE, URINE [[id number]] Collected: 05/26/22 1234    Order Status: Completed Specimen: Urine from Clean catch Updated: 05/28/22 1226     Special Requests: NO SPECIAL REQUESTS        Culture result: No growth (<1,000 CFU/ML)       RESPIRATORY VIRUS PANEL W/COVID-19, PCR [[id number]]  (Abnormal) Collected: 05/26/22 1309    Order Status: Completed Specimen: Nasopharyngeal Updated: 05/26/22 1452     Adenovirus Not detected        Coronavirus 229E Not detected        Coronavirus HKU1 Not detected        Coronavirus CVNL63 Not detected        Coronavirus OC43 Not detected        SARS-CoV-2, PCR Not detected        Metapneumovirus Not detected        Rhinovirus and Enterovirus Not detected        Influenza A Not detected        Influenza A, subtype H1 Not detected        Influenza A, subtype H3 Not detected        INFLUENZA A H1N1 PCR Not detected        Influenza B Not detected        Parainfluenza 1 Not detected        Parainfluenza 2 Not detected        Parainfluenza 3 Detected        Parainfluenza virus 4 Not detected        RSV by PCR Not detected        B. parapertussis, PCR Not detected        Bordetella pertussis - PCR Not detected        Chlamydophila pneumoniae DNA, QL, PCR Not detected        Mycoplasma pneumoniae DNA, QL, PCR Not detected               Images:    CT (Most Recent). XRAY (Most Recent)      EKG No results found for this or any previous visit.      2D ECHO

## 2022-06-14 NOTE — PROGRESS NOTES
spoke with Cate Santana,  at [facility], regarding possible readmission to [facility]. Samantha Gravely inquired about the patient's medical status.  informed Samantha Gravely that the patient is stable.  offered for the facility to come reevaluate the patient. Samantha Tilleyely indicated that she will think about it and follow up with  by the end of today.  voiced an understanding.         EARNEST Monterroso
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
## 2022-06-14 NOTE — PROGRESS NOTES
spoke with the patient's brother, Madhuri Palma, regarding discharge planning.  informed the patient's brother that she can been communicating with [facility] regarding discharge planning.  notified the patient's brother that he has been trying to reach the patient's csb worker and has not gotten any response. The patient's brother reported that he spoke with the csb worker, Edwar Turner, on Thursday and she indicated that she will follow up with him when they have a placement.  informed the patient's brother that she will continue to try to reach csb worker.  informed the patient's brother that she spoke with  Cate Santana,  at [facility] regarding the patient being reevaluate to return to [facility]. The patient's brother indicated that he would like for him to return to [facility] group [facility].  informed the patient's brother that Michelle indicated that she will follow up with her by the end of today to determine if the patient can return. The patient's brother voiced an understanding.  discussed nursing homes as possible discharge placement. The patient's brother indicated that he does not want a nursing home and prefers group home.  voiced an understanding.     EARNEST Taylor

## 2022-06-14 NOTE — PROGRESS NOTES
[facility] regarding the status of group home placements.  unable to leave voicemail because voicemail was full.       EARNEST Daniel

## 2022-06-14 NOTE — PROGRESS NOTES
9505- Bedside, Verbal and Written shift change report received by Noe Vargas RN (oncoming nurse) by Ritu Dewitt nurseRavi. Report included the following information SBAR, Kardex, Intake/Output, MAR and Recent Results.   -Assessment completed, call bell within reach, no distress noted. -- Bedside, Verbal and Written shift change report given to Willard Carr (oncoming nurse) by Noe Vargas RN (offgoing nurse). Report included the following information SBAR, Kardex, Intake/Output, MAR and Recent Results. Skin assessment completed.

## 2022-06-14 NOTE — PROGRESS NOTES
Problem: Risk for Spread of Infection  Goal: Prevent transmission of infectious organism to others  Description: Prevent the transmission of infectious organisms to other patients, staff members, and visitors. Outcome: Progressing Towards Goal     Problem: Patient Education:  Go to Education Activity  Goal: Patient/Family Education  Outcome: Progressing Towards Goal     Problem: Pressure Injury - Risk of  Goal: *Prevention of pressure injury  Description: Document Otoniel Scale and appropriate interventions in the flowsheet. Outcome: Progressing Towards Goal  Note: Pressure Injury Interventions:  Sensory Interventions: Assess changes in LOC,Minimize linen layers,Pressure redistribution bed/mattress (bed type)    Moisture Interventions: Absorbent underpads,Check for incontinence Q2 hours and as needed,Offer toileting Q_hr    Activity Interventions: Pressure redistribution bed/mattress(bed type)    Mobility Interventions: Pressure redistribution bed/mattress (bed type)    Nutrition Interventions: Document food/fluid/supplement intake    Friction and Shear Interventions: Minimize layers                Problem: Patient Education: Go to Patient Education Activity  Goal: Patient/Family Education  Outcome: Progressing Towards Goal     Problem: Patient Education: Go to Patient Education Activity  Goal: Patient/Family Education  Outcome: Progressing Towards Goal     Problem: Nutrition Deficit  Goal: *Optimize nutritional status  Outcome: Progressing Towards Goal     Problem: Falls - Risk of  Goal: *Absence of Falls  Description: Document Syl Fall Risk and appropriate interventions in the flowsheet.   Outcome: Progressing Towards Goal  Note: Fall Risk Interventions:  Mobility Interventions: Bed/chair exit alarm    Mentation Interventions: Bed/chair exit alarm,Door open when patient unattended,More frequent rounding,Update white board    Medication Interventions: Bed/chair exit alarm    Elimination Interventions: Call light in reach,Patient to call for help with toileting needs    History of Falls Interventions: Bed/chair exit alarm,Door open when patient unattended,Room close to nurse's station         Problem: Patient Education: Go to Patient Education Activity  Goal: Patient/Family Education  Outcome: Progressing Towards Goal     Problem: Injury - Risk of, Adverse Drug Event  Goal: *Absence of adverse drug events  Outcome: Progressing Towards Goal  Goal: *Absence of medication errors  Outcome: Progressing Towards Goal  Goal: *Knowledge of prescribed medications  Outcome: Progressing Towards Goal     Problem: Patient Education: Go to Patient Education Activity  Goal: Patient/Family Education  Outcome: Progressing Towards Goal     Problem: Pain  Goal: *Control of Pain  Outcome: Progressing Towards Goal  Goal: *PALLIATIVE CARE:  Alleviation of Pain  Outcome: Progressing Towards Goal     Problem: Patient Education: Go to Patient Education Activity  Goal: Patient/Family Education  Outcome: Progressing Towards Goal     Problem: Patient Education: Go to Patient Education Activity  Goal: Patient/Family Education  Outcome: Progressing Towards Goal     Problem: Patient Education: Go to Patient Education Activity  Goal: Patient/Family Education  Outcome: Progressing Towards Goal

## 2022-06-14 NOTE — PROGRESS NOTES
Pt has appeared to calm down enough to take night time medication and obtain V/S. Pt in bed resting w/ no s/s of distress.

## 2022-06-14 NOTE — PROGRESS NOTES
Problem: Risk for Spread of Infection  Goal: Prevent transmission of infectious organism to others  Description: Prevent the transmission of infectious organisms to other patients, staff members, and visitors. Outcome: Progressing Towards Goal     Problem: Pressure Injury - Risk of  Goal: *Prevention of pressure injury  Description: Document Otoniel Scale and appropriate interventions in the flowsheet. Outcome: Progressing Towards Goal  Note: Pressure Injury Interventions:  Sensory Interventions: Keep linens dry and wrinkle-free,Minimize linen layers,Pad between skin to skin,Pressure redistribution bed/mattress (bed type)    Moisture Interventions: Absorbent underpads,Apply protective barrier, creams and emollients,Offer toileting Q_hr    Activity Interventions: Pressure redistribution bed/mattress(bed type),PT/OT evaluation    Mobility Interventions: Pressure redistribution bed/mattress (bed type),PT/OT evaluation    Nutrition Interventions: Document food/fluid/supplement intake    Friction and Shear Interventions: Apply protective barrier, creams and emollients,Minimize layers                Problem: Patient Education: Go to Patient Education Activity  Goal: Patient/Family Education  Outcome: Progressing Towards Goal     Problem: Falls - Risk of  Goal: *Absence of Falls  Description: Document Syl Fall Risk and appropriate interventions in the flowsheet.   Outcome: Progressing Towards Goal  Note: Fall Risk Interventions:  Mobility Interventions: Bed/chair exit alarm,Patient to call before getting OOB    Mentation Interventions: Bed/chair exit alarm,Adequate sleep, hydration, pain control,More frequent rounding,Reorient patient    Medication Interventions: Bed/chair exit alarm,Patient to call before getting OOB,Teach patient to arise slowly    Elimination Interventions: Bed/chair exit alarm,Call light in reach    History of Falls Interventions: Bed/chair exit alarm,Investigate reason for fall,Room close to nurse's station

## 2022-06-14 NOTE — PROGRESS NOTES
called [facility]  , Kasey Runner, regarding the status of group home placement and was unable to leave voicemail because voicemail was full.        EARNEST Cazares
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
## 2022-06-14 NOTE — PROGRESS NOTES
Beside report given to [facility] from Arleen Boyd RN to includes SBAR, Kardex, recent results, MAR, intake/output.

## 2022-06-15 NOTE — PROGRESS NOTES
followed up with the patient's csb worker, Milan Kennedy, at [address] [place] [address] to confirm she got recent notes. ENEDINA confirmed she got recent notes.  inquired about estimate discharge date. ENEDINA indicated that there is no estimate discharge date.  indicated that she has to give the brother 3 choices before a final discussion can be made on discharge location. ENEDINA reported that the patient's brother will have to take tour all of the facility before making a decision. ENEDINA indicated that the current facility that is willing to accept the patient is [address] [place] [address].  informed the csb worker that she will follow up with her tomorrow.       EARNEST Marquez

## 2022-06-15 NOTE — PROGRESS NOTES
Problem: Risk for Spread of Infection  Goal: Prevent transmission of infectious organism to others  Description: Prevent the transmission of infectious organisms to other patients, staff members, and visitors. Outcome: Progressing Towards Goal     Problem: Patient Education:  Go to Education Activity  Goal: Patient/Family Education  Outcome: Progressing Towards Goal     Problem: Pressure Injury - Risk of  Goal: *Prevention of pressure injury  Description: Document Otoniel Scale and appropriate interventions in the flowsheet. Outcome: Progressing Towards Goal  Note: Pressure Injury Interventions:  Sensory Interventions: Assess changes in LOC,Minimize linen layers    Moisture Interventions: Absorbent underpads,Check for incontinence Q2 hours and as needed    Activity Interventions: Pressure redistribution bed/mattress(bed type),PT/OT evaluation    Mobility Interventions: Pressure redistribution bed/mattress (bed type)    Nutrition Interventions: Document food/fluid/supplement intake    Friction and Shear Interventions: Minimize layers                Problem: Patient Education: Go to Patient Education Activity  Goal: Patient/Family Education  Outcome: Progressing Towards Goal     Problem: Patient Education: Go to Patient Education Activity  Goal: Patient/Family Education  Outcome: Progressing Towards Goal     Problem: Nutrition Deficit  Goal: *Optimize nutritional status  Outcome: Progressing Towards Goal     Problem: Falls - Risk of  Goal: *Absence of Falls  Description: Document Syl Fall Risk and appropriate interventions in the flowsheet.   Outcome: Progressing Towards Goal  Note: Fall Risk Interventions:  Mobility Interventions: Bed/chair exit alarm    Mentation Interventions: Bed/chair exit alarm,Room close to nurse's station,Update white board,More frequent rounding    Medication Interventions: Bed/chair exit alarm    Elimination Interventions: Bed/chair exit alarm,Call light in reach    History of Falls Interventions: Bed/chair exit alarm         Problem: Patient Education: Go to Patient Education Activity  Goal: Patient/Family Education  Outcome: Progressing Towards Goal     Problem: Injury - Risk of, Adverse Drug Event  Goal: *Absence of adverse drug events  Outcome: Progressing Towards Goal  Goal: *Absence of medication errors  Outcome: Progressing Towards Goal  Goal: *Knowledge of prescribed medications  Outcome: Progressing Towards Goal     Problem: Patient Education: Go to Patient Education Activity  Goal: Patient/Family Education  Outcome: Progressing Towards Goal     Problem: Pain  Goal: *Control of Pain  Outcome: Progressing Towards Goal  Goal: *PALLIATIVE CARE:  Alleviation of Pain  Outcome: Progressing Towards Goal     Problem: Patient Education: Go to Patient Education Activity  Goal: Patient/Family Education  Outcome: Progressing Towards Goal     Problem: Patient Education: Go to Patient Education Activity  Goal: Patient/Family Education  Outcome: Progressing Towards Goal     Problem: Patient Education: Go to Patient Education Activity  Goal: Patient/Family Education  Outcome: Progressing Towards Goal

## 2022-06-15 NOTE — PROGRESS NOTES
[facility]  (A Division of [address] [place] [address])    Follow-up Note      Date of Admission: 2022       Date of Note:  6/15/2022          Current Antimicrobials:    Prior Antimicrobials:  Zosyn  to       Assessment:         Acute respiratory failure: Intubated on , extubated   Infection with parainfluenza 3 virus: no pneumonia on CXR upon admission. There are hazy opacities noted on post intubation x-ray on . With possible congestion noted on  x-rays.   -Lactic acid 1.4, procalcitonin 0.45  Aspiration pneumonia  Acute metabolic encephalopathy  Transaminitis: Persistent-AST trending down somewhat  Mild hyponatremia: Resolved  PKU  Mild hematuria       Plan:   Completed course of Zosyn . CBC, BMP every 2-3 days  Aspiration precautions- high risk for recurrent aspiration. Close follow-up . -no plans for NG/PEG as patient has pulled them out. Family prefers to avoid further trauma    Continue with droplet precautions for parainfluenza as per hospital infection control policy      Kobe Lo, [facility]  [address], [address] [facility] [address]  Office: [phone]  Mobile/Text: [phone]     Microbiology:   blood cultures: No growth to date   urine culture no course to date   respiratory viral culture:  positive for parainfluenza 3 negative for all other pathogens    Lines / Catheters:  Peripheral  ET   Right IJ      Subjective:   Patient seen and examined at bedside. Irritable today. Patient continues to cough intermittently. No new events per nursing. No documented fevers.    Pending discharge/placement    Objective:        Visit Vitals  /73   Pulse 77   Temp 98.6 °F (37 °C)   Resp 16   Ht 5' 8\" (1.727 m)   Wt 56.7 kg (125 lb)   SpO2 99%   BMI 19.01 kg/m²     Temp (24hrs), Av °F (36.7 °C), Min:97.6 °F (36.4 °C), Max:98.6 °F (37 °C)        General:   Awake and alert looks around   Skin:   no rashes or skin lesions noted on limited exam, dry and warm   HEENT:  No scleral icterus or pallor; oral mucosa moist, lips moist   Lymph Nodes:   not assessed today   Lungs:   coarse breath sounds throughout, no wheeze and irregular respiratory drive   Heart:  RRR, s1 and s2; no murmurs rubs or gallops; no edema, + pedal pulses   Abdomen:  soft, non-distended, active bowel sounds, non-tender   Genitourinary:  deferred   Extremities:   average muscle tone; no contractures, no joint effusions,   Neurologic:   Moves extremities independently, no apparent focal weakness, slow to answer questions, intermittent confusion   Psychiatric:  , Calm, follows directions         Lab results:    Chemistry  No results for input(s): GLU, NA, K, CL, CO2, BUN, CREA, CA, AGAP, BUCR, TBIL, AP, TP, ALB, GLOB, AGRAT in the last 72 hours. No lab exists for component: GPT    CBC w/ Diff  No results for input(s): WBC, RBC, HGB, HCT, PLT, GRANS, LYMPH, EOS, HGBEXT, HCTEXT, PLTEXT, HGBEXT, HCTEXT, PLTEXT in the last 72 hours.     Microbiology  All Micro Results     Procedure Component Value Units Date/Time    CULTURE, BLOOD [[id number]] Collected: 05/26/22 1100    Order Status: Completed Specimen: Blood Updated: 06/01/22 0641     Special Requests: NO SPECIAL REQUESTS        Culture result: NO GROWTH 6 DAYS       CULTURE, BLOOD [[id number]] Collected: 05/26/22 1200    Order Status: Completed Specimen: Blood Updated: 06/01/22 0641     Special Requests: NO SPECIAL REQUESTS        Culture result: NO GROWTH 6 DAYS       CULTURE, RESPIRATORY/SPUTUM/BRONCH Ezequiel Loupe STAIN [[id number]] Collected: 05/28/22 0250    Order Status: Completed Specimen: Sputum Updated: 05/30/22 0927     Special Requests: NO SPECIAL REQUESTS        GRAM STAIN RARE WBCS SEEN               RARE EPITHELIAL CELLS SEEN            NO ORGANISMS SEEN        Culture result:       RARE NORMAL RESPIRATORY GENARO          CULTURE, URINE [[id number]] Collected: 05/26/22 1234    Order Status: Completed Specimen: Urine from Clean catch Updated: 05/28/22 1226     Special Requests: NO SPECIAL REQUESTS        Culture result: No growth (<1,000 CFU/ML)       RESPIRATORY VIRUS PANEL W/COVID-19, PCR [[id number]]  (Abnormal) Collected: 05/26/22 1309    Order Status: Completed Specimen: Nasopharyngeal Updated: 05/26/22 1452     Adenovirus Not detected        Coronavirus 229E Not detected        Coronavirus HKU1 Not detected        Coronavirus CVNL63 Not detected        Coronavirus OC43 Not detected        SARS-CoV-2, PCR Not detected        Metapneumovirus Not detected        Rhinovirus and Enterovirus Not detected        Influenza A Not detected        Influenza A, subtype H1 Not detected        Influenza A, subtype H3 Not detected        INFLUENZA A H1N1 PCR Not detected        Influenza B Not detected        Parainfluenza 1 Not detected        Parainfluenza 2 Not detected        Parainfluenza 3 Detected        Parainfluenza virus 4 Not detected        RSV by PCR Not detected        B. parapertussis, PCR Not detected        Bordetella pertussis - PCR Not detected        Chlamydophila pneumoniae DNA, QL, PCR Not detected        Mycoplasma pneumoniae DNA, QL, PCR Not detected              Leona Harman DO   6/15/2022

## 2022-06-15 NOTE — PROGRESS NOTES
received a call [address] [place] [address] , Glen Garcia, regarding group home placement. Estuardo Showers indicated that she has a possible group home but they would like recent notes about the patient's progress.  informed Estuardo Mcmanus that she will sent updated notes.     EARNEST Garcia

## 2022-06-15 NOTE — ROUTINE PROCESS
Bedside and Verbal shift change report given to [facility] (oncoming nurse) by KENDRICK Eason RN (offgoing nurse). Report included the following information SBAR, Kardex, MAR and Recent Results.

## 2022-06-15 NOTE — PROGRESS NOTES
Bedside shift report given to Angy Miller. Oliver OAKLEY from [facility]. Report including the following information SBAR, Kardex, recent results, MAR, intake/output.

## 2022-06-16 NOTE — ROUTINE PROCESS
0720-- Bedside, Verbal and Written shift change report received by Dale Greenberg (oncoming nurse) by Ailyn(offgoing nurse). Report included the following information SBAR, Kardex, Intake/Output, MAR and Recent Results. 0945-Assessment completed, call bell within reach, no distress noted. AM  medications administered, pt tolerated with ease, will continue to monitor. 1200- Shift reassessment, pt condition unchanged, will continue to monitor. 1600-  Shift reassessment, pt condition unchanged, will continue to monitor. 1925- Bedside, Verbal and Written shift change report given to Claribel(oncoming nurse) by Dale Greenberg (offgoing nurse). Report included the following information SBAR, Kardex, Intake/Output, MAR and Recent Results. Skin assessment completed.

## 2022-06-16 NOTE — PROGRESS NOTES
[facility] [address] Service , Debo Lopez, regarding the status of group home placement.    unable to leave voicemail because voicemail was full.        Jade Coombs, MSW

## 2022-06-16 NOTE — PROGRESS NOTES
Hospitalist Progress Note    Patient: Jeancarlos Rodriguez Age: 64 y.o. : 1960 MR#: [id number] SSN: [id number]  Date/Time: 2022 2:58 PM    DOA: 2022  PCP: Cat Perez MD    Subjective:     No overnight event, No fever overnight   on comfort feed  family members is knowing the potential risk of aspiration. NOTE per record, he is currently DNR/DNI with comfort feed, limited intervention with no feeding tubes per family wishes       Interval Hospital Course:        ROS: limited due to intellectual disability       Assessment/Plan:     1. Acute respiratory failure with hypoxia,        S/p intubated for airway protection, Extubated 22. 2.  Atypical pneumonia related to parainfluenza infection, resolved         Aspiration pneumonia concern   3. Sepsis ruled out   4. Acute toxic/metabolic encephalopathy on chronic intellectual disability   5. Intellectual disability with h/o PKU  6.  Seizure disorder, stable   7. Elevated LFT's, improving to baseline   8. Hypercholesteremia   9. Hypokalemia, resolved   10. Urinary retention  11. Severe pharyngeal dysphagia with aspiration risk, unable to maintain NG Tube and family has forgone PEG tube placement. He remains on comfort feed only      Continue supportive care  Completed Zosyn on 22 for aspiration PNA  Family has agreed to NO PEG TUBE. Comfort feed only. He is currently tolerating keppra, and depakene, no report of seizure   Resume his other home medications as tolerated.    DNR/DNI  He is still under droplet precaution, unclear to duration of precaution within hospital protocol, will have further follow up for clarity       Disposition planning: pending facility placement   Family updated (.NONE.)  Additional Notes:    Time spent >30  minutes    Case discussed with:  [x]Patient  []Family  [x]Nursing  []Case Management  DVT Prophylaxis:  [x]Lovenox  []Hep SQ  []SCDs  []Coumadin   []On Heparin gtt    Signed By: Obdulio Rosado MD      2022 2:58 PM              Objective:   VS:   Visit Vitals  /82 (BP 1 Location: Left upper arm, BP Patient Position: At rest)   Pulse (!) 101   Temp 97.4 °F (36.3 °C)   Resp 18   Ht 5' 8\" (1.727 m)   Wt 56.7 kg (125 lb)   SpO2 98%   BMI 19.01 kg/m²      Tmax/24hrs: Temp (24hrs), Av.1 °F (36.7 °C), Min:97.4 °F (36.3 °C), Max:98.8 °F (37.1 °C)      Intake/Output Summary (Last 24 hours) at 2022 1458  Last data filed at 2022 1309  Gross per 24 hour   Intake 480 ml   Output --   Net 480 ml       Tele:   General:  Cooperative, Not in acute distress,   HEENT: PERRL, EOMI, supple neck, no JVD, dry oral mucosa  Cardiovascular: S1S2 regular, no rub/gallop   Pulmonary: air entry bilaterally, no wheezing, no crackle  GI:  Soft, non tender, non distended, +bs, no guarding   Extremities:  No pedal edema, +distal pulses appreciated   Neuro: Awake and able to vocalize but no comprehensible     Additional:       Current Facility-Administered Medications   Medication Dose Route Frequency    benztropine (COGENTIN) tablet 1 mg  1 mg Oral TID    trospium (SANCTURA) tablet 20 mg  20 mg Oral DAILY    famotidine (PEPCID) 40 mg/5 mL (8 mg/mL) oral suspension 20 mg  20 mg Oral DAILY    levETIRAcetam (KEPPRA) oral solution 500 mg  500 mg Oral BID    valproic acid (as sodium salt) (DEPAKENE) 250 mg/5 mL (5 mL) oral solution 500 mg  500 mg Oral Q12H    albuterol-ipratropium (DUO-NEB) 2.5 MG-0.5 MG/3 ML  3 mL Nebulization Q4H PRN    glucose chewable tablet 16 g  4 Tablet Oral PRN    glucagon (GLUCAGEN) injection 1 mg  1 mg IntraMUSCular PRN    dextrose 10% infusion 0-250 mL  0-250 mL IntraVENous PRN    clotrimazole-betamethasone (LOTRISONE) 1-0.05 % cream   Topical BID    loperamide (IMODIUM) capsule 2 mg  2 mg Oral QID PRN    OLANZapine (ZyPREXA) tablet 10 mg  10 mg Oral TID    tamsulosin (FLOMAX) capsule 0.4 mg  0.4 mg Oral DAILY    polyethylene glycol (MIRALAX) packet 17 g  17 g Oral DAILY PRN    ondansetron (ZOFRAN ODT) tablet 4 mg  4 mg Oral Q8H PRN    Or    ondansetron (ZOFRAN) injection 4 mg  4 mg IntraVENous Q6H PRN    enoxaparin (LOVENOX) injection 40 mg  40 mg SubCUTAneous DAILY    acetaminophen (TYLENOL) suppository 650 mg  650 mg Rectal Q4H PRN            Lab/Data Review:  Labs: Results:       Chemistry No results for input(s): GLU, NA, K, CL, CO2, BUN, CREA, BUCR, AGAP, CA, PHOS in the last 72 hours. No results for input(s): TBIL, ALT, ALKP, TP, ALB, GLOB, AGRAT in the last 72 hours. No lab exists for component: SGOT   CBC w/Diff No results for input(s): WBC, RBC, HGB, HCT, MCV, MCH, MCHC, RDW, PLT, BANDS, GRANS, LYMPH, EOS, HGBEXT, HCTEXT, PLTEXT, HGBEXT, HCTEXT, PLTEXT in the last 72 hours. Coagulation No results for input(s): PTP, INR, APTT, INREXT, INREXT in the last 72 hours.     Iron/Ferritin No results found for: IRON, FE, TIBC, IBCT, PSAT, FERR    BNP    Cardiac Enzymes No results found for: CPK, RCK1, RCK2, RCK3, RCK4, CKMB, CKNDX, CKND1, TROPT, TROIQ, BNPP, BNP     Lactic Acid    Thyroid Studies          All Micro Results     Procedure Component Value Units Date/Time    CULTURE, BLOOD [[id number]] Collected: 05/26/22 1100    Order Status: Completed Specimen: Blood Updated: 06/01/22 0641     Special Requests: NO SPECIAL REQUESTS        Culture result: NO GROWTH 6 DAYS       CULTURE, BLOOD [[id number]] Collected: 05/26/22 1200    Order Status: Completed Specimen: Blood Updated: 06/01/22 0641     Special Requests: NO SPECIAL REQUESTS        Culture result: NO GROWTH 6 DAYS       CULTURE, RESPIRATORY/SPUTUM/BRONCH Alfred Mckusick STAIN [[id number]] Collected: 05/28/22 0250    Order Status: Completed Specimen: Sputum Updated: 05/30/22 0927     Special Requests: NO SPECIAL REQUESTS        GRAM STAIN RARE WBCS SEEN               RARE EPITHELIAL CELLS SEEN            NO ORGANISMS SEEN        Culture result:       RARE NORMAL RESPIRATORY GENARO          CULTURE, URINE [[id number]] Collected: 05/26/22 1234    Order Status: Completed Specimen: Urine from Clean catch Updated: 05/28/22 1226     Special Requests: NO SPECIAL REQUESTS        Culture result: No growth (<1,000 CFU/ML)       RESPIRATORY VIRUS PANEL W/COVID-19, PCR [[id number]]  (Abnormal) Collected: 05/26/22 1309    Order Status: Completed Specimen: Nasopharyngeal Updated: 05/26/22 1452     Adenovirus Not detected        Coronavirus 229E Not detected        Coronavirus HKU1 Not detected        Coronavirus CVNL63 Not detected        Coronavirus OC43 Not detected        SARS-CoV-2, PCR Not detected        Metapneumovirus Not detected        Rhinovirus and Enterovirus Not detected        Influenza A Not detected        Influenza A, subtype H1 Not detected        Influenza A, subtype H3 Not detected        INFLUENZA A H1N1 PCR Not detected        Influenza B Not detected        Parainfluenza 1 Not detected        Parainfluenza 2 Not detected        Parainfluenza 3 Detected        Parainfluenza virus 4 Not detected        RSV by PCR Not detected        B. parapertussis, PCR Not detected        Bordetella pertussis - PCR Not detected        Chlamydophila pneumoniae DNA, QL, PCR Not detected        Mycoplasma pneumoniae DNA, QL, PCR Not detected               Images:    CT (Most Recent). XRAY (Most Recent)      EKG No results found for this or any previous visit.      2D ECHO

## 2022-06-16 NOTE — PROGRESS NOTES
Hospitalist Progress Note    Patient: Lois Humphries Age: 64 y.o. : 1960 MR#: [id number] SSN: [id number]  Date/Time: 6/15/2022 1:27 PM    DOA: 2022  PCP: Lionel Dorado MD    Subjective:     No overnight event, No fever overnight   on comfort feed  family members is knowing the potential risk of aspiration. NOTE per record, he is currently DNR/DNI with comfort feed, limited intervention with no feeding tubes per family wishes       Interval Hospital Course:        ROS: limited due to intellectual disability       Assessment/Plan:     1. Acute respiratory failure with hypoxia,        S/p intubated for airway protection, Extubated 22. 2.  Atypical pneumonia related to parainfluenza infection, resolved         Aspiration pneumonia concern   3. Sepsis ruled out   4. Acute toxic/metabolic encephalopathy on chronic intellectual disability   5. Intellectual disability with h/o PKU  6.  Seizure disorder, stable   7. Elevated LFT's, improving to baseline   8. Hypercholesteremia   9. Hypokalemia, resolved   10. Urinary retention  11. Severe pharyngeal dysphagia with aspiration risk, unable to maintain NG Tube and family has forgone PEG tube placement. He remains on comfort feed only      Continue supportive care  Completed Zosyn on 22 for aspiration PNA  Family has agreed to NO PEG TUBE. Comfort feed only. He is currently tolerating keppra, and depakene, no report of seizure   Resume his other home medications as tolerated.    DNR/DNI  He is still under droplet precaution, unclear to duration of precaution within hospital protocol, will have further follow up for clarity       Disposition planning: pending facility placement   Family updated (.NONE.)  Additional Notes:    Time spent >30  minutes    Case discussed with:  [x]Patient  []Family  [x]Nursing  []Case Management  DVT Prophylaxis:  [x]Lovenox  []Hep SQ  []SCDs  []Coumadin   []On Heparin gtt    Signed By: Shelley Hebert MD      15, 2022 1:27 PM              Objective:   VS:   Visit Vitals  /73   Pulse 77   Temp 98.6 °F (37 °C)   Resp 16   Ht 5' 8\" (1.727 m)   Wt 56.7 kg (125 lb)   SpO2 99%   BMI 19.01 kg/m²      Tmax/24hrs: Temp (24hrs), Av.6 °F (37 °C), Min:98.6 °F (37 °C), Max:98.6 °F (37 °C)      Intake/Output Summary (Last 24 hours) at 6/15/2022 2010  Last data filed at 6/15/2022 1717  Gross per 24 hour   Intake 480 ml   Output --   Net 480 ml       Tele:   General:  Cooperative, Not in acute distress,   HEENT: PERRL, EOMI, supple neck, no JVD, dry oral mucosa  Cardiovascular: S1S2 regular, no rub/gallop   Pulmonary: air entry bilaterally, no wheezing, no crackle  GI:  Soft, non tender, non distended, +bs, no guarding   Extremities:  No pedal edema, +distal pulses appreciated   Neuro: Awake and able to vocalize but no comprehensible     Additional:       Current Facility-Administered Medications   Medication Dose Route Frequency    benztropine (COGENTIN) tablet 1 mg  1 mg Oral TID    trospium (SANCTURA) tablet 20 mg  20 mg Oral DAILY    famotidine (PEPCID) 40 mg/5 mL (8 mg/mL) oral suspension 20 mg  20 mg Oral DAILY    levETIRAcetam (KEPPRA) oral solution 500 mg  500 mg Oral BID    valproic acid (as sodium salt) (DEPAKENE) 250 mg/5 mL (5 mL) oral solution 500 mg  500 mg Oral Q12H    albuterol-ipratropium (DUO-NEB) 2.5 MG-0.5 MG/3 ML  3 mL Nebulization Q4H PRN    glucose chewable tablet 16 g  4 Tablet Oral PRN    glucagon (GLUCAGEN) injection 1 mg  1 mg IntraMUSCular PRN    dextrose 10% infusion 0-250 mL  0-250 mL IntraVENous PRN    clotrimazole-betamethasone (LOTRISONE) 1-0.05 % cream   Topical BID    loperamide (IMODIUM) capsule 2 mg  2 mg Oral QID PRN    OLANZapine (ZyPREXA) tablet 10 mg  10 mg Oral TID    tamsulosin (FLOMAX) capsule 0.4 mg  0.4 mg Oral DAILY    polyethylene glycol (MIRALAX) packet 17 g  17 g Oral DAILY PRN    ondansetron (ZOFRAN ODT) tablet 4 mg  4 mg Oral Q8H PRN    Or    ondansetron [facility]) injection 4 mg  4 mg IntraVENous Q6H PRN    enoxaparin (LOVENOX) injection 40 mg  40 mg SubCUTAneous DAILY    acetaminophen (TYLENOL) suppository 650 mg  650 mg Rectal Q4H PRN            Lab/Data Review:  Labs: Results:       Chemistry No results for input(s): GLU, NA, K, CL, CO2, BUN, CREA, BUCR, AGAP, CA, PHOS in the last 72 hours. No results for input(s): TBIL, ALT, ALKP, TP, ALB, GLOB, AGRAT in the last 72 hours. No lab exists for component: SGOT   CBC w/Diff No results for input(s): WBC, RBC, HGB, HCT, MCV, MCH, MCHC, RDW, PLT, BANDS, GRANS, LYMPH, EOS, HGBEXT, HCTEXT, PLTEXT, HGBEXT, HCTEXT, PLTEXT in the last 72 hours. Coagulation No results for input(s): PTP, INR, APTT, INREXT, INREXT in the last 72 hours.     Iron/Ferritin No results found for: IRON, FE, TIBC, IBCT, PSAT, FERR    BNP    Cardiac Enzymes No results found for: CPK, RCK1, RCK2, RCK3, RCK4, CKMB, CKNDX, CKND1, TROPT, TROIQ, BNPP, BNP     Lactic Acid    Thyroid Studies          All Micro Results     Procedure Component Value Units Date/Time    CULTURE, BLOOD [[id number]] Collected: 05/26/22 1100    Order Status: Completed Specimen: Blood Updated: 06/01/22 0641     Special Requests: NO SPECIAL REQUESTS        Culture result: NO GROWTH 6 DAYS       CULTURE, BLOOD [[id number]] Collected: 05/26/22 1200    Order Status: Completed Specimen: Blood Updated: 06/01/22 0641     Special Requests: NO SPECIAL REQUESTS        Culture result: NO GROWTH 6 DAYS       CULTURE, RESPIRATORY/SPUTUM/BRONCH Mark Kayser STAIN [[id number]] Collected: 05/28/22 0250    Order Status: Completed Specimen: Sputum Updated: 05/30/22 0927     Special Requests: NO SPECIAL REQUESTS        GRAM STAIN RARE WBCS SEEN               RARE EPITHELIAL CELLS SEEN            NO ORGANISMS SEEN        Culture result:       RARE NORMAL RESPIRATORY GENARO          CULTURE, URINE [[id number]] Collected: 05/26/22 1234    Order Status: Completed Specimen: Urine from Clean catch Updated: 05/28/22 1226     Special Requests: NO SPECIAL REQUESTS        Culture result: No growth (<1,000 CFU/ML)       RESPIRATORY VIRUS PANEL W/COVID-19, PCR [[id number]]  (Abnormal) Collected: 05/26/22 1309    Order Status: Completed Specimen: Nasopharyngeal Updated: 05/26/22 1452     Adenovirus Not detected        Coronavirus 229E Not detected        Coronavirus HKU1 Not detected        Coronavirus CVNL63 Not detected        Coronavirus OC43 Not detected        SARS-CoV-2, PCR Not detected        Metapneumovirus Not detected        Rhinovirus and Enterovirus Not detected        Influenza A Not detected        Influenza A, subtype H1 Not detected        Influenza A, subtype H3 Not detected        INFLUENZA A H1N1 PCR Not detected        Influenza B Not detected        Parainfluenza 1 Not detected        Parainfluenza 2 Not detected        Parainfluenza 3 Detected        Parainfluenza virus 4 Not detected        RSV by PCR Not detected        B. parapertussis, PCR Not detected        Bordetella pertussis - PCR Not detected        Chlamydophila pneumoniae DNA, QL, PCR Not detected        Mycoplasma pneumoniae DNA, QL, PCR Not detected               Images:    CT (Most Recent). XRAY (Most Recent)      EKG No results found for this or any previous visit.      2D ECHO

## 2022-06-17 NOTE — PROGRESS NOTES
Hospitalist Progress Note    Patient: Rhiannon Stout Age: 64 y.o. : 1960 MR#: [id number] SSN: [id number]  Date/Time: 2022 1:14 PM    DOA: 2022  PCP: Anthony Corona MD    Subjective:       No overnight event, No fever overnight   on comfort feed    NOTE per record, he is currently DNR/DNI with comfort feed, limited intervention with no feeding tubes per family wishes       Interval Hospital Course:        ROS: limited due to intellectual disability       Assessment/Plan:     1. Acute respiratory failure with hypoxia,        S/p intubated for airway protection, Extubated 22. 2.  Atypical pneumonia related to parainfluenza infection, resolved         Aspiration pneumonia concern   3. Sepsis ruled out   4. Acute toxic/metabolic encephalopathy on chronic intellectual disability   5. Intellectual disability with h/o PKU  6.  Seizure disorder, stable   7. Elevated LFT's, improving to baseline   8. Hypercholesteremia   9. Hypokalemia, resolved   10. Urinary retention  11. Severe pharyngeal dysphagia with aspiration risk, unable to maintain NG Tube and family has forgone PEG tube placement. He remains on comfort feed only      Continue supportive care  Completed Zosyn on 22 for aspiration PNA  Family has agreed to NO PEG TUBE. Comfort feed only. He is currently tolerating keppra, and depakene, no report of seizure   Resume his other home medications as tolerated.    DNR/DNI  He is still under droplet precaution, unclear to duration of precaution within hospital protocol, will have further follow up for clarity       Disposition planning: pending facility placement   Family updated (.called to brother [phone], at 1:16 PM, no answer.)  Additional Notes:    Time spent >30  minutes    Case discussed with:  [x]Patient  []Family  [x]Nursing  []Case Management  DVT Prophylaxis:  [x]Lovenox  []Hep SQ  []SCDs  []Coumadin   []On Heparin gtt    Signed By: Lori Stevenson MD     2022 1:14 PM              Objective:   VS:   Visit Vitals  /66 (BP 1 Location: Left arm, BP Patient Position: At rest)   Pulse 79   Temp 97.4 °F (36.3 °C)   Resp 20   Ht 5' 8\" (1.727 m)   Wt 56.7 kg (125 lb)   SpO2 94%   BMI 19.01 kg/m²      Tmax/24hrs: Temp (24hrs), Av.5 °F (36.4 °C), Min:97.4 °F (36.3 °C), Max:97.6 °F (36.4 °C)      Intake/Output Summary (Last 24 hours) at 2022 1315  Last data filed at 2022 1256  Gross per 24 hour   Intake 360 ml   Output --   Net 360 ml       Tele:   General:  Cooperative, Not in acute distress,   HEENT: PERRL, EOMI, supple neck, no JVD, dry oral mucosa  Cardiovascular: S1S2 regular, no rub/gallop   Pulmonary: air entry bilaterally, no wheezing, no crackle  GI:  Soft, non tender, non distended, +bs, no guarding   Extremities:  No pedal edema, +distal pulses appreciated   Neuro: Awake and able to vocalize but no comprehensible     Additional:       Current Facility-Administered Medications   Medication Dose Route Frequency    benztropine (COGENTIN) tablet 1 mg  1 mg Oral TID    trospium (SANCTURA) tablet 20 mg  20 mg Oral DAILY    famotidine (PEPCID) 40 mg/5 mL (8 mg/mL) oral suspension 20 mg  20 mg Oral DAILY    levETIRAcetam (KEPPRA) oral solution 500 mg  500 mg Oral BID    valproic acid (as sodium salt) (DEPAKENE) 250 mg/5 mL (5 mL) oral solution 500 mg  500 mg Oral Q12H    albuterol-ipratropium (DUO-NEB) 2.5 MG-0.5 MG/3 ML  3 mL Nebulization Q4H PRN    glucose chewable tablet 16 g  4 Tablet Oral PRN    glucagon (GLUCAGEN) injection 1 mg  1 mg IntraMUSCular PRN    dextrose 10% infusion 0-250 mL  0-250 mL IntraVENous PRN    clotrimazole-betamethasone (LOTRISONE) 1-0.05 % cream   Topical BID    loperamide (IMODIUM) capsule 2 mg  2 mg Oral QID PRN    OLANZapine (ZyPREXA) tablet 10 mg  10 mg Oral TID    tamsulosin (FLOMAX) capsule 0.4 mg  0.4 mg Oral DAILY    polyethylene glycol (MIRALAX) packet 17 g  17 g Oral DAILY PRN    ondansetron (ZOFRAN ODT) tablet 4 mg  4 mg Oral Q8H PRN    Or    ondansetron (ZOFRAN) injection 4 mg  4 mg IntraVENous Q6H PRN    enoxaparin (LOVENOX) injection 40 mg  40 mg SubCUTAneous DAILY    acetaminophen (TYLENOL) suppository 650 mg  650 mg Rectal Q4H PRN            Lab/Data Review:  Labs: Results:       Chemistry No results for input(s): GLU, NA, K, CL, CO2, BUN, CREA, BUCR, AGAP, CA, PHOS in the last 72 hours. No results for input(s): TBIL, ALT, ALKP, TP, ALB, GLOB, AGRAT in the last 72 hours. No lab exists for component: SGOT   CBC w/Diff Recent Labs     06/17/22  0354   WBC 7.7   RBC 4.51   HGB 13.9   HCT 42.1   MCV 93.3   MCH 30.8   MCHC 33.0   RDW 13.0   *      Coagulation No results for input(s): PTP, INR, APTT, INREXT, INREXT in the last 72 hours.     Iron/Ferritin No results found for: IRON, FE, TIBC, IBCT, PSAT, FERR    BNP    Cardiac Enzymes No results found for: CPK, RCK1, RCK2, RCK3, RCK4, CKMB, CKNDX, CKND1, TROPT, TROIQ, BNPP, BNP     Lactic Acid    Thyroid Studies          All Micro Results     Procedure Component Value Units Date/Time    CULTURE, BLOOD [[id number]] Collected: 05/26/22 1100    Order Status: Completed Specimen: Blood Updated: 06/01/22 0641     Special Requests: NO SPECIAL REQUESTS        Culture result: NO GROWTH 6 DAYS       CULTURE, BLOOD [[id number]] Collected: 05/26/22 1200    Order Status: Completed Specimen: Blood Updated: 06/01/22 0641     Special Requests: NO SPECIAL REQUESTS        Culture result: NO GROWTH 6 DAYS       CULTURE, RESPIRATORY/SPUTUM/BRONCH Raford Payer STAIN [[id number]] Collected: 05/28/22 0250    Order Status: Completed Specimen: Sputum Updated: 05/30/22 0927     Special Requests: NO SPECIAL REQUESTS        GRAM STAIN RARE WBCS SEEN               RARE EPITHELIAL CELLS SEEN            NO ORGANISMS SEEN        Culture result:       RARE NORMAL RESPIRATORY GENARO          CULTURE, URINE [[id number]] Collected: 05/26/22 1234    Order Status: Completed Specimen: Urine from Clean catch Updated: 05/28/22 1226     Special Requests: NO SPECIAL REQUESTS        Culture result: No growth (<1,000 CFU/ML)       RESPIRATORY VIRUS PANEL W/COVID-19, PCR [[id number]]  (Abnormal) Collected: 05/26/22 1309    Order Status: Completed Specimen: Nasopharyngeal Updated: 05/26/22 1452     Adenovirus Not detected        Coronavirus 229E Not detected        Coronavirus HKU1 Not detected        Coronavirus CVNL63 Not detected        Coronavirus OC43 Not detected        SARS-CoV-2, PCR Not detected        Metapneumovirus Not detected        Rhinovirus and Enterovirus Not detected        Influenza A Not detected        Influenza A, subtype H1 Not detected        Influenza A, subtype H3 Not detected        INFLUENZA A H1N1 PCR Not detected        Influenza B Not detected        Parainfluenza 1 Not detected        Parainfluenza 2 Not detected        Parainfluenza 3 Detected        Parainfluenza virus 4 Not detected        RSV by PCR Not detected        B. parapertussis, PCR Not detected        Bordetella pertussis - PCR Not detected        Chlamydophila pneumoniae DNA, QL, PCR Not detected        Mycoplasma pneumoniae DNA, QL, PCR Not detected               Images:    CT (Most Recent). XRAY (Most Recent)      EKG No results found for this or any previous visit.      2D ECHO

## 2022-06-17 NOTE — PROGRESS NOTES
left a message for the patient's csb worker, Shreyas Rosales, at [facility] regarding the status of group home placement.       EARNEST Colin

## 2022-06-17 NOTE — PROGRESS NOTES
Problem: Pressure Injury - Risk of  Goal: *Prevention of pressure injury  Description: Document Otoniel Scale and appropriate interventions in the flowsheet. Outcome: Progressing Towards Goal  Note: Pressure Injury Interventions:  Sensory Interventions: Assess changes in LOC,Minimize linen layers    Moisture Interventions: Absorbent underpads,Minimize layers    Activity Interventions: Pressure redistribution bed/mattress(bed type)    Mobility Interventions: Assess need for specialty bed,Pressure redistribution bed/mattress (bed type)    Nutrition Interventions: Document food/fluid/supplement intake    Friction and Shear Interventions: Minimize layers                Problem: Falls - Risk of  Goal: *Absence of Falls  Description: Document Syl Fall Risk and appropriate interventions in the flowsheet.   Outcome: Progressing Towards Goal  Note: Fall Risk Interventions:  Mobility Interventions: Bed/chair exit alarm    Mentation Interventions: Bed/chair exit alarm,More frequent rounding    Medication Interventions: Bed/chair exit alarm,Evaluate medications/consider consulting pharmacy    Elimination Interventions: Bed/chair exit alarm,Call light in reach    History of Falls Interventions: Bed/chair exit alarm,Door open when patient unattended

## 2022-06-17 NOTE — ROUTINE PROCESS
0715- Bedside, Verbal and Written shift change report received by Ashley Christensen (oncoming nurse) by Maira(offgoing nurse). Report included the following information SBAR, Kardex, Intake/Output, MAR and Recent Results. 0800-Assessment completed, call bell within reach, no distress noted, voiced no complaints at this time. AM  medications administered, pt tolerated with ease, will continue to monitor. 1200- Shift reassessment, pt condition unchanged, will continue to monitor. 1600-  Shift reassessment, pt condition unchanged, will continue to monitor. 1910- Bedside, Verbal and Written shift change report given to Nerissa(oncoming nurse) by Ashley Christensen (offgoing nurse). Report included the following information SBAR, Kardex, Intake/Output, MAR and Recent Results. Skin assessment completed.

## 2022-06-17 NOTE — PROGRESS NOTES
[facility]  (A Division of [address] [place] [address])    Follow-up Note      Date of Admission: 2022       Date of Note:  2022          Current Antimicrobials:    Prior Antimicrobials:  Zosyn  to       Assessment:         Acute respiratory failure: Intubated on , extubated   Infection with parainfluenza 3 virus: no pneumonia on CXR upon admission. There are hazy opacities noted on post intubation x-ray on . With possible congestion noted on  x-rays.   -Lactic acid 1.4, procalcitonin 0.45  Aspiration pneumonia  Acute metabolic encephalopathy  Transaminitis: Persistent-AST trending down somewhat  Mild hyponatremia: Resolved  PKU  Mild hematuria       Plan:   Completed course of Zosyn . CBC, BMP every 2-3 days  Aspiration precautions- high risk for recurrent aspiration. Close follow-up . -no plans for NG/PEG as patient has pulled them out. Family prefers to avoid further trauma    Still on droplet precautions for parainfluenza as per hospital infection control policy      Alla Davila, [address] [facility]  [address], [address] [facility] [address]  Office: [phone]  Mobile/Text: [phone]     Microbiology:   blood cultures: No growth to date   urine culture no course to date   respiratory viral culture:  positive for parainfluenza 3 negative for all other pathogens    Lines / Catheters:  Peripheral  ET   Right IJ      Subjective:   Patient seen and examined at bedside. Doing about the same. Patient continues to cough intermittently/gurgles   No new events per nursing. No documented fevers.    Pending discharge/placement    Objective:        Visit Vitals  /66 (BP 1 Location: Left arm, BP Patient Position: At rest)   Pulse 72   Temp 97.4 °F (36.3 °C)   Resp 18   Ht 5' 8\" (1.727 m)   Wt 56.7 kg (125 lb)   SpO2 97%   BMI 19.01 kg/m²     Temp (24hrs), Av.5 °F (36.4 °C), Min:97.4 °F (36.3 °C), Max:97.6 °F (36.4 °C)        General:   Awake and alert looks around   Skin:   no rashes or skin lesions noted on limited exam, dry and warm   HEENT:  No scleral icterus or pallor; oral mucosa moist, lips moist   Lymph Nodes:   not assessed today   Lungs:   coarse breath sounds throughout, no wheeze and irregular respiratory drive   Heart:  RRR, s1 and s2; no murmurs rubs or gallops; no edema, + pedal pulses   Abdomen:  soft, non-distended, active bowel sounds, non-tender   Genitourinary:  deferred   Extremities:   average muscle tone; no contractures, no joint effusions,   Neurologic:   Moves extremities independently, no apparent focal weakness, slow to answer questions, intermittent confusion   Psychiatric:  , Calm, follows directions         Lab results:    Chemistry  No results for input(s): GLU, NA, K, CL, CO2, BUN, CREA, CA, AGAP, BUCR, TBIL, AP, TP, ALB, GLOB, AGRAT in the last 72 hours.     No lab exists for component: GPT    CBC w/ Diff  Recent Labs     06/17/22  0354   WBC 7.7   RBC 4.51   HGB 13.9   HCT 42.1   *       Microbiology  All Micro Results     Procedure Component Value Units Date/Time    CULTURE, BLOOD [[id number]] Collected: 05/26/22 1100    Order Status: Completed Specimen: Blood Updated: 06/01/22 0641     Special Requests: NO SPECIAL REQUESTS        Culture result: NO GROWTH 6 DAYS       CULTURE, BLOOD [[id number]] Collected: 05/26/22 1200    Order Status: Completed Specimen: Blood Updated: 06/01/22 0641     Special Requests: NO SPECIAL REQUESTS        Culture result: NO GROWTH 6 DAYS       CULTURE, RESPIRATORY/SPUTUM/BRONCH Lavada Gambles STAIN [[id number]] Collected: 05/28/22 0250    Order Status: Completed Specimen: Sputum Updated: 05/30/22 6641     Special Requests: NO SPECIAL REQUESTS        GRAM STAIN RARE WBCS SEEN               RARE EPITHELIAL CELLS SEEN            NO ORGANISMS SEEN        Culture result:       RARE NORMAL RESPIRATORY GENARO          CULTURE, URINE [[id number]] Collected: 05/26/22 1234    Order Status: Completed Specimen: Urine from Clean catch Updated: 05/28/22 1226     Special Requests: NO SPECIAL REQUESTS        Culture result: No growth (<1,000 CFU/ML)       RESPIRATORY VIRUS PANEL W/COVID-19, PCR [[id number]]  (Abnormal) Collected: 05/26/22 1309    Order Status: Completed Specimen: Nasopharyngeal Updated: 05/26/22 1452     Adenovirus Not detected        Coronavirus 229E Not detected        Coronavirus HKU1 Not detected        Coronavirus CVNL63 Not detected        Coronavirus OC43 Not detected        SARS-CoV-2, PCR Not detected        Metapneumovirus Not detected        Rhinovirus and Enterovirus Not detected        Influenza A Not detected        Influenza A, subtype H1 Not detected        Influenza A, subtype H3 Not detected        INFLUENZA A H1N1 PCR Not detected        Influenza B Not detected        Parainfluenza 1 Not detected        Parainfluenza 2 Not detected        Parainfluenza 3 Detected        Parainfluenza virus 4 Not detected        RSV by PCR Not detected        B. parapertussis, PCR Not detected        Bordetella pertussis - PCR Not detected        Chlamydophila pneumoniae DNA, QL, PCR Not detected        Mycoplasma pneumoniae DNA, QL, PCR Not detected              Tc Bledsoe DO   6/17/2022

## 2022-06-18 NOTE — PROGRESS NOTES
Problem: Risk for Spread of Infection  Goal: Prevent transmission of infectious organism to others  Description: Prevent the transmission of infectious organisms to other patients, staff members, and visitors. Outcome: Progressing Towards Goal     Problem: Patient Education:  Go to Education Activity  Goal: Patient/Family Education  Outcome: Progressing Towards Goal     Problem: Pressure Injury - Risk of  Goal: *Prevention of pressure injury  Description: Document Otoniel Scale and appropriate interventions in the flowsheet. Outcome: Progressing Towards Goal  Note: Pressure Injury Interventions:  Sensory Interventions: Assess changes in LOC    Moisture Interventions: Absorbent underpads    Activity Interventions: Pressure redistribution bed/mattress(bed type)    Mobility Interventions: Pressure redistribution bed/mattress (bed type)    Nutrition Interventions: Document food/fluid/supplement intake    Friction and Shear Interventions: Minimize layers                Problem: Patient Education: Go to Patient Education Activity  Goal: Patient/Family Education  Outcome: Progressing Towards Goal     Problem: Patient Education: Go to Patient Education Activity  Goal: Patient/Family Education  Outcome: Progressing Towards Goal     Problem: Nutrition Deficit  Goal: *Optimize nutritional status  Outcome: Progressing Towards Goal     Problem: Nutrition Deficit  Goal: *Optimize nutritional status  Outcome: Progressing Towards Goal     Problem: Falls - Risk of  Goal: *Absence of Falls  Description: Document Syl Fall Risk and appropriate interventions in the flowsheet.   Outcome: Progressing Towards Goal  Note: Fall Risk Interventions:  Mobility Interventions: Bed/chair exit alarm    Mentation Interventions: Bed/chair exit alarm    Medication Interventions: Patient to call before getting OOB    Elimination Interventions: Bed/chair exit alarm    History of Falls Interventions: Bed/chair exit alarm         Problem: Patient Education: Go to Patient Education Activity  Goal: Patient/Family Education  Outcome: Progressing Towards Goal

## 2022-06-18 NOTE — PROGRESS NOTES
Hospitalist Progress Note    Patient: Eva Bass Age: 64 y.o. : 1960 MR#: [id number] SSN: [id number]  Date/Time: 2022 2:54 PM    DOA: 2022  PCP: Felipe More MD    Subjective:     He hid himself under the blanket but able to vocalize when ask how he is doing today. No overnight event, No fever overnight   on comfort feed  Still with coughing with liquid intake     NOTE per record, he is currently DNR/DNI with comfort feed, limited intervention with no feeding tubes per family wishes         ROS: limited due to intellectual disability       Assessment/Plan:     1. Acute respiratory failure with hypoxia,        S/p intubated for airway protection, Extubated 22. 2.  Atypical pneumonia related to parainfluenza infection, resolved         Aspiration pneumonia concern   3. Sepsis ruled out   4. Acute toxic/metabolic encephalopathy on chronic intellectual disability   5. Intellectual disability with h/o PKU  6.  Seizure disorder, stable   7. Elevated LFT's, improving to baseline   8. Hypercholesteremia   9. Hypokalemia, resolved   10. Urinary retention  11. Severe pharyngeal dysphagia with aspiration risk, unable to maintain NG Tube and family has forgone PEG tube placement. He remains on comfort feed only      Continue supportive care  Completed Zosyn on 22 for aspiration PNA  Family has agreed to NO PEG TUBE. Comfort feed only. He is currently tolerating keppra, and depakene, no report of seizure   Resume his other home medications as tolerated.    DNR/DNI  He is still under droplet precaution, unclear to duration of precaution within hospital protocol, will have further follow up for clarity       Disposition planning: pending facility placement   Family updated (.NONE.)  Additional Notes:    Time spent >30  minutes    Case discussed with:  [x]Patient  []Family  [x]Nursing  []Case Management  DVT Prophylaxis:  [x]Lovenox  []Hep SQ  []SCDs  []Coumadin   []On Heparin gtt    Signed By: Denny Hooper MD     2022 2:54 PM              Objective:   VS:   Visit Vitals  /69   Pulse 74   Temp 97.5 °F (36.4 °C)   Resp 16   Ht 5' 8\" (1.727 m)   Wt 56.7 kg (125 lb)   SpO2 98%   BMI 19.01 kg/m²      Tmax/24hrs: Temp (24hrs), Av.4 °F (36.3 °C), Min:97.3 °F (36.3 °C), Max:97.5 °F (36.4 °C)      Intake/Output Summary (Last 24 hours) at 2022 1454  Last data filed at 2022 1708  Gross per 24 hour   Intake 240 ml   Output --   Net 240 ml       Tele:   General:  Cooperative, Not in acute distress,   HEENT: PERRL, EOMI, supple neck, no JVD, dry oral mucosa  Cardiovascular: S1S2 regular, no rub/gallop   Pulmonary: air entry bilaterally, no wheezing, no crackle  GI:  Soft, non tender, non distended, +bs, no guarding   Extremities:  No pedal edema, +distal pulses appreciated   Neuro: Awake and able to vocalize but no comprehensible     Additional:       Current Facility-Administered Medications   Medication Dose Route Frequency    benztropine (COGENTIN) tablet 1 mg  1 mg Oral TID    trospium (SANCTURA) tablet 20 mg  20 mg Oral DAILY    famotidine (PEPCID) 40 mg/5 mL (8 mg/mL) oral suspension 20 mg  20 mg Oral DAILY    levETIRAcetam (KEPPRA) oral solution 500 mg  500 mg Oral BID    valproic acid (as sodium salt) (DEPAKENE) 250 mg/5 mL (5 mL) oral solution 500 mg  500 mg Oral Q12H    albuterol-ipratropium (DUO-NEB) 2.5 MG-0.5 MG/3 ML  3 mL Nebulization Q4H PRN    glucose chewable tablet 16 g  4 Tablet Oral PRN    glucagon (GLUCAGEN) injection 1 mg  1 mg IntraMUSCular PRN    dextrose 10% infusion 0-250 mL  0-250 mL IntraVENous PRN    clotrimazole-betamethasone (LOTRISONE) 1-0.05 % cream   Topical BID    loperamide (IMODIUM) capsule 2 mg  2 mg Oral QID PRN    OLANZapine (ZyPREXA) tablet 10 mg  10 mg Oral TID    tamsulosin (FLOMAX) capsule 0.4 mg  0.4 mg Oral DAILY    polyethylene glycol (MIRALAX) packet 17 g  17 g Oral DAILY PRN    ondansetron (ZOFRAN ODT) tablet 4 mg  4 mg Oral Q8H PRN    Or    ondansetron (ZOFRAN) injection 4 mg  4 mg IntraVENous Q6H PRN    enoxaparin (LOVENOX) injection 40 mg  40 mg SubCUTAneous DAILY    acetaminophen (TYLENOL) suppository 650 mg  650 mg Rectal Q4H PRN            Lab/Data Review:  Labs: Results:       Chemistry No results for input(s): GLU, NA, K, CL, CO2, BUN, CREA, BUCR, AGAP, CA, PHOS in the last 72 hours. No results for input(s): TBIL, ALT, ALKP, TP, ALB, GLOB, AGRAT in the last 72 hours. No lab exists for component: SGOT   CBC w/Diff Recent Labs     06/17/22  0354   WBC 7.7   RBC 4.51   HGB 13.9   HCT 42.1   MCV 93.3   MCH 30.8   MCHC 33.0   RDW 13.0   *      Coagulation No results for input(s): PTP, INR, APTT, INREXT, INREXT in the last 72 hours.     Iron/Ferritin No results found for: IRON, FE, TIBC, IBCT, PSAT, FERR    BNP    Cardiac Enzymes No results found for: CPK, RCK1, RCK2, RCK3, RCK4, CKMB, CKNDX, CKND1, TROPT, TROIQ, BNPP, BNP     Lactic Acid    Thyroid Studies          All Micro Results     Procedure Component Value Units Date/Time    CULTURE, BLOOD [[id number]] Collected: 05/26/22 1100    Order Status: Completed Specimen: Blood Updated: 06/01/22 0641     Special Requests: NO SPECIAL REQUESTS        Culture result: NO GROWTH 6 DAYS       CULTURE, BLOOD [[id number]] Collected: 05/26/22 1200    Order Status: Completed Specimen: Blood Updated: 06/01/22 0641     Special Requests: NO SPECIAL REQUESTS        Culture result: NO GROWTH 6 DAYS       CULTURE, RESPIRATORY/SPUTUM/BRONCH Tianna Pucker STAIN [[id number]] Collected: 05/28/22 0250    Order Status: Completed Specimen: Sputum Updated: 05/30/22 0927     Special Requests: NO SPECIAL REQUESTS        GRAM STAIN RARE WBCS SEEN               RARE EPITHELIAL CELLS SEEN            NO ORGANISMS SEEN        Culture result:       RARE NORMAL RESPIRATORY GENARO          CULTURE, URINE [[id number]] Collected: 05/26/22 1234    Order Status: Completed Specimen: Urine from Clean catch Updated: 05/28/22 1226     Special Requests: NO SPECIAL REQUESTS        Culture result: No growth (<1,000 CFU/ML)       RESPIRATORY VIRUS PANEL W/COVID-19, PCR [[id number]]  (Abnormal) Collected: 05/26/22 1309    Order Status: Completed Specimen: Nasopharyngeal Updated: 05/26/22 1452     Adenovirus Not detected        Coronavirus 229E Not detected        Coronavirus HKU1 Not detected        Coronavirus CVNL63 Not detected        Coronavirus OC43 Not detected        SARS-CoV-2, PCR Not detected        Metapneumovirus Not detected        Rhinovirus and Enterovirus Not detected        Influenza A Not detected        Influenza A, subtype H1 Not detected        Influenza A, subtype H3 Not detected        INFLUENZA A H1N1 PCR Not detected        Influenza B Not detected        Parainfluenza 1 Not detected        Parainfluenza 2 Not detected        Parainfluenza 3 Detected        Parainfluenza virus 4 Not detected        RSV by PCR Not detected        B. parapertussis, PCR Not detected        Bordetella pertussis - PCR Not detected        Chlamydophila pneumoniae DNA, QL, PCR Not detected        Mycoplasma pneumoniae DNA, QL, PCR Not detected               Images:    CT (Most Recent). XRAY (Most Recent)      EKG No results found for this or any previous visit.      2D ECHO

## 2022-06-18 NOTE — ROUTINE PROCESS
0250- Bedside, Verbal and Written shift change report received by Leif Lynch (oncoming nurse) by Hilario Olvera (offgoing nurse). Report included the following information SBAR, Kardex, Intake/Output, MAR and Recent Results. 0830-Assessment completed, call bell within reach, no distress noted. Assisted with breakfast. AM  medications administered, pt tolerated with ease, will continue to monitor. 1200- Shift reassessment, pt condition unchanged, will continue to monitor. 1600-Shift reassessment, pt condition unchanged, will continue to monitor. 1923- Bedside, Verbal and Written shift change report given to Nerissa(oncoming nurse) by Leif Lynch (offgoing nurse). Report included the following information SBAR, Kardex, Intake/Output, MAR and Recent Results. Skin assessment completed.

## 2022-06-18 NOTE — ROUTINE PROCESS
Bedside and Verbal shift change report given to Camilla RN (oncoming nurse) by Eduar Parks RN (offgoing nurse). Report given with SBAR, Kardex, Intake/Output, MAR, Accordion and Recent Results.

## 2022-06-19 NOTE — PROGRESS NOTES
Hospitalist Progress Note    Patient: Nicholas Ponce Age: 58 y.o. : 1960 MR#: [id number] SSN: [id number]  Date/Time: 2022 10:21 AM    DOA: 2022  PCP: Fany Carpenter MD    Subjective:     He was agitated today, did not want any follow up blood work  No overnight event, No fever overnight   on comfort feed  Still with coughing with liquid intake     NOTE per record, he is currently DNR/DNI with comfort feed, limited intervention with no feeding tubes per family wishes     Interval Hospital Course:  64 y.o male with intellectual disability with h/o PKU (phenylketonuria), Seizure disorder, anxiety disorder, chronic urinary retention with singleton catheter requirement, presented to the ER 22 due to progressive lethargy and altered in his mentation. His group home staff reported that he was nonverbal for 48hrs, he was previously conversant and ambulatory. In the ER, he was found to be in acute respiratory failure with concern for Parainfluenza 3 infection. ID consulted and started him on zosyn for concern of aspiration. Due to his persistent respiratory failure and AMS, he was transferred to ICU for further monitoring. He was eventually intubated 22 due to worsened encephalopathy and became unresponsive. He was stabilized and was able to extubate on 22. ID continues him on zosyn. He stable to transfer out of ICU on 22. He was on room air at 97% O2 saturation. He underwent MBS with severe pharyngeal dysphagia with all consistencies. He was recommended on alternative feeding method. NG tube was placed but he kept fulling them out. PEG tube was considered but there was clear caution that he might be fulling them out at well. He was made NPO. He continued on slow improvement back to his baseline mentation. He was able to finish out his IV zosyn on 22.   Per Dr Savita Davila note 2022:  \"-I discussed with patient's's brother and patient's brother's wife who is the only family members available, patient's father mother  no other siblings. Patient's brother had some concerns regarding patient's condition, I explained him regarding progressive disease of patient especially in the face of oropharyngeal dysphagia patient may not able to get adequate nutrition attempts were made to put NG tube twice both times patient pulled out vigorously probably will do the same thing to PEG tube will not serve purpose for long-term nutritional support. I also discussed regarding patient's long-term care plan with his brother. After long discussion after answering to the questions and them(patient's brother and patient's brother's wife) understanding the gravity of situation decided they do not want any resuscitation or intubation making patient DNR/DNI. They also decided that at this point no aggressive treatment in form of artificial feeding. Their goal for comfort feeding. \"         ROS: limited due to intellectual disability       Assessment/Plan:     1. Acute respiratory failure with hypoxia,        S/p intubated for airway protection, Extubated 22. 2.  Atypical pneumonia related to parainfluenza infection, resolved         Aspiration pneumonia concern   3. Sepsis ruled out   4. Acute toxic/metabolic encephalopathy on chronic intellectual disability   5. Intellectual disability with h/o PKU  6.  Seizure disorder, stable   7. Elevated LFT's, improving to baseline   8. Hypercholesteremia   9. Hypokalemia, resolved   10. Urinary retention  11. Severe pharyngeal dysphagia with aspiration risk, unable to maintain NG Tube and family has forgone PEG tube placement. He remains on comfort feed only      D/C follow up lab check today  Cont supportive care  Family has agreed to NO PEG TUBE. Comfort feed only. He is tolerating keppra, and depakene, no report of seizure   Resume his home medications as tolerated.    DNR/DNI  He is still under droplet precaution, this can be discontinue if he has no respiratory symptom.    I spoke with ID control staff previously      Disposition planning: pending facility placement   Family updated (.called his brother [phone], @10:22 AM, no answer.)  Additional Notes:    Time spent >30  minutes    Case discussed with:  [x]Patient  []Family  [x]Nursing  []Case Management  DVT Prophylaxis:  []Lovenox  []Hep SQ  [x]SCDs  []Coumadin   []On Heparin gtt    Signed By: Marlin Figueroa MD     2022 10:21 AM              Objective:   VS:   Visit Vitals  /78   Pulse 66   Temp 97.4 °F (36.3 °C)   Resp 16   Ht 5' 8\" (1.727 m)   Wt 56.7 kg (125 lb)   SpO2 100%   BMI 19.01 kg/m²      Tmax/24hrs: Temp (24hrs), Av.5 °F (36.4 °C), Min:97.4 °F (36.3 °C), Max:97.6 °F (36.4 °C)      Intake/Output Summary (Last 24 hours) at 2022 1021  Last data filed at 2022 0827  Gross per 24 hour   Intake 240 ml   Output --   Net 240 ml       Tele:   General:  Cooperative, Not in acute distress,   HEENT: PERRL, EOMI, supple neck, no JVD, dry oral mucosa  Cardiovascular: S1S2 regular, no rub/gallop   Pulmonary: air entry bilaterally, no wheezing, no crackle  GI:  Soft, non tender, non distended, +bs, no guarding   Extremities:  No pedal edema, +distal pulses appreciated   Neuro: Awake and able to vocalize but no comprehensible     Additional:       Current Facility-Administered Medications   Medication Dose Route Frequency    benztropine (COGENTIN) tablet 1 mg  1 mg Oral TID    trospium (SANCTURA) tablet 20 mg  20 mg Oral DAILY    famotidine (PEPCID) 40 mg/5 mL (8 mg/mL) oral suspension 20 mg  20 mg Oral DAILY    levETIRAcetam (KEPPRA) oral solution 500 mg  500 mg Oral BID    valproic acid (as sodium salt) (DEPAKENE) 250 mg/5 mL (5 mL) oral solution 500 mg  500 mg Oral Q12H    albuterol-ipratropium (DUO-NEB) 2.5 MG-0.5 MG/3 ML  3 mL Nebulization Q4H PRN    glucose chewable tablet 16 g  4 Tablet Oral PRN    glucagon (GLUCAGEN) injection 1 mg  1 mg IntraMUSCular PRN    dextrose 10% infusion 0-250 mL  0-250 mL IntraVENous PRN    clotrimazole-betamethasone (LOTRISONE) 1-0.05 % cream   Topical BID    loperamide (IMODIUM) capsule 2 mg  2 mg Oral QID PRN    OLANZapine (ZyPREXA) tablet 10 mg  10 mg Oral TID    tamsulosin (FLOMAX) capsule 0.4 mg  0.4 mg Oral DAILY    polyethylene glycol (MIRALAX) packet 17 g  17 g Oral DAILY PRN    ondansetron (ZOFRAN ODT) tablet 4 mg  4 mg Oral Q8H PRN    Or    ondansetron (ZOFRAN) injection 4 mg  4 mg IntraVENous Q6H PRN    acetaminophen (TYLENOL) suppository 650 mg  650 mg Rectal Q4H PRN            Lab/Data Review:  Labs: Results:       Chemistry No results for input(s): GLU, NA, K, CL, CO2, BUN, CREA, BUCR, AGAP, CA, PHOS in the last 72 hours. No results for input(s): TBIL, ALT, ALKP, TP, ALB, GLOB, AGRAT in the last 72 hours. No lab exists for component: SGOT   CBC w/Diff Recent Labs     06/17/22  0354   WBC 7.7   RBC 4.51   HGB 13.9   HCT 42.1   MCV 93.3   MCH 30.8   MCHC 33.0   RDW 13.0   *      Coagulation No results for input(s): PTP, INR, APTT, INREXT, INREXT in the last 72 hours.     Iron/Ferritin No results found for: IRON, FE, TIBC, IBCT, PSAT, FERR    BNP    Cardiac Enzymes No results found for: CPK, RCK1, RCK2, RCK3, RCK4, CKMB, CKNDX, CKND1, TROPT, TROIQ, BNPP, BNP     Lactic Acid    Thyroid Studies          All Micro Results     Procedure Component Value Units Date/Time    CULTURE, BLOOD [[id number]] Collected: 05/26/22 1100    Order Status: Completed Specimen: Blood Updated: 06/01/22 0641     Special Requests: NO SPECIAL REQUESTS        Culture result: NO GROWTH 6 DAYS       CULTURE, BLOOD [[id number]] Collected: 05/26/22 1200    Order Status: Completed Specimen: Blood Updated: 06/01/22 0641     Special Requests: NO SPECIAL REQUESTS        Culture result: NO GROWTH 6 DAYS       CULTURE, RESPIRATORY/SPUTUM/BRONCH Silvano Son STAIN [[id number]] Collected: 05/28/22 0250    Order Status: Completed Specimen: Sputum Updated: 05/30/22 0927     Special Requests: NO SPECIAL REQUESTS        GRAM STAIN RARE WBCS SEEN               RARE EPITHELIAL CELLS SEEN            NO ORGANISMS SEEN        Culture result:       RARE NORMAL RESPIRATORY GENARO          CULTURE, URINE [[id number]] Collected: 05/26/22 1234    Order Status: Completed Specimen: Urine from Clean catch Updated: 05/28/22 1226     Special Requests: NO SPECIAL REQUESTS        Culture result: No growth (<1,000 CFU/ML)       RESPIRATORY VIRUS PANEL W/COVID-19, PCR [[id number]]  (Abnormal) Collected: 05/26/22 1309    Order Status: Completed Specimen: Nasopharyngeal Updated: 05/26/22 1452     Adenovirus Not detected        Coronavirus 229E Not detected        Coronavirus HKU1 Not detected        Coronavirus CVNL63 Not detected        Coronavirus OC43 Not detected        SARS-CoV-2, PCR Not detected        Metapneumovirus Not detected        Rhinovirus and Enterovirus Not detected        Influenza A Not detected        Influenza A, subtype H1 Not detected        Influenza A, subtype H3 Not detected        INFLUENZA A H1N1 PCR Not detected        Influenza B Not detected        Parainfluenza 1 Not detected        Parainfluenza 2 Not detected        Parainfluenza 3 Detected        Parainfluenza virus 4 Not detected        RSV by PCR Not detected        B. parapertussis, PCR Not detected        Bordetella pertussis - PCR Not detected        Chlamydophila pneumoniae DNA, QL, PCR Not detected        Mycoplasma pneumoniae DNA, QL, PCR Not detected               Images:    CT (Most Recent). XRAY (Most Recent)      EKG No results found for this or any previous visit.      2D ECHO

## 2022-06-19 NOTE — PROGRESS NOTES
Problem: Risk for Spread of Infection  Goal: Prevent transmission of infectious organism to others  Description: Prevent the transmission of infectious organisms to other patients, staff members, and visitors. Outcome: Progressing Towards Goal     Problem: Patient Education:  Go to Education Activity  Goal: Patient/Family Education  Outcome: Progressing Towards Goal     Problem: Pressure Injury - Risk of  Goal: *Prevention of pressure injury  Description: Document Otoniel Scale and appropriate interventions in the flowsheet. Outcome: Progressing Towards Goal  Note: Pressure Injury Interventions:  Sensory Interventions: Assess changes in LOC    Moisture Interventions: Absorbent underpads,Minimize layers,Limit adult briefs    Activity Interventions: Pressure redistribution bed/mattress(bed type)    Mobility Interventions: Pressure redistribution bed/mattress (bed type)    Nutrition Interventions: Document food/fluid/supplement intake    Friction and Shear Interventions: Minimize layers                Problem: Patient Education: Go to Patient Education Activity  Goal: Patient/Family Education  Outcome: Progressing Towards Goal     Problem: Nutrition Deficit  Goal: *Optimize nutritional status  Outcome: Progressing Towards Goal     Problem: Falls - Risk of  Goal: *Absence of Falls  Description: Document Syl Fall Risk and appropriate interventions in the flowsheet.   Outcome: Progressing Towards Goal  Note: Fall Risk Interventions:  Mobility Interventions: Bed/chair exit alarm,OT consult for ADLs,Patient to call before getting OOB,PT Consult for mobility concerns    Mentation Interventions: Bed/chair exit alarm    Medication Interventions: Bed/chair exit alarm    Elimination Interventions: Call light in reach,Bed/chair exit alarm    History of Falls Interventions: Bed/chair exit alarm

## 2022-06-19 NOTE — PROGRESS NOTES
Problem: Risk for Spread of Infection  Goal: Prevent transmission of infectious organism to others  Description: Prevent the transmission of infectious organisms to other patients, staff members, and visitors. Outcome: Progressing Towards Goal     Problem: Patient Education:  Go to Education Activity  Goal: Patient/Family Education  Outcome: Progressing Towards Goal     Problem: Pressure Injury - Risk of  Goal: *Prevention of pressure injury  Description: Document Otoniel Scale and appropriate interventions in the flowsheet. Outcome: Progressing Towards Goal  Note: Pressure Injury Interventions:  Sensory Interventions: Assess changes in LOC    Moisture Interventions: Absorbent underpads,Minimize layers,Limit adult briefs    Activity Interventions: Pressure redistribution bed/mattress(bed type)    Mobility Interventions: Pressure redistribution bed/mattress (bed type)    Nutrition Interventions: Document food/fluid/supplement intake    Friction and Shear Interventions: Minimize layers                Problem: Patient Education: Go to Patient Education Activity  Goal: Patient/Family Education  Outcome: Progressing Towards Goal     Problem: Patient Education: Go to Patient Education Activity  Goal: Patient/Family Education  Outcome: Progressing Towards Goal     Problem: Nutrition Deficit  Goal: *Optimize nutritional status  Outcome: Progressing Towards Goal     Problem: Falls - Risk of  Goal: *Absence of Falls  Description: Document Syl Fall Risk and appropriate interventions in the flowsheet.   Outcome: Progressing Towards Goal  Note: Fall Risk Interventions:  Mobility Interventions: Bed/chair exit alarm,OT consult for ADLs,Patient to call before getting OOB,PT Consult for mobility concerns    Mentation Interventions: Bed/chair exit alarm    Medication Interventions: Bed/chair exit alarm    Elimination Interventions: Call light in reach,Bed/chair exit alarm    History of Falls Interventions: Bed/chair exit alarm         Problem: Patient Education: Go to Patient Education Activity  Goal: Patient/Family Education  Outcome: Progressing Towards Goal     Problem: Injury - Risk of, Adverse Drug Event  Goal: *Absence of adverse drug events  Outcome: Progressing Towards Goal  Goal: *Absence of medication errors  Outcome: Progressing Towards Goal  Goal: *Knowledge of prescribed medications  Outcome: Progressing Towards Goal     Problem: Patient Education: Go to Patient Education Activity  Goal: Patient/Family Education  Outcome: Progressing Towards Goal     Problem: Pain  Goal: *Control of Pain  Outcome: Progressing Towards Goal  Goal: *PALLIATIVE CARE:  Alleviation of Pain  Outcome: Progressing Towards Goal

## 2022-06-19 NOTE — ROUTINE PROCESS
Bedside and Verbal shift change report given to Camilla RN (oncoming nurse) by Alexa Alanis RN (offgoing nurse). Report given with SBAR, Kardex, Intake/Output, MAR, Accordion and Recent Results.

## 2022-06-20 NOTE — PROGRESS NOTES
Problem: Risk for Spread of Infection  Goal: Prevent transmission of infectious organism to others  Description: Prevent the transmission of infectious organisms to other patients, staff members, and visitors. Outcome: Progressing Towards Goal     Problem: Patient Education:  Go to Education Activity  Goal: Patient/Family Education  Outcome: Progressing Towards Goal     Problem: Pressure Injury - Risk of  Goal: *Prevention of pressure injury  Description: Document Otoniel Scale and appropriate interventions in the flowsheet.   Outcome: Progressing Towards Goal  Note: Pressure Injury Interventions:  Sensory Interventions: Assess need for specialty bed    Moisture Interventions: Absorbent underpads    Activity Interventions: Increase time out of bed,Pressure redistribution bed/mattress(bed type)    Mobility Interventions: HOB 30 degrees or less    Nutrition Interventions: Document food/fluid/supplement intake    Friction and Shear Interventions: HOB 30 degrees or less                Problem: Patient Education: Go to Patient Education Activity  Goal: Patient/Family Education  Outcome: Progressing Towards Goal

## 2022-06-20 NOTE — ROUTINE PROCESS
Bedside and Verbal shift change report given to Federico Brody RN (oncoming nurse) by Gabriela Mccloud RN (offgoing nurse). Report given with SBAR, Kardex, Intake/Output, MAR, Accordion and Recent Results.

## 2022-06-20 NOTE — PROGRESS NOTES
Hospitalist Progress Note    Patient: Nicholas Ponce Age: 58 y.o. : 1960 MR#: [id number] SSN: [id number]  Date/Time: 2022     DOA: 2022  PCP: Fany Carpenter MD    Subjective:     Patient is sitting in bed, awake, confused    Interval Hospital Course:  64 y.o male with intellectual disability with h/o PKU (phenylketonuria), Seizure disorder, anxiety disorder, chronic urinary retention with singleton catheter requirement, presented to the ER 22 due to progressive lethargy and altered in his mentation. His group home staff reported that he was nonverbal for 48hrs, he was previously conversant and ambulatory. In the ER, he was found to be in acute respiratory failure with concern for Parainfluenza 3 infection. ID consulted and started him on zosyn for concern of aspiration. Due to his persistent respiratory failure and AMS, he was transferred to ICU for further monitoring. He was eventually intubated 22 due to worsened encephalopathy and became unresponsive. He was stabilized and was able to extubate on 22. ID continues him on zosyn. He stable to transfer out of ICU on 22. He was on room air at 97% O2 saturation. He underwent MBS with severe pharyngeal dysphagia with all consistencies. He was recommended on alternative feeding method. NG tube was placed but he kept fulling them out. PEG tube was considered but there was clear caution that he might be fulling them out at well. He was made NPO. He continued on slow improvement back to his baseline mentation. He was able to finish out his IV zosyn on 22. Per Dr Savita Davila note 2022:  \"-I discussed with patient's's brother and patient's brother's wife who is the only family members available, patient's father mother  no other siblings.   Patient's brother had some concerns regarding patient's condition, I explained him regarding progressive disease of patient especially in the face of oropharyngeal dysphagia patient may not able to get adequate nutrition attempts were made to put NG tube twice both times patient pulled out vigorously probably will do the same thing to PEG tube will not serve purpose for long-term nutritional support. I also discussed regarding patient's long-term care plan with his brother. After long discussion after answering to the questions and them(patient's brother and patient's brother's wife) understanding the gravity of situation decided they do not want any resuscitation or intubation making patient DNR/DNI. They also decided that at this point no aggressive treatment in form of artificial feeding. Their goal for comfort feeding. \"               Assessment/Plan:     1. Acute respiratory failure with hypoxia,        S/p intubated for airway protection, Extubated 5/29/22. 2.  Atypical pneumonia related to parainfluenza infection, resolved         Aspiration pneumonia concern   3. Sepsis ruled out   4. Acute toxic/metabolic encephalopathy on chronic intellectual disability   5. Intellectual disability with h/o PKU  6.  Seizure disorder, stable   7. Elevated LFT's, improving to baseline   8. Hypercholesteremia   9. Hypokalemia, resolved   10. Urinary retention  11. Severe pharyngeal dysphagia with aspiration risk, unable to maintain NG Tube and family has forgone PEG tube placement. He remains on comfort feed only      No PEG tube per family.   Comfort feeding  On Keppra and Depakene  DNR and DNI  Discussed with  during IDR's      Disposition planning: pending facility placement   Patient is medically stable    Case discussed with:  [x]Patient  []Family  []Nursing  []Case Management  DVT Prophylaxis:  []Lovenox  []Hep SQ  [x]SCDs  []Coumadin   []On Heparin gtt    Signed By: Jose Aguila MD     June 20, 2022               Objective:   VS:   Visit Vitals  /65 (BP 1 Location: Left upper arm, BP Patient Position: At rest)   Pulse 91   Temp 97.4 °F (36.3 °C)   Resp 18   Ht 5' 8\" (1.727 m)   Wt 56.7 kg (125 lb)   SpO2 99%   BMI 19.01 kg/m²      Tmax/24hrs: No data recorded. Intake/Output Summary (Last 24 hours) at 6/20/2022 1933  Last data filed at 6/20/2022 1712  Gross per 24 hour   Intake 480 ml   Output --   Net 480 ml       Tele:   General:  Awake,   Cardiovascular:  S1S2+, RRR  Pulmonary: Coarse breath sounds bilaterally  GI:  Soft, BS+, NT, ND  Extremities:  No edema    Additional:       Current Facility-Administered Medications   Medication Dose Route Frequency    benztropine (COGENTIN) tablet 1 mg  1 mg Oral TID    trospium (SANCTURA) tablet 20 mg  20 mg Oral DAILY    famotidine (PEPCID) 40 mg/5 mL (8 mg/mL) oral suspension 20 mg  20 mg Oral DAILY    levETIRAcetam (KEPPRA) oral solution 500 mg  500 mg Oral BID    valproic acid (as sodium salt) (DEPAKENE) 250 mg/5 mL (5 mL) oral solution 500 mg  500 mg Oral Q12H    albuterol-ipratropium (DUO-NEB) 2.5 MG-0.5 MG/3 ML  3 mL Nebulization Q4H PRN    glucose chewable tablet 16 g  4 Tablet Oral PRN    glucagon (GLUCAGEN) injection 1 mg  1 mg IntraMUSCular PRN    dextrose 10% infusion 0-250 mL  0-250 mL IntraVENous PRN    clotrimazole-betamethasone (LOTRISONE) 1-0.05 % cream   Topical BID    loperamide (IMODIUM) capsule 2 mg  2 mg Oral QID PRN    OLANZapine (ZyPREXA) tablet 10 mg  10 mg Oral TID    tamsulosin (FLOMAX) capsule 0.4 mg  0.4 mg Oral DAILY    polyethylene glycol (MIRALAX) packet 17 g  17 g Oral DAILY PRN    ondansetron (ZOFRAN ODT) tablet 4 mg  4 mg Oral Q8H PRN    Or    ondansetron (ZOFRAN) injection 4 mg  4 mg IntraVENous Q6H PRN    acetaminophen (TYLENOL) suppository 650 mg  650 mg Rectal Q4H PRN            Lab/Data Review:  Labs: Results:       Chemistry No results for input(s): GLU, NA, K, CL, CO2, BUN, CREA, BUCR, AGAP, CA, PHOS in the last 72 hours. No results for input(s): TBIL, ALT, ALKP, TP, ALB, GLOB, AGRAT in the last 72 hours.     No lab exists for component: SGOT   CBC w/Diff No results for input(s): WBC, RBC, HGB, HCT, MCV, MCH, MCHC, RDW, PLT, BANDS, GRANS, LYMPH, EOS, HGBEXT, HCTEXT, PLTEXT, HGBEXT, HCTEXT, PLTEXT in the last 72 hours. Coagulation No results for input(s): PTP, INR, APTT, INREXT, INREXT in the last 72 hours.     Iron/Ferritin No results found for: IRON, FE, TIBC, IBCT, PSAT, FERR    BNP    Cardiac Enzymes No results found for: CPK, RCK1, RCK2, RCK3, RCK4, CKMB, CKNDX, CKND1, TROPT, TROIQ, BNPP, BNP     Lactic Acid    Thyroid Studies          All Micro Results     Procedure Component Value Units Date/Time    CULTURE, BLOOD [[id number]] Collected: 05/26/22 1100    Order Status: Completed Specimen: Blood Updated: 06/01/22 0641     Special Requests: NO SPECIAL REQUESTS        Culture result: NO GROWTH 6 DAYS       CULTURE, BLOOD [[id number]] Collected: 05/26/22 1200    Order Status: Completed Specimen: Blood Updated: 06/01/22 0641     Special Requests: NO SPECIAL REQUESTS        Culture result: NO GROWTH 6 DAYS       CULTURE, RESPIRATORY/SPUTUM/BRONCH Fabiola Plano STAIN [[id number]] Collected: 05/28/22 0250    Order Status: Completed Specimen: Sputum Updated: 05/30/22 0927     Special Requests: NO SPECIAL REQUESTS        GRAM STAIN RARE WBCS SEEN               RARE EPITHELIAL CELLS SEEN            NO ORGANISMS SEEN        Culture result:       RARE NORMAL RESPIRATORY GENARO          CULTURE, URINE [[id number]] Collected: 05/26/22 1234    Order Status: Completed Specimen: Urine from Clean catch Updated: 05/28/22 1226     Special Requests: NO SPECIAL REQUESTS        Culture result: No growth (<1,000 CFU/ML)       RESPIRATORY VIRUS PANEL W/COVID-19, PCR [[id number]]  (Abnormal) Collected: 05/26/22 1309    Order Status: Completed Specimen: Nasopharyngeal Updated: 05/26/22 1452     Adenovirus Not detected        Coronavirus 229E Not detected        Coronavirus HKU1 Not detected        Coronavirus CVNL63 Not detected        Coronavirus OC43 Not detected        SARS-CoV-2, PCR Not detected Metapneumovirus Not detected        Rhinovirus and Enterovirus Not detected        Influenza A Not detected        Influenza A, subtype H1 Not detected        Influenza A, subtype H3 Not detected        INFLUENZA A H1N1 PCR Not detected        Influenza B Not detected        Parainfluenza 1 Not detected        Parainfluenza 2 Not detected        Parainfluenza 3 Detected        Parainfluenza virus 4 Not detected        RSV by PCR Not detected        B. parapertussis, PCR Not detected        Bordetella pertussis - PCR Not detected        Chlamydophila pneumoniae DNA, QL, PCR Not detected        Mycoplasma pneumoniae DNA, QL, PCR Not detected               Images:    CT (Most Recent). XRAY (Most Recent)      EKG No results found for this or any previous visit.      2D ECHO

## 2022-06-20 NOTE — PROGRESS NOTES
was unable to leave voice message for the patient's csb worker, Glen Garcia, at [facility] regarding the status of group home placement.       EARNEST Garcia

## 2022-06-20 NOTE — ROUTINE PROCESS
Bedside and Verbal shift change report given to Lauren Pina (oncoming nurse) by Romina Rodríguez RN (offgoing nurse). Report included the following information SBAR, Kardex and Recent Results.

## 2022-06-20 NOTE — PROGRESS NOTES
Bedside and Verbal shift change report given to Luz Elena Stephens (oncoming nurse) by Gayatri Lei RN   (offgoing nurse). Report given with SBAR, Lavon, MAR and Recent Results.

## 2022-06-20 NOTE — PROGRESS NOTES
[facility]  (A Division of [address] [place] [address])    Follow-up Note      Date of Admission: 5/26/2022       Date of Note:  6/20/2022          Current Antimicrobials:    Prior Antimicrobials:  Zosyn 5/26 to 6/2      Assessment:         Acute respiratory failure: Intubated on 5/28, extubated 5/29  Infection with parainfluenza 3 virus: no pneumonia on CXR upon admission. There are hazy opacities noted on post intubation x-ray on 5/28. With possible congestion noted on 5/27 x-rays.   -Lactic acid 1.4, procalcitonin 0.45  Aspiration pneumonia  Acute metabolic encephalopathy  Transaminitis: Persistent-AST trending down somewhat  Mild hyponatremia: Resolved  PKU  Mild hematuria       Plan:   Completed course of Zosyn 6/2. CBC, BMP every 2-3 days  Aspiration precautions- high risk for recurrent aspiration. Close follow-up . -no plans for NG/PEG as patient has pulled them out. Family prefers to avoid further trauma    Alla Davila, [address] [facility]  [address], [address] [facility] [place] [facility]  Office: [phone]  Mobile/Text: [phone]     Microbiology:  5/26 blood cultures: No growth to date  5/26 urine culture no course to date  5/26 respiratory viral culture:  positive for parainfluenza 3 negative for all other pathogens    Lines / Catheters:  Peripheral  ET 5/28  Right IJ 5/29     Subjective:   Patient seen and examined at bedside. Doing about the same. Coughing when drinking some juice. No new events per nursing. No documented fevers. Pending discharge/placement    Objective:        Visit Vitals  /65 (BP 1 Location: Left upper arm, BP Patient Position: At rest)   Pulse 91   Temp 97.4 °F (36.3 °C)   Resp 18   Ht 5' 8\" (1.727 m)   Wt 56.7 kg (125 lb)   SpO2 99%   BMI 19.01 kg/m²     No data recorded.         General:   Awake and alert looks around   Skin:   no rashes or skin lesions noted on limited exam, dry and warm   HEENT: No scleral icterus or pallor; oral mucosa moist, lips moist   Lymph Nodes:   not assessed today   Lungs:   coarse breath sounds throughout, no wheeze and irregular respiratory drive   Heart:  RRR, s1 and s2; no murmurs rubs or gallops; no edema, + pedal pulses   Abdomen:  soft, non-distended, active bowel sounds, non-tender   Genitourinary:  deferred   Extremities:   average muscle tone; no contractures, no joint effusions,   Neurologic:   Moves extremities independently, no apparent focal weakness, slow to answer questions, intermittent confusion   Psychiatric:  , Calm, follows directions         Lab results:    Chemistry  No results for input(s): GLU, NA, K, CL, CO2, BUN, CREA, CA, AGAP, BUCR, TBIL, AP, TP, ALB, GLOB, AGRAT in the last 72 hours. No lab exists for component: GPT    CBC w/ Diff  No results for input(s): WBC, RBC, HGB, HCT, PLT, GRANS, LYMPH, EOS, HGBEXT, HCTEXT, PLTEXT, HGBEXT, HCTEXT, PLTEXT in the last 72 hours.     Microbiology  All Micro Results     Procedure Component Value Units Date/Time    CULTURE, BLOOD [[id number]] Collected: 05/26/22 1100    Order Status: Completed Specimen: Blood Updated: 06/01/22 0641     Special Requests: NO SPECIAL REQUESTS        Culture result: NO GROWTH 6 DAYS       CULTURE, BLOOD [[id number]] Collected: 05/26/22 1200    Order Status: Completed Specimen: Blood Updated: 06/01/22 0641     Special Requests: NO SPECIAL REQUESTS        Culture result: NO GROWTH 6 DAYS       CULTURE, RESPIRATORY/SPUTUM/BRONCH Debora Bull STAIN [[id number]] Collected: 05/28/22 0250    Order Status: Completed Specimen: Sputum Updated: 05/30/22 0927     Special Requests: NO SPECIAL REQUESTS        GRAM STAIN RARE WBCS SEEN               RARE EPITHELIAL CELLS SEEN            NO ORGANISMS SEEN        Culture result:       RARE NORMAL RESPIRATORY GENARO          CULTURE, URINE [[id number]] Collected: 05/26/22 1234    Order Status: Completed Specimen: Urine from Clean catch Updated: 05/28/22 1226 Special Requests: NO SPECIAL REQUESTS        Culture result: No growth (<1,000 CFU/ML)       RESPIRATORY VIRUS PANEL W/COVID-19, PCR [[id number]]  (Abnormal) Collected: 05/26/22 1309    Order Status: Completed Specimen: Nasopharyngeal Updated: 05/26/22 1450     Adenovirus Not detected        Coronavirus 229E Not detected        Coronavirus HKU1 Not detected        Coronavirus CVNL63 Not detected        Coronavirus OC43 Not detected        SARS-CoV-2, PCR Not detected        Metapneumovirus Not detected        Rhinovirus and Enterovirus Not detected        Influenza A Not detected        Influenza A, subtype H1 Not detected        Influenza A, subtype H3 Not detected        INFLUENZA A H1N1 PCR Not detected        Influenza B Not detected        Parainfluenza 1 Not detected        Parainfluenza 2 Not detected        Parainfluenza 3 Detected        Parainfluenza virus 4 Not detected        RSV by PCR Not detected        B. parapertussis, PCR Not detected        Bordetella pertussis - PCR Not detected        Chlamydophila pneumoniae DNA, QL, PCR Not detected        Mycoplasma pneumoniae DNA, QL, PCR Not detected              Leona Harman DO   6/20/2022

## 2022-06-21 NOTE — PROGRESS NOTES
Problem: Risk for Spread of Infection  Goal: Prevent transmission of infectious organism to others  Description: Prevent the transmission of infectious organisms to other patients, staff members, and visitors. Outcome: Progressing Towards Goal     Problem: Patient Education:  Go to Education Activity  Goal: Patient/Family Education  Outcome: Progressing Towards Goal     Problem: Pressure Injury - Risk of  Goal: *Prevention of pressure injury  Description: Document Otoniel Scale and appropriate interventions in the flowsheet.   Outcome: Progressing Towards Goal  Note: Pressure Injury Interventions:  Sensory Interventions: Assess changes in LOC    Moisture Interventions: Absorbent underpads    Activity Interventions: Increase time out of bed    Mobility Interventions: HOB 30 degrees or less    Nutrition Interventions: Document food/fluid/supplement intake    Friction and Shear Interventions: HOB 30 degrees or less

## 2022-06-21 NOTE — PROGRESS NOTES
Hospitalist Progress Note    Patient: Kennedy Avendano Age: 58 y.o. : 1960 MR#: [id number] SSN: [id number]  Date/Time: 2022     DOA: 2022  PCP: Mehnaz Cisneros MD    Subjective:     Patient is sitting in bed in no apparent distress, confused    Interval Hospital Course:  64 y.o male with intellectual disability with h/o PKU (phenylketonuria), Seizure disorder, anxiety disorder, chronic urinary retention with singleton catheter requirement, presented to the ER 22 due to progressive lethargy and altered in his mentation. His group home staff reported that he was nonverbal for 48hrs, he was previously conversant and ambulatory. In the ER, he was found to be in acute respiratory failure with concern for Parainfluenza 3 infection. ID consulted and started him on zosyn for concern of aspiration. Due to his persistent respiratory failure and AMS, he was transferred to ICU for further monitoring. He was eventually intubated 22 due to worsened encephalopathy and became unresponsive. He was stabilized and was able to extubate on 22. ID continues him on zosyn. He stable to transfer out of ICU on 22. He was on room air at 97% O2 saturation. He underwent MBS with severe pharyngeal dysphagia with all consistencies. He was recommended on alternative feeding method. NG tube was placed but he kept fulling them out. PEG tube was considered but there was clear caution that he might be fulling them out at well. He was made NPO. He continued on slow improvement back to his baseline mentation. He was able to finish out his IV zosyn on 22. Per Dr Heaven Jimenez note 2022:  \"-I discussed with patient's's brother and patient's brother's wife who is the only family members available, patient's father mother  no other siblings.   Patient's brother had some concerns regarding patient's condition, I explained him regarding progressive disease of patient especially in the face of oropharyngeal dysphagia patient may not able to get adequate nutrition attempts were made to put NG tube twice both times patient pulled out vigorously probably will do the same thing to PEG tube will not serve purpose for long-term nutritional support. I also discussed regarding patient's long-term care plan with his brother. After long discussion after answering to the questions and them(patient's brother and patient's brother's wife) understanding the gravity of situation decided they do not want any resuscitation or intubation making patient DNR/DNI. They also decided that at this point no aggressive treatment in form of artificial feeding. Their goal for comfort feeding. \"               Assessment/Plan:     1. Acute respiratory failure with hypoxia,        S/p intubated for airway protection, Extubated 5/29/22. 2.  Atypical pneumonia related to parainfluenza infection, resolved         Aspiration pneumonia concern   3. Sepsis ruled out   4. Acute toxic/metabolic encephalopathy on chronic intellectual disability   5. Intellectual disability with h/o PKU  6.  Seizure disorder, stable   7. Elevated LFT's, improving to baseline   8. Hypercholesteremia   9. Hypokalemia, resolved   10. Urinary retention  11. Severe pharyngeal dysphagia with aspiration risk, unable to maintain NG Tube and family has forgone PEG tube placement. He remains on comfort feed only      Continue comfort feeding per family.   No PEG tube per family  Continue Keppra and Depakene  DNR and DNI      Disposition-pending  Patient is medically stable  Discussed with case management during IDR's    Case discussed with:  [x]Patient  []Family  []Nursing  []Case Management  DVT Prophylaxis:  []Lovenox  []Hep SQ  [x]SCDs  []Coumadin   []On Heparin gtt    Signed By: Skip Sena MD     June 21, 2022               Objective:   VS:   Visit Vitals  /72   Pulse 65   Temp 97.3 °F (36.3 °C)   Resp 19   Ht 5' 8\" (1.727 m)   Wt 56.7 kg (125 lb)   SpO2 94% BMI 19.01 kg/m²      Tmax/24hrs: Temp (24hrs), Av.4 °F (36.3 °C), Min:97.3 °F (36.3 °C), Max:97.4 °F (36.3 °C)      Intake/Output Summary (Last 24 hours) at 2022 1161  Last data filed at 2022 1318  Gross per 24 hour   Intake 380 ml   Output --   Net 380 ml       Tele:   General:  Awake,   Cardiovascular:  S1S2+, RRR  Pulmonary: Coarse breath sounds bilaterally  GI:  Soft, BS+, NT, ND  Extremities:  No edema      Additional:       Current Facility-Administered Medications   Medication Dose Route Frequency    benztropine (COGENTIN) tablet 1 mg  1 mg Oral TID    trospium (SANCTURA) tablet 20 mg  20 mg Oral DAILY    famotidine (PEPCID) 40 mg/5 mL (8 mg/mL) oral suspension 20 mg  20 mg Oral DAILY    levETIRAcetam (KEPPRA) oral solution 500 mg  500 mg Oral BID    valproic acid (as sodium salt) (DEPAKENE) 250 mg/5 mL (5 mL) oral solution 500 mg  500 mg Oral Q12H    albuterol-ipratropium (DUO-NEB) 2.5 MG-0.5 MG/3 ML  3 mL Nebulization Q4H PRN    glucose chewable tablet 16 g  4 Tablet Oral PRN    glucagon (GLUCAGEN) injection 1 mg  1 mg IntraMUSCular PRN    dextrose 10% infusion 0-250 mL  0-250 mL IntraVENous PRN    clotrimazole-betamethasone (LOTRISONE) 1-0.05 % cream   Topical BID    loperamide (IMODIUM) capsule 2 mg  2 mg Oral QID PRN    OLANZapine (ZyPREXA) tablet 10 mg  10 mg Oral TID    tamsulosin (FLOMAX) capsule 0.4 mg  0.4 mg Oral DAILY    polyethylene glycol (MIRALAX) packet 17 g  17 g Oral DAILY PRN    ondansetron (ZOFRAN ODT) tablet 4 mg  4 mg Oral Q8H PRN    Or    ondansetron (ZOFRAN) injection 4 mg  4 mg IntraVENous Q6H PRN    acetaminophen (TYLENOL) suppository 650 mg  650 mg Rectal Q4H PRN            Lab/Data Review:  Labs: Results:       Chemistry No results for input(s): GLU, NA, K, CL, CO2, BUN, CREA, BUCR, AGAP, CA, PHOS in the last 72 hours. No results for input(s): TBIL, ALT, ALKP, TP, ALB, GLOB, AGRAT in the last 72 hours.     No lab exists for component: SGOT   CBC w/Diff No results for input(s): WBC, RBC, HGB, HCT, MCV, MCH, MCHC, RDW, PLT, BANDS, GRANS, LYMPH, EOS, HGBEXT, HCTEXT, PLTEXT, HGBEXT, HCTEXT, PLTEXT in the last 72 hours. Coagulation No results for input(s): PTP, INR, APTT, INREXT, INREXT in the last 72 hours.     Iron/Ferritin No results found for: IRON, FE, TIBC, IBCT, PSAT, FERR    BNP    Cardiac Enzymes No results found for: CPK, RCK1, RCK2, RCK3, RCK4, CKMB, CKNDX, CKND1, TROPT, TROIQ, BNPP, BNP     Lactic Acid    Thyroid Studies          All Micro Results     Procedure Component Value Units Date/Time    CULTURE, BLOOD [[id number]] Collected: 05/26/22 1100    Order Status: Completed Specimen: Blood Updated: 06/01/22 0641     Special Requests: NO SPECIAL REQUESTS        Culture result: NO GROWTH 6 DAYS       CULTURE, BLOOD [[id number]] Collected: 05/26/22 1200    Order Status: Completed Specimen: Blood Updated: 06/01/22 0641     Special Requests: NO SPECIAL REQUESTS        Culture result: NO GROWTH 6 DAYS       CULTURE, RESPIRATORY/SPUTUM/BRONCH Dedra Restorationist STAIN [[id number]] Collected: 05/28/22 0250    Order Status: Completed Specimen: Sputum Updated: 05/30/22 0927     Special Requests: NO SPECIAL REQUESTS        GRAM STAIN RARE WBCS SEEN               RARE EPITHELIAL CELLS SEEN            NO ORGANISMS SEEN        Culture result:       RARE NORMAL RESPIRATORY GENARO          CULTURE, URINE [[id number]] Collected: 05/26/22 1234    Order Status: Completed Specimen: Urine from Clean catch Updated: 05/28/22 1226     Special Requests: NO SPECIAL REQUESTS        Culture result: No growth (<1,000 CFU/ML)       RESPIRATORY VIRUS PANEL W/COVID-19, PCR [[id number]]  (Abnormal) Collected: 05/26/22 1309    Order Status: Completed Specimen: Nasopharyngeal Updated: 05/26/22 1452     Adenovirus Not detected        Coronavirus 229E Not detected        Coronavirus HKU1 Not detected        Coronavirus CVNL63 Not detected        Coronavirus OC43 Not detected        SARS-CoV-2, PCR Not detected        Metapneumovirus Not detected        Rhinovirus and Enterovirus Not detected        Influenza A Not detected        Influenza A, subtype H1 Not detected        Influenza A, subtype H3 Not detected        INFLUENZA A H1N1 PCR Not detected        Influenza B Not detected        Parainfluenza 1 Not detected        Parainfluenza 2 Not detected        Parainfluenza 3 Detected        Parainfluenza virus 4 Not detected        RSV by PCR Not detected        B. parapertussis, PCR Not detected        Bordetella pertussis - PCR Not detected        Chlamydophila pneumoniae DNA, QL, PCR Not detected        Mycoplasma pneumoniae DNA, QL, PCR Not detected               Images:    CT (Most Recent). XRAY (Most Recent)      EKG No results found for this or any previous visit.      2D ECHO

## 2022-06-21 NOTE — PROGRESS NOTES
called patient's csb worker, Glade Apgar, at [facility], and unable to leave voicemail because voicemail is full.       EARNEST Reagan

## 2022-06-21 NOTE — ROUTINE PROCESS
Bedside and Verbal shift change report given to Christiana Rausch RN (oncoming nurse) by Bonifacio Robin (offgoing nurse). Report included the following information SBAR, Kardex and Recent Results.

## 2022-06-21 NOTE — ROUTINE PROCESS
Bedside and Verbal shift change report given to Federico Brody RN (oncoming nurse) by Minal Wen RN (offgoing nurse). Report given with SBAR, Kardex, Intake/Output, MAR, Accordion and Recent Results.

## 2022-06-22 NOTE — PROGRESS NOTES
[facility]  (A Division of [address] [place] [address])    Follow-up Note      Date of Admission: 2022       Date of Note:  2022          Current Antimicrobials:    Prior Antimicrobials:  Zosyn  to       Assessment:         Acute respiratory failure: Intubated on , extubated   Infection with parainfluenza 3 virus: no pneumonia on CXR upon admission. There are hazy opacities noted on post intubation x-ray on . With possible congestion noted on  x-rays.   -Lactic acid 1.4, procalcitonin 0.45  Aspiration pneumonia  Acute metabolic encephalopathy  Transaminitis: Persistent-AST trending down somewhat  Mild hyponatremia: Resolved  PKU  Mild hematuria       Plan:   Completed course of Zosyn . CBC, BMP every 2-3 days  Aspiration precautions- high risk for recurrent aspiration. Close follow-up . -no plans for NG/PEG as patient has pulled them out. Family prefers to avoid further trauma    Cheri Glover, [address] [facility]  [address], [address] [facility] [address]  Office: [phone]  Mobile/Text: [phone]     Microbiology:   blood cultures: No growth to date   urine culture no course to date   respiratory viral culture:  positive for parainfluenza 3 negative for all other pathogens    Lines / Catheters:  Peripheral  ET   Right IJ      Subjective:   Patient seen and examined at bedside. Doing about the same. Calmer. No new changes. No new events per nursing. No documented fevers.      Pending discharge/placement    Objective:        Visit Vitals  /70   Pulse 71   Temp 97.3 °F (36.3 °C)   Resp 16   Ht 5' 8\" (1.727 m)   Wt 56.7 kg (125 lb)   SpO2 99%   BMI 19.01 kg/m²     Temp (24hrs), Av.4 °F (36.3 °C), Min:97.3 °F (36.3 °C), Max:97.5 °F (36.4 °C)        General:   Awake and alert looks around   Skin:   no rashes or skin lesions noted on limited exam, dry and warm   HEENT:  No scleral icterus or pallor; oral mucosa moist, lips moist   Lymph Nodes:   not assessed today   Lungs:   coarse breath sounds throughout, no wheeze and irregular respiratory drive   Heart:  RRR, s1 and s2; no murmurs rubs or gallops; no edema, + pedal pulses   Abdomen:  soft, non-distended, active bowel sounds, non-tender   Genitourinary:  deferred   Extremities:   average muscle tone; no contractures, no joint effusions,   Neurologic:   Moves extremities independently, no apparent focal weakness, slow to answer questions, intermittent confusion   Psychiatric:  , Calm, follows directions         Lab results:    Chemistry  No results for input(s): GLU, NA, K, CL, CO2, BUN, CREA, CA, AGAP, BUCR, TBIL, AP, TP, ALB, GLOB, AGRAT in the last 72 hours. No lab exists for component: GPT    CBC w/ Diff  No results for input(s): WBC, RBC, HGB, HCT, PLT, GRANS, LYMPH, EOS, HGBEXT, HCTEXT, PLTEXT, HGBEXT, HCTEXT, PLTEXT in the last 72 hours.     Microbiology  All Micro Results     Procedure Component Value Units Date/Time    CULTURE, BLOOD [[id number]] Collected: 05/26/22 1100    Order Status: Completed Specimen: Blood Updated: 06/01/22 0641     Special Requests: NO SPECIAL REQUESTS        Culture result: NO GROWTH 6 DAYS       CULTURE, BLOOD [[id number]] Collected: 05/26/22 1200    Order Status: Completed Specimen: Blood Updated: 06/01/22 0641     Special Requests: NO SPECIAL REQUESTS        Culture result: NO GROWTH 6 DAYS       CULTURE, RESPIRATORY/SPUTUM/BRONCH Micaela Aj STAIN [[id number]] Collected: 05/28/22 0250    Order Status: Completed Specimen: Sputum Updated: 05/30/22 0927     Special Requests: NO SPECIAL REQUESTS        GRAM STAIN RARE WBCS SEEN               RARE EPITHELIAL CELLS SEEN            NO ORGANISMS SEEN        Culture result:       RARE NORMAL RESPIRATORY GENARO          CULTURE, URINE [[id number]] Collected: 05/26/22 1234    Order Status: Completed Specimen: Urine from Clean catch Updated: 05/28/22 1226 Special Requests: NO SPECIAL REQUESTS        Culture result: No growth (<1,000 CFU/ML)       RESPIRATORY VIRUS PANEL W/COVID-19, PCR [[id number]]  (Abnormal) Collected: 05/26/22 1309    Order Status: Completed Specimen: Nasopharyngeal Updated: 05/26/22 1451     Adenovirus Not detected        Coronavirus 229E Not detected        Coronavirus HKU1 Not detected        Coronavirus CVNL63 Not detected        Coronavirus OC43 Not detected        SARS-CoV-2, PCR Not detected        Metapneumovirus Not detected        Rhinovirus and Enterovirus Not detected        Influenza A Not detected        Influenza A, subtype H1 Not detected        Influenza A, subtype H3 Not detected        INFLUENZA A H1N1 PCR Not detected        Influenza B Not detected        Parainfluenza 1 Not detected        Parainfluenza 2 Not detected        Parainfluenza 3 Detected        Parainfluenza virus 4 Not detected        RSV by PCR Not detected        B. parapertussis, PCR Not detected        Bordetella pertussis - PCR Not detected        Chlamydophila pneumoniae DNA, QL, PCR Not detected        Mycoplasma pneumoniae DNA, QL, PCR Not detected              Alla Davila, DO   6/22/2022

## 2022-06-22 NOTE — PROGRESS NOTES
Bedside and Verbal shift change report given to [address] (oncoming nurse) by Matt Chapa RN   (offgoing nurse). Report given with SBAR, Kardex, MAR and Recent Results.

## 2022-06-22 NOTE — PROGRESS NOTES
called patient's csb worker, Monalisa Martinez, at [facility], and was unable to leave voicemail because voicemail is full.        EARNEST Payne

## 2022-06-22 NOTE — PROGRESS NOTES
Hospitalist Progress Note    Patient: Cathy Garcia Age: 58 y.o. : 1960 MR#: [id number] SSN: [id number]  Date/Time: 2022     DOA: 2022  PCP: Meet Bryant MD    Subjective:     Patient is sitting in bed in no apparent distress    Interval Hospital Course:  64 y.o male with intellectual disability with h/o PKU (phenylketonuria), Seizure disorder, anxiety disorder, chronic urinary retention with singleton catheter requirement, presented to the ER 22 due to progressive lethargy and altered in his mentation. His group home staff reported that he was nonverbal for 48hrs, he was previously conversant and ambulatory. In the ER, he was found to be in acute respiratory failure with concern for Parainfluenza 3 infection. ID consulted and started him on zosyn for concern of aspiration. Due to his persistent respiratory failure and AMS, he was transferred to ICU for further monitoring. He was eventually intubated 22 due to worsened encephalopathy and became unresponsive. He was stabilized and was able to extubate on 22. ID continues him on zosyn. He stable to transfer out of ICU on 22. He was on room air at 97% O2 saturation. He underwent MBS with severe pharyngeal dysphagia with all consistencies. He was recommended on alternative feeding method. NG tube was placed but he kept fulling them out. PEG tube was considered but there was clear caution that he might be fulling them out at well. He was made NPO. He continued on slow improvement back to his baseline mentation. He was able to finish out his IV zosyn on 22. Per Dr Gertrude [place] note 2022:  \"-I discussed with patient's's brother and patient's brother's wife who is the only family members available, patient's father mother  no other siblings.   Patient's brother had some concerns regarding patient's condition, I explained him regarding progressive disease of patient especially in the face of oropharyngeal dysphagia patient may not able to get adequate nutrition attempts were made to put NG tube twice both times patient pulled out vigorously probably will do the same thing to PEG tube will not serve purpose for long-term nutritional support. I also discussed regarding patient's long-term care plan with his brother. After long discussion after answering to the questions and them(patient's brother and patient's brother's wife) understanding the gravity of situation decided they do not want any resuscitation or intubation making patient DNR/DNI. They also decided that at this point no aggressive treatment in form of artificial feeding. Their goal for comfort feeding. \"               Assessment/Plan:     1. Acute respiratory failure with hypoxia,        S/p intubated for airway protection, Extubated 5/29/22. 2.  Atypical pneumonia related to parainfluenza infection, resolved         Aspiration pneumonia concern   3. Sepsis ruled out   4. Acute toxic/metabolic encephalopathy on chronic intellectual disability   5. Intellectual disability with h/o PKU  6.  Seizure disorder, stable   7. Elevated LFT's, improving to baseline   8. Hypercholesteremia   9. Hypokalemia, resolved   10. Urinary retention  11. Severe pharyngeal dysphagia with aspiration risk, unable to maintain NG Tube and family has forgone PEG tube placement. He remains on comfort feed only      Comfort feeding per family.   No PEG tube per family  On Depakene and Keppra  DNR and DNI      Disposition-pending  Patient is medically stable  Discussed with case management during IDR    Case discussed with:  [x]Patient  []Family  []Nursing  []Case Management  DVT Prophylaxis:  []Lovenox  []Hep SQ  [x]SCDs  []Coumadin   []On Heparin gtt    Signed By: Thompson Hurley MD     June 22, 2022               Objective:   VS:   Visit Vitals  /70   Pulse 71   Temp 97.3 °F (36.3 °C)   Resp 16   Ht 5' 8\" (1.727 m)   Wt 56.7 kg (125 lb)   SpO2 99%   BMI 19.01 kg/m² Tmax/24hrs: Temp (24hrs), Av.4 °F (36.3 °C), Min:97.3 °F (36.3 °C), Max:97.5 °F (36.4 °C)      Intake/Output Summary (Last 24 hours) at 2022  Last data filed at 2022 1735  Gross per 24 hour   Intake 480 ml   Output --   Net 480 ml       General:  Awake, confused  Cardiovascular:  S1S2+, RRR  Pulmonary:  CTA b/l  GI:  Soft, BS+, NT, ND  Extremities:  No edema      Additional:       Current Facility-Administered Medications   Medication Dose Route Frequency    benztropine (COGENTIN) tablet 1 mg  1 mg Oral TID    trospium (SANCTURA) tablet 20 mg  20 mg Oral DAILY    famotidine (PEPCID) 40 mg/5 mL (8 mg/mL) oral suspension 20 mg  20 mg Oral DAILY    levETIRAcetam (KEPPRA) oral solution 500 mg  500 mg Oral BID    valproic acid (as sodium salt) (DEPAKENE) 250 mg/5 mL (5 mL) oral solution 500 mg  500 mg Oral Q12H    albuterol-ipratropium (DUO-NEB) 2.5 MG-0.5 MG/3 ML  3 mL Nebulization Q4H PRN    glucose chewable tablet 16 g  4 Tablet Oral PRN    glucagon (GLUCAGEN) injection 1 mg  1 mg IntraMUSCular PRN    dextrose 10% infusion 0-250 mL  0-250 mL IntraVENous PRN    clotrimazole-betamethasone (LOTRISONE) 1-0.05 % cream   Topical BID    loperamide (IMODIUM) capsule 2 mg  2 mg Oral QID PRN    OLANZapine (ZyPREXA) tablet 10 mg  10 mg Oral TID    tamsulosin (FLOMAX) capsule 0.4 mg  0.4 mg Oral DAILY    polyethylene glycol (MIRALAX) packet 17 g  17 g Oral DAILY PRN    ondansetron (ZOFRAN ODT) tablet 4 mg  4 mg Oral Q8H PRN    Or    ondansetron (ZOFRAN) injection 4 mg  4 mg IntraVENous Q6H PRN    acetaminophen (TYLENOL) suppository 650 mg  650 mg Rectal Q4H PRN            Lab/Data Review:  Labs: Results:       Chemistry No results for input(s): GLU, NA, K, CL, CO2, BUN, CREA, BUCR, AGAP, CA, PHOS in the last 72 hours. No results for input(s): TBIL, ALT, ALKP, TP, ALB, GLOB, AGRAT in the last 72 hours.     No lab exists for component: SGOT   CBC w/Diff No results for input(s): WBC, RBC, HGB, HCT, MCV, MCH, MCHC, RDW, PLT, BANDS, GRANS, LYMPH, EOS, HGBEXT, HCTEXT, PLTEXT, HGBEXT, HCTEXT, PLTEXT in the last 72 hours. Coagulation No results for input(s): PTP, INR, APTT, INREXT, INREXT in the last 72 hours.     Iron/Ferritin No results found for: IRON, FE, TIBC, IBCT, PSAT, FERR    BNP    Cardiac Enzymes No results found for: CPK, RCK1, RCK2, RCK3, RCK4, CKMB, CKNDX, CKND1, TROPT, TROIQ, BNPP, BNP     Lactic Acid    Thyroid Studies          All Micro Results     Procedure Component Value Units Date/Time    CULTURE, BLOOD [[id number]] Collected: 05/26/22 1100    Order Status: Completed Specimen: Blood Updated: 06/01/22 0641     Special Requests: NO SPECIAL REQUESTS        Culture result: NO GROWTH 6 DAYS       CULTURE, BLOOD [[id number]] Collected: 05/26/22 1200    Order Status: Completed Specimen: Blood Updated: 06/01/22 0641     Special Requests: NO SPECIAL REQUESTS        Culture result: NO GROWTH 6 DAYS       CULTURE, RESPIRATORY/SPUTUM/BRONCH Edwyna Júnior STAIN [[id number]] Collected: 05/28/22 0250    Order Status: Completed Specimen: Sputum Updated: 05/30/22 0927     Special Requests: NO SPECIAL REQUESTS        GRAM STAIN RARE WBCS SEEN               RARE EPITHELIAL CELLS SEEN            NO ORGANISMS SEEN        Culture result:       RARE NORMAL RESPIRATORY GENARO          CULTURE, URINE [[id number]] Collected: 05/26/22 1234    Order Status: Completed Specimen: Urine from Clean catch Updated: 05/28/22 1226     Special Requests: NO SPECIAL REQUESTS        Culture result: No growth (<1,000 CFU/ML)       RESPIRATORY VIRUS PANEL W/COVID-19, PCR [[id number]]  (Abnormal) Collected: 05/26/22 1309    Order Status: Completed Specimen: Nasopharyngeal Updated: 05/26/22 1452     Adenovirus Not detected        Coronavirus 229E Not detected        Coronavirus HKU1 Not detected        Coronavirus CVNL63 Not detected        Coronavirus OC43 Not detected        SARS-CoV-2, PCR Not detected        Metapneumovirus Not detected Rhinovirus and Enterovirus Not detected        Influenza A Not detected        Influenza A, subtype H1 Not detected        Influenza A, subtype H3 Not detected        INFLUENZA A H1N1 PCR Not detected        Influenza B Not detected        Parainfluenza 1 Not detected        Parainfluenza 2 Not detected        Parainfluenza 3 Detected        Parainfluenza virus 4 Not detected        RSV by PCR Not detected        B. parapertussis, PCR Not detected        Bordetella pertussis - PCR Not detected        Chlamydophila pneumoniae DNA, QL, PCR Not detected        Mycoplasma pneumoniae DNA, QL, PCR Not detected               Images:    CT (Most Recent). XRAY (Most Recent)      EKG No results found for this or any previous visit.      2D ECHO

## 2022-06-22 NOTE — ROUTINE PROCESS
Bedside and Verbal shift change report given to ADIN Sampson (oncoming nurse) by Sussy Moise RN (offgoing nurse). Report included the following information SBAR.

## 2022-06-22 NOTE — PROGRESS NOTES
left a message for the patient's brother, Michelle Pino, to call back to provided an discharge status update.       EARNEST Colin

## 2022-06-22 NOTE — PROGRESS NOTES
received an email back from the back csb worker, Antwan Potter 18 indicated that the patient's brother is in agreeance to one facility and is discussing medical concerns with the facility.       EARNEST Wilkins

## 2022-06-22 NOTE — PROGRESS NOTES
emailed the patient's csb worker, Hakeem Myers, at [facility] regarding an update on the patient's status for group home placement.       EARNEST Patterson

## 2022-06-23 NOTE — ROUTINE PROCESS
Bedside and Verbal shift change report given to ADIN Sampson (oncoming nurse) by Fili Singh RN (offgoing nurse). Report included the following information SBAR.

## 2022-06-23 NOTE — PROGRESS NOTES
spoke with csb worker, Antwan Potter regarding status of group home placement. Ye Busby indicated that the patient's brother is going to review the place on tomorrow and will make a decision.  inquired if the facility will be able to accept the patient tomorrow. Ye Busby indicated that they will not be able to accept him the same day. Ye Busby inquired about the patient's medical status.  informed Ye Busby that he has been medical stable for discharge for 2 weeks. Ye Busby voiced an understanding.       EARNEST Wilkins

## 2022-06-23 NOTE — PROGRESS NOTES
Patient continues to hit his head up against the siderails on the bed padded side rails will continue to monitor

## 2022-06-24 NOTE — PROGRESS NOTES
Hospitalist Progress Note    Patient: Cathy Garcia Age: 58 y.o. : 1960 MR#: [id number] SSN: [id number]  Date/Time: 2022     DOA: 2022  PCP: Meet Bryant MD    Subjective:     I personally saw and evaluated this patient on 2022. Patient is sitting in bed in no apparent distress    Interval Hospital Course:  64 y.o male with intellectual disability with h/o PKU (phenylketonuria), Seizure disorder, anxiety disorder, chronic urinary retention with singleton catheter requirement, presented to the ER 22 due to progressive lethargy and altered in his mentation. His group home staff reported that he was nonverbal for 48hrs, he was previously conversant and ambulatory. In the ER, he was found to be in acute respiratory failure with concern for Parainfluenza 3 infection. ID consulted and started him on zosyn for concern of aspiration. Due to his persistent respiratory failure and AMS, he was transferred to ICU for further monitoring. He was eventually intubated 22 due to worsened encephalopathy and became unresponsive. He was stabilized and was able to extubate on 22. ID continues him on zosyn. He stable to transfer out of ICU on 22. He was on room air at 97% O2 saturation. He underwent MBS with severe pharyngeal dysphagia with all consistencies. He was recommended on alternative feeding method. NG tube was placed but he kept fulling them out. PEG tube was considered but there was clear caution that he might be fulling them out at well. He was made NPO. He continued on slow improvement back to his baseline mentation. He was able to finish out his IV zosyn on 22. Per Dr Gertrude Weldon note 2022:  \"-I discussed with patient's's brother and patient's brother's wife who is the only family members available, patient's father mother  no other siblings.   Patient's brother had some concerns regarding patient's condition, I explained him regarding progressive disease of patient especially in the face of oropharyngeal dysphagia patient may not able to get adequate nutrition attempts were made to put NG tube twice both times patient pulled out vigorously probably will do the same thing to PEG tube will not serve purpose for long-term nutritional support. I also discussed regarding patient's long-term care plan with his brother. After long discussion after answering to the questions and them(patient's brother and patient's brother's wife) understanding the gravity of situation decided they do not want any resuscitation or intubation making patient DNR/DNI. They also decided that at this point no aggressive treatment in form of artificial feeding. Their goal for comfort feeding. \"               Assessment/Plan:     1. Acute respiratory failure with hypoxia,        S/p intubated for airway protection, Extubated 5/29/22. 2.  Atypical pneumonia related to parainfluenza infection, resolved         Aspiration pneumonia concern   3. Sepsis ruled out   4. Acute toxic/metabolic encephalopathy on chronic intellectual disability   5. Intellectual disability with h/o PKU  6.  Seizure disorder, stable   7. Elevated LFT's, improving to baseline   8. Hypercholesteremia   9. Hypokalemia, resolved   10. Urinary retention  11. Severe pharyngeal dysphagia with aspiration risk, unable to maintain NG Tube and family has forgone PEG tube placement.  He remains on comfort feed only      Continue comfort meeting per family  Family declines PEG tube  Continue Keppra and Depakene  DNR and DNI      Disposition-pending  Patient is medically stable  Discussed with case management    Case discussed with:  [x]Patient  []Family  []Nursing  []Case Management  DVT Prophylaxis:  []Lovenox  []Hep SQ  [x]SCDs  []Coumadin   []On Heparin gtt    Signed By: Nadege Cade MD     June 23, 2022               Objective:   VS:   Visit Vitals  /60   Pulse 70   Temp 97.3 °F (36.3 °C)   Resp 16   Ht 5' 8\" (1.727 m)   Wt 56.7 kg (125 lb)   SpO2 98%   BMI 19.01 kg/m²      Tmax/24hrs: Temp (24hrs), Av.4 °F (36.3 °C), Min:97.3 °F (36.3 °C), Max:97.4 °F (36.3 °C)      Intake/Output Summary (Last 24 hours) at 2022  Last data filed at 2022 9494  Gross per 24 hour   Intake 240 ml   Output --   Net 240 ml       General:  Awake, has intellectual disability  Cardiovascular:  S1S2+, RRR  Pulmonary:  CTA b/l  GI:  Soft, BS+, NT, ND  Extremities:  No edema        Additional:       Current Facility-Administered Medications   Medication Dose Route Frequency    benztropine (COGENTIN) tablet 1 mg  1 mg Oral TID    trospium (SANCTURA) tablet 20 mg  20 mg Oral DAILY    famotidine (PEPCID) 40 mg/5 mL (8 mg/mL) oral suspension 20 mg  20 mg Oral DAILY    levETIRAcetam (KEPPRA) oral solution 500 mg  500 mg Oral BID    valproic acid (as sodium salt) (DEPAKENE) 250 mg/5 mL (5 mL) oral solution 500 mg  500 mg Oral Q12H    albuterol-ipratropium (DUO-NEB) 2.5 MG-0.5 MG/3 ML  3 mL Nebulization Q4H PRN    glucose chewable tablet 16 g  4 Tablet Oral PRN    glucagon (GLUCAGEN) injection 1 mg  1 mg IntraMUSCular PRN    dextrose 10% infusion 0-250 mL  0-250 mL IntraVENous PRN    clotrimazole-betamethasone (LOTRISONE) 1-0.05 % cream   Topical BID    loperamide (IMODIUM) capsule 2 mg  2 mg Oral QID PRN    OLANZapine (ZyPREXA) tablet 10 mg  10 mg Oral TID    tamsulosin (FLOMAX) capsule 0.4 mg  0.4 mg Oral DAILY    polyethylene glycol (MIRALAX) packet 17 g  17 g Oral DAILY PRN    ondansetron (ZOFRAN ODT) tablet 4 mg  4 mg Oral Q8H PRN    Or    ondansetron (ZOFRAN) injection 4 mg  4 mg IntraVENous Q6H PRN    acetaminophen (TYLENOL) suppository 650 mg  650 mg Rectal Q4H PRN            Lab/Data Review:  Labs: Results:       Chemistry No results for input(s): GLU, NA, K, CL, CO2, BUN, CREA, BUCR, AGAP, CA, PHOS in the last 72 hours. No results for input(s): TBIL, ALT, ALKP, TP, ALB, GLOB, AGRAT in the last 72 hours.     No lab exists for component: SGOT   CBC w/Diff No results for input(s): WBC, RBC, HGB, HCT, MCV, MCH, MCHC, RDW, PLT, BANDS, GRANS, LYMPH, EOS, HGBEXT, HCTEXT, PLTEXT, HGBEXT, HCTEXT, PLTEXT in the last 72 hours. Coagulation No results for input(s): PTP, INR, APTT, INREXT, INREXT in the last 72 hours.     Iron/Ferritin No results found for: IRON, FE, TIBC, IBCT, PSAT, FERR    BNP    Cardiac Enzymes No results found for: CPK, RCK1, RCK2, RCK3, RCK4, CKMB, CKNDX, CKND1, TROPT, TROIQ, BNPP, BNP     Lactic Acid    Thyroid Studies          All Micro Results     Procedure Component Value Units Date/Time    CULTURE, BLOOD [[id number]] Collected: 05/26/22 1100    Order Status: Completed Specimen: Blood Updated: 06/01/22 0641     Special Requests: NO SPECIAL REQUESTS        Culture result: NO GROWTH 6 DAYS       CULTURE, BLOOD [[id number]] Collected: 05/26/22 1200    Order Status: Completed Specimen: Blood Updated: 06/01/22 0641     Special Requests: NO SPECIAL REQUESTS        Culture result: NO GROWTH 6 DAYS       CULTURE, RESPIRATORY/SPUTUM/BRONCH Srikanth Alex STAIN [[id number]] Collected: 05/28/22 0250    Order Status: Completed Specimen: Sputum Updated: 05/30/22 0927     Special Requests: NO SPECIAL REQUESTS        GRAM STAIN RARE WBCS SEEN               RARE EPITHELIAL CELLS SEEN            NO ORGANISMS SEEN        Culture result:       RARE NORMAL RESPIRATORY GENARO          CULTURE, URINE [[id number]] Collected: 05/26/22 1234    Order Status: Completed Specimen: Urine from Clean catch Updated: 05/28/22 1226     Special Requests: NO SPECIAL REQUESTS        Culture result: No growth (<1,000 CFU/ML)       RESPIRATORY VIRUS PANEL W/COVID-19, PCR [[id number]]  (Abnormal) Collected: 05/26/22 1309    Order Status: Completed Specimen: Nasopharyngeal Updated: 05/26/22 1452     Adenovirus Not detected        Coronavirus 229E Not detected        Coronavirus HKU1 Not detected        Coronavirus CVNL63 Not detected        Coronavirus OC43 Not detected        SARS-CoV-2, PCR Not detected        Metapneumovirus Not detected        Rhinovirus and Enterovirus Not detected        Influenza A Not detected        Influenza A, subtype H1 Not detected        Influenza A, subtype H3 Not detected        INFLUENZA A H1N1 PCR Not detected        Influenza B Not detected        Parainfluenza 1 Not detected        Parainfluenza 2 Not detected        Parainfluenza 3 Detected        Parainfluenza virus 4 Not detected        RSV by PCR Not detected        B. parapertussis, PCR Not detected        Bordetella pertussis - PCR Not detected        Chlamydophila pneumoniae DNA, QL, PCR Not detected        Mycoplasma pneumoniae DNA, QL, PCR Not detected               Images:    CT (Most Recent). XRAY (Most Recent)      EKG No results found for this or any previous visit.      2D ECHO

## 2022-06-24 NOTE — PROGRESS NOTES
[facility]  (A Division of [address] [place] [address])    Follow-up Note      Date of Admission: 2022       Date of Note:  2022          Current Antimicrobials:    Prior Antimicrobials:  Zosyn  to       Assessment:         Acute respiratory failure: Intubated on , extubated   Infection with parainfluenza 3 virus: no pneumonia on CXR upon admission. There are hazy opacities noted on post intubation x-ray on . With possible congestion noted on  x-rays.   -Lactic acid 1.4, procalcitonin 0.45  Aspiration pneumonia  Acute metabolic encephalopathy  Transaminitis: Persistent-AST trending down somewhat  Mild hyponatremia: Resolved  PKU  Mild hematuria       Plan:   Completed course of Zosyn . CBC, BMP every 2-3 days  Aspiration precautions- high risk for recurrent aspiration. Close follow-up . -no plans for NG/PEG as patient has pulled them out. Family prefers to avoid further trauma    Pending placement in group home. Summer Ross DO  [facility]  [facility], [address] [place] [address]  Office: [phone]  Mobile/Text: [phone]     Microbiology:   blood cultures: No growth to date   urine culture no course to date   respiratory viral culture:  positive for parainfluenza 3 negative for all other pathogens    Lines / Catheters:  Peripheral  ET   Right IJ      Subjective:   Patient seen and examined at bedside. Watching TV. Irritable today. No new changes. No new events per nursing. No documented fevers.      Pending discharge/placement    Objective:        Visit Vitals  /69 (BP 1 Location: Left arm, BP Patient Position: At rest)   Pulse 68   Temp 97.5 °F (36.4 °C)   Resp 18   Ht 5' 8\" (1.727 m)   Wt 56.7 kg (125 lb)   SpO2 99%   BMI 19.01 kg/m²     Temp (24hrs), Av.6 °F (36.4 °C), Min:97.5 °F (36.4 °C), Max:97.6 °F (36.4 °C)        General:   Awake and alert looks around   Skin:   no rashes or skin lesions noted on limited exam, dry and warm   HEENT:  No scleral icterus or pallor; oral mucosa moist, lips moist   Lymph Nodes:   not assessed today   Lungs:   coarse breath sounds throughout, no wheeze and irregular respiratory drive   Heart:  RRR, s1 and s2; no murmurs rubs or gallops; no edema, + pedal pulses   Abdomen:  soft, non-distended, active bowel sounds, non-tender   Genitourinary:  deferred   Extremities:   average muscle tone; no contractures, no joint effusions,   Neurologic:   Moves extremities independently, no apparent focal weakness, slow to answer questions, intermittent confusion   Psychiatric:  , Calm, follows directions         Lab results:    Chemistry  No results for input(s): GLU, NA, K, CL, CO2, BUN, CREA, CA, AGAP, BUCR, TBIL, AP, TP, ALB, GLOB, AGRAT in the last 72 hours. No lab exists for component: GPT    CBC w/ Diff  No results for input(s): WBC, RBC, HGB, HCT, PLT, GRANS, LYMPH, EOS, HGBEXT, HCTEXT, PLTEXT, HGBEXT, HCTEXT, PLTEXT in the last 72 hours.     Microbiology  All Micro Results     Procedure Component Value Units Date/Time    CULTURE, BLOOD [[id number]] Collected: 05/26/22 1100    Order Status: Completed Specimen: Blood Updated: 06/01/22 0641     Special Requests: NO SPECIAL REQUESTS        Culture result: NO GROWTH 6 DAYS       CULTURE, BLOOD [[id number]] Collected: 05/26/22 1200    Order Status: Completed Specimen: Blood Updated: 06/01/22 0641     Special Requests: NO SPECIAL REQUESTS        Culture result: NO GROWTH 6 DAYS       CULTURE, RESPIRATORY/SPUTUM/BRONCH Noland Reinaldo STAIN [[id number]] Collected: 05/28/22 0250    Order Status: Completed Specimen: Sputum Updated: 05/30/22 0927     Special Requests: NO SPECIAL REQUESTS        GRAM STAIN RARE WBCS SEEN               RARE EPITHELIAL CELLS SEEN            NO ORGANISMS SEEN        Culture result:       RARE NORMAL RESPIRATORY GENARO          CULTURE, URINE [[id number]] Collected: 05/26/22 1238 Order Status: Completed Specimen: Urine from Clean catch Updated: 05/28/22 1226     Special Requests: NO SPECIAL REQUESTS        Culture result: No growth (<1,000 CFU/ML)       RESPIRATORY VIRUS PANEL W/COVID-19, PCR [[id number]]  (Abnormal) Collected: 05/26/22 1309    Order Status: Completed Specimen: Nasopharyngeal Updated: 05/26/22 1452     Adenovirus Not detected        Coronavirus 229E Not detected        Coronavirus HKU1 Not detected        Coronavirus CVNL63 Not detected        Coronavirus OC43 Not detected        SARS-CoV-2, PCR Not detected        Metapneumovirus Not detected        Rhinovirus and Enterovirus Not detected        Influenza A Not detected        Influenza A, subtype H1 Not detected        Influenza A, subtype H3 Not detected        INFLUENZA A H1N1 PCR Not detected        Influenza B Not detected        Parainfluenza 1 Not detected        Parainfluenza 2 Not detected        Parainfluenza 3 Detected        Parainfluenza virus 4 Not detected        RSV by PCR Not detected        B. parapertussis, PCR Not detected        Bordetella pertussis - PCR Not detected        Chlamydophila pneumoniae DNA, QL, PCR Not detected        Mycoplasma pneumoniae DNA, QL, PCR Not detected              Cheri Glover DO   6/24/2022

## 2022-06-24 NOTE — PROGRESS NOTES
Hospitalist Progress Note    Patient: Palmira Bledsoe Age: 58 y.o. : 1960 MR#: [id number] SSN: [id number]  Date/Time: 2022     DOA: 2022  PCP: Ramos Goldman MD    Subjective:     Patient is sitting in bed in no apparent distress    Interval Hospital Course:  64 y.o male with intellectual disability with h/o PKU (phenylketonuria), Seizure disorder, anxiety disorder, chronic urinary retention with singleton catheter requirement, presented to the ER 22 due to progressive lethargy and altered in his mentation. His group home staff reported that he was nonverbal for 48hrs, he was previously conversant and ambulatory. In the ER, he was found to be in acute respiratory failure with concern for Parainfluenza 3 infection. ID consulted and started him on zosyn for concern of aspiration. Due to his persistent respiratory failure and AMS, he was transferred to ICU for further monitoring. He was eventually intubated 22 due to worsened encephalopathy and became unresponsive. He was stabilized and was able to extubate on 22. ID continues him on zosyn. He stable to transfer out of ICU on 22. He was on room air at 97% O2 saturation. He underwent MBS with severe pharyngeal dysphagia with all consistencies. He was recommended on alternative feeding method. NG tube was placed but he kept fulling them out. PEG tube was considered but there was clear caution that he might be fulling them out at well. He was made NPO. He continued on slow improvement back to his baseline mentation. He was able to finish out his IV zosyn on 22. Per Dr Sinclair [place] note 2022:  \"-I discussed with patient's's brother and patient's brother's wife who is the only family members available, patient's father mother  no other siblings.   Patient's brother had some concerns regarding patient's condition, I explained him regarding progressive disease of patient especially in the face of oropharyngeal dysphagia patient may not able to get adequate nutrition attempts were made to put NG tube twice both times patient pulled out vigorously probably will do the same thing to PEG tube will not serve purpose for long-term nutritional support. I also discussed regarding patient's long-term care plan with his brother. After long discussion after answering to the questions and them(patient's brother and patient's brother's wife) understanding the gravity of situation decided they do not want any resuscitation or intubation making patient DNR/DNI. They also decided that at this point no aggressive treatment in form of artificial feeding. Their goal for comfort feeding. \"               Assessment/Plan:     1. Acute respiratory failure with hypoxia,        S/p intubated for airway protection, Extubated 5/29/22. 2.  Atypical pneumonia related to parainfluenza infection, resolved         Aspiration pneumonia concern   3. Sepsis ruled out   4. Acute toxic/metabolic encephalopathy on chronic intellectual disability   5. Intellectual disability with h/o PKU  6.  Seizure disorder, stable   7. Elevated LFT's, improving to baseline   8. Hypercholesteremia   9. Hypokalemia, resolved   10. Urinary retention  11. Severe pharyngeal dysphagia with aspiration risk, unable to maintain NG Tube and family has forgone PEG tube placement.  He remains on comfort feed only      Continue comfort feeding per family  Family declines PEG tube  On Keppra and Depakene  DNR and DNI      Disposition-pending  Patient is medically stable  Discussed with  during IDR's    Case discussed with:  [x]Patient  []Family  []Nursing  []Case Management  DVT Prophylaxis:  []Lovenox  []Hep SQ  [x]SCDs  []Coumadin   []On Heparin gtt    Signed By: Nadege Cade MD     June 24, 2022               Objective:   VS:   Visit Vitals  /69 (BP 1 Location: Left arm, BP Patient Position: At rest)   Pulse 68   Temp 97.5 °F (36.4 °C)   Resp 18   Ht 5' 8\" (1.727 m)   Wt 56.7 kg (125 lb)   SpO2 99%   BMI 19.01 kg/m²      Tmax/24hrs: Temp (24hrs), Av.6 °F (36.4 °C), Min:97.5 °F (36.4 °C), Max:97.6 °F (36.4 °C)      Intake/Output Summary (Last 24 hours) at 2022 1821  Last data filed at 2022 1259  Gross per 24 hour   Intake 480 ml   Output --   Net 480 ml       General:  Awake, has intellectual disability  Cardiovascular:  S1S2+, RRR  Pulmonary:  CTA b/l  GI:  Soft, BS+, NT, ND  Extremities:  No edema        Additional:       Current Facility-Administered Medications   Medication Dose Route Frequency    benztropine (COGENTIN) tablet 1 mg  1 mg Oral TID    trospium (SANCTURA) tablet 20 mg  20 mg Oral DAILY    famotidine (PEPCID) 40 mg/5 mL (8 mg/mL) oral suspension 20 mg  20 mg Oral DAILY    levETIRAcetam (KEPPRA) oral solution 500 mg  500 mg Oral BID    valproic acid (as sodium salt) (DEPAKENE) 250 mg/5 mL (5 mL) oral solution 500 mg  500 mg Oral Q12H    albuterol-ipratropium (DUO-NEB) 2.5 MG-0.5 MG/3 ML  3 mL Nebulization Q4H PRN    glucose chewable tablet 16 g  4 Tablet Oral PRN    glucagon (GLUCAGEN) injection 1 mg  1 mg IntraMUSCular PRN    dextrose 10% infusion 0-250 mL  0-250 mL IntraVENous PRN    clotrimazole-betamethasone (LOTRISONE) 1-0.05 % cream   Topical BID    loperamide (IMODIUM) capsule 2 mg  2 mg Oral QID PRN    OLANZapine (ZyPREXA) tablet 10 mg  10 mg Oral TID    tamsulosin (FLOMAX) capsule 0.4 mg  0.4 mg Oral DAILY    polyethylene glycol (MIRALAX) packet 17 g  17 g Oral DAILY PRN    ondansetron (ZOFRAN ODT) tablet 4 mg  4 mg Oral Q8H PRN    Or    ondansetron (ZOFRAN) injection 4 mg  4 mg IntraVENous Q6H PRN    acetaminophen (TYLENOL) suppository 650 mg  650 mg Rectal Q4H PRN            Lab/Data Review:  Labs: Results:       Chemistry No results for input(s): GLU, NA, K, CL, CO2, BUN, CREA, BUCR, AGAP, CA, PHOS in the last 72 hours. No results for input(s): TBIL, ALT, ALKP, TP, ALB, GLOB, AGRAT in the last 72 hours.     No lab exists for component: SGOT   CBC w/Diff No results for input(s): WBC, RBC, HGB, HCT, MCV, MCH, MCHC, RDW, PLT, BANDS, GRANS, LYMPH, EOS, HGBEXT, HCTEXT, PLTEXT, HGBEXT, HCTEXT, PLTEXT in the last 72 hours. Coagulation No results for input(s): PTP, INR, APTT, INREXT, INREXT in the last 72 hours.     Iron/Ferritin No results found for: IRON, FE, TIBC, IBCT, PSAT, FERR    BNP    Cardiac Enzymes No results found for: CPK, RCK1, RCK2, RCK3, RCK4, CKMB, CKNDX, CKND1, TROPT, TROIQ, BNPP, BNP     Lactic Acid    Thyroid Studies          All Micro Results     Procedure Component Value Units Date/Time    CULTURE, BLOOD [[id number]] Collected: 05/26/22 1100    Order Status: Completed Specimen: Blood Updated: 06/01/22 0641     Special Requests: NO SPECIAL REQUESTS        Culture result: NO GROWTH 6 DAYS       CULTURE, BLOOD [[id number]] Collected: 05/26/22 1200    Order Status: Completed Specimen: Blood Updated: 06/01/22 0641     Special Requests: NO SPECIAL REQUESTS        Culture result: NO GROWTH 6 DAYS       CULTURE, RESPIRATORY/SPUTUM/BRONCH [place] STAIN [[id number]] Collected: 05/28/22 0250    Order Status: Completed Specimen: Sputum Updated: 05/30/22 0927     Special Requests: NO SPECIAL REQUESTS        GRAM STAIN RARE WBCS SEEN               RARE EPITHELIAL CELLS SEEN            NO ORGANISMS SEEN        Culture result:       RARE NORMAL RESPIRATORY GENARO          CULTURE, URINE [[id number]] Collected: 05/26/22 1234    Order Status: Completed Specimen: Urine from Clean catch Updated: 05/28/22 1226     Special Requests: NO SPECIAL REQUESTS        Culture result: No growth (<1,000 CFU/ML)       RESPIRATORY VIRUS PANEL W/COVID-19, PCR [[id number]]  (Abnormal) Collected: 05/26/22 1309    Order Status: Completed Specimen: Nasopharyngeal Updated: 05/26/22 1452     Adenovirus Not detected        Coronavirus 229E Not detected        Coronavirus HKU1 Not detected        Coronavirus CVNL63 Not detected        Coronavirus OC43 Not detected        SARS-CoV-2, PCR Not detected        Metapneumovirus Not detected        Rhinovirus and Enterovirus Not detected        Influenza A Not detected        Influenza A, subtype H1 Not detected        Influenza A, subtype H3 Not detected        INFLUENZA A H1N1 PCR Not detected        Influenza B Not detected        Parainfluenza 1 Not detected        Parainfluenza 2 Not detected        Parainfluenza 3 Detected        Parainfluenza virus 4 Not detected        RSV by PCR Not detected        B. parapertussis, PCR Not detected        Bordetella pertussis - PCR Not detected        Chlamydophila pneumoniae DNA, QL, PCR Not detected        Mycoplasma pneumoniae DNA, QL, PCR Not detected               Images:    CT (Most Recent). XRAY (Most Recent)      EKG No results found for this or any previous visit.      2D ECHO

## 2022-06-24 NOTE — ROUTINE PROCESS
Bedside and Verbal shift change report given to Aric Lindsey RN (oncoming nurse) by Ole Morin RN (offgoing nurse). Report included the following information SBAR.

## 2022-06-25 NOTE — PROGRESS NOTES
Problem: Risk for Spread of Infection  Goal: Prevent transmission of infectious organism to others  Description: Prevent the transmission of infectious organisms to other patients, staff members, and visitors. Outcome: Progressing Towards Goal     Problem: Patient Education:  Go to Education Activity  Goal: Patient/Family Education  Outcome: Progressing Towards Goal     Problem: Pressure Injury - Risk of  Goal: *Prevention of pressure injury  Description: Document Otoniel Scale and appropriate interventions in the flowsheet. Outcome: Progressing Towards Goal  Note: Pressure Injury Interventions:  Sensory Interventions: Assess changes in LOC,Minimize linen layers    Moisture Interventions: Absorbent underpads,Check for incontinence Q2 hours and as needed    Activity Interventions: Pressure redistribution bed/mattress(bed type)    Mobility Interventions: Pressure redistribution bed/mattress (bed type)    Nutrition Interventions: Document food/fluid/supplement intake    Friction and Shear Interventions: Minimize layers                Problem: Patient Education: Go to Patient Education Activity  Goal: Patient/Family Education  Outcome: Progressing Towards Goal     Problem: Patient Education: Go to Patient Education Activity  Goal: Patient/Family Education  Outcome: Progressing Towards Goal     Problem: Nutrition Deficit  Goal: *Optimize nutritional status  Outcome: Progressing Towards Goal     Problem: Falls - Risk of  Goal: *Absence of Falls  Description: Document Ysl Fall Risk and appropriate interventions in the flowsheet.   Outcome: Progressing Towards Goal  Note: Fall Risk Interventions:  Mobility Interventions: Bed/chair exit alarm    Mentation Interventions: Bed/chair exit alarm,Door open when patient unattended,Room close to nurse's station,Update white board    Medication Interventions: Bed/chair exit alarm    Elimination Interventions: Bed/chair exit alarm,Toileting schedule/hourly rounds    History of Falls Interventions: Room close to nurse's station         Problem: Patient Education: Go to Patient Education Activity  Goal: Patient/Family Education  Outcome: Progressing Towards Goal     Problem: Injury - Risk of, Adverse Drug Event  Goal: *Absence of adverse drug events  Outcome: Progressing Towards Goal  Goal: *Absence of medication errors  Outcome: Progressing Towards Goal  Goal: *Knowledge of prescribed medications  Outcome: Progressing Towards Goal     Problem: Patient Education: Go to Patient Education Activity  Goal: Patient/Family Education  Outcome: Progressing Towards Goal     Problem: Pain  Goal: *Control of Pain  Outcome: Progressing Towards Goal  Goal: *PALLIATIVE CARE:  Alleviation of Pain  Outcome: Progressing Towards Goal     Problem: Patient Education: Go to Patient Education Activity  Goal: Patient/Family Education  Outcome: Progressing Towards Goal     Problem: Patient Education: Go to Patient Education Activity  Goal: Patient/Family Education  Outcome: Progressing Towards Goal     Problem: Patient Education: Go to Patient Education Activity  Goal: Patient/Family Education  Outcome: Progressing Towards Goal

## 2022-06-25 NOTE — PROGRESS NOTES
Beside report given to Tony Naidu from [facility] to includes SBAR, Kardex, recent results, MAR, intake/output.

## 2022-06-25 NOTE — PROGRESS NOTES
Hospitalist Progress Note    Patient: Niharika Beltrangood Age: 58 y.o. : 1960 MR#: [id number] SSN: [id number]  Date/Time: 2022     DOA: 2022  PCP: Donato Galloway MD    Subjective:     Patient is sitting in bed in no apparent distress, currently appears calm. Per RN has been agitated earlier today    Interval Hospital Course:  64 y.o male with intellectual disability with h/o PKU (phenylketonuria), Seizure disorder, anxiety disorder, chronic urinary retention with singleton catheter requirement, presented to the ER 22 due to progressive lethargy and altered in his mentation. His group home staff reported that he was nonverbal for 48hrs, he was previously conversant and ambulatory. In the ER, he was found to be in acute respiratory failure with concern for Parainfluenza 3 infection. ID consulted and started him on zosyn for concern of aspiration. Due to his persistent respiratory failure and AMS, he was transferred to ICU for further monitoring. He was eventually intubated 22 due to worsened encephalopathy and became unresponsive. He was stabilized and was able to extubate on 22. ID continues him on zosyn. He stable to transfer out of ICU on 22. He was on room air at 97% O2 saturation. He underwent MBS with severe pharyngeal dysphagia with all consistencies. He was recommended on alternative feeding method. NG tube was placed but he kept fulling them out. PEG tube was considered but there was clear caution that he might be fulling them out at well. He was made NPO. He continued on slow improvement back to his baseline mentation. He was able to finish out his IV zosyn on 22. Per Dr Nanci Gonzalez note 2022:  \"-I discussed with patient's's brother and patient's brother's wife who is the only family members available, patient's father mother  no other siblings.   Patient's brother had some concerns regarding patient's condition, I explained him regarding progressive disease of patient especially in the face of oropharyngeal dysphagia patient may not able to get adequate nutrition attempts were made to put NG tube twice both times patient pulled out vigorously probably will do the same thing to PEG tube will not serve purpose for long-term nutritional support. I also discussed regarding patient's long-term care plan with his brother. After long discussion after answering to the questions and them(patient's brother and patient's brother's wife) understanding the gravity of situation decided they do not want any resuscitation or intubation making patient DNR/DNI. They also decided that at this point no aggressive treatment in form of artificial feeding. Their goal for comfort feeding. \"         Assessment/Plan:     1. Acute respiratory failure with hypoxia,        S/p intubated for airway protection, Extubated 5/29/22. 2.  Atypical pneumonia related to parainfluenza infection, resolved         Aspiration pneumonia concern   3. Sepsis ruled out   4. Acute toxic/metabolic encephalopathy on chronic intellectual disability   5. Intellectual disability with h/o PKU  6.  Seizure disorder, stable   7. Elevated LFT's, improving to baseline   8. Hypercholesteremia   9. Hypokalemia, resolved   10. Urinary retention  11. Severe pharyngeal dysphagia with aspiration risk, unable to maintain NG Tube and family has forgone PEG tube placement. He remains on comfort feed only      On comfort feeding per family  Family has declined PEG tube  On Keppra and Depakene  Will increase Cogentin given increased agitation per RN.   Continue Zyprexa  DNR and DNI      Disposition-pending  Patient is medically stable  Discussed with RN    Case discussed with:  [x]Patient  []Family  []Nursing  []Case Management  DVT Prophylaxis:  []Lovenox  []Hep SQ  [x]SCDs  []Coumadin   []On Heparin gtt    Signed By: Lui Auguste MD     June 25, 2022               Objective:   VS:   Visit Vitals  /65   Pulse 88 Temp 97.6 °F (36.4 °C)   Resp 20   Ht 5' 8\" (1.727 m)   Wt 56.7 kg (125 lb)   SpO2 97%   BMI 19.01 kg/m²      Tmax/24hrs: Temp (24hrs), Av.6 °F (36.4 °C), Min:97.6 °F (36.4 °C), Max:97.6 °F (36.4 °C)    No intake or output data in the 24 hours ending 22    General:  Awake, has intellectual disability  Cardiovascular:  S1S2+, RRR  Pulmonary:  CTA b/l  GI:  Soft, BS+, NT, ND  Extremities:  No edema      Additional:       Current Facility-Administered Medications   Medication Dose Route Frequency    benztropine (COGENTIN) tablet 1 mg  1 mg Oral TID    trospium (SANCTURA) tablet 20 mg  20 mg Oral DAILY    famotidine (PEPCID) 40 mg/5 mL (8 mg/mL) oral suspension 20 mg  20 mg Oral DAILY    levETIRAcetam (KEPPRA) oral solution 500 mg  500 mg Oral BID    valproic acid (as sodium salt) (DEPAKENE) 250 mg/5 mL (5 mL) oral solution 500 mg  500 mg Oral Q12H    albuterol-ipratropium (DUO-NEB) 2.5 MG-0.5 MG/3 ML  3 mL Nebulization Q4H PRN    glucose chewable tablet 16 g  4 Tablet Oral PRN    glucagon (GLUCAGEN) injection 1 mg  1 mg IntraMUSCular PRN    dextrose 10% infusion 0-250 mL  0-250 mL IntraVENous PRN    clotrimazole-betamethasone (LOTRISONE) 1-0.05 % cream   Topical BID    loperamide (IMODIUM) capsule 2 mg  2 mg Oral QID PRN    OLANZapine (ZyPREXA) tablet 10 mg  10 mg Oral TID    tamsulosin (FLOMAX) capsule 0.4 mg  0.4 mg Oral DAILY    polyethylene glycol (MIRALAX) packet 17 g  17 g Oral DAILY PRN    ondansetron (ZOFRAN ODT) tablet 4 mg  4 mg Oral Q8H PRN    Or    ondansetron (ZOFRAN) injection 4 mg  4 mg IntraVENous Q6H PRN    acetaminophen (TYLENOL) suppository 650 mg  650 mg Rectal Q4H PRN            Lab/Data Review:  Labs: Results:       Chemistry No results for input(s): GLU, NA, K, CL, CO2, BUN, CREA, BUCR, AGAP, CA, PHOS in the last 72 hours. No results for input(s): TBIL, ALT, ALKP, TP, ALB, GLOB, AGRAT in the last 72 hours.     No lab exists for component: SGOT   CBC w/Diff No results for input(s): WBC, RBC, HGB, HCT, MCV, MCH, MCHC, RDW, PLT, BANDS, GRANS, LYMPH, EOS, HGBEXT, HCTEXT, PLTEXT, HGBEXT, HCTEXT, PLTEXT in the last 72 hours. Coagulation No results for input(s): PTP, INR, APTT, INREXT, INREXT in the last 72 hours.     Iron/Ferritin No results found for: IRON, FE, TIBC, IBCT, PSAT, FERR    BNP    Cardiac Enzymes No results found for: CPK, RCK1, RCK2, RCK3, RCK4, CKMB, CKNDX, CKND1, TROPT, TROIQ, BNPP, BNP     Lactic Acid    Thyroid Studies          All Micro Results     Procedure Component Value Units Date/Time    CULTURE, BLOOD [[id number]] Collected: 05/26/22 1100    Order Status: Completed Specimen: Blood Updated: 06/01/22 0641     Special Requests: NO SPECIAL REQUESTS        Culture result: NO GROWTH 6 DAYS       CULTURE, BLOOD [[id number]] Collected: 05/26/22 1200    Order Status: Completed Specimen: Blood Updated: 06/01/22 0641     Special Requests: NO SPECIAL REQUESTS        Culture result: NO GROWTH 6 DAYS       CULTURE, RESPIRATORY/SPUTUM/BRONCH Tori Mulch STAIN [[id number]] Collected: 05/28/22 0250    Order Status: Completed Specimen: Sputum Updated: 05/30/22 0927     Special Requests: NO SPECIAL REQUESTS        GRAM STAIN RARE WBCS SEEN               RARE EPITHELIAL CELLS SEEN            NO ORGANISMS SEEN        Culture result:       RARE NORMAL RESPIRATORY GENARO          CULTURE, URINE [[id number]] Collected: 05/26/22 1234    Order Status: Completed Specimen: Urine from Clean catch Updated: 05/28/22 1226     Special Requests: NO SPECIAL REQUESTS        Culture result: No growth (<1,000 CFU/ML)       RESPIRATORY VIRUS PANEL W/COVID-19, PCR [[id number]]  (Abnormal) Collected: 05/26/22 1309    Order Status: Completed Specimen: Nasopharyngeal Updated: 05/26/22 1452     Adenovirus Not detected        Coronavirus 229E Not detected        Coronavirus HKU1 Not detected        Coronavirus CVNL63 Not detected        Coronavirus OC43 Not detected        SARS-CoV-2, PCR Not detected Metapneumovirus Not detected        Rhinovirus and Enterovirus Not detected        Influenza A Not detected        Influenza A, subtype H1 Not detected        Influenza A, subtype H3 Not detected        INFLUENZA A H1N1 PCR Not detected        Influenza B Not detected        Parainfluenza 1 Not detected        Parainfluenza 2 Not detected        Parainfluenza 3 Detected        Parainfluenza virus 4 Not detected        RSV by PCR Not detected        B. parapertussis, PCR Not detected        Bordetella pertussis - PCR Not detected        Chlamydophila pneumoniae DNA, QL, PCR Not detected        Mycoplasma pneumoniae DNA, QL, PCR Not detected               Images:    CT (Most Recent). XRAY (Most Recent)      EKG No results found for this or any previous visit.      2D ECHO

## 2022-06-25 NOTE — ROUTINE PROCESS
0738-/Bedside shift change report given to Sudha (oncoming nurse) by Frank Blunt (offgoing nurse). Report included the following information SBAR, MAR and Recent Results. 1242-patient is repeatedly banging his head against the side rails despite efforts made by the RN to pad the railing with pillows. Patient is hitting himself in the chest and shouting. Patient continues to make attempts to jump out of bed. Received a sitter order from [facility], but no sitters available per nursing supervisor. Patient is a risk to himself without one on one supervision.    /Bedside shift change report given to Frank Blunt (oncoming nurse) by Dave Quintero (offgoing nurse). Report included the following information SBAR, MAR and Recent Results.

## 2022-06-26 NOTE — PROGRESS NOTES
Problem: Risk for Spread of Infection  Goal: Prevent transmission of infectious organism to others  Description: Prevent the transmission of infectious organisms to other patients, staff members, and visitors. Outcome: Progressing Towards Goal     Problem: Patient Education:  Go to Education Activity  Goal: Patient/Family Education  Outcome: Progressing Towards Goal     Problem: Pressure Injury - Risk of  Goal: *Prevention of pressure injury  Description: Document Otoniel Scale and appropriate interventions in the flowsheet. Outcome: Progressing Towards Goal  Note: Pressure Injury Interventions:  Sensory Interventions: Minimize linen layers,Pressure redistribution bed/mattress (bed type)    Moisture Interventions: Internal/External urinary devices,Check for incontinence Q2 hours and as needed,Absorbent underpads    Activity Interventions: Pressure redistribution bed/mattress(bed type)    Mobility Interventions: Pressure redistribution bed/mattress (bed type)    Nutrition Interventions: Document food/fluid/supplement intake    Friction and Shear Interventions: Minimize layers                Problem: Patient Education: Go to Patient Education Activity  Goal: Patient/Family Education  Outcome: Progressing Towards Goal     Problem: Patient Education: Go to Patient Education Activity  Goal: Patient/Family Education  Outcome: Progressing Towards Goal     Problem: Nutrition Deficit  Goal: *Optimize nutritional status  Outcome: Progressing Towards Goal     Problem: Falls - Risk of  Goal: *Absence of Falls  Description: Document Syl Fall Risk and appropriate interventions in the flowsheet.   Outcome: Progressing Towards Goal  Note: Fall Risk Interventions:  Mobility Interventions: Bed/chair exit alarm,PT Consult for mobility concerns,PT Consult for assist device competence,OT consult for ADLs    Mentation Interventions: Bed/chair exit alarm,More frequent rounding,Room close to nurse's station,Update white board    Medication Interventions: Bed/chair exit alarm    Elimination Interventions: Bed/chair exit alarm,Toileting schedule/hourly rounds    History of Falls Interventions: Door open when patient unattended         Problem: Patient Education: Go to Patient Education Activity  Goal: Patient/Family Education  Outcome: Progressing Towards Goal     Problem: Injury - Risk of, Adverse Drug Event  Goal: *Absence of adverse drug events  Outcome: Progressing Towards Goal  Goal: *Absence of medication errors  Outcome: Progressing Towards Goal  Goal: *Knowledge of prescribed medications  Outcome: Progressing Towards Goal     Problem: Patient Education: Go to Patient Education Activity  Goal: Patient/Family Education  Outcome: Progressing Towards Goal     Problem: Pain  Goal: *Control of Pain  Outcome: Progressing Towards Goal  Goal: *PALLIATIVE CARE:  Alleviation of Pain  Outcome: Progressing Towards Goal     Problem: Patient Education: Go to Patient Education Activity  Goal: Patient/Family Education  Outcome: Progressing Towards Goal     Problem: Patient Education: Go to Patient Education Activity  Goal: Patient/Family Education  Outcome: Progressing Towards Goal     Problem: Patient Education: Go to Patient Education Activity  Goal: Patient/Family Education  Outcome: Progressing Towards Goal

## 2022-06-26 NOTE — PROGRESS NOTES
Chart reviewed remotely    Afebrile/ off abx    Cont same  Covering for Dr Elena Segura until she returns on Tuesday, 6/28/22. Rizwan Amaya MD  [facility]  Office #:     [phone]  [phone] [phone]   Office Fax: [phone]

## 2022-06-26 NOTE — PROGRESS NOTES
Hospitalist Progress Note    Patient: Veronika Kaur Age: 58 y.o. : 1960 MR#: [id number] SSN: [id number]  Date/Time: 2022     DOA: 2022  PCP: Barbara Petersen MD    Subjective:     Patient is sitting in bed in no apparent distress    Interval Hospital Course:  64 y.o male with intellectual disability with h/o PKU (phenylketonuria), Seizure disorder, anxiety disorder, chronic urinary retention with singleton catheter requirement, presented to the ER 22 due to progressive lethargy and altered in his mentation. His group home staff reported that he was nonverbal for 48hrs, he was previously conversant and ambulatory. In the ER, he was found to be in acute respiratory failure with concern for Parainfluenza 3 infection. ID consulted and started him on zosyn for concern of aspiration. Due to his persistent respiratory failure and AMS, he was transferred to ICU for further monitoring. He was eventually intubated 22 due to worsened encephalopathy and became unresponsive. He was stabilized and was able to extubate on 22. ID continues him on zosyn. He stable to transfer out of ICU on 22. He was on room air at 97% O2 saturation. He underwent MBS with severe pharyngeal dysphagia with all consistencies. He was recommended on alternative feeding method. NG tube was placed but he kept fulling them out. PEG tube was considered but there was clear caution that he might be fulling them out at well. He was made NPO. He continued on slow improvement back to his baseline mentation. He was able to finish out his IV zosyn on 22. Per Dr Petra Villeda note 2022:  \"-I discussed with patient's's brother and patient's brother's wife who is the only family members available, patient's father mother  no other siblings.   Patient's brother had some concerns regarding patient's condition, I explained him regarding progressive disease of patient especially in the face of oropharyngeal dysphagia patient may not able to get adequate nutrition attempts were made to put NG tube twice both times patient pulled out vigorously probably will do the same thing to PEG tube will not serve purpose for long-term nutritional support. I also discussed regarding patient's long-term care plan with his brother. After long discussion after answering to the questions and them(patient's brother and patient's brother's wife) understanding the gravity of situation decided they do not want any resuscitation or intubation making patient DNR/DNI. They also decided that at this point no aggressive treatment in form of artificial feeding. Their goal for comfort feeding. \"         Assessment/Plan:     1. Acute respiratory failure with hypoxia,        S/p intubated for airway protection, Extubated 5/29/22. 2.  Atypical pneumonia related to parainfluenza infection, resolved         Aspiration pneumonia concern   3. Sepsis ruled out   4. Acute toxic/metabolic encephalopathy on chronic intellectual disability   5. Intellectual disability with h/o PKU  6.  Seizure disorder, stable   7. Elevated LFT's, improving to baseline   8. Hypercholesteremia   9. Hypokalemia, resolved   10. Urinary retention  11. Severe pharyngeal dysphagia with aspiration risk, unable to maintain NG Tube and family has forgone PEG tube placement.  He remains on comfort feed only      Continue comfort feeding per family  Family has declined PEG tube  Keppra and Depakene  On Cogentin and Zyprexa  DNR and DNI      Disposition-pending  Patient is medically stable    Case discussed with:  [x]Patient  []Family  []Nursing  []Case Management  DVT Prophylaxis:  []Lovenox  []Hep SQ  [x]SCDs  []Coumadin   []On Heparin gtt    Signed By: Nas Layton MD     June 26, 2022               Objective:   VS:   Visit Vitals  /71 (BP 1 Location: Left leg, BP Patient Position: Semi fowlers)   Pulse 78   Temp 97.4 °F (36.3 °C)   Resp 20   Ht 5' 8\" (1.727 m)   Wt 56.7 kg (125 lb)   SpO2 98%   BMI 19.01 kg/m²      Tmax/24hrs: Temp (24hrs), Av.6 °F (36.4 °C), Min:97.4 °F (36.3 °C), Max:97.8 °F (36.6 °C)      Intake/Output Summary (Last 24 hours) at 2022 1749  Last data filed at 2022 0804  Gross per 24 hour   Intake 120 ml   Output --   Net 120 ml       General:  Awake, has intellectual disability  Cardiovascular:  S1S2+, RRR  Pulmonary:  CTA b/l  GI:  Soft, BS+, NT, ND  Extremities:  No edema        Additional:       Current Facility-Administered Medications   Medication Dose Route Frequency    benztropine (COGENTIN) tablet 1.5 mg  1.5 mg Oral TID    trospium (SANCTURA) tablet 20 mg  20 mg Oral DAILY    famotidine (PEPCID) 40 mg/5 mL (8 mg/mL) oral suspension 20 mg  20 mg Oral DAILY    levETIRAcetam (KEPPRA) oral solution 500 mg  500 mg Oral BID    valproic acid (as sodium salt) (DEPAKENE) 250 mg/5 mL (5 mL) oral solution 500 mg  500 mg Oral Q12H    albuterol-ipratropium (DUO-NEB) 2.5 MG-0.5 MG/3 ML  3 mL Nebulization Q4H PRN    glucose chewable tablet 16 g  4 Tablet Oral PRN    glucagon (GLUCAGEN) injection 1 mg  1 mg IntraMUSCular PRN    dextrose 10% infusion 0-250 mL  0-250 mL IntraVENous PRN    clotrimazole-betamethasone (LOTRISONE) 1-0.05 % cream   Topical BID    loperamide (IMODIUM) capsule 2 mg  2 mg Oral QID PRN    OLANZapine (ZyPREXA) tablet 10 mg  10 mg Oral TID    tamsulosin (FLOMAX) capsule 0.4 mg  0.4 mg Oral DAILY    polyethylene glycol (MIRALAX) packet 17 g  17 g Oral DAILY PRN    ondansetron (ZOFRAN ODT) tablet 4 mg  4 mg Oral Q8H PRN    Or    ondansetron (ZOFRAN) injection 4 mg  4 mg IntraVENous Q6H PRN    acetaminophen (TYLENOL) suppository 650 mg  650 mg Rectal Q4H PRN            Lab/Data Review:  Labs: Results:       Chemistry No results for input(s): GLU, NA, K, CL, CO2, BUN, CREA, BUCR, AGAP, CA, PHOS in the last 72 hours. No results for input(s): TBIL, ALT, ALKP, TP, ALB, GLOB, AGRAT in the last 72 hours.     No lab exists for component: SGOT   CBC w/Diff No results for input(s): WBC, RBC, HGB, HCT, MCV, MCH, MCHC, RDW, PLT, BANDS, GRANS, LYMPH, EOS, HGBEXT, HCTEXT, PLTEXT, HGBEXT, HCTEXT, PLTEXT in the last 72 hours. Coagulation No results for input(s): PTP, INR, APTT, INREXT, INREXT in the last 72 hours.     Iron/Ferritin No results found for: IRON, FE, TIBC, IBCT, PSAT, FERR    BNP    Cardiac Enzymes No results found for: CPK, RCK1, RCK2, RCK3, RCK4, CKMB, CKNDX, CKND1, TROPT, TROIQ, BNPP, BNP     Lactic Acid    Thyroid Studies          All Micro Results     Procedure Component Value Units Date/Time    CULTURE, BLOOD [[id number]] Collected: 05/26/22 1100    Order Status: Completed Specimen: Blood Updated: 06/01/22 0641     Special Requests: NO SPECIAL REQUESTS        Culture result: NO GROWTH 6 DAYS       CULTURE, BLOOD [[id number]] Collected: 05/26/22 1200    Order Status: Completed Specimen: Blood Updated: 06/01/22 0641     Special Requests: NO SPECIAL REQUESTS        Culture result: NO GROWTH 6 DAYS       CULTURE, RESPIRATORY/SPUTUM/BRONCH Mark Kayser STAIN [[id number]] Collected: 05/28/22 0250    Order Status: Completed Specimen: Sputum Updated: 05/30/22 0927     Special Requests: NO SPECIAL REQUESTS        GRAM STAIN RARE WBCS SEEN               RARE EPITHELIAL CELLS SEEN            NO ORGANISMS SEEN        Culture result:       RARE NORMAL RESPIRATORY GENARO          CULTURE, URINE [[id number]] Collected: 05/26/22 1234    Order Status: Completed Specimen: Urine from Clean catch Updated: 05/28/22 1226     Special Requests: NO SPECIAL REQUESTS        Culture result: No growth (<1,000 CFU/ML)       RESPIRATORY VIRUS PANEL W/COVID-19, PCR [[id number]]  (Abnormal) Collected: 05/26/22 1309    Order Status: Completed Specimen: Nasopharyngeal Updated: 05/26/22 1452     Adenovirus Not detected        Coronavirus 229E Not detected        Coronavirus HKU1 Not detected        Coronavirus CVNL63 Not detected        Coronavirus OC43 Not detected SARS-CoV-2, PCR Not detected        Metapneumovirus Not detected        Rhinovirus and Enterovirus Not detected        Influenza A Not detected        Influenza A, subtype H1 Not detected        Influenza A, subtype H3 Not detected        INFLUENZA A H1N1 PCR Not detected        Influenza B Not detected        Parainfluenza 1 Not detected        Parainfluenza 2 Not detected        Parainfluenza 3 Detected        Parainfluenza virus 4 Not detected        RSV by PCR Not detected        B. parapertussis, PCR Not detected        Bordetella pertussis - PCR Not detected        Chlamydophila pneumoniae DNA, QL, PCR Not detected        Mycoplasma pneumoniae DNA, QL, PCR Not detected               Images:    CT (Most Recent). XRAY (Most Recent)      EKG No results found for this or any previous visit.      2D ECHO

## 2022-06-26 NOTE — ROUTINE PROCESS
0711-/Bedside and Verbal shift change report given to Sudha (oncoming nurse) by Peyton Tolliver (offgoing nurse). Report included the following information SBAR, MAR and Recent Results.     Jeyson Rose shift change report given to [facility] (oncoming nurse) by Nika Taylor (offgoing nurse). Report included the following information SBAR, MAR and Recent Results. Attending/Gypsy: NAD, PERRl/EOMI, supple, RRR, CTAB, Abd-soft, NT/ND; LLE-swollen, erythema, 2 cm linear, clean, no obvious d/c

## 2022-06-27 NOTE — PROGRESS NOTES
0800:  Report received from [facility] DR NAZARIO BELTRAN. Care assumed. 1800:  Remains confused throughout the day. Increased agitation and attempts climb out of bed as day progresses. No response to staff reorientation and instruction not climb OOB. Impulsive. Poor safety awareness. Dr Lyudmila Cobb paged, informed. New orders for prn Haldol and Sitter received. Charge nurse and Supervisor notified of sitter. Await Haldol med available from pharmacy  1810:  Haldol 2mg IM LUOQ given. Repositioned. Monitor. 1915:  Less agitated and attempts to climb OOB after IM Haldol given. Remains awake, confused, safe. Shift change report given to [facility] DR NAZARIO BELTRAN with bedside rounds.

## 2022-06-27 NOTE — PROGRESS NOTES
Patient's brother Mamadou Dinero [phone] transferred to  by another . Patient's brother said he is agreeable to the [facility]. [facility]  Owner Charlie Parks [phone]  [address] [place] [address].    Patient's brother said owner of Group home will not take patient until the Peg tube is placed. Patient's brother said to call him, and he will give consent for Peg tube to be placed. Patient's brother said he did speak with Glen Garcia at [facility] and updated her with his decision to go with the [facility].              Angelia Hector, RN  Case Management [phone]

## 2022-06-27 NOTE — PROGRESS NOTES
[facility]  (A Division of [address] [place] [address])    Follow-up Note      Date of Admission: 2022       Date of Note:  2022          Current Antimicrobials:    Prior Antimicrobials:  Zosyn  to       Assessment:         Acute respiratory failure: Intubated on , extubated   Infection with parainfluenza 3 virus: no pneumonia on CXR upon admission. There are hazy opacities noted on post intubation x-ray on . With possible congestion noted on  x-rays.   -Lactic acid 1.4, procalcitonin 0.45  Aspiration pneumonia  Acute metabolic encephalopathy  Transaminitis: Persistent-AST trending down somewhat  Mild hyponatremia: Resolved  PKU  Mild hematuria       Plan:     Doing ok off abx- last     CBC, BMP every 2-3 days  Aspiration precautions- high risk for recurrent aspiration. Close follow-up . -no plans for NG/PEG as patient has pulled them out. Family prefers to avoid further trauma    Pending placement     Can discontinue droplet isoaltion--  RVP result from many days ago reviewed    Covering for Dr Hannah Alicea until she returns on Tuesday, 22. Sandra Horne MD  [facility])  Office #:     [phone]  [phone]   Office Fax: [phone]     Microbiology:   blood cultures: No growth to date   urine culture no course to date   respiratory viral culture:  positive for parainfluenza 3 negative for all other pathogens    Lines / Catheters:  Peripheral  ET   Right IJ      Subjective:   Patient seen and examined at bedside. DW RN  Is irritable/agitates at times  No F/C/R/hypoxia  No new changes. No new events per nursing.      Pending discharge/placement    Objective:        Visit Vitals  /81   Pulse 60   Temp 97.4 °F (36.3 °C)   Resp 16   Ht 5' 8\" (1.727 m)   Wt 56.7 kg (125 lb)   SpO2 99%   BMI 19.01 kg/m²     Temp (24hrs), Av.4 °F (36.3 °C), Min:97.4 °F (36.3 °C), Max:97.4 °F (36.3 °C)        General:  Sleepng On RA           Lymph Nodes:   not assessed today   Lungs:   non laboured breathing           Genitourinary:  deferred           Psychiatric:  , Sleeping         Lab results:    Chemistry  No results for input(s): GLU, NA, K, CL, CO2, BUN, CREA, CA, AGAP, BUCR, TBIL, AP, TP, ALB, GLOB, AGRAT in the last 72 hours. No lab exists for component: GPT    CBC w/ Diff  No results for input(s): WBC, RBC, HGB, HCT, PLT, GRANS, LYMPH, EOS, HGBEXT, HCTEXT, PLTEXT, HGBEXT, HCTEXT, PLTEXT in the last 72 hours.     Microbiology  All Micro Results     Procedure Component Value Units Date/Time    CULTURE, BLOOD [[id number]] Collected: 05/26/22 1100    Order Status: Completed Specimen: Blood Updated: 06/01/22 0641     Special Requests: NO SPECIAL REQUESTS        Culture result: NO GROWTH 6 DAYS       CULTURE, BLOOD [[id number]] Collected: 05/26/22 1200    Order Status: Completed Specimen: Blood Updated: 06/01/22 0641     Special Requests: NO SPECIAL REQUESTS        Culture result: NO GROWTH 6 DAYS       CULTURE, RESPIRATORY/SPUTUM/BRONCH Yuly Simba STAIN [[id number]] Collected: 05/28/22 0250    Order Status: Completed Specimen: Sputum Updated: 05/30/22 0927     Special Requests: NO SPECIAL REQUESTS        GRAM STAIN RARE WBCS SEEN               RARE EPITHELIAL CELLS SEEN            NO ORGANISMS SEEN        Culture result:       RARE NORMAL RESPIRATORY GENARO          CULTURE, URINE [[id number]] Collected: 05/26/22 1234    Order Status: Completed Specimen: Urine from Clean catch Updated: 05/28/22 1226     Special Requests: NO SPECIAL REQUESTS        Culture result: No growth (<1,000 CFU/ML)       RESPIRATORY VIRUS PANEL W/COVID-19, PCR [[id number]]  (Abnormal) Collected: 05/26/22 1309    Order Status: Completed Specimen: Nasopharyngeal Updated: 05/26/22 1452     Adenovirus Not detected        Coronavirus 229E Not detected        Coronavirus HKU1 Not detected        Coronavirus CVNL63 Not detected Coronavirus OC43 Not detected        SARS-CoV-2, PCR Not detected        Metapneumovirus Not detected        Rhinovirus and Enterovirus Not detected        Influenza A Not detected        Influenza A, subtype H1 Not detected        Influenza A, subtype H3 Not detected        INFLUENZA A H1N1 PCR Not detected        Influenza B Not detected        Parainfluenza 1 Not detected        Parainfluenza 2 Not detected        Parainfluenza 3 Detected        Parainfluenza virus 4 Not detected        RSV by PCR Not detected        B. parapertussis, PCR Not detected        Bordetella pertussis - PCR Not detected        Chlamydophila pneumoniae DNA, QL, PCR Not detected        Mycoplasma pneumoniae DNA, QL, PCR Not detected              Ashley Flores MD   6/27/2022

## 2022-06-27 NOTE — PROGRESS NOTES
Comprehensive Nutrition Assessment    Type and Reason for Visit: Reassess,Consult    Nutrition Recommendations/Plan:   - Continue current diet consistent with goals of care- diet for comfort. Malnutrition Assessment:  Malnutrition Status: At risk for malnutrition (specify) (r/t NPO status) (05/27/22 1401)      Nutrition Assessment:    :  Pt continues diet for comfort. PO documentation shows good meal intake. Per care management note, PT still awaiting group home placement for d/c. Nutrition Related Findings:    Last BM noted in chart 6/16. No wounds. Labs and pertinent meds reviewed- Pepcid, immodium, Zofran, miralax. Wound Type: None    Current Nutrition Intake & Therapies:  Average Meal Intake: %  Average Supplement Intake: Unable to assess  ADULT DIET Dysphagia - Pureed; Less than 60 gm; No artificial sweeteners, legumes, nuts. LIMIT eggs, dairy, meat. Anthropometric Measures:  Height: 5' 8\" (172.7 cm)  Ideal Body Weight (IBW): 154 lbs (70 kg)     Current Body Wt:  56.7 kg (125 lb), 81.2 % IBW. Not specified  Current BMI (kg/m2): 19        Weight Adjustment: No adjustment                 BMI Category: Normal weight (BMI 18.5-24. 9)    Estimated Daily Nutrient Needs:  Energy Requirements Based On: Kcal/kg (25-30)  Weight Used for Energy Requirements: Current  Energy (kcal/day): 1418 -1701  Weight Used for Protein Requirements: Current (.6-.8)  Protein (g/day): 34 - 45  Method Used for Fluid Requirements: 1 ml/kcal  Fluid (ml/day): 1418 -1701    Nutrition Diagnosis:   No nutrition diagnosis at this time    Nutrition Interventions:   Food and/or Nutrient Delivery: Continue current diet  Nutrition Education/Counseling: Education not indicated  Coordination of Nutrition Care: Continue to monitor while inpatient  Plan of Care discussed with: MD    Goals:  Previous Goal Met: Goal(s) achieved  Goals: Meet at least 75% of estimated needs,by next RD assessment       Nutrition Monitoring and Evaluation: Behavioral-Environmental Outcomes: None identified  Food/Nutrient Intake Outcomes: Food and nutrient intake,Diet advancement/tolerance  Physical Signs/Symptoms Outcomes: Biochemical data,Meal time behavior    Discharge Planning:    No discharge needs at this time    [address]: [phone]

## 2022-06-27 NOTE — PROGRESS NOTES
Joseph Dugan CM with [facility] contact phone number [phone].             Lois Pete RN  Case Management [phone]

## 2022-06-27 NOTE — PROGRESS NOTES
CM called Vanna ROSENBAUMB at [facility] [phone], received voicemail, mailbox was full, and CM could not leave a message.              Glenis Moyer RN  Case Management [phone]

## 2022-06-27 NOTE — PROGRESS NOTES
Hospitalist Progress Note    Patient: Nicholas Ponce Age: 58 y.o. : 1960 MR#: [id number] SSN: [id number]  Date/Time: 2022     DOA: 2022  PCP: Fany Carpenter MD    Subjective:     Patient is sitting in bed in no apparent distress, sleepy    Interval Hospital Course:  64 y.o male with intellectual disability with h/o PKU (phenylketonuria), Seizure disorder, anxiety disorder, chronic urinary retention with singleton catheter requirement, presented to the ER 22 due to progressive lethargy and altered in his mentation. His group home staff reported that he was nonverbal for 48hrs, he was previously conversant and ambulatory. In the ER, he was found to be in acute respiratory failure with concern for Parainfluenza 3 infection. ID consulted and started him on zosyn for concern of aspiration. Due to his persistent respiratory failure and AMS, he was transferred to ICU for further monitoring. He was eventually intubated 22 due to worsened encephalopathy and became unresponsive. He was stabilized and was able to extubate on 22. ID continues him on zosyn. He stable to transfer out of ICU on 22. He was on room air at 97% O2 saturation. He underwent MBS with severe pharyngeal dysphagia with all consistencies. He was recommended on alternative feeding method. NG tube was placed but he kept fulling them out. PEG tube was considered but there was clear caution that he might be fulling them out at well. He was made NPO. He continued on slow improvement back to his baseline mentation. He was able to finish out his IV zosyn on 22. Per Dr Savita Davila note 2022:  \"-I discussed with patient's's brother and patient's brother's wife who is the only family members available, patient's father mother  no other siblings.   Patient's brother had some concerns regarding patient's condition, I explained him regarding progressive disease of patient especially in the face of oropharyngeal dysphagia patient may not able to get adequate nutrition attempts were made to put NG tube twice both times patient pulled out vigorously probably will do the same thing to PEG tube will not serve purpose for long-term nutritional support. I also discussed regarding patient's long-term care plan with his brother. After long discussion after answering to the questions and them(patient's brother and patient's brother's wife) understanding the gravity of situation decided they do not want any resuscitation or intubation making patient DNR/DNI. They also decided that at this point no aggressive treatment in form of artificial feeding. Their goal for comfort feeding. \"         Assessment/Plan:     1. Acute respiratory failure with hypoxia,        S/p intubated for airway protection, Extubated 5/29/22. 2.  Atypical pneumonia related to parainfluenza infection, resolved         Aspiration pneumonia concern   3. Sepsis ruled out   4. Acute toxic/metabolic encephalopathy on chronic intellectual disability   5. Intellectual disability with h/o PKU  6.  Seizure disorder, stable   7. Elevated LFT's, improving to baseline   8. Hypercholesteremia   9. Hypokalemia, resolved   10. Urinary retention  11. Severe pharyngeal dysphagia with aspiration risk, unable to maintain NG Tube and family has forgone PEG tube placement.  He remains on comfort feed only      Comfort feeding per family  Family has declined PEG tube  Keppra and Depakene  Zyprexa and Cogentin  DNR and DNI      Disposition-pending  Patient is medically stable  Discussed with case management during IDR    Case discussed with:  [x]Patient  []Family  []Nursing  [x]Case Management  DVT Prophylaxis:  []Lovenox  []Hep SQ  [x]SCDs  []Coumadin   []On Heparin gtt    Signed By: Lui Auguste MD     June 27, 2022               Objective:   VS:   Visit Vitals  /81   Pulse 60   Temp 97.4 °F (36.3 °C)   Resp 16   Ht 5' 8\" (1.727 m)   Wt 56.7 kg (125 lb)   SpO2 99% BMI 19.01 kg/m²      Tmax/24hrs: Temp (24hrs), Av.4 °F (36.3 °C), Min:97.4 °F (36.3 °C), Max:97.4 °F (36.3 °C)      Intake/Output Summary (Last 24 hours) at 2022 1635  Last data filed at 2022 1304  Gross per 24 hour   Intake 600 ml   Output --   Net 600 ml       General: Sleepy  Cardiovascular:  S1S2+, RRR  Pulmonary:  CTA b/l  GI:  Soft, BS+, NT, ND  Extremities:  No edema          Additional:       Current Facility-Administered Medications   Medication Dose Route Frequency    benztropine (COGENTIN) tablet 1 mg  1 mg Oral TID    trospium (SANCTURA) tablet 20 mg  20 mg Oral DAILY    famotidine (PEPCID) 40 mg/5 mL (8 mg/mL) oral suspension 20 mg  20 mg Oral DAILY    levETIRAcetam (KEPPRA) oral solution 500 mg  500 mg Oral BID    valproic acid (as sodium salt) (DEPAKENE) 250 mg/5 mL (5 mL) oral solution 500 mg  500 mg Oral Q12H    albuterol-ipratropium (DUO-NEB) 2.5 MG-0.5 MG/3 ML  3 mL Nebulization Q4H PRN    glucose chewable tablet 16 g  4 Tablet Oral PRN    glucagon (GLUCAGEN) injection 1 mg  1 mg IntraMUSCular PRN    dextrose 10% infusion 0-250 mL  0-250 mL IntraVENous PRN    clotrimazole-betamethasone (LOTRISONE) 1-0.05 % cream   Topical BID    loperamide (IMODIUM) capsule 2 mg  2 mg Oral QID PRN    OLANZapine (ZyPREXA) tablet 10 mg  10 mg Oral TID    tamsulosin (FLOMAX) capsule 0.4 mg  0.4 mg Oral DAILY    polyethylene glycol (MIRALAX) packet 17 g  17 g Oral DAILY PRN    ondansetron (ZOFRAN ODT) tablet 4 mg  4 mg Oral Q8H PRN    Or    ondansetron (ZOFRAN) injection 4 mg  4 mg IntraVENous Q6H PRN    acetaminophen (TYLENOL) suppository 650 mg  650 mg Rectal Q4H PRN            Lab/Data Review:  Labs: Results:       Chemistry No results for input(s): GLU, NA, K, CL, CO2, BUN, CREA, BUCR, AGAP, CA, PHOS in the last 72 hours. No results for input(s): TBIL, ALT, ALKP, TP, ALB, GLOB, AGRAT in the last 72 hours.     No lab exists for component: SGOT   CBC w/Diff No results for input(s): WBC, RBC, HGB, HCT, MCV, MCH, MCHC, RDW, PLT, BANDS, GRANS, LYMPH, EOS, HGBEXT, HCTEXT, PLTEXT, HGBEXT, HCTEXT, PLTEXT in the last 72 hours. Coagulation No results for input(s): PTP, INR, APTT, INREXT, INREXT in the last 72 hours.     Iron/Ferritin No results found for: IRON, FE, TIBC, IBCT, PSAT, FERR    BNP    Cardiac Enzymes No results found for: CPK, RCK1, RCK2, RCK3, RCK4, CKMB, CKNDX, CKND1, TROPT, TROIQ, BNPP, BNP     Lactic Acid    Thyroid Studies          All Micro Results     Procedure Component Value Units Date/Time    CULTURE, BLOOD [[id number]] Collected: 05/26/22 1100    Order Status: Completed Specimen: Blood Updated: 06/01/22 0641     Special Requests: NO SPECIAL REQUESTS        Culture result: NO GROWTH 6 DAYS       CULTURE, BLOOD [[id number]] Collected: 05/26/22 1200    Order Status: Completed Specimen: Blood Updated: 06/01/22 0641     Special Requests: NO SPECIAL REQUESTS        Culture result: NO GROWTH 6 DAYS       CULTURE, RESPIRATORY/SPUTUM/BRONCH Silvano Son STAIN [[id number]] Collected: 05/28/22 0250    Order Status: Completed Specimen: Sputum Updated: 05/30/22 0927     Special Requests: NO SPECIAL REQUESTS        GRAM STAIN RARE WBCS SEEN               RARE EPITHELIAL CELLS SEEN            NO ORGANISMS SEEN        Culture result:       RARE NORMAL RESPIRATORY GENARO          CULTURE, URINE [[id number]] Collected: 05/26/22 1234    Order Status: Completed Specimen: Urine from Clean catch Updated: 05/28/22 1226     Special Requests: NO SPECIAL REQUESTS        Culture result: No growth (<1,000 CFU/ML)       RESPIRATORY VIRUS PANEL W/COVID-19, PCR [[id number]]  (Abnormal) Collected: 05/26/22 1309    Order Status: Completed Specimen: Nasopharyngeal Updated: 05/26/22 1452     Adenovirus Not detected        Coronavirus 229E Not detected        Coronavirus HKU1 Not detected        Coronavirus CVNL63 Not detected        Coronavirus OC43 Not detected        SARS-CoV-2, PCR Not detected        Metapneumovirus Not detected        Rhinovirus and Enterovirus Not detected        Influenza A Not detected        Influenza A, subtype H1 Not detected        Influenza A, subtype H3 Not detected        INFLUENZA A H1N1 PCR Not detected        Influenza B Not detected        Parainfluenza 1 Not detected        Parainfluenza 2 Not detected        Parainfluenza 3 Detected        Parainfluenza virus 4 Not detected        RSV by PCR Not detected        B. parapertussis, PCR Not detected        Bordetella pertussis - PCR Not detected        Chlamydophila pneumoniae DNA, QL, PCR Not detected        Mycoplasma pneumoniae DNA, QL, PCR Not detected               Images:    CT (Most Recent). XRAY (Most Recent)      EKG No results found for this or any previous visit.      2D ECHO

## 2022-06-27 NOTE — TELEPHONE ENCOUNTER
I called and left a message for 's brother. I see the patient has been in the hospital for a month now. He has an appointment on Wednesday. I am not sure if he will make that appointment.

## 2022-06-27 NOTE — PROGRESS NOTES
Problem: Risk for Spread of Infection  Goal: Prevent transmission of infectious organism to others  Description: Prevent the transmission of infectious organisms to other patients, staff members, and visitors. Outcome: Progressing Towards Goal     Problem: Pressure Injury - Risk of  Goal: *Prevention of pressure injury  Description: Document Otoniel Scale and appropriate interventions in the flowsheet. Outcome: Progressing Towards Goal  Note: Pressure Injury Interventions:  Sensory Interventions: Assess changes in LOC    Moisture Interventions: Absorbent underpads    Activity Interventions: Pressure redistribution bed/mattress(bed type)    Mobility Interventions: HOB 30 degrees or less    Nutrition Interventions: Document food/fluid/supplement intake    Friction and Shear Interventions: HOB 30 degrees or less                Problem: Patient Education: Go to Patient Education Activity  Goal: Patient/Family Education  Outcome: Progressing Towards Goal     Problem: Falls - Risk of  Goal: *Absence of Falls  Description: Document Syl Fall Risk and appropriate interventions in the flowsheet.   Outcome: Progressing Towards Goal  Note: Fall Risk Interventions:  Mobility Interventions: Bed/chair exit alarm    Mentation Interventions: Bed/chair exit alarm    Medication Interventions: Patient to call before getting OOB    Elimination Interventions: Call light in reach    History of Falls Interventions: Bed/chair exit alarm         Problem: Injury - Risk of, Adverse Drug Event  Goal: *Absence of adverse drug events  Outcome: Progressing Towards Goal     Problem: Pain  Goal: *Control of Pain  Outcome: Progressing Towards Goal

## 2022-06-28 NOTE — PROGRESS NOTES
1000- Patient was combative and screaming while administering medication   Will continue to monitor. 1500- Patient had feces on the floor CNA was able to  Perform ADLs on patient  Will continue to monitor. 1900- Report given to oncoming nurse including SBAR and plan of care.

## 2022-06-28 NOTE — PROGRESS NOTES
Hospitalist Progress Note    Patient: Alejandra Kim Age: 58 y.o. : 1960 MR#: [id number] SSN: [id number]  Date/Time: 2022     DOA: 2022  PCP: Cynthia Garcia MD    Subjective:     Patient is sitting in bed in no apparent distress, has been agitated as per staff    Interval Hospital Course:  64 y.o male with intellectual disability with h/o PKU (phenylketonuria), Seizure disorder, anxiety disorder, chronic urinary retention with singleton catheter requirement, presented to the ER 22 due to progressive lethargy and altered in his mentation. His group home staff reported that he was nonverbal for 48hrs, he was previously conversant and ambulatory. In the ER, he was found to be in acute respiratory failure with concern for Parainfluenza 3 infection. ID consulted and started him on zosyn for concern of aspiration. Due to his persistent respiratory failure and AMS, he was transferred to ICU for further monitoring. He was eventually intubated 22 due to worsened encephalopathy and became unresponsive. He was stabilized and was able to extubate on 22. ID continues him on zosyn. He stable to transfer out of ICU on 22. He was on room air at 97% O2 saturation. He underwent MBS with severe pharyngeal dysphagia with all consistencies. He was recommended on alternative feeding method. NG tube was placed but he kept fulling them out. PEG tube was considered but there was clear caution that he might be fulling them out at well. He was made NPO. He continued on slow improvement back to his baseline mentation. He was able to finish out his IV zosyn on 22. Per Dr Axel Torre note 2022:  \"-I discussed with patient's's brother and patient's brother's wife who is the only family members available, patient's father mother  no other siblings.   Patient's brother had some concerns regarding patient's condition, I explained him regarding progressive disease of patient especially in the face of oropharyngeal dysphagia patient may not able to get adequate nutrition attempts were made to put NG tube twice both times patient pulled out vigorously probably will do the same thing to PEG tube will not serve purpose for long-term nutritional support. I also discussed regarding patient's long-term care plan with his brother. After long discussion after answering to the questions and them(patient's brother and patient's brother's wife) understanding the gravity of situation decided they do not want any resuscitation or intubation making patient DNR/DNI. They also decided that at this point no aggressive treatment in form of artificial feeding. Their goal for comfort feeding. \"         Assessment/Plan:     1. Acute respiratory failure with hypoxia,        S/p intubated for airway protection, Extubated 5/29/22. 2.  Atypical pneumonia related to parainfluenza infection, resolved         Aspiration pneumonia concern   3. Sepsis ruled out   4. Acute toxic/metabolic encephalopathy on chronic intellectual disability   5. Intellectual disability with h/o PKU  6.  Seizure disorder, stable   7. Elevated LFT's, improving to baseline   8. Hypercholesteremia   9. Hypokalemia, resolved   10. Urinary retention  11. Severe pharyngeal dysphagia with aspiration risk, unable to maintain NG Tube and family has forgone PEG tube placement. He remains on comfort feed only      Comfort feeding per family  I called and discussed with patient's brother at phone #[phone]. I discussed with brother regarding PEG tube placement and he states that family is now agreeable for PEG tube placement.   GI consult  Keppra and Depakene  Zyprexa and Cogentin  DNR and DNI      Disposition-pending  Patient is medically stable  Discussed with     Case discussed with:  [x]Patient  []Family  []Nursing  [x]Case Management  DVT Prophylaxis:  []Lovenox  []Hep SQ  [x]SCDs  []Coumadin   []On Heparin gtt    Signed By: Sera Hicks MD     2022               Objective:   VS:   Visit Vitals  BP (!) 117/59   Pulse 93   Temp 97.3 °F (36.3 °C)   Resp 17   Ht 5' 8\" (1.727 m)   Wt 56.7 kg (125 lb)   SpO2 96%   BMI 19.01 kg/m²      Tmax/24hrs: Temp (24hrs), Av.5 °F (36.4 °C), Min:97.3 °F (36.3 °C), Max:97.6 °F (36.4 °C)      Intake/Output Summary (Last 24 hours) at 2022 1808  Last data filed at 2022 1744  Gross per 24 hour   Intake 340 ml   Output --   Net 340 ml       General: Sleepy  Cardiovascular:  S1S2+, RRR  Pulmonary:  CTA b/l  GI:  Soft, BS+, NT, ND  Extremities:  No edema          Additional:       Current Facility-Administered Medications   Medication Dose Route Frequency    benztropine (COGENTIN) tablet 1 mg  1 mg Oral TID    haloperidol lactate (HALDOL) injection 2 mg  2 mg IntraMUSCular Q6H PRN    trospium (SANCTURA) tablet 20 mg  20 mg Oral DAILY    famotidine (PEPCID) 40 mg/5 mL (8 mg/mL) oral suspension 20 mg  20 mg Oral DAILY    levETIRAcetam (KEPPRA) oral solution 500 mg  500 mg Oral BID    valproic acid (as sodium salt) (DEPAKENE) 250 mg/5 mL (5 mL) oral solution 500 mg  500 mg Oral Q12H    albuterol-ipratropium (DUO-NEB) 2.5 MG-0.5 MG/3 ML  3 mL Nebulization Q4H PRN    glucose chewable tablet 16 g  4 Tablet Oral PRN    glucagon (GLUCAGEN) injection 1 mg  1 mg IntraMUSCular PRN    dextrose 10% infusion 0-250 mL  0-250 mL IntraVENous PRN    clotrimazole-betamethasone (LOTRISONE) 1-0.05 % cream   Topical BID    loperamide (IMODIUM) capsule 2 mg  2 mg Oral QID PRN    OLANZapine (ZyPREXA) tablet 10 mg  10 mg Oral TID    tamsulosin (FLOMAX) capsule 0.4 mg  0.4 mg Oral DAILY    polyethylene glycol (MIRALAX) packet 17 g  17 g Oral DAILY PRN    ondansetron (ZOFRAN ODT) tablet 4 mg  4 mg Oral Q8H PRN    Or    ondansetron (ZOFRAN) injection 4 mg  4 mg IntraVENous Q6H PRN    acetaminophen (TYLENOL) suppository 650 mg  650 mg Rectal Q4H PRN            Lab/Data Review:  Labs: Results:       Chemistry No results for input(s): GLU, NA, K, CL, CO2, BUN, CREA, BUCR, AGAP, CA, PHOS in the last 72 hours. No results for input(s): TBIL, ALT, ALKP, TP, ALB, GLOB, AGRAT in the last 72 hours. No lab exists for component: SGOT   CBC w/Diff No results for input(s): WBC, RBC, HGB, HCT, MCV, MCH, MCHC, RDW, PLT, BANDS, GRANS, LYMPH, EOS, HGBEXT, HCTEXT, PLTEXT, HGBEXT, HCTEXT, PLTEXT in the last 72 hours. Coagulation No results for input(s): PTP, INR, APTT, INREXT, INREXT in the last 72 hours.     Iron/Ferritin No results found for: IRON, FE, TIBC, IBCT, PSAT, FERR    BNP    Cardiac Enzymes No results found for: CPK, RCK1, RCK2, RCK3, RCK4, CKMB, CKNDX, CKND1, TROPT, TROIQ, BNPP, BNP     Lactic Acid    Thyroid Studies          All Micro Results     Procedure Component Value Units Date/Time    CULTURE, BLOOD [[id number]] Collected: 05/26/22 1100    Order Status: Completed Specimen: Blood Updated: 06/01/22 0641     Special Requests: NO SPECIAL REQUESTS        Culture result: NO GROWTH 6 DAYS       CULTURE, BLOOD [[id number]] Collected: 05/26/22 1200    Order Status: Completed Specimen: Blood Updated: 06/01/22 0641     Special Requests: NO SPECIAL REQUESTS        Culture result: NO GROWTH 6 DAYS       CULTURE, RESPIRATORY/SPUTUM/BRONCH Myrle Loach STAIN [[id number]] Collected: 05/28/22 0250    Order Status: Completed Specimen: Sputum Updated: 05/30/22 0927     Special Requests: NO SPECIAL REQUESTS        GRAM STAIN RARE WBCS SEEN               RARE EPITHELIAL CELLS SEEN            NO ORGANISMS SEEN        Culture result:       RARE NORMAL RESPIRATORY GENARO          CULTURE, URINE [[id number]] Collected: 05/26/22 1234    Order Status: Completed Specimen: Urine from Clean catch Updated: 05/28/22 1226     Special Requests: NO SPECIAL REQUESTS        Culture result: No growth (<1,000 CFU/ML)       RESPIRATORY VIRUS PANEL W/COVID-19, PCR [[id number]]  (Abnormal) Collected: 05/26/22 1309    Order Status: Completed Specimen: Nasopharyngeal Updated: 05/26/22 1452     Adenovirus Not detected        Coronavirus 229E Not detected        Coronavirus HKU1 Not detected        Coronavirus CVNL63 Not detected        Coronavirus OC43 Not detected        SARS-CoV-2, PCR Not detected        Metapneumovirus Not detected        Rhinovirus and Enterovirus Not detected        Influenza A Not detected        Influenza A, subtype H1 Not detected        Influenza A, subtype H3 Not detected        INFLUENZA A H1N1 PCR Not detected        Influenza B Not detected        Parainfluenza 1 Not detected        Parainfluenza 2 Not detected        Parainfluenza 3 Detected        Parainfluenza virus 4 Not detected        RSV by PCR Not detected        B. parapertussis, PCR Not detected        Bordetella pertussis - PCR Not detected        Chlamydophila pneumoniae DNA, QL, PCR Not detected        Mycoplasma pneumoniae DNA, QL, PCR Not detected               Images:    CT (Most Recent). XRAY (Most Recent)      EKG No results found for this or any previous visit.      2D ECHO

## 2022-06-28 NOTE — PROGRESS NOTES
Consult received for PEG. Will evaluate in AM. Hx suggests he will be at very high risk to pull out his PEG which can lead to more serious complications. Comfort feeds recommended earlier this month.     Isabel Coronado MD

## 2022-06-28 NOTE — PROGRESS NOTES
Problem: Patient Education:  Go to Education Activity  Goal: Patient/Family Education  Outcome: Progressing Towards Goal     Problem: Patient Education: Go to Patient Education Activity  Goal: Patient/Family Education  Outcome: Progressing Towards Goal     Problem: Nutrition Deficit  Goal: *Optimize nutritional status  Outcome: Progressing Towards Goal     Problem: Falls - Risk of  Goal: *Absence of Falls  Description: Document Jadon Iglesias Fall Risk and appropriate interventions in the flowsheet.   Outcome: Progressing Towards Goal  Note: Fall Risk Interventions:  Mobility Interventions: Assess mobility with egress test    Mentation Interventions: Adequate sleep, hydration, pain control    Medication Interventions: Bed/chair exit alarm    Elimination Interventions: Bed/chair exit alarm    History of Falls Interventions: Bed/chair exit alarm         Problem: Pain  Goal: *Control of Pain  Outcome: Progressing Towards Goal

## 2022-06-28 NOTE — PROGRESS NOTES
Problem: Risk for Spread of Infection  Goal: Prevent transmission of infectious organism to others  Description: Prevent the transmission of infectious organisms to other patients, staff members, and visitors. Outcome: Progressing Towards Goal     Problem: Patient Education:  Go to Education Activity  Goal: Patient/Family Education  Outcome: Progressing Towards Goal     Problem: Pressure Injury - Risk of  Goal: *Prevention of pressure injury  Description: Document Otoniel Scale and appropriate interventions in the flowsheet. Outcome: Progressing Towards Goal  Note: Pressure Injury Interventions:  Sensory Interventions: Assess changes in LOC    Moisture Interventions: Absorbent underpads,Check for incontinence Q2 hours and as needed,Internal/External fecal devices    Activity Interventions: Assess need for specialty bed    Mobility Interventions: Assess need for specialty bed,Pressure redistribution bed/mattress (bed type),Turn and reposition approx. every two hours(pillow and wedges)    Nutrition Interventions: Document food/fluid/supplement intake    Friction and Shear Interventions: Apply protective barrier, creams and emollients                Problem: Patient Education: Go to Patient Education Activity  Goal: Patient/Family Education  Outcome: Progressing Towards Goal     Problem: Patient Education: Go to Patient Education Activity  Goal: Patient/Family Education  Outcome: Progressing Towards Goal     Problem: Nutrition Deficit  Goal: *Optimize nutritional status  Outcome: Progressing Towards Goal     Problem: Falls - Risk of  Goal: *Absence of Falls  Description: Document Syl Fall Risk and appropriate interventions in the flowsheet.   Outcome: Progressing Towards Goal  Note: Fall Risk Interventions:  Mobility Interventions: Assess mobility with egress test,Patient to call before getting OOB,Strengthening exercises (ROM-active/passive)    Mentation Interventions: Adequate sleep, hydration, pain control    Medication Interventions: Bed/chair exit alarm,Teach patient to arise slowly    Elimination Interventions: Bed/chair exit alarm,Call light in reach    History of Falls Interventions: Bed/chair exit alarm         Problem: Patient Education: Go to Patient Education Activity  Goal: Patient/Family Education  Outcome: Progressing Towards Goal     Problem: Injury - Risk of, Adverse Drug Event  Goal: *Absence of adverse drug events  Outcome: Progressing Towards Goal  Goal: *Absence of medication errors  Outcome: Progressing Towards Goal  Goal: *Knowledge of prescribed medications  Outcome: Progressing Towards Goal     Problem: Patient Education: Go to Patient Education Activity  Goal: Patient/Family Education  Outcome: Progressing Towards Goal     Problem: Pain  Goal: *Control of Pain  Outcome: Progressing Towards Goal  Goal: *PALLIATIVE CARE:  Alleviation of Pain  Outcome: Progressing Towards Goal     Problem: Patient Education: Go to Patient Education Activity  Goal: Patient/Family Education  Outcome: Progressing Towards Goal     Problem: Patient Education: Go to Patient Education Activity  Goal: Patient/Family Education  Outcome: Progressing Towards Goal     Problem: Patient Education: Go to Patient Education Activity  Goal: Patient/Family Education  Outcome: Progressing Towards Goal

## 2022-06-29 NOTE — PROGRESS NOTES
Problem: Risk for Spread of Infection  Goal: Prevent transmission of infectious organism to others  Description: Prevent the transmission of infectious organisms to other patients, staff members, and visitors. Outcome: Progressing Towards Goal     Problem: Patient Education:  Go to Education Activity  Goal: Patient/Family Education  Outcome: Progressing Towards Goal     Problem: Pressure Injury - Risk of  Goal: *Prevention of pressure injury  Description: Document Otoniel Scale and appropriate interventions in the flowsheet. Outcome: Progressing Towards Goal  Note: Pressure Injury Interventions:  Sensory Interventions: Assess changes in LOC    Moisture Interventions: Absorbent underpads,Check for incontinence Q2 hours and as needed    Activity Interventions: Increase time out of bed,Pressure redistribution bed/mattress(bed type)    Mobility Interventions: Pressure redistribution bed/mattress (bed type),HOB 30 degrees or less,Turn and reposition approx. every two hours(pillow and wedges)    Nutrition Interventions: Document food/fluid/supplement intake,Discuss nutritional consult with provider    Friction and Shear Interventions: Apply protective barrier, creams and emollients                Problem: Patient Education: Go to Patient Education Activity  Goal: Patient/Family Education  Outcome: Progressing Towards Goal     Problem: Patient Education: Go to Patient Education Activity  Goal: Patient/Family Education  Outcome: Progressing Towards Goal     Problem: Nutrition Deficit  Goal: *Optimize nutritional status  Outcome: Progressing Towards Goal     Problem: Falls - Risk of  Goal: *Absence of Falls  Description: Document Syl Fall Risk and appropriate interventions in the flowsheet.   Outcome: Progressing Towards Goal  Note: Fall Risk Interventions:  Mobility Interventions: Bed/chair exit alarm,Patient to call before getting OOB,Utilize gait belt for transfers/ambulation    Mentation Interventions: Adequate sleep, hydration, pain control    Medication Interventions: Bed/chair exit alarm,Teach patient to arise slowly    Elimination Interventions: Bed/chair exit alarm,Call light in reach    History of Falls Interventions: Bed/chair exit alarm         Problem: Patient Education: Go to Patient Education Activity  Goal: Patient/Family Education  Outcome: Progressing Towards Goal     Problem: Injury - Risk of, Adverse Drug Event  Goal: *Absence of adverse drug events  Outcome: Progressing Towards Goal  Goal: *Absence of medication errors  Outcome: Progressing Towards Goal  Goal: *Knowledge of prescribed medications  Outcome: Progressing Towards Goal     Problem: Patient Education: Go to Patient Education Activity  Goal: Patient/Family Education  Outcome: Progressing Towards Goal     Problem: Pain  Goal: *Control of Pain  Outcome: Progressing Towards Goal  Goal: *PALLIATIVE CARE:  Alleviation of Pain  Outcome: Progressing Towards Goal     Problem: Patient Education: Go to Patient Education Activity  Goal: Patient/Family Education  Outcome: Progressing Towards Goal     Problem: Patient Education: Go to Patient Education Activity  Goal: Patient/Family Education  Outcome: Progressing Towards Goal     Problem: Patient Education: Go to Patient Education Activity  Goal: Patient/Family Education  Outcome: Progressing Towards Goal

## 2022-06-29 NOTE — CONSULTS
[email or web address]  [phone]    GASTROENTEROLOGY CONSULT      Impression:   1. Severe pharyngeal dysphagia w/ aspiration risk - secondary to #2/3. unable to maintain NG tube as patient \"vigorourously\" pulled out NG tube x2. Previous recommendations - with family supportive of comfort feeds. 2. Acute toxic/metabolic encephalopathy w/ chronic baseline deficit   3. Phenylketonuria   4. Acute respiratory failure w/ hypoxia   5. Atypical pneumonia - resolved  6. Elevated LFT  7. Seizure disorder  8. Chronic urinary retention w/ singleton catheter       Plan:     1. With recent NG tube events and chronic behavior, PEG not appropriate or beneficial for nutritional support as patient would be very high risk of infection and serious complications. Recommend comfort feeds. Discussed concerns with patient's brother Loli Dewitt whom voiced understanding. 2. Continue medical management per primary team.  3. GI to sign off. Thank you for this consultation and the opportunity to participate in the care of this patient. Please do not hesitate to call with any questions or concerns, or should event occur that may necessitate additional GI evaluation. Chief Complaint: PEG evaluation      HPI:  Viktor Montelongo is a 58 y.o. male w/ PMH Phenylketonuria, seizure disorder, and anxiety who I am being asked to see in consultation for an opinion regarding possible PEG placement. Patient presented to the ED 5/26 with progressive lethargy and AMS. ED w/u significant for acute respiratory failure and AMS. Clinical course was complicated by MBS w/ severe pharyngeal dysphagia with all consistencies. NGT subsequently placed but \"vigourously\" removed by patient x2.      PMH:   Past Medical History:   Diagnosis Date    Anxiety disorder     Dermatitis     High cholesterol     Intellectual disability     [facility]) [facility] [address]     Seizures ([facility] [address]     Urinary retention        PSH:   Past Surgical History:   Procedure Laterality Date    COLONOSCOPY N/A 10/19/2021    COLONOSCOPY performed by Emerald Bartlett MD at [facility] HX GI  03/15/2022    Excision and Fulgeration Anal Condyloma     HX PROSTATE SURGERY      bph    HX UROLOGICAL      retention       Social HX:   Social History     Socioeconomic History    Marital status: SINGLE     Spouse name: Not on file    Number of children: Not on file    Years of education: Not on file    Highest education level: Not on file   Occupational History    Not on file   Tobacco Use    Smoking status: Never Smoker    Smokeless tobacco: Never Used   Vaping Use    Vaping Use: Never used   Substance and Sexual Activity    Alcohol use: Never    Drug use: Never    Sexual activity: Never   Other Topics Concern     Service No    Blood Transfusions No    Caffeine Concern No    Occupational Exposure No    Hobby Hazards No    Sleep Concern No    Stress Concern No    Weight Concern No    Special Diet No    Back Care No    Exercise No    Bike Helmet No    Seat Belt No    Self-Exams No   Social History Narrative    Not on file     Social Determinants of Health     Financial Resource Strain:     Difficulty of Paying Living Expenses: Not on file   Food Insecurity:     Worried About Running Out of Food in the Last Year: Not on file    Mango of Food in the Last Year: Not on file   Transportation Needs:     Lack of Transportation (Medical): Not on file    Lack of Transportation (Non-Medical):  Not on file   Physical Activity:     Days of Exercise per Week: Not on file    Minutes of Exercise per Session: Not on file   Stress:     Feeling of Stress : Not on file   Social Connections:     Frequency of Communication with Friends and Family: Not on file    Frequency of Social Gatherings with Friends and Family: Not on file    Attends [employer] Services: Not on file    Active Member of Clubs or Organizations: Not on file    Attends Club or Organization Meetings: Not on file    Marital Status: Not on file   Intimate Partner Violence:     Fear of Current or Ex-Partner: Not on file    Emotionally Abused: Not on file    Physically Abused: Not on file    Sexually Abused: Not on file   Housing Stability:     Unable to Pay for Housing in the Last Year: Not on file    Number of [place] in the Last Year: Not on file    Unstable Housing in the Last Year: Not on file       FHX:   Family History   Family history unknown: Yes       Allergy:   Allergies   Allergen Reactions    Phenylalanine Other (comments)     PKU       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    Pneumonia J18.9    Encounter for palliative care Z51.5    Dysphagia R13.10    Debility R53.81       Home Medications:     Medications Prior to Admission   Medication Sig    finasteride (PROSCAR) 5 mg tablet Take 5 mg by mouth daily.  Myrbetriq 25 mg ER tablet Take 25 mg by mouth daily.  triamcinolone acetonide (KENALOG) 0.1 % topical cream Apply  to affected area two (2) times a day. Apply to face    polyethylene glycol (MIRALAX) 17 gram packet Take 17 g by mouth daily.  levETIRAcetam (KEPPRA) 500 mg tablet Take 500 mg by mouth two (2) times a day.  cetirizine (ZYRTEC) 10 mg tablet Take  by mouth every six (6) hours as needed for Allergies.  LORazepam (ATIVAN) 0.5 mg tablet Take 0.5 mg by mouth two (2) times a day.  OLANZapine (ZyPREXA) 5 mg tablet Take 10 mg by mouth three (3) times daily.  benztropine (COGENTIN) 1 mg tablet Take 1 mg by mouth three (3) times daily.  divalproex DR (DEPAKOTE) 500 mg tablet Take 500 mg by mouth two (2) times a day.  nystatin (MYCOSTATIN) topical cream Apply  to affected area two (2) times a day. To axilla and both arms    acetaminophen (TYLENOL) 325 mg tablet Take 650 mg by mouth every six (6) hours as needed for Pain.     LORazepam (ATIVAN) 1 mg tablet Take 1 mg by mouth every six (6) hours as needed for Anxiety.  pravastatin (PravachoL) 20 mg tablet Take 20 mg by mouth nightly.  tamsulosin (FLOMAX) 0.4 mg capsule Take 0.4 mg by mouth two (2) times a day.  hydrocortisone (CORTAID) 1 % topical cream Apply  to affected area two (2) times a day. use thin layer    guaiFENesin (ROBITUSSIN) 100 mg/5 mL liquid Take 100 mg by mouth four (4) times daily as needed for Cough.  bismuth subsalicylate (BismatroL) 262 mg/15 mL suspension Take 10 mL by mouth every six (6) hours as needed for Indigestion.  loperamide (IMODIUM) 2 mg capsule Take 2 mg by mouth four (4) times daily as needed for Diarrhea.  ondansetron hcl (Zofran) 4 mg tablet Take 4 mg by mouth every eight (8) hours as needed for Nausea or Vomiting.  magnesium hydroxide ([employer]well Milk of Magnesia) 400 mg/5 mL suspension Take 10 mL by mouth daily as needed for Constipation. Review of Systems:     Unable to obtain secondary to mental status     Visit Vitals  /67   Pulse 94   Temp 98.2 °F (36.8 °C)   Resp 18   Ht 5' 8\" (1.727 m)   Wt 58.1 kg (128 lb)   SpO2 94%   BMI 19.46 kg/m²       Physical Assessment:     constitutional: thin habitus, irritable/agitated, in no acute distress. skin: no rashes, ulcers, icterus or other lesions  eyes: normal conjunctivae and lids; no jaundice pupils: normal  HEENT: normocephalic, atraumatic  neck: supple, normal ROM   respiratory: normal chest excursion; no intercostal retraction or accessory muscle use; clear to ascultation bilaterally    cardiovascular: regular rate and rhythm, no murmur, rub or gallop.   abdominal: non-distended, active bowel sounds, soft, non-tender, non-acute, no palpable masses or hernias. liver/spleen: not palpable. extremities: no significant deformity or contracture, no edema.  Gait not assessed   neurologic: cranial nerves: grossly normal.  psychiatric: at baseline       Basic Metabolic Profile   No results for input(s): NA, K, CL, CO2, BUN, GLU, CA, MG, PHOS in the last 72 hours. No lab exists for component: CREAT      CBC w/Diff    No results for input(s): WBC, RBC, HGB, HCT, MCV, MCH, MCHC, RDW, PLT, HGBEXT, HCTEXT, PLTEXT in the last 72 hours. No lab exists for component: MPV No results for input(s): GRANS, LYMPH, EOS, PRO, MYELO, METAS, BLAST in the last 72 hours. No lab exists for component: MONO, BASO     Hepatic Function   No results for input(s): ALB, TP, TBILI, AP, AML, LPSE in the last 72 hours. No lab exists for component: DBILI, GPT, SGOT     Coags   No results for input(s): PTP, INR, APTT, INREXT in the last 72 hours. Radha Banuelos PA-C.   06/29/22, 9:08 AM   [facility]  [email or web address]  Phone: [phone]  Pager: [phone]

## 2022-06-29 NOTE — PROGRESS NOTES
received a call from Cheryl Summers at [facility] regarding discharge planning.  informed Dulce Maria Art that the patient was seen by gastroenology today and they indicated that he is not appropriate for PEG at this time.  informed Dulce Maria Sang that PEG is not appropriate or beneficial for patient because the patient would be high risk of infection and serious complications. Dulce Maria Sang indicated that she wants to speak with the physician because she does not understanding why they will not place the PEG tube.  informed Dulce Maria Art that she would have the physician reach out to her.       EARNEST Daniel

## 2022-06-29 NOTE — PROGRESS NOTES
spoke with the patient's brother, Kate Whittington, regarding discharge planning.  confirmed with the patient's brother that he does not want the feeding tube. The patient's brother indicated that he does not want the patient to have feeding tube.  informed the patient's brother that the csb said they are stepping away from the case because the waiver does not pay for nursing homes. The patient's brother indicated that he is aware. The patient's brother indicated that he is willing to send his brother to nursing home.  discussed with the patient's brother nursing home placement locations. The patient's brother indicated that he is open to [place], [place], and [place]. The patient's brother indicated that he does not want his brother to go to any [place] [address].  voiced an understanding and informed the patient's brother that she would start looking for nursing home placements. The patient's brother voiced an understanding. The patient's brother inquired if the medicaid and social security will cover the cost.   informed the patient's brother that the facility would discuss with him because the price various depending on the location. The patient's brother voiced an understanding.       EARNEST Rodriguez

## 2022-06-29 NOTE — PROGRESS NOTES
Hospitalist Progress Note    Patient: Palmira Bledsoe Age: 58 y.o. : 1960 MR#: [id number] SSN: [id number]  Date/Time: 2022     DOA: 2022  PCP: Ramos Goldman MD    Subjective:     Patient is sitting in bed in no apparent distress    Interval Hospital Course:  64 y.o male with intellectual disability with h/o PKU (phenylketonuria), Seizure disorder, anxiety disorder, chronic urinary retention with singleton catheter requirement, presented to the ER 22 due to progressive lethargy and altered in his mentation. His group home staff reported that he was nonverbal for 48hrs, he was previously conversant and ambulatory. In the ER, he was found to be in acute respiratory failure with concern for Parainfluenza 3 infection. ID consulted and started him on zosyn for concern of aspiration. Due to his persistent respiratory failure and AMS, he was transferred to ICU for further monitoring. He was eventually intubated 22 due to worsened encephalopathy and became unresponsive. He was stabilized and was able to extubate on 22. ID continues him on zosyn. He stable to transfer out of ICU on 22. He was on room air at 97% O2 saturation. He underwent MBS with severe pharyngeal dysphagia with all consistencies. He was recommended on alternative feeding method. NG tube was placed but he kept fulling them out. PEG tube was considered but there was clear caution that he might be fulling them out at well. He was made NPO. He continued on slow improvement back to his baseline mentation. He was able to finish out his IV zosyn on 22. Per Dr Sinclair Stanhope note 2022:  \"-I discussed with patient's's brother and patient's brother's wife who is the only family members available, patient's father mother  no other siblings.   Patient's brother had some concerns regarding patient's condition, I explained him regarding progressive disease of patient especially in the face of oropharyngeal dysphagia patient may not able to get adequate nutrition attempts were made to put NG tube twice both times patient pulled out vigorously probably will do the same thing to PEG tube will not serve purpose for long-term nutritional support. I also discussed regarding patient's long-term care plan with his brother. After long discussion after answering to the questions and them(patient's brother and patient's brother's wife) understanding the gravity of situation decided they do not want any resuscitation or intubation making patient DNR/DNI. They also decided that at this point no aggressive treatment in form of artificial feeding. Their goal for comfort feeding. \"         Assessment/Plan:     1. Acute respiratory failure with hypoxia,        S/p intubated for airway protection, Extubated 5/29/22. 2.  Atypical pneumonia related to parainfluenza infection, resolved         Aspiration pneumonia concern   3. Sepsis ruled out   4. Acute toxic/metabolic encephalopathy on chronic intellectual disability   5. Intellectual disability with h/o PKU  6.  Seizure disorder, stable   7. Elevated LFT's, improving to baseline   8. Hypercholesteremia   9. Hypokalemia, resolved   10. Urinary retention  11. Severe pharyngeal dysphagia with aspiration risk, unable to maintain NG Tube and family has forgone PEG tube placement. He remains on comfort feed only      On comfort feedings per family  GI input noted. GI feels that patient is at increased risk of pulling out PEG tube. I called patient's brother again today at phone #[phone] and discussed with him regarding the gastroenterology team's concern about patient pulling out PEG tube. We again discussed the risks and benefits of PEG tube placement and at this time brother declines PEG tube placement and wishes to continue comfort feeding.   Keppra and Depakene  Zyprexa and Cogentin  DNR and DNI      Disposition-pending  Patient is medically stable  Discussed with case management    Case discussed with:  [x]Patient  []Family  []Nursing  [x]Case Management  DVT Prophylaxis:  []Lovenox  []Hep SQ  [x]SCDs  []Coumadin   []On Heparin gtt    Signed By: Elizabeth Jimenez MD     2022               Objective:   VS:   Visit Vitals  /68   Pulse 90   Temp 98.2 °F (36.8 °C)   Resp 15   Ht 5' 8\" (1.727 m)   Wt 58.1 kg (128 lb)   SpO2 98%   BMI 19.46 kg/m²      Tmax/24hrs: Temp (24hrs), Av.2 °F (36.8 °C), Min:98.2 °F (36.8 °C), Max:98.2 °F (36.8 °C)      Intake/Output Summary (Last 24 hours) at 2022 1223  Last data filed at 2022 0910  Gross per 24 hour   Intake 280 ml   Output --   Net 280 ml       General:  Awake,   Cardiovascular:  S1S2+, RRR  Pulmonary:  CTA b/l  GI:  Soft, BS+, NT, ND  Extremities:  No edema            Additional:       Current Facility-Administered Medications   Medication Dose Route Frequency    benztropine (COGENTIN) tablet 1 mg  1 mg Oral TID    haloperidol lactate (HALDOL) injection 2 mg  2 mg IntraMUSCular Q6H PRN    trospium (SANCTURA) tablet 20 mg  20 mg Oral DAILY    famotidine (PEPCID) 40 mg/5 mL (8 mg/mL) oral suspension 20 mg  20 mg Oral DAILY    levETIRAcetam (KEPPRA) oral solution 500 mg  500 mg Oral BID    valproic acid (as sodium salt) (DEPAKENE) 250 mg/5 mL (5 mL) oral solution 500 mg  500 mg Oral Q12H    albuterol-ipratropium (DUO-NEB) 2.5 MG-0.5 MG/3 ML  3 mL Nebulization Q4H PRN    glucose chewable tablet 16 g  4 Tablet Oral PRN    glucagon (GLUCAGEN) injection 1 mg  1 mg IntraMUSCular PRN    dextrose 10% infusion 0-250 mL  0-250 mL IntraVENous PRN    clotrimazole-betamethasone (LOTRISONE) 1-0.05 % cream   Topical BID    loperamide (IMODIUM) capsule 2 mg  2 mg Oral QID PRN    OLANZapine (ZyPREXA) tablet 10 mg  10 mg Oral TID    tamsulosin (FLOMAX) capsule 0.4 mg  0.4 mg Oral DAILY    polyethylene glycol (MIRALAX) packet 17 g  17 g Oral DAILY PRN    ondansetron (ZOFRAN ODT) tablet 4 mg  4 mg Oral Q8H PRN    Or    ondansetron (ZOFRAN) injection 4 mg  4 mg IntraVENous Q6H PRN    acetaminophen (TYLENOL) suppository 650 mg  650 mg Rectal Q4H PRN            Lab/Data Review:  Labs: Results:       Chemistry No results for input(s): GLU, NA, K, CL, CO2, BUN, CREA, BUCR, AGAP, CA, PHOS in the last 72 hours. No results for input(s): TBIL, ALT, ALKP, TP, ALB, GLOB, AGRAT in the last 72 hours. No lab exists for component: SGOT   CBC w/Diff No results for input(s): WBC, RBC, HGB, HCT, MCV, MCH, MCHC, RDW, PLT, BANDS, GRANS, LYMPH, EOS, HGBEXT, HCTEXT, PLTEXT, HGBEXT, HCTEXT, PLTEXT in the last 72 hours. Coagulation No results for input(s): PTP, INR, APTT, INREXT, INREXT in the last 72 hours.     Iron/Ferritin No results found for: IRON, FE, TIBC, IBCT, PSAT, FERR    BNP    Cardiac Enzymes No results found for: CPK, RCK1, RCK2, RCK3, RCK4, CKMB, CKNDX, CKND1, TROPT, TROIQ, BNPP, BNP     Lactic Acid    Thyroid Studies          All Micro Results     Procedure Component Value Units Date/Time    CULTURE, BLOOD [[id number]] Collected: 05/26/22 1100    Order Status: Completed Specimen: Blood Updated: 06/01/22 0641     Special Requests: NO SPECIAL REQUESTS        Culture result: NO GROWTH 6 DAYS       CULTURE, BLOOD [[id number]] Collected: 05/26/22 1200    Order Status: Completed Specimen: Blood Updated: 06/01/22 0641     Special Requests: NO SPECIAL REQUESTS        Culture result: NO GROWTH 6 DAYS       CULTURE, RESPIRATORY/SPUTUM/BRONCH Kristy Chyle STAIN [[id number]] Collected: 05/28/22 0250    Order Status: Completed Specimen: Sputum Updated: 05/30/22 0927     Special Requests: NO SPECIAL REQUESTS        GRAM STAIN RARE WBCS SEEN               RARE EPITHELIAL CELLS SEEN            NO ORGANISMS SEEN        Culture result:       RARE NORMAL RESPIRATORY GENARO          CULTURE, URINE [[id number]] Collected: 05/26/22 1234    Order Status: Completed Specimen: Urine from Clean catch Updated: 05/28/22 1226     Special Requests: NO SPECIAL REQUESTS Culture result: No growth (<1,000 CFU/ML)       RESPIRATORY VIRUS PANEL W/COVID-19, PCR [[id number]]  (Abnormal) Collected: 05/26/22 1309    Order Status: Completed Specimen: Nasopharyngeal Updated: 05/26/22 1455     Adenovirus Not detected        Coronavirus 229E Not detected        Coronavirus HKU1 Not detected        Coronavirus CVNL63 Not detected        Coronavirus OC43 Not detected        SARS-CoV-2, PCR Not detected        Metapneumovirus Not detected        Rhinovirus and Enterovirus Not detected        Influenza A Not detected        Influenza A, subtype H1 Not detected        Influenza A, subtype H3 Not detected        INFLUENZA A H1N1 PCR Not detected        Influenza B Not detected        Parainfluenza 1 Not detected        Parainfluenza 2 Not detected        Parainfluenza 3 Detected        Parainfluenza virus 4 Not detected        RSV by PCR Not detected        B. parapertussis, PCR Not detected        Bordetella pertussis - PCR Not detected        Chlamydophila pneumoniae DNA, QL, PCR Not detected        Mycoplasma pneumoniae DNA, QL, PCR Not detected               Images:    CT (Most Recent). XRAY (Most Recent)      EKG No results found for this or any previous visit.      2D ECHO

## 2022-06-29 NOTE — PROGRESS NOTES
left a message for the patient's brother, Ellie Watts, to call  back to discuss discharge planning.       EARNEST Vanegas

## 2022-06-29 NOTE — PROGRESS NOTES
updated Dr. Elizabeth Garza on the patient's discharge plan.  informed Dr. Elizabeth Garza that she left a message for the patient's brother to call back to discuss nursing home placement. Dr. Elizabeth Garza voiced an understanding.       Charls Neighbours MSW

## 2022-06-29 NOTE — PROGRESS NOTES
received a call from the patient's csb worker, Melvin Salcido, indicating that the brother does want feeding tube.  explained that the doctor spoke with the patient's brother and he did not agree to feeding tube. Jenny Sue indicated that she staff the case with her supervisor and they no longer can assist.  Jenny Sue stated \"I am stepping away from the case. There nothing I can do. The  does not pay for nursing homes. \"   voiced an understanding.       EARNEST Molina

## 2022-06-29 NOTE — PROGRESS NOTES
sent a email to the csb worker, Christ Lincoln, indicating that she needs to speak with the patient's brother regarding other alternative discharge placements.       EARNEST De La Garza

## 2022-06-29 NOTE — PROGRESS NOTES
informed Dr. Carmen Dennis that the patient's accepting facility would like to talk with him about the PEG.  was informed by Dr. Carmen Dennis to do a conference call.  conference call ADIN Potter at [facility] ([phone]). Dr. Carmen Dennis reviewed the patient's history with Pina Crandall and informed her that he will put in another consult to gastro. Dr. Carmen Dennis informed Pina Crandall that the  will follow up with her after the consult.       EARNEST Payne

## 2022-06-29 NOTE — PROGRESS NOTES
was informed by Dr. Stacey Guerrero that the patient brother does not want the peg tube at this time.  voiced an understanding.       EARNEST Chaves

## 2022-06-29 NOTE — PROGRESS NOTES
placed long term care referral in [place], with clinicals, to [address], [address] ([address] [place] [address]), [facility], [address], Sherlyn Watson [address] ([address] [place] [address]), [facility], [address], [facility], [facility], [address] [place] Mika Santiago and [address], [facility], [address] ([address] [place] [address]), [address], [address] [facility], [address], [address] [facility], [address] [facility], [place] [address] ([facility]), [address], and [facility] [address].        EARNEST Schultz

## 2022-06-29 NOTE — PROGRESS NOTES
informed ADIN Hunt at [facility] that the patient will not be receiving peg tube. Hiram Radha indicated that they are not able to take him.  voiced an understanding.       EARNEST De La Garza

## 2022-06-29 NOTE — PROGRESS NOTES
Bedside and Verbal shift change report given to [address] Casarez (oncoming nurse) by Joanie Romero RN   (offgoing nurse). Report given with SBAR, Kardex, MAR and Recent Results.

## 2022-06-29 NOTE — PROGRESS NOTES
called the csb worker, Roxanne Meyers, to informed her that the doctor indicated that the patient's brother has not to get peg tube.  unable to leave voicemail because voicemail is full.       EARNEST Mo

## 2022-06-29 NOTE — PROGRESS NOTES
called the csb worker, Janie Perrin at [phone], regarding discharge planning.  informed Lavelle Pavon that the gastroenterologist does not feel PEG is appropriate or beneficial for the patient and the patient would be high risk of infection and serious complications. Lavelle Pavon indicated that she does not understanding why the patient can not get the peg tube.  informed Lavelle Pavon that she needs to looks at alternative placements. Lavelle Pavon indicated that there is no other placements. Lavelle Pavon indicated that she found 4 skilled nursing group homes and the patient was decline by 2. Lavelle Pavon reported that the patinet was accepted to [address] and the patient's brother wants him to go to [address]. Lavelle Pavon indicated that [address] requires the peg to have PEG tube.  explained that the patient will not be getting PEG tube. Lavelle Pavon indicated that she is going have the facility.  voiced an understanding.  inquired about the other facility that accepted the patient. Lavelle Pavon indicated that she did not follow up with the facility because the brother agreed to [address]. Lavelle Pavon indicated that both facilities wants PEG tube.  informed Lavelle Pavon that she needs to discuss with the brother option discharge options. Lavelle Pavon indicated that she have no other choices at this time.  informed her that she would wait on the call from the facility.     EARNEST Kerr

## 2022-06-29 NOTE — PROGRESS NOTES
called the csb worker, Lazarus Dew, at [facility] to discuss alternative discharge placement in case the patient can not get peg tube. Stewart Zhao did not answer.  unable to leave voicemail because voicemail was full.       EARNEST Paulson

## 2022-06-29 NOTE — PROGRESS NOTES
spoke with Herb White, admission director, at [facility] and [facility] regarding long term care. Linette indicated that she would review the patient in [place].  informed Linette that she would put the patient in [place].       EARNEST Chaves

## 2022-06-30 NOTE — PROGRESS NOTES
Assumed care of [place] from [place] (off going nurse). Patient in bed sleeping No apparent distress noted. Assessment to follow. Call bell within reach. Bed in lowest, locked position.

## 2022-06-30 NOTE — PROGRESS NOTES
Problem: Risk for Spread of Infection  Goal: Prevent transmission of infectious organism to others  Description: Prevent the transmission of infectious organisms to other patients, staff members, and visitors. Outcome: Progressing Towards Goal     Problem: Pressure Injury - Risk of  Goal: *Prevention of pressure injury  Description: Document Otoniel Scale and appropriate interventions in the flowsheet.   Outcome: Progressing Towards Goal  Note: Pressure Injury Interventions:  Sensory Interventions: Minimize linen layers,Keep linens dry and wrinkle-free    Moisture Interventions: Absorbent underpads,Minimize layers    Activity Interventions: Pressure redistribution bed/mattress(bed type)    Mobility Interventions: Pressure redistribution bed/mattress (bed type)    Nutrition Interventions: Document food/fluid/supplement intake    Friction and Shear Interventions: Minimize layers

## 2022-06-30 NOTE — PROGRESS NOTES
Hospitalist Progress Note    Patient: Pantera Sebastian Age: 58 y.o. : 1960 MR#: [id number] SSN: [id number]  Date/Time: 2022     DOA: 2022  PCP: Duke Vásquez MD    Subjective:     Patient is sitting in bed in no apparent distress, confused    Interval Hospital Course:  64 y.o male with intellectual disability with h/o PKU (phenylketonuria), Seizure disorder, anxiety disorder, chronic urinary retention with singleton catheter requirement, presented to the ER 22 due to progressive lethargy and altered in his mentation. His group home staff reported that he was nonverbal for 48hrs, he was previously conversant and ambulatory. In the ER, he was found to be in acute respiratory failure with concern for Parainfluenza 3 infection. ID consulted and started him on zosyn for concern of aspiration. Due to his persistent respiratory failure and AMS, he was transferred to ICU for further monitoring. He was eventually intubated 22 due to worsened encephalopathy and became unresponsive. He was stabilized and was able to extubate on 22. ID continues him on zosyn. He stable to transfer out of ICU on 22. He was on room air at 97% O2 saturation. He underwent MBS with severe pharyngeal dysphagia with all consistencies. He was recommended on alternative feeding method. NG tube was placed but he kept fulling them out. PEG tube was considered but there was clear caution that he might be fulling them out at well. He was made NPO. He continued on slow improvement back to his baseline mentation. He was able to finish out his IV zosyn on 22. Per Dr Tawanna Romano note 2022:  \"-I discussed with patient's's brother and patient's brother's wife who is the only family members available, patient's father mother  no other siblings.   Patient's brother had some concerns regarding patient's condition, I explained him regarding progressive disease of patient especially in the face of oropharyngeal dysphagia patient may not able to get adequate nutrition attempts were made to put NG tube twice both times patient pulled out vigorously probably will do the same thing to PEG tube will not serve purpose for long-term nutritional support. I also discussed regarding patient's long-term care plan with his brother. After long discussion after answering to the questions and them(patient's brother and patient's brother's wife) understanding the gravity of situation decided they do not want any resuscitation or intubation making patient DNR/DNI. They also decided that at this point no aggressive treatment in form of artificial feeding. Their goal for comfort feeding. \"         Assessment/Plan:     1. Acute respiratory failure with hypoxia,        S/p intubated for airway protection, Extubated 5/29/22. 2.  Atypical pneumonia related to parainfluenza infection, resolved         Aspiration pneumonia concern   3. Sepsis ruled out   4. Acute toxic/metabolic encephalopathy on chronic intellectual disability   5. Intellectual disability with h/o PKU  6.  Seizure disorder, stable   7. Elevated LFT's, improving to baseline   8. Hypercholesteremia   9. Hypokalemia, resolved   10. Urinary retention  11. Severe pharyngeal dysphagia with aspiration risk, unable to maintain NG Tube and family has forgone PEG tube placement.  He remains on comfort feed only      Continue comfort feeding per family  Family has declined PEG tube placement  On Keppra and Depakene  Zyprexa and Cogentin  DNR and DNI      Disposition-pending  Patient is medically stable  Discussed with  during IDRs    Case discussed with:  [x]Patient  []Family  []Nursing  [x]Case Management  DVT Prophylaxis:  []Lovenox  []Hep SQ  [x]SCDs  []Coumadin   []On Heparin gtt    Signed By: Stella Nuñez MD     June 30, 2022               Objective:   VS:   Visit Vitals  /70 (BP 1 Location: Left upper arm, BP Patient Position: At rest)   Pulse 89   Temp 97.7 °F (36.5 °C)   Resp 16   Ht 5' 8\" (1.727 m)   Wt 58.1 kg (128 lb)   SpO2 99%   BMI 19.46 kg/m²      Tmax/24hrs: Temp (24hrs), Av.7 °F (36.5 °C), Min:97.7 °F (36.5 °C), Max:97.7 °F (36.5 °C)      Intake/Output Summary (Last 24 hours) at 2022 1743  Last data filed at 2022 1041  Gross per 24 hour   Intake 170 ml   Output --   Net 170 ml       General:  Awake,   Cardiovascular:  S1S2+, RRR  Pulmonary:  CTA b/l  GI:  Soft, BS+, NT, ND  Extremities:  trace edema        Additional:       Current Facility-Administered Medications   Medication Dose Route Frequency    benztropine (COGENTIN) tablet 1 mg  1 mg Oral TID    haloperidol lactate (HALDOL) injection 2 mg  2 mg IntraMUSCular Q6H PRN    trospium (SANCTURA) tablet 20 mg  20 mg Oral DAILY    famotidine (PEPCID) 40 mg/5 mL (8 mg/mL) oral suspension 20 mg  20 mg Oral DAILY    levETIRAcetam (KEPPRA) oral solution 500 mg  500 mg Oral BID    valproic acid (as sodium salt) (DEPAKENE) 250 mg/5 mL (5 mL) oral solution 500 mg  500 mg Oral Q12H    albuterol-ipratropium (DUO-NEB) 2.5 MG-0.5 MG/3 ML  3 mL Nebulization Q4H PRN    glucose chewable tablet 16 g  4 Tablet Oral PRN    glucagon (GLUCAGEN) injection 1 mg  1 mg IntraMUSCular PRN    dextrose 10% infusion 0-250 mL  0-250 mL IntraVENous PRN    clotrimazole-betamethasone (LOTRISONE) 1-0.05 % cream   Topical BID    loperamide (IMODIUM) capsule 2 mg  2 mg Oral QID PRN    OLANZapine (ZyPREXA) tablet 10 mg  10 mg Oral TID    tamsulosin (FLOMAX) capsule 0.4 mg  0.4 mg Oral DAILY    polyethylene glycol (MIRALAX) packet 17 g  17 g Oral DAILY PRN    ondansetron (ZOFRAN ODT) tablet 4 mg  4 mg Oral Q8H PRN    Or    ondansetron (ZOFRAN) injection 4 mg  4 mg IntraVENous Q6H PRN    acetaminophen (TYLENOL) suppository 650 mg  650 mg Rectal Q4H PRN            Lab/Data Review:  Labs: Results:       Chemistry No results for input(s): GLU, NA, K, CL, CO2, BUN, CREA, BUCR, AGAP, CA, PHOS in the last 72 hours. No results for input(s): TBIL, ALT, ALKP, TP, ALB, GLOB, AGRAT in the last 72 hours. No lab exists for component: SGOT   CBC w/Diff No results for input(s): WBC, RBC, HGB, HCT, MCV, MCH, MCHC, RDW, PLT, BANDS, GRANS, LYMPH, EOS, HGBEXT, HCTEXT, PLTEXT, HGBEXT, HCTEXT, PLTEXT in the last 72 hours. Coagulation No results for input(s): PTP, INR, APTT, INREXT, INREXT in the last 72 hours.     Iron/Ferritin No results found for: IRON, FE, TIBC, IBCT, PSAT, FERR    BNP    Cardiac Enzymes No results found for: CPK, RCK1, RCK2, RCK3, RCK4, CKMB, CKNDX, CKND1, TROPT, TROIQ, BNPP, BNP     Lactic Acid    Thyroid Studies          All Micro Results     Procedure Component Value Units Date/Time    CULTURE, BLOOD [[id number]] Collected: 05/26/22 1100    Order Status: Completed Specimen: Blood Updated: 06/01/22 0641     Special Requests: NO SPECIAL REQUESTS        Culture result: NO GROWTH 6 DAYS       CULTURE, BLOOD [[id number]] Collected: 05/26/22 1200    Order Status: Completed Specimen: Blood Updated: 06/01/22 0641     Special Requests: NO SPECIAL REQUESTS        Culture result: NO GROWTH 6 DAYS       CULTURE, RESPIRATORY/SPUTUM/BRONCH Ro[place] Brenda STAIN [[id number]] Collected: 05/28/22 0250    Order Status: Completed Specimen: Sputum Updated: 05/30/22 0927     Special Requests: NO SPECIAL REQUESTS        GRAM STAIN RARE WBCS SEEN               RARE EPITHELIAL CELLS SEEN            NO ORGANISMS SEEN        Culture result:       RARE NORMAL RESPIRATORY GENARO          CULTURE, URINE [[id number]] Collected: 05/26/22 1234    Order Status: Completed Specimen: Urine from Clean catch Updated: 05/28/22 1226     Special Requests: NO SPECIAL REQUESTS        Culture result: No growth (<1,000 CFU/ML)       RESPIRATORY VIRUS PANEL W/COVID-19, PCR [[id number]]  (Abnormal) Collected: 05/26/22 1309    Order Status: Completed Specimen: Nasopharyngeal Updated: 05/26/22 1452     Adenovirus Not detected        Coronavirus 229E Not detected Coronavirus HKU1 Not detected        Coronavirus CVNL63 Not detected        Coronavirus OC43 Not detected        SARS-CoV-2, PCR Not detected        Metapneumovirus Not detected        Rhinovirus and Enterovirus Not detected        Influenza A Not detected        Influenza A, subtype H1 Not detected        Influenza A, subtype H3 Not detected        INFLUENZA A H1N1 PCR Not detected        Influenza B Not detected        Parainfluenza 1 Not detected        Parainfluenza 2 Not detected        Parainfluenza 3 Detected        Parainfluenza virus 4 Not detected        RSV by PCR Not detected        B. parapertussis, PCR Not detected        Bordetella pertussis - PCR Not detected        Chlamydophila pneumoniae DNA, QL, PCR Not detected        Mycoplasma pneumoniae DNA, QL, PCR Not detected               Images:    CT (Most Recent). XRAY (Most Recent)      EKG No results found for this or any previous visit.      2D ECHO

## 2022-06-30 NOTE — PROGRESS NOTES
confirmed with Lisa Fisher at [facility] that he receives the level II fax. Lisa Fisher indicated that he did not get the UAI and DMAS.  informed Lisa Fisher that she would resend UAI and DMAS.         EARNEST Reyes

## 2022-06-30 NOTE — PROGRESS NOTES
Flex Lugo from [facility] confirmed that he received all document for the level II.  inquired about time estimate time for approval.  Flex Lugo indicated that it will be about 7 business days.  voiced an understanding.     EARNEST Leone

## 2022-06-30 NOTE — PROGRESS NOTES
informed the RN Diana Chacko that the patient will not be able to discharge to a nursing home until the Level II is approve.  informed Diana Chacko that once the level II is approve the  will begin to look for placements.       EARNEST Dyer

## 2022-07-01 NOTE — PROGRESS NOTES
Hospitalist Progress Note    Patient: Veronika Kaur Age: 58 y.o. : 1960 MR#: [id number] SSN: [id number]  Date/Time: 2022     DOA: 2022  PCP: Barbara Petersen MD    Subjective:     Patient is lying in bed in no apparent distress, confused      Assessment/Plan:   Interval Hospital Course:  64 y.o male with intellectual disability with h/o PKU (phenylketonuria), Seizure disorder, anxiety disorder, chronic urinary retention with singleton catheter requirement, presented to the ER 22 due to progressive lethargy and altered in his mentation. His group home staff reported that he was nonverbal for 48hrs, he was previously conversant and ambulatory. In the ER, he was found to be in acute respiratory failure with concern for Parainfluenza 3 infection. ID consulted and started him on zosyn for concern of aspiration. Due to his persistent respiratory failure and AMS, he was transferred to ICU for further monitoring. He was eventually intubated 22 due to worsened encephalopathy and became unresponsive. He was stabilized and was able to extubate on 22. 1.  Acute respiratory failure with hypoxia,        S/p intubated for airway protection, Extubated 22. 2.  Atypical pneumonia related to parainfluenza infection, resolved         Aspiration pneumonia concern   3. Sepsis ruled out   4. Acute toxic/metabolic encephalopathy on chronic intellectual disability   5. Intellectual disability with h/o PKU  6.  Seizure disorder, stable   7. Elevated LFT's, improving to baseline   8. Hypercholesteremia   9. Hypokalemia, resolved   10. Urinary retention  11. Severe pharyngeal dysphagia with aspiration risk, unable to maintain NG Tube and family recently inquired about PEG tube placement, however GI specialist did not feel that patient was appropriate for PEG tube placement.  He remains on comfort feed only      Continue comfort feeds  On Keppra and Depakene  Zyprexa and Cogentin  DNR and DNI      Disposition-pending  Patient is medically stable      Case discussed with:  [x]Patient  []Family  []Nursing  [x]Case Management  DVT Prophylaxis:  []Lovenox  []Hep SQ  [x]SCDs  []Coumadin   []On Heparin gtt    Signed By: Sergo Mckay MD     2022               Objective:   VS:   Visit Vitals  /62 (BP 1 Location: Left upper arm, BP Patient Position: At rest)   Pulse 87   Temp 97.4 °F (36.3 °C)   Resp 18   Ht 5' 8\" (1.727 m)   Wt 58.1 kg (128 lb)   SpO2 96%   BMI 19.46 kg/m²      Tmax/24hrs: Temp (24hrs), Av.5 °F (36.4 °C), Min:97.4 °F (36.3 °C), Max:97.6 °F (36.4 °C)    No intake or output data in the 24 hours ending 22 1442    General:  Awake,   Cardiovascular:  S1S2+, RRR  Pulmonary:  CTA b/l  GI:  Soft, BS+, NT, ND  Extremities:  trace edema        Additional:       Current Facility-Administered Medications   Medication Dose Route Frequency    benztropine (COGENTIN) tablet 1 mg  1 mg Oral TID    haloperidol lactate (HALDOL) injection 2 mg  2 mg IntraMUSCular Q6H PRN    trospium (SANCTURA) tablet 20 mg  20 mg Oral DAILY    famotidine (PEPCID) 40 mg/5 mL (8 mg/mL) oral suspension 20 mg  20 mg Oral DAILY    levETIRAcetam (KEPPRA) oral solution 500 mg  500 mg Oral BID    valproic acid (as sodium salt) (DEPAKENE) 250 mg/5 mL (5 mL) oral solution 500 mg  500 mg Oral Q12H    albuterol-ipratropium (DUO-NEB) 2.5 MG-0.5 MG/3 ML  3 mL Nebulization Q4H PRN    glucose chewable tablet 16 g  4 Tablet Oral PRN    glucagon (GLUCAGEN) injection 1 mg  1 mg IntraMUSCular PRN    dextrose 10% infusion 0-250 mL  0-250 mL IntraVENous PRN    clotrimazole-betamethasone (LOTRISONE) 1-0.05 % cream   Topical BID    loperamide (IMODIUM) capsule 2 mg  2 mg Oral QID PRN    OLANZapine (ZyPREXA) tablet 10 mg  10 mg Oral TID    tamsulosin (FLOMAX) capsule 0.4 mg  0.4 mg Oral DAILY    polyethylene glycol (MIRALAX) packet 17 g  17 g Oral DAILY PRN    ondansetron (ZOFRAN ODT) tablet 4 mg  4 mg Oral Q8H PRN    Or    ondansetron (ZOFRAN) injection 4 mg  4 mg IntraVENous Q6H PRN    acetaminophen (TYLENOL) suppository 650 mg  650 mg Rectal Q4H PRN            Lab/Data Review:  Labs: Results:       Chemistry No results for input(s): GLU, NA, K, CL, CO2, BUN, CREA, BUCR, AGAP, CA, PHOS in the last 72 hours. No results for input(s): TBIL, ALT, ALKP, TP, ALB, GLOB, AGRAT in the last 72 hours. No lab exists for component: SGOT   CBC w/Diff No results for input(s): WBC, RBC, HGB, HCT, MCV, MCH, MCHC, RDW, PLT, BANDS, GRANS, LYMPH, EOS, HGBEXT, HCTEXT, PLTEXT, HGBEXT, HCTEXT, PLTEXT in the last 72 hours. Coagulation No results for input(s): PTP, INR, APTT, INREXT, INREXT in the last 72 hours.     Iron/Ferritin No results found for: IRON, FE, TIBC, IBCT, PSAT, FERR    BNP    Cardiac Enzymes No results found for: CPK, RCK1, RCK2, RCK3, RCK4, CKMB, CKNDX, CKND1, TROPT, TROIQ, BNPP, BNP     Lactic Acid    Thyroid Studies          All Micro Results     Procedure Component Value Units Date/Time    CULTURE, BLOOD [[id number]] Collected: 05/26/22 1100    Order Status: Completed Specimen: Blood Updated: 06/01/22 0641     Special Requests: NO SPECIAL REQUESTS        Culture result: NO GROWTH 6 DAYS       CULTURE, BLOOD [[id number]] Collected: 05/26/22 1200    Order Status: Completed Specimen: Blood Updated: 06/01/22 0641     Special Requests: NO SPECIAL REQUESTS        Culture result: NO GROWTH 6 DAYS       CULTURE, RESPIRATORY/SPUTUM/BRONCH Ardean Sayer STAIN [[id number]] Collected: 05/28/22 0250    Order Status: Completed Specimen: Sputum Updated: 05/30/22 0927     Special Requests: NO SPECIAL REQUESTS        GRAM STAIN RARE WBCS SEEN               RARE EPITHELIAL CELLS SEEN            NO ORGANISMS SEEN        Culture result:       RARE NORMAL RESPIRATORY GENARO          CULTURE, URINE [[id number]] Collected: 05/26/22 1234    Order Status: Completed Specimen: Urine from Clean catch Updated: 05/28/22 1225     Special Requests: NO SPECIAL REQUESTS        Culture result: No growth (<1,000 CFU/ML)       RESPIRATORY VIRUS PANEL W/COVID-19, PCR [[id number]]  (Abnormal) Collected: 05/26/22 1309    Order Status: Completed Specimen: Nasopharyngeal Updated: 05/26/22 1452     Adenovirus Not detected        Coronavirus 229E Not detected        Coronavirus HKU1 Not detected        Coronavirus CVNL63 Not detected        Coronavirus OC43 Not detected        SARS-CoV-2, PCR Not detected        Metapneumovirus Not detected        Rhinovirus and Enterovirus Not detected        Influenza A Not detected        Influenza A, subtype H1 Not detected        Influenza A, subtype H3 Not detected        INFLUENZA A H1N1 PCR Not detected        Influenza B Not detected        Parainfluenza 1 Not detected        Parainfluenza 2 Not detected        Parainfluenza 3 Detected        Parainfluenza virus 4 Not detected        RSV by PCR Not detected        B. parapertussis, PCR Not detected        Bordetella pertussis - PCR Not detected        Chlamydophila pneumoniae DNA, QL, PCR Not detected        Mycoplasma pneumoniae DNA, QL, PCR Not detected               Images:    CT (Most Recent). XRAY (Most Recent)      EKG No results found for this or any previous visit.      2D ECHO

## 2022-07-01 NOTE — PROGRESS NOTES
Problem: Risk for Spread of Infection  Goal: Prevent transmission of infectious organism to others  Description: Prevent the transmission of infectious organisms to other patients, staff members, and visitors. Outcome: Progressing Towards Goal     Problem: Patient Education:  Go to Education Activity  Goal: Patient/Family Education  Outcome: Progressing Towards Goal     Problem: Pressure Injury - Risk of  Goal: *Prevention of pressure injury  Description: Document Otoniel Scale and appropriate interventions in the flowsheet. Outcome: Progressing Towards Goal  Note: Pressure Injury Interventions:  Sensory Interventions: Minimize linen layers,Keep linens dry and wrinkle-free    Moisture Interventions: Absorbent underpads,Minimize layers    Activity Interventions: Pressure redistribution bed/mattress(bed type)    Mobility Interventions: Pressure redistribution bed/mattress (bed type)    Nutrition Interventions: Document food/fluid/supplement intake    Friction and Shear Interventions: Minimize layers                Problem: Patient Education: Go to Patient Education Activity  Goal: Patient/Family Education  Outcome: Progressing Towards Goal     Problem: Patient Education: Go to Patient Education Activity  Goal: Patient/Family Education  Outcome: Progressing Towards Goal     Problem: Nutrition Deficit  Goal: *Optimize nutritional status  Outcome: Progressing Towards Goal     Problem: Falls - Risk of  Goal: *Absence of Falls  Description: Document Syl Fall Risk and appropriate interventions in the flowsheet.   Outcome: Progressing Towards Goal  Note: Fall Risk Interventions:  Mobility Interventions: Bed/chair exit alarm,Communicate number of staff needed for ambulation/transfer    Mentation Interventions: Bed/chair exit alarm,Door open when patient unattended    Medication Interventions: Bed/chair exit alarm    Elimination Interventions: Bed/chair exit alarm,Toileting schedule/hourly rounds    History of Falls Interventions: Bed/chair exit alarm         Problem: Patient Education: Go to Patient Education Activity  Goal: Patient/Family Education  Outcome: Progressing Towards Goal     Problem: Injury - Risk of, Adverse Drug Event  Goal: *Absence of adverse drug events  Outcome: Progressing Towards Goal  Goal: *Absence of medication errors  Outcome: Progressing Towards Goal  Goal: *Knowledge of prescribed medications  Outcome: Progressing Towards Goal     Problem: Patient Education: Go to Patient Education Activity  Goal: Patient/Family Education  Outcome: Progressing Towards Goal     Problem: Pain  Goal: *Control of Pain  Outcome: Progressing Towards Goal  Goal: *PALLIATIVE CARE:  Alleviation of Pain  Outcome: Progressing Towards Goal     Problem: Patient Education: Go to Patient Education Activity  Goal: Patient/Family Education  Outcome: Progressing Towards Goal     Problem: Patient Education: Go to Patient Education Activity  Goal: Patient/Family Education  Outcome: Progressing Towards Goal     Problem: Patient Education: Go to Patient Education Activity  Goal: Patient/Family Education  Outcome: Progressing Towards Goal

## 2022-07-01 NOTE — PROGRESS NOTES
was informed by Angie Barney at [facility] that they are working on moka5 level II. Angie Barney indicated that he has all the documents he needs and it will take 7 days business days.  voiced an understanding.         EARNEST Gaines

## 2022-07-01 NOTE — PROGRESS NOTES
Bedside and Verbal shift change report given to Ravin Kohler RN (oncoming nurse) by Ashley Broden RN (offgoing nurse). Report included the following information SBAR, Kardex and Recent Results.

## 2022-07-01 NOTE — PROGRESS NOTES
Bedside shift change report given to [employer] (oncoming nurse) by Nely Olivas RN (offgoing nurse). Report included the following information SBAR, Kardex and MAR.

## 2022-07-02 NOTE — PROGRESS NOTES
Pt putting feces on the floor. Scooting to the end of the bed and hollering out. Called to speak to MD Benitez. Awaiting orders. Will continue to monitor.

## 2022-07-02 NOTE — ROUTINE PROCESS
Bedside shift change report given to Vicente Kilpatrick RN (oncoming nurse) by Rochelle Hunt RN (offgoing nurse). Report included the following information SBAR, Kardex and MAR.

## 2022-07-02 NOTE — PROGRESS NOTES
Verbal bedside shift report received from [id number] [place] [address]. Report included SBAR with recent vitals. Pt resting during report with call bell in reach.

## 2022-07-03 NOTE — PROGRESS NOTES
Hospitalist Progress Note    Patient: Skip Games Age: 58 y.o. : 1960 MR#: [id number] SSN: [id number]  Date/Time: 7/3/2022     DOA: 2022  PCP: Jada Casey MD    Subjective:     Patient is lying in bed in no apparent distress, remains confused. Assessment/Plan:   Interval Hospital Course:  64 y.o male with intellectual disability with h/o PKU (phenylketonuria), Seizure disorder, anxiety disorder, chronic urinary retention with singleton catheter requirement, presented to the ER 22 due to progressive lethargy and altered in his mentation. His group home staff reported that he was nonverbal for 48hrs, he was previously conversant and ambulatory. In the ER, he was found to be in acute respiratory failure with concern for Parainfluenza 3 infection. ID consulted and started him on zosyn for concern of aspiration. Due to his persistent respiratory failure and AMS, he was transferred to ICU for further monitoring. He was eventually intubated 22 due to worsened encephalopathy and became unresponsive. He was stabilized and was able to extubate on 22. 1.  Acute respiratory failure with hypoxia,        S/p intubated for airway protection, Extubated 22. 2.  Atypical pneumonia related to parainfluenza infection, resolved         Aspiration pneumonia concern   3. Sepsis ruled out   4. Acute toxic/metabolic encephalopathy on chronic intellectual disability   5. Intellectual disability with h/o PKU  6.  Seizure disorder, stable   7. Elevated LFT's, improving to baseline   8. Hypercholesteremia   9. Hypokalemia, resolved   10. Urinary retention  11. Severe pharyngeal dysphagia with aspiration risk, unable to maintain NG Tube and family recently inquired about PEG tube placement, however GI specialist did not feel that patient was appropriate for PEG tube placement.  He remains on comfort feed only      Continue comfort feeds  On Keppra and Depakene  Zyprexa and Cogentin  DNR and DNI      Disposition-pending  Patient is medically stable      Case discussed with:  [x]Patient  []Family  []Nursing  [x]Case Management  DVT Prophylaxis:  []Lovenox  []Hep SQ  [x]SCDs  []Coumadin   []On Heparin gtt    Signed By: Cristy Orozco MD     July 3, 2022               Objective:   VS:   Visit Vitals  /65   Pulse 87   Temp 97.5 °F (36.4 °C)   Resp 17   Ht 5' 8\" (1.727 m)   Wt 58.1 kg (128 lb)   SpO2 98%   BMI 19.46 kg/m²      Tmax/24hrs: Temp (24hrs), Av.6 °F (36.4 °C), Min:97.5 °F (36.4 °C), Max:97.6 °F (36.4 °C)      Intake/Output Summary (Last 24 hours) at 7/3/2022 1652  Last data filed at 7/3/2022 1301  Gross per 24 hour   Intake 400 ml   Output --   Net 400 ml       General:  Awake,   Cardiovascular:  S1S2+, RRR  Pulmonary:  CTA b/l  GI:  Soft, BS+, NT, ND  Extremities:  trace edema        Additional:       Current Facility-Administered Medications   Medication Dose Route Frequency    benztropine (COGENTIN) tablet 1 mg  1 mg Oral TID    haloperidol lactate (HALDOL) injection 2 mg  2 mg IntraMUSCular Q6H PRN    trospium (SANCTURA) tablet 20 mg  20 mg Oral DAILY    famotidine (PEPCID) 40 mg/5 mL (8 mg/mL) oral suspension 20 mg  20 mg Oral DAILY    levETIRAcetam (KEPPRA) oral solution 500 mg  500 mg Oral BID    valproic acid (as sodium salt) (DEPAKENE) 250 mg/5 mL (5 mL) oral solution 500 mg  500 mg Oral Q12H    albuterol-ipratropium (DUO-NEB) 2.5 MG-0.5 MG/3 ML  3 mL Nebulization Q4H PRN    glucose chewable tablet 16 g  4 Tablet Oral PRN    glucagon (GLUCAGEN) injection 1 mg  1 mg IntraMUSCular PRN    dextrose 10% infusion 0-250 mL  0-250 mL IntraVENous PRN    clotrimazole-betamethasone (LOTRISONE) 1-0.05 % cream   Topical BID    loperamide (IMODIUM) capsule 2 mg  2 mg Oral QID PRN    OLANZapine (ZyPREXA) tablet 10 mg  10 mg Oral TID    tamsulosin (FLOMAX) capsule 0.4 mg  0.4 mg Oral DAILY    polyethylene glycol (MIRALAX) packet 17 g  17 g Oral DAILY PRN    ondansetron (ZOFRAN ODT) tablet 4 mg  4 mg Oral Q8H PRN    Or    ondansetron (ZOFRAN) injection 4 mg  4 mg IntraVENous Q6H PRN    acetaminophen (TYLENOL) suppository 650 mg  650 mg Rectal Q4H PRN            Lab/Data Review:  Labs: Results:       Chemistry No results for input(s): GLU, NA, K, CL, CO2, BUN, CREA, BUCR, AGAP, CA, PHOS in the last 72 hours. No results for input(s): TBIL, ALT, ALKP, TP, ALB, GLOB, AGRAT in the last 72 hours. No lab exists for component: SGOT   CBC w/Diff No results for input(s): WBC, RBC, HGB, HCT, MCV, MCH, MCHC, RDW, PLT, BANDS, GRANS, LYMPH, EOS, HGBEXT, HCTEXT, PLTEXT, HGBEXT, HCTEXT, PLTEXT in the last 72 hours. Coagulation No results for input(s): PTP, INR, APTT, INREXT, INREXT in the last 72 hours.     Iron/Ferritin No results found for: IRON, FE, TIBC, IBCT, PSAT, FERR    BNP    Cardiac Enzymes No results found for: CPK, RCK1, RCK2, RCK3, RCK4, CKMB, CKNDX, CKND1, TROPT, TROIQ, BNPP, BNP     Lactic Acid    Thyroid Studies          All Micro Results     Procedure Component Value Units Date/Time    CULTURE, BLOOD [[id number]] Collected: 05/26/22 1100    Order Status: Completed Specimen: Blood Updated: 06/01/22 0641     Special Requests: NO SPECIAL REQUESTS        Culture result: NO GROWTH 6 DAYS       CULTURE, BLOOD [[id number]] Collected: 05/26/22 1200    Order Status: Completed Specimen: Blood Updated: 06/01/22 0641     Special Requests: NO SPECIAL REQUESTS        Culture result: NO GROWTH 6 DAYS       CULTURE, RESPIRATORY/SPUTUM/BRONCH Dara Ly STAIN [[id number]] Collected: 05/28/22 0250    Order Status: Completed Specimen: Sputum Updated: 05/30/22 0927     Special Requests: NO SPECIAL REQUESTS        GRAM STAIN RARE WBCS SEEN               RARE EPITHELIAL CELLS SEEN            NO ORGANISMS SEEN        Culture result:       RARE NORMAL RESPIRATORY GENARO          CULTURE, URINE [[id number]] Collected: 05/26/22 1234    Order Status: Completed Specimen: Urine from Clean catch Updated: 05/28/22 1226     Special Requests: NO SPECIAL REQUESTS        Culture result: No growth (<1,000 CFU/ML)       RESPIRATORY VIRUS PANEL W/COVID-19, PCR [[id number]]  (Abnormal) Collected: 05/26/22 1309    Order Status: Completed Specimen: Nasopharyngeal Updated: 05/26/22 1452     Adenovirus Not detected        Coronavirus 229E Not detected        Coronavirus HKU1 Not detected        Coronavirus CVNL63 Not detected        Coronavirus OC43 Not detected        SARS-CoV-2, PCR Not detected        Metapneumovirus Not detected        Rhinovirus and Enterovirus Not detected        Influenza A Not detected        Influenza A, subtype H1 Not detected        Influenza A, subtype H3 Not detected        INFLUENZA A H1N1 PCR Not detected        Influenza B Not detected        Parainfluenza 1 Not detected        Parainfluenza 2 Not detected        Parainfluenza 3 Detected        Parainfluenza virus 4 Not detected        RSV by PCR Not detected        B. parapertussis, PCR Not detected        Bordetella pertussis - PCR Not detected        Chlamydophila pneumoniae DNA, QL, PCR Not detected        Mycoplasma pneumoniae DNA, QL, PCR Not detected               Images:    CT (Most Recent). XRAY (Most Recent)      EKG No results found for this or any previous visit.      2D ECHO

## 2022-07-03 NOTE — PROGRESS NOTES
Hospitalist Progress Note    Patient: Palmira Bledsoe Age: 58 y.o. : 1960 MR#: [id number] SSN: [id number]  Date/Time: 2022     DOA: 2022  PCP: Ramos Goldman MD    Subjective:     Patient is lying in bed in no apparent distress, remains confused. Seen with nurse at bedside. Assessment/Plan:   Interval Hospital Course:  64 y.o male with intellectual disability with h/o PKU (phenylketonuria), Seizure disorder, anxiety disorder, chronic urinary retention with singleton catheter requirement, presented to the ER 22 due to progressive lethargy and altered in his mentation. His group home staff reported that he was nonverbal for 48hrs, he was previously conversant and ambulatory. In the ER, he was found to be in acute respiratory failure with concern for Parainfluenza 3 infection. ID consulted and started him on zosyn for concern of aspiration. Due to his persistent respiratory failure and AMS, he was transferred to ICU for further monitoring. He was eventually intubated 22 due to worsened encephalopathy and became unresponsive. He was stabilized and was able to extubate on 22. 1.  Acute respiratory failure with hypoxia,        S/p intubated for airway protection, Extubated 22. 2.  Atypical pneumonia related to parainfluenza infection, resolved         Aspiration pneumonia concern   3. Sepsis ruled out   4. Acute toxic/metabolic encephalopathy on chronic intellectual disability   5. Intellectual disability with h/o PKU  6.  Seizure disorder, stable   7. Elevated LFT's, improving to baseline   8. Hypercholesteremia   9. Hypokalemia, resolved   10. Urinary retention  11. Severe pharyngeal dysphagia with aspiration risk, unable to maintain NG Tube and family recently inquired about PEG tube placement, however GI specialist did not feel that patient was appropriate for PEG tube placement.  He remains on comfort feed only      Continue comfort feeds  On Keppra and Depakene  Zyprexa and Cogentin  DNR and DNI      Disposition-pending  Patient is medically stable      Case discussed with:  [x]Patient  []Family  []Nursing  [x]Case Management  DVT Prophylaxis:  []Lovenox  []Hep SQ  [x]SCDs  []Coumadin   []On Heparin gtt    Signed By: Leydi Tee MD     2022               Objective:   VS:   Visit Vitals  /72   Pulse 94   Temp 97.6 °F (36.4 °C)   Resp 17   Ht 5' 8\" (1.727 m)   Wt 58.1 kg (128 lb)   SpO2 96%   BMI 19.46 kg/m²      Tmax/24hrs: Temp (24hrs), Av °F (36.7 °C), Min:97.6 °F (36.4 °C), Max:98.8 °F (37.1 °C)      Intake/Output Summary (Last 24 hours) at 2022 2115  Last data filed at 2022 1744  Gross per 24 hour   Intake 700 ml   Output --   Net 700 ml       General:  Awake,   Cardiovascular:  S1S2+, RRR  Pulmonary:  CTA b/l  GI:  Soft, BS+, NT, ND  Extremities:  trace edema        Additional:       Current Facility-Administered Medications   Medication Dose Route Frequency    benztropine (COGENTIN) tablet 1 mg  1 mg Oral TID    haloperidol lactate (HALDOL) injection 2 mg  2 mg IntraMUSCular Q6H PRN    trospium (SANCTURA) tablet 20 mg  20 mg Oral DAILY    famotidine (PEPCID) 40 mg/5 mL (8 mg/mL) oral suspension 20 mg  20 mg Oral DAILY    levETIRAcetam (KEPPRA) oral solution 500 mg  500 mg Oral BID    valproic acid (as sodium salt) (DEPAKENE) 250 mg/5 mL (5 mL) oral solution 500 mg  500 mg Oral Q12H    albuterol-ipratropium (DUO-NEB) 2.5 MG-0.5 MG/3 ML  3 mL Nebulization Q4H PRN    glucose chewable tablet 16 g  4 Tablet Oral PRN    glucagon (GLUCAGEN) injection 1 mg  1 mg IntraMUSCular PRN    dextrose 10% infusion 0-250 mL  0-250 mL IntraVENous PRN    clotrimazole-betamethasone (LOTRISONE) 1-0.05 % cream   Topical BID    loperamide (IMODIUM) capsule 2 mg  2 mg Oral QID PRN    OLANZapine (ZyPREXA) tablet 10 mg  10 mg Oral TID    tamsulosin (FLOMAX) capsule 0.4 mg  0.4 mg Oral DAILY    polyethylene glycol (MIRALAX) packet 17 g  17 g Oral DAILY PRN    ondansetron (ZOFRAN ODT) tablet 4 mg  4 mg Oral Q8H PRN    Or    ondansetron (ZOFRAN) injection 4 mg  4 mg IntraVENous Q6H PRN    acetaminophen (TYLENOL) suppository 650 mg  650 mg Rectal Q4H PRN            Lab/Data Review:  Labs: Results:       Chemistry No results for input(s): GLU, NA, K, CL, CO2, BUN, CREA, BUCR, AGAP, CA, PHOS in the last 72 hours. No results for input(s): TBIL, ALT, ALKP, TP, ALB, GLOB, AGRAT in the last 72 hours. No lab exists for component: SGOT   CBC w/Diff No results for input(s): WBC, RBC, HGB, HCT, MCV, MCH, MCHC, RDW, PLT, BANDS, GRANS, LYMPH, EOS, HGBEXT, HCTEXT, PLTEXT, HGBEXT, HCTEXT, PLTEXT in the last 72 hours. Coagulation No results for input(s): PTP, INR, APTT, INREXT, INREXT in the last 72 hours.     Iron/Ferritin No results found for: IRON, FE, TIBC, IBCT, PSAT, FERR    BNP    Cardiac Enzymes No results found for: CPK, RCK1, RCK2, RCK3, RCK4, CKMB, CKNDX, CKND1, TROPT, TROIQ, BNPP, BNP     Lactic Acid    Thyroid Studies          All Micro Results     Procedure Component Value Units Date/Time    CULTURE, BLOOD [[id number]] Collected: 05/26/22 1100    Order Status: Completed Specimen: Blood Updated: 06/01/22 0641     Special Requests: NO SPECIAL REQUESTS        Culture result: NO GROWTH 6 DAYS       CULTURE, BLOOD [[id number]] Collected: 05/26/22 1200    Order Status: Completed Specimen: Blood Updated: 06/01/22 0641     Special Requests: NO SPECIAL REQUESTS        Culture result: NO GROWTH 6 DAYS       CULTURE, RESPIRATORY/SPUTUM/BRONCH Raford Payer STAIN [[id number]] Collected: 05/28/22 0250    Order Status: Completed Specimen: Sputum Updated: 05/30/22 0927     Special Requests: NO SPECIAL REQUESTS        GRAM STAIN RARE WBCS SEEN               RARE EPITHELIAL CELLS SEEN            NO ORGANISMS SEEN        Culture result:       RARE NORMAL RESPIRATORY GENARO          CULTURE, URINE [[id number]] Collected: 05/26/22 1234    Order Status: Completed Specimen: Urine from Clean catch Updated: 05/28/22 1226     Special Requests: NO SPECIAL REQUESTS        Culture result: No growth (<1,000 CFU/ML)       RESPIRATORY VIRUS PANEL W/COVID-19, PCR [[id number]]  (Abnormal) Collected: 05/26/22 1309    Order Status: Completed Specimen: Nasopharyngeal Updated: 05/26/22 1452     Adenovirus Not detected        Coronavirus 229E Not detected        Coronavirus HKU1 Not detected        Coronavirus CVNL63 Not detected        Coronavirus OC43 Not detected        SARS-CoV-2, PCR Not detected        Metapneumovirus Not detected        Rhinovirus and Enterovirus Not detected        Influenza A Not detected        Influenza A, subtype H1 Not detected        Influenza A, subtype H3 Not detected        INFLUENZA A H1N1 PCR Not detected        Influenza B Not detected        Parainfluenza 1 Not detected        Parainfluenza 2 Not detected        Parainfluenza 3 Detected        Parainfluenza virus 4 Not detected        RSV by PCR Not detected        B. parapertussis, PCR Not detected        Bordetella pertussis - PCR Not detected        Chlamydophila pneumoniae DNA, QL, PCR Not detected        Mycoplasma pneumoniae DNA, QL, PCR Not detected               Images:    CT (Most Recent). XRAY (Most Recent)      EKG No results found for this or any previous visit.      2D ECHO

## 2022-07-04 NOTE — PROGRESS NOTES
Problem: Pressure Injury - Risk of  Goal: *Prevention of pressure injury  Description: Document Otoniel Scale and appropriate interventions in the flowsheet. Outcome: Progressing Towards Goal  Note: Pressure Injury Interventions:  Sensory Interventions: Assess changes in LOC,Assess need for specialty bed,Avoid rigorous massage over bony prominences,Discuss PT/OT consult with provider,Check visual cues for pain,Float heels,Keep linens dry and wrinkle-free,Minimize linen layers,Maintain/enhance activity level,Monitor skin under medical devices,Pad between skin to skin,Pressure redistribution bed/mattress (bed type),Sit a 90-degree angle/use footstool if needed,Turn and reposition approx. every two hours (pillows and wedges if needed),Use 30-degree side-lying position    Moisture Interventions: Absorbent underpads,Apply protective barrier, creams and emollients,Assess need for specialty bed,Check for incontinence Q2 hours and as needed,Internal/External urinary devices,Limit adult briefs,Maintain skin hydration (lotion/cream),Moisture barrier,Minimize layers,Offer toileting Q_hr    Activity Interventions: Assess need for specialty bed,Chair cushion,Increase time out of bed,Pressure redistribution bed/mattress(bed type),PT/OT evaluation    Mobility Interventions: Assess need for specialty bed,Chair cushion,Float heels,HOB 30 degrees or less,Pressure redistribution bed/mattress (bed type),PT/OT evaluation,Turn and reposition approx.  every two hours(pillow and wedges)    Nutrition Interventions: Document food/fluid/supplement intake,Discuss nutritional consult with provider,Offer support with meals,snacks and hydration    Friction and Shear Interventions: Apply protective barrier, creams and emollients,HOB 30 degrees or less,Lift sheet,Minimize layers,Sit at 90-degree angle,Transfer aides:transfer board/Bo lift/ceiling lift                Problem: Falls - Risk of  Goal: *Absence of Falls  Description: Al Michaels Fall Risk and appropriate interventions in the flowsheet.   Outcome: Progressing Towards Goal  Note: Fall Risk Interventions:  Mobility Interventions: Assess mobility with egress test,Bed/chair exit alarm,Communicate number of staff needed for ambulation/transfer,OT consult for ADLs,Patient to call before getting OOB,PT Consult for mobility concerns,PT Consult for assist device competence,Strengthening exercises (ROM-active/passive),Utilize walker, cane, or other assistive device    Mentation Interventions: Adequate sleep, hydration, pain control,Bed/chair exit alarm,Door open when patient unattended,Evaluate medications/consider consulting pharmacy,Familiar objects from home,Update white board,Toileting rounds,Room close to nurse's station,Reorient patient,More frequent rounding,Increase mobility,HELP ([address]) if available    Medication Interventions: Bed/chair exit alarm,Evaluate medications/consider consulting pharmacy,Patient to call before getting OOB,Teach patient to arise slowly    Elimination Interventions: Bed/chair exit alarm,Call light in reach,Elevated toilet seat,Patient to call for help with toileting needs,Stay With Me (per policy),Toilet paper/wipes in reach,Toileting schedule/hourly rounds,Urinal in reach    History of Falls Interventions: Bed/chair exit alarm,Consult care management for discharge planning,Door open when patient unattended,Evaluate medications/consider consulting pharmacy,Investigate reason for fall,Room close to nurse's station,Utilize gait belt for transfer/ambulation,Assess for delayed presentation/identification of injury for 48 hrs (comment for end date)         Problem: Injury - Risk of, Adverse Drug Event  Goal: *Absence of adverse drug events  Outcome: Progressing Towards Goal     Problem: Injury - Risk of, Adverse Drug Event  Goal: *Absence of medication errors  Outcome: Progressing Towards Goal     Problem: Pain  Goal: *Control of Pain  Outcome: Progressing Towards Goal     Problem: General Medical Care Plan  Goal: *Vital signs within specified parameters  Outcome: Progressing Towards Goal     Problem: General Medical Care Plan  Goal: *Absence of infection signs and symptoms  Outcome: Progressing Towards Goal     Problem: General Medical Care Plan  Goal: *Optimize nutritional status  Outcome: Progressing Towards Goal     Problem: General Medical Care Plan  Goal: *Skin integrity maintained  Outcome: Progressing Towards Goal     Problem: General Medical Care Plan  Goal: *Anxiety reduced or absent  Outcome: Not Progressing Towards Goal

## 2022-07-04 NOTE — PROGRESS NOTES
Hospitalist Progress Note    Patient: Thomas Swartz Age: 58 y.o. : 1960 MR#: [id number] SSN: [id number]  Date/Time: 2022     DOA: 2022  PCP: Ella Merchant MD    Subjective:     Patient is lying in bed in no apparent distress. Assessment/Plan:   Interval Hospital Course:  64 y.o male with intellectual disability with h/o PKU (phenylketonuria), Seizure disorder, anxiety disorder, chronic urinary retention with singleton catheter requirement, presented to the ER 22 due to progressive lethargy and altered in his mentation. His group home staff reported that he was nonverbal for 48hrs, he was previously conversant and ambulatory. In the ER, he was found to be in acute respiratory failure with concern for Parainfluenza 3 infection. ID consulted and started him on zosyn for concern of aspiration. Due to his persistent respiratory failure and AMS, he was transferred to ICU for further monitoring. He was eventually intubated 22 due to worsened encephalopathy and became unresponsive. He was stabilized and was able to extubate on 22. 1.  Acute respiratory failure with hypoxia,        S/p intubated for airway protection, Extubated 22. 2.  Atypical pneumonia related to parainfluenza infection, resolved         Aspiration pneumonia concern   3. Sepsis ruled out   4. Acute toxic/metabolic encephalopathy on chronic intellectual disability   5. Intellectual disability with h/o PKU  6.  Seizure disorder, stable   7. Elevated LFT's, improving to baseline   8. Hypercholesteremia   9. Hypokalemia, resolved   10. Urinary retention  11. Severe pharyngeal dysphagia with aspiration risk, unable to maintain NG Tube and family recently inquired about PEG tube placement, however GI specialist did not feel that patient was appropriate for PEG tube placement.  He remains on comfort feed only      Continue comfort feeds  On Keppra and Depakene  Zyprexa and Cogentin  DNR and DNI      Disposition-pending  Patient is medically stable      Case discussed with:  [x]Patient  []Family  []Nursing  [x]Case Management  DVT Prophylaxis:  []Lovenox  []Hep SQ  [x]SCDs  []Coumadin   []On Heparin gtt    Signed By: Cristy Orozco MD     2022               Objective:   VS:   Visit Vitals  /68   Pulse 85   Temp 97.7 °F (36.5 °C)   Resp 17   Ht 5' 8\" (1.727 m)   Wt 58.1 kg (128 lb)   SpO2 97%   BMI 19.46 kg/m²      Tmax/24hrs: Temp (24hrs), Av.8 °F (36.6 °C), Min:97.7 °F (36.5 °C), Max:97.8 °F (36.6 °C)      Intake/Output Summary (Last 24 hours) at 2022 1518  Last data filed at 2022 0916  Gross per 24 hour   Intake 240 ml   Output --   Net 240 ml       General:  Awake,   Cardiovascular:  S1S2+, RRR  Pulmonary:  CTA b/l  GI:  Soft, BS+, NT, ND  Extremities:  trace edema        Additional:       Current Facility-Administered Medications   Medication Dose Route Frequency    benztropine (COGENTIN) tablet 1 mg  1 mg Oral TID    haloperidol lactate (HALDOL) injection 2 mg  2 mg IntraMUSCular Q6H PRN    trospium (SANCTURA) tablet 20 mg  20 mg Oral DAILY    famotidine (PEPCID) 40 mg/5 mL (8 mg/mL) oral suspension 20 mg  20 mg Oral DAILY    levETIRAcetam (KEPPRA) oral solution 500 mg  500 mg Oral BID    valproic acid (as sodium salt) (DEPAKENE) 250 mg/5 mL (5 mL) oral solution 500 mg  500 mg Oral Q12H    albuterol-ipratropium (DUO-NEB) 2.5 MG-0.5 MG/3 ML  3 mL Nebulization Q4H PRN    glucose chewable tablet 16 g  4 Tablet Oral PRN    glucagon (GLUCAGEN) injection 1 mg  1 mg IntraMUSCular PRN    dextrose 10% infusion 0-250 mL  0-250 mL IntraVENous PRN    clotrimazole-betamethasone (LOTRISONE) 1-0.05 % cream   Topical BID    loperamide (IMODIUM) capsule 2 mg  2 mg Oral QID PRN    OLANZapine (ZyPREXA) tablet 10 mg  10 mg Oral TID    tamsulosin (FLOMAX) capsule 0.4 mg  0.4 mg Oral DAILY    polyethylene glycol (MIRALAX) packet 17 g  17 g Oral DAILY PRN    ondansetron (ZOFRAN ODT) tablet 4 mg  4 mg Oral Q8H PRN    Or    ondansetron (ZOFRAN) injection 4 mg  4 mg IntraVENous Q6H PRN    acetaminophen (TYLENOL) suppository 650 mg  650 mg Rectal Q4H PRN            Lab/Data Review:  Labs: Results:       Chemistry No results for input(s): GLU, NA, K, CL, CO2, BUN, CREA, BUCR, AGAP, CA, PHOS in the last 72 hours. No results for input(s): TBIL, ALT, ALKP, TP, ALB, GLOB, AGRAT in the last 72 hours. No lab exists for component: SGOT   CBC w/Diff No results for input(s): WBC, RBC, HGB, HCT, MCV, MCH, MCHC, RDW, PLT, BANDS, GRANS, LYMPH, EOS, HGBEXT, HCTEXT, PLTEXT, HGBEXT, HCTEXT, PLTEXT in the last 72 hours. Coagulation No results for input(s): PTP, INR, APTT, INREXT, INREXT in the last 72 hours.     Iron/Ferritin No results found for: IRON, FE, TIBC, IBCT, PSAT, FERR    BNP    Cardiac Enzymes No results found for: CPK, RCK1, RCK2, RCK3, RCK4, CKMB, CKNDX, CKND1, TROPT, TROIQ, BNPP, BNP     Lactic Acid    Thyroid Studies          All Micro Results     Procedure Component Value Units Date/Time    CULTURE, BLOOD [[id number]] Collected: 05/26/22 1100    Order Status: Completed Specimen: Blood Updated: 06/01/22 0641     Special Requests: NO SPECIAL REQUESTS        Culture result: NO GROWTH 6 DAYS       CULTURE, BLOOD [[id number]] Collected: 05/26/22 1200    Order Status: Completed Specimen: Blood Updated: 06/01/22 0641     Special Requests: NO SPECIAL REQUESTS        Culture result: NO GROWTH 6 DAYS       CULTURE, RESPIRATORY/SPUTUM/BRONCH Ardean Sayer STAIN [[id number]] Collected: 05/28/22 0250    Order Status: Completed Specimen: Sputum Updated: 05/30/22 0927     Special Requests: NO SPECIAL REQUESTS        GRAM STAIN RARE WBCS SEEN               RARE EPITHELIAL CELLS SEEN            NO ORGANISMS SEEN        Culture result:       RARE NORMAL RESPIRATORY GENARO          CULTURE, URINE [[id number]] Collected: 05/26/22 1234    Order Status: Completed Specimen: Urine from Clean catch Updated: 05/28/22 1221 Special Requests: NO SPECIAL REQUESTS        Culture result: No growth (<1,000 CFU/ML)       RESPIRATORY VIRUS PANEL W/COVID-19, PCR [[id number]]  (Abnormal) Collected: 05/26/22 1309    Order Status: Completed Specimen: Nasopharyngeal Updated: 05/26/22 1458     Adenovirus Not detected        Coronavirus 229E Not detected        Coronavirus HKU1 Not detected        Coronavirus CVNL63 Not detected        Coronavirus OC43 Not detected        SARS-CoV-2, PCR Not detected        Metapneumovirus Not detected        Rhinovirus and Enterovirus Not detected        Influenza A Not detected        Influenza A, subtype H1 Not detected        Influenza A, subtype H3 Not detected        INFLUENZA A H1N1 PCR Not detected        Influenza B Not detected        Parainfluenza 1 Not detected        Parainfluenza 2 Not detected        Parainfluenza 3 Detected        Parainfluenza virus 4 Not detected        RSV by PCR Not detected        B. parapertussis, PCR Not detected        Bordetella pertussis - PCR Not detected        Chlamydophila pneumoniae DNA, QL, PCR Not detected        Mycoplasma pneumoniae DNA, QL, PCR Not detected               Images:    CT (Most Recent). XRAY (Most Recent)      EKG No results found for this or any previous visit.      2D ECHO

## 2022-07-04 NOTE — ROUTINE PROCESS
Bedside and Verbal shift change report given to [address] Nguyen RN (oncoming nurse) by Tiff Hooker RN (offgoing nurse). Report included the following information SBAR.

## 2022-07-05 NOTE — PROGRESS NOTES
Problem: Risk for Spread of Infection  Goal: Prevent transmission of infectious organism to others  Description: Prevent the transmission of infectious organisms to other patients, staff members, and visitors. Outcome: Progressing Towards Goal     Problem: Patient Education:  Go to Education Activity  Goal: Patient/Family Education  Outcome: Progressing Towards Goal     Problem: Pressure Injury - Risk of  Goal: *Prevention of pressure injury  Description: Document Otoniel Scale and appropriate interventions in the flowsheet. Outcome: Progressing Towards Goal  Note: Pressure Injury Interventions:  Sensory Interventions: Assess changes in LOC,Assess need for specialty bed,Avoid rigorous massage over bony prominences,Discuss PT/OT consult with provider,Check visual cues for pain,Float heels,Keep linens dry and wrinkle-free,Minimize linen layers,Maintain/enhance activity level,Monitor skin under medical devices,Pad between skin to skin,Pressure redistribution bed/mattress (bed type),Sit a 90-degree angle/use footstool if needed,Turn and reposition approx.  every two hours (pillows and wedges if needed),Use 30-degree side-lying position    Moisture Interventions: Absorbent underpads,Apply protective barrier, creams and emollients,Check for incontinence Q2 hours and as needed,Maintain skin hydration (lotion/cream)    Activity Interventions: Assess need for specialty bed    Mobility Interventions: Assess need for specialty bed    Nutrition Interventions: Document food/fluid/supplement intake    Friction and Shear Interventions: Apply protective barrier, creams and emollients

## 2022-07-05 NOTE — ROUTINE PROCESS
Pt incontinent of stool. Pt. Threw stool on floor and smeared on siderails.   Cleaned and linen change done

## 2022-07-05 NOTE — PROGRESS NOTES
Bedside and Verbal shift change report given to [address] Justice (oncoming nurse) by Viola Faith RN (offgoing nurse). Report included the following information . SBAR, Patient status, Discharge planning.

## 2022-07-05 NOTE — ROUTINE PROCESS
Pt. Yelling and barking like a dog.   Kicking feet and slapping with care  Haldol 2 mg given IM right arm

## 2022-07-06 NOTE — PROGRESS NOTES
received a voicemail from the patient's brother, Kasey Roe, indicating that he has decided that  He wants the Peg tube because he can not find a place to take the patient without the peg tube.  returned the patient's brother call. The patient's brother informed  that he spoke with Dr. Crystal Alvarez and informed Dr. Crystal Alvarez that he wants a peg tube places because he can not find a place that can take the patient without a peg tube. The patient's brother reported that he is going to call the group home and have them speak with the doctor about the patient's medical conditions.  informed the patient's brother that a level II was completed last week and  is waiting on results for nursing home placement. The patient's brother informed  that he wants her to stop searching for nursing homes because he only wants him at a group home. The patient's brother indicated that he does not want his brother to lose his Medicaid waiver and only wants him to go to a group home at this time.  informed the patient's brother to follow up as soon as the group home talks to the doctor. The patient's brother indicated that he will follow up with  after the group home communicates with Dr. Crystal Alvarez.       EARNEST Payne

## 2022-07-06 NOTE — ROUTINE PROCESS
Bedside shift change report given to Leonela Kenyon RN (oncoming nurse) by Jono Gottlieb RN (offgoing nurse). Report included the following information SBAR, Kardex, MAR and Cardiac Rhythm NSR.

## 2022-07-06 NOTE — PROGRESS NOTES
Hospitalist Progress Note    Patient: Melony Gutiérrez Age: 58 y.o. : 1960 MR#: [id number] SSN: [id number]  Date/Time: 2022     DOA: 2022  PCP: Rosemary Navas MD    Subjective:     Patient seen in bed w/ two nurses at bedside. Per nursing, pt attempting to get out of bed. Pt in no apparent distress. Assessment/Plan:   Interval Hospital Course:  64 y.o male with intellectual disability with h/o PKU (phenylketonuria), Seizure disorder, anxiety disorder, chronic urinary retention with singleton catheter requirement, presented to the ER 22 due to progressive lethargy and altered in his mentation. His group home staff reported that he was nonverbal for 48hrs, he was previously conversant and ambulatory. In the ER, he was found to be in acute respiratory failure with concern for Parainfluenza 3 infection. ID consulted and started him on zosyn for concern of aspiration. Due to his persistent respiratory failure and AMS, he was transferred to ICU for further monitoring. He was eventually intubated 22 due to worsened encephalopathy and became unresponsive. He was stabilized and was able to extubate on 22. 1.  Acute respiratory failure with hypoxia,        S/p intubated for airway protection, Extubated 22. 2.  Atypical pneumonia related to parainfluenza infection, resolved         Aspiration pneumonia concern   3. Sepsis ruled out   4. Acute toxic/metabolic encephalopathy on chronic intellectual disability   5. Intellectual disability with h/o PKU  6.  Seizure disorder, stable   7. Elevated LFT's, improving to baseline   8. Hypercholesteremia   9. Hypokalemia, resolved   10. Urinary retention  11. Severe pharyngeal dysphagia with aspiration risk, unable to maintain NG Tube and family recently inquired about PEG tube placement, however GI specialist did not feel that patient was appropriate for PEG tube placement.  He remains on comfort feed only      Continue comfort feeds  Pt will benefit from PT/OT, will consult.   On Keppra and Depakene  Zyprexa and Cogentin  DNR and DNI      Disposition-pending  Patient is medically stable      Case discussed with:  [x]Patient  []Family  []Nursing  [x]Case Management  DVT Prophylaxis:  []Lovenox  []Hep SQ  [x]SCDs  []Coumadin   []On Heparin gtt    Signed By: Bri Harman MD     2022               Objective:   VS:   Visit Vitals  /82 (BP 1 Location: Right leg, BP Patient Position: At rest)   Pulse 76   Temp 98.1 °F (36.7 °C)   Resp 16   Ht 5' 8\" (1.727 m)   Wt 58.1 kg (128 lb)   SpO2 99%   BMI 19.46 kg/m²      Tmax/24hrs: Temp (24hrs), Av.1 °F (36.7 °C), Min:98.1 °F (36.7 °C), Max:98.1 °F (36.7 °C)      Intake/Output Summary (Last 24 hours) at 2022  Last data filed at 2022 4533  Gross per 24 hour   Intake 240 ml   Output --   Net 240 ml       General:  Awake,   Cardiovascular:  S1S2+, RRR  Pulmonary:  CTA b/l  GI:  Soft, BS+, NT, ND  Extremities:  trace edema        Additional:       Current Facility-Administered Medications   Medication Dose Route Frequency    benztropine (COGENTIN) tablet 1 mg  1 mg Oral TID    haloperidol lactate (HALDOL) injection 2 mg  2 mg IntraMUSCular Q6H PRN    trospium (SANCTURA) tablet 20 mg  20 mg Oral DAILY    famotidine (PEPCID) 40 mg/5 mL (8 mg/mL) oral suspension 20 mg  20 mg Oral DAILY    levETIRAcetam (KEPPRA) oral solution 500 mg  500 mg Oral BID    valproic acid (as sodium salt) (DEPAKENE) 250 mg/5 mL (5 mL) oral solution 500 mg  500 mg Oral Q12H    albuterol-ipratropium (DUO-NEB) 2.5 MG-0.5 MG/3 ML  3 mL Nebulization Q4H PRN    glucose chewable tablet 16 g  4 Tablet Oral PRN    glucagon (GLUCAGEN) injection 1 mg  1 mg IntraMUSCular PRN    dextrose 10% infusion 0-250 mL  0-250 mL IntraVENous PRN    clotrimazole-betamethasone (LOTRISONE) 1-0.05 % cream   Topical BID    loperamide (IMODIUM) capsule 2 mg  2 mg Oral QID PRN    OLANZapine (ZyPREXA) tablet 10 mg  10 mg Oral TID    tamsulosin (FLOMAX) capsule 0.4 mg  0.4 mg Oral DAILY    polyethylene glycol (MIRALAX) packet 17 g  17 g Oral DAILY PRN    ondansetron (ZOFRAN ODT) tablet 4 mg  4 mg Oral Q8H PRN    Or    ondansetron (ZOFRAN) injection 4 mg  4 mg IntraVENous Q6H PRN    acetaminophen (TYLENOL) suppository 650 mg  650 mg Rectal Q4H PRN            Lab/Data Review:  Labs: Results:       Chemistry No results for input(s): GLU, NA, K, CL, CO2, BUN, CREA, BUCR, AGAP, CA, PHOS in the last 72 hours. No results for input(s): TBIL, ALT, ALKP, TP, ALB, GLOB, AGRAT in the last 72 hours. No lab exists for component: SGOT   CBC w/Diff No results for input(s): WBC, RBC, HGB, HCT, MCV, MCH, MCHC, RDW, PLT, BANDS, GRANS, LYMPH, EOS, HGBEXT, HCTEXT, PLTEXT, HGBEXT, HCTEXT, PLTEXT in the last 72 hours. Coagulation No results for input(s): PTP, INR, APTT, INREXT, INREXT in the last 72 hours.     Iron/Ferritin No results found for: IRON, FE, TIBC, IBCT, PSAT, FERR    BNP    Cardiac Enzymes No results found for: CPK, RCK1, RCK2, RCK3, RCK4, CKMB, CKNDX, CKND1, TROPT, TROIQ, BNPP, BNP     Lactic Acid    Thyroid Studies          All Micro Results     Procedure Component Value Units Date/Time    CULTURE, BLOOD [[id number]] Collected: 05/26/22 1100    Order Status: Completed Specimen: Blood Updated: 06/01/22 0641     Special Requests: NO SPECIAL REQUESTS        Culture result: NO GROWTH 6 DAYS       CULTURE, BLOOD [[id number]] Collected: 05/26/22 1200    Order Status: Completed Specimen: Blood Updated: 06/01/22 0641     Special Requests: NO SPECIAL REQUESTS        Culture result: NO GROWTH 6 DAYS       CULTURE, RESPIRATORY/SPUTUM/BRONCH Yuly Simba STAIN [[id number]] Collected: 05/28/22 0250    Order Status: Completed Specimen: Sputum Updated: 05/30/22 9659     Special Requests: NO SPECIAL REQUESTS        GRAM STAIN RARE WBCS SEEN               RARE EPITHELIAL CELLS SEEN            NO ORGANISMS SEEN        Culture result:       RARE NORMAL RESPIRATORY GENARO          CULTURE, URINE [[id number]] Collected: 05/26/22 1234    Order Status: Completed Specimen: Urine from Clean catch Updated: 05/28/22 1226     Special Requests: NO SPECIAL REQUESTS        Culture result: No growth (<1,000 CFU/ML)       RESPIRATORY VIRUS PANEL W/COVID-19, PCR [[id number]]  (Abnormal) Collected: 05/26/22 1309    Order Status: Completed Specimen: Nasopharyngeal Updated: 05/26/22 1452     Adenovirus Not detected        Coronavirus 229E Not detected        Coronavirus HKU1 Not detected        Coronavirus CVNL63 Not detected        Coronavirus OC43 Not detected        SARS-CoV-2, PCR Not detected        Metapneumovirus Not detected        Rhinovirus and Enterovirus Not detected        Influenza A Not detected        Influenza A, subtype H1 Not detected        Influenza A, subtype H3 Not detected        INFLUENZA A H1N1 PCR Not detected        Influenza B Not detected        Parainfluenza 1 Not detected        Parainfluenza 2 Not detected        Parainfluenza 3 Detected        Parainfluenza virus 4 Not detected        RSV by PCR Not detected        B. parapertussis, PCR Not detected        Bordetella pertussis - PCR Not detected        Chlamydophila pneumoniae DNA, QL, PCR Not detected        Mycoplasma pneumoniae DNA, QL, PCR Not detected               Images:    CT (Most Recent). XRAY (Most Recent)      EKG No results found for this or any previous visit.      2D ECHO

## 2022-07-06 NOTE — ROUTINE PROCESS
Bedside and Verbal shift change report given to Cuauhtemoc Harman (oncoming nurse) by Yamilka Rodarte RN (offgoing nurse). Report included the following information SBAR.

## 2022-07-07 NOTE — ROUTINE PROCESS
Bedside and Verbal shift change report given to Daryle Krabbe, RN (oncoming nurse) by Rebekah Vera RN (offgoing nurse). Report included the following information SBAR.

## 2022-07-07 NOTE — PROGRESS NOTES
Bedside and Verbal shift change report given to [address] (oncoming nurse) by Chapo Rivera RN (offgoing nurse). Report included the following information SBAR, Kardex, Intake/Output, MAR and Recent Results.

## 2022-07-07 NOTE — PROGRESS NOTES
Problem: Pressure Injury - Risk of  Goal: *Prevention of pressure injury  Description: Document Otoniel Scale and appropriate interventions in the flowsheet. Outcome: Progressing Towards Goal  Note: Pressure Injury Interventions:  Sensory Interventions: Keep linens dry and wrinkle-free,Maintain/enhance activity level,Minimize linen layers,Assess changes in LOC    Moisture Interventions: Check for incontinence Q2 hours and as needed,Absorbent underpads    Activity Interventions: Pressure redistribution bed/mattress(bed type)    Mobility Interventions: HOB 30 degrees or less,Pressure redistribution bed/mattress (bed type)    Nutrition Interventions: Document food/fluid/supplement intake    Friction and Shear Interventions: HOB 30 degrees or less,Minimize layers       Problem: Falls - Risk of  Goal: *Absence of Falls  Description: Document Syl Fall Risk and appropriate interventions in the flowsheet.   Outcome: Progressing Towards Goal  Note: Fall Risk Interventions:  Mobility Interventions: Bed/chair exit alarm,Patient to call before getting OOB    Mentation Interventions: Bed/chair exit alarm,Door open when patient unattended,Adequate sleep, hydration, pain control    Medication Interventions: Bed/chair exit alarm    Elimination Interventions: Call light in reach    History of Falls Interventions: Door open when patient unattended,Bed/chair exit alarm

## 2022-07-07 NOTE — PROGRESS NOTES
Hospitalist Progress Note    Patient: Jim Grimes Age: 58 y.o. : 1960 MR#: [id number] SSN: [id number]  Date/Time: 2022     DOA: 2022  PCP: Santiago Louis MD    Subjective:     Patient lying in bed appears comfortable. Assessment/Plan:   Interval Hospital Course:  64 y.o male with intellectual disability with h/o PKU (phenylketonuria), Seizure disorder, anxiety disorder, chronic urinary retention with singleton catheter requirement, presented to the ER 22 due to progressive lethargy and altered in his mentation. His group home staff reported that he was nonverbal for 48hrs, he was previously conversant and ambulatory. In the ER, he was found to be in acute respiratory failure with concern for Parainfluenza 3 infection. ID consulted and started him on zosyn for concern of aspiration. Due to his persistent respiratory failure and AMS, he was transferred to ICU for further monitoring. He was eventually intubated 22 due to worsened encephalopathy and became unresponsive. He was stabilized and was able to extubate on 22. 1.  Acute respiratory failure with hypoxia,        S/p intubated for airway protection, Extubated 22. 2.  Atypical pneumonia related to parainfluenza infection, resolved         Aspiration pneumonia concern   3. Sepsis ruled out   4. Acute toxic/metabolic encephalopathy on chronic intellectual disability   5. Intellectual disability with h/o PKU  6.  Seizure disorder, stable   7. Elevated LFT's, improving to baseline   8. Hypercholesteremia   9. Hypokalemia, resolved   10. Urinary retention  11. Severe pharyngeal dysphagia with aspiration risk, unable to maintain NG Tube and family recently inquired about PEG tube placement, however GI specialist did not feel that patient was appropriate for PEG tube placement.  Also, pt now able to feed himself w/o overt signs and symptoms of aspiration, no respiratory compromise/ evidence of pna during his stay here over the past few wks. Brother called again on 22 with request to have PEG tube placed, stating that LTC facilities will not take pt if he does not have a PEG tube. I have informed brother that currently no medical indication for PEG tube placement. He remains on comfort feed only      Continue comfort feeds  Pt will benefit from PT/OT, will consult.   On Keppra and Depakene  Zyprexa and Cogentin  DNR and DNI      Disposition-pending  Patient is medically stable      Case discussed with:  [x]Patient  []Family  []Nursing  [x]Case Management  DVT Prophylaxis:  []Lovenox  []Hep SQ  [x]SCDs  []Coumadin   []On Heparin gtt    Signed By: Alice Booth MD     2022               Objective:   VS:   Visit Vitals  /74   Pulse 80   Temp 97.6 °F (36.4 °C)   Resp 17   Ht 5' 8\" (1.727 m)   Wt 58.1 kg (128 lb)   SpO2 97%   BMI 19.46 kg/m²      Tmax/24hrs: Temp (24hrs), Av.4 °F (36.3 °C), Min:97.2 °F (36.2 °C), Max:97.6 °F (36.4 °C)      Intake/Output Summary (Last 24 hours) at 2022 1508  Last data filed at 2022 1312  Gross per 24 hour   Intake 600 ml   Output --   Net 600 ml       General:  Awake,   Cardiovascular:  S1S2+, RRR  Pulmonary:  CTA b/l  GI:  Soft, BS+, NT, ND  Extremities:  trace edema        Additional:       Current Facility-Administered Medications   Medication Dose Route Frequency    benztropine (COGENTIN) tablet 1 mg  1 mg Oral TID    haloperidol lactate (HALDOL) injection 2 mg  2 mg IntraMUSCular Q6H PRN    trospium (SANCTURA) tablet 20 mg  20 mg Oral DAILY    famotidine (PEPCID) 40 mg/5 mL (8 mg/mL) oral suspension 20 mg  20 mg Oral DAILY    levETIRAcetam (KEPPRA) oral solution 500 mg  500 mg Oral BID    valproic acid (as sodium salt) (DEPAKENE) 250 mg/5 mL (5 mL) oral solution 500 mg  500 mg Oral Q12H    albuterol-ipratropium (DUO-NEB) 2.5 MG-0.5 MG/3 ML  3 mL Nebulization Q4H PRN    glucose chewable tablet 16 g  4 Tablet Oral PRN    glucagon (GLUCAGEN) injection 1 mg 1 mg IntraMUSCular PRN    dextrose 10% infusion 0-250 mL  0-250 mL IntraVENous PRN    clotrimazole-betamethasone (LOTRISONE) 1-0.05 % cream   Topical BID    loperamide (IMODIUM) capsule 2 mg  2 mg Oral QID PRN    OLANZapine (ZyPREXA) tablet 10 mg  10 mg Oral TID    tamsulosin (FLOMAX) capsule 0.4 mg  0.4 mg Oral DAILY    polyethylene glycol (MIRALAX) packet 17 g  17 g Oral DAILY PRN    ondansetron (ZOFRAN ODT) tablet 4 mg  4 mg Oral Q8H PRN    Or    ondansetron (ZOFRAN) injection 4 mg  4 mg IntraVENous Q6H PRN    acetaminophen (TYLENOL) suppository 650 mg  650 mg Rectal Q4H PRN            Lab/Data Review:  Labs: Results:       Chemistry No results for input(s): GLU, NA, K, CL, CO2, BUN, CREA, BUCR, AGAP, CA, PHOS in the last 72 hours. No results for input(s): TBIL, ALT, ALKP, TP, ALB, GLOB, AGRAT in the last 72 hours. No lab exists for component: SGOT   CBC w/Diff No results for input(s): WBC, RBC, HGB, HCT, MCV, MCH, MCHC, RDW, PLT, BANDS, GRANS, LYMPH, EOS, HGBEXT, HCTEXT, PLTEXT, HGBEXT, HCTEXT, PLTEXT in the last 72 hours. Coagulation No results for input(s): PTP, INR, APTT, INREXT, INREXT in the last 72 hours.     Iron/Ferritin No results found for: IRON, FE, TIBC, IBCT, PSAT, FERR    BNP    Cardiac Enzymes No results found for: CPK, RCK1, RCK2, RCK3, RCK4, CKMB, CKNDX, CKND1, TROPT, TROIQ, BNPP, BNP     Lactic Acid    Thyroid Studies          All Micro Results     Procedure Component Value Units Date/Time    CULTURE, BLOOD [[id number]] Collected: 05/26/22 1100    Order Status: Completed Specimen: Blood Updated: 06/01/22 0641     Special Requests: NO SPECIAL REQUESTS        Culture result: NO GROWTH 6 DAYS       CULTURE, BLOOD [[id number]] Collected: 05/26/22 1200    Order Status: Completed Specimen: Blood Updated: 06/01/22 0641     Special Requests: NO SPECIAL REQUESTS        Culture result: NO GROWTH 6 DAYS       CULTURE, RESPIRATORY/SPUTUM/BRONCH Noland Reinaldo STAIN [[id number]] Collected: 05/28/22 0255 Order Status: Completed Specimen: Sputum Updated: 05/30/22 0927     Special Requests: NO SPECIAL REQUESTS        GRAM STAIN RARE WBCS SEEN               RARE EPITHELIAL CELLS SEEN            NO ORGANISMS SEEN        Culture result:       RARE NORMAL RESPIRATORY GENARO          CULTURE, URINE [[id number]] Collected: 05/26/22 1234    Order Status: Completed Specimen: Urine from Clean catch Updated: 05/28/22 1226     Special Requests: NO SPECIAL REQUESTS        Culture result: No growth (<1,000 CFU/ML)       RESPIRATORY VIRUS PANEL W/COVID-19, PCR [[id number]]  (Abnormal) Collected: 05/26/22 1309    Order Status: Completed Specimen: Nasopharyngeal Updated: 05/26/22 1452     Adenovirus Not detected        Coronavirus 229E Not detected        Coronavirus HKU1 Not detected        Coronavirus CVNL63 Not detected        Coronavirus OC43 Not detected        SARS-CoV-2, PCR Not detected        Metapneumovirus Not detected        Rhinovirus and Enterovirus Not detected        Influenza A Not detected        Influenza A, subtype H1 Not detected        Influenza A, subtype H3 Not detected        INFLUENZA A H1N1 PCR Not detected        Influenza B Not detected        Parainfluenza 1 Not detected        Parainfluenza 2 Not detected        Parainfluenza 3 Detected        Parainfluenza virus 4 Not detected        RSV by PCR Not detected        B. parapertussis, PCR Not detected        Bordetella pertussis - PCR Not detected        Chlamydophila pneumoniae DNA, QL, PCR Not detected        Mycoplasma pneumoniae DNA, QL, PCR Not detected               Images:    CT (Most Recent). XRAY (Most Recent)      EKG No results found for this or any previous visit.      2D ECHO

## 2022-07-08 NOTE — PROGRESS NOTES
[facility]  (A Division of [address] [place] [address])    Follow-up Note      Date of Admission: 2022       Date of Note:  2022          Current Antimicrobials:    Prior Antimicrobials:  Zosyn  to       Assessment:         Acute respiratory failure: Intubated on , extubated   Infection with parainfluenza 3 virus: no pneumonia on CXR upon admission. There are hazy opacities noted on post intubation x-ray on . With possible congestion noted on  x-rays.   -Lactic acid 1.4, procalcitonin 0.45  Aspiration pneumonia  Acute metabolic encephalopathy  Transaminitis: Persistent-AST trending down somewhat  Mild hyponatremia: Resolved  PKU  Mild hematuria       Plan:   Completed course of Zosyn . CBC, BMP every 2-3 days  Aspiration precautions- high risk for recurrent aspiration. Close follow-up . -no plans for NG/PEG as patient has pulled them out. Family prefers to avoid further trauma    Pending placement in group home. Will check back again end of next week. Please call if needed sooner. Erick Law DO  [facility]  [address] , [address] [facility] [address]  Office: [phone]  Mobile/Text: [phone]     Microbiology:   blood cultures: No growth to date   urine culture no course to date   respiratory viral culture:  positive for parainfluenza 3 negative for all other pathogens    Lines / Catheters:  Peripheral  ET   Right IJ      Subjective:   Patient seen and examined at bedside. Watching TV. Playing with a toy. No new changes. No new events per nursing. No documented fevers.      Pending discharge/placement    Objective:        Visit Vitals  /60   Pulse 75   Temp 97.6 °F (36.4 °C)   Resp 17   Ht 5' 8\" (1.727 m)   Wt 58.1 kg (128 lb)   SpO2 95%   BMI 19.46 kg/m²     Temp (24hrs), Av.8 °F (36.6 °C), Min:97.6 °F (36.4 °C), Max:98 °F (36.7 °C)        General:   Awake and alert looks around   Skin:   no rashes or skin lesions noted on limited exam, dry and warm   HEENT:  No scleral icterus or pallor; oral mucosa moist, lips moist   Lymph Nodes:   not assessed today   Lungs:   coarse breath sounds throughout, no wheeze and irregular respiratory drive   Heart:  RRR, s1 and s2; no murmurs rubs or gallops; no edema, + pedal pulses   Abdomen:  soft, non-distended, active bowel sounds, non-tender   Genitourinary:  deferred   Extremities:   average muscle tone; no contractures, no joint effusions,   Neurologic:   Moves extremities independently, no apparent focal weakness, slow to answer questions, intermittent confusion   Psychiatric:  , Calm, follows directions         Lab results:    Chemistry  No results for input(s): GLU, NA, K, CL, CO2, BUN, CREA, CA, AGAP, BUCR, TBIL, AP, TP, ALB, GLOB, AGRAT in the last 72 hours. No lab exists for component: GPT    CBC w/ Diff  No results for input(s): WBC, RBC, HGB, HCT, PLT, GRANS, LYMPH, EOS, HGBEXT, HCTEXT, PLTEXT, HGBEXT, HCTEXT, PLTEXT in the last 72 hours.     Microbiology  All Micro Results     Procedure Component Value Units Date/Time    CULTURE, BLOOD [[id number]] Collected: 05/26/22 1100    Order Status: Completed Specimen: Blood Updated: 06/01/22 0641     Special Requests: NO SPECIAL REQUESTS        Culture result: NO GROWTH 6 DAYS       CULTURE, BLOOD [[id number]] Collected: 05/26/22 1200    Order Status: Completed Specimen: Blood Updated: 06/01/22 0641     Special Requests: NO SPECIAL REQUESTS        Culture result: NO GROWTH 6 DAYS       CULTURE, RESPIRATORY/SPUTUM/BRONCH Burnham Bane STAIN [[id number]] Collected: 05/28/22 0250    Order Status: Completed Specimen: Sputum Updated: 05/30/22 0927     Special Requests: NO SPECIAL REQUESTS        GRAM STAIN RARE WBCS SEEN               RARE EPITHELIAL CELLS SEEN            NO ORGANISMS SEEN        Culture result:       RARE NORMAL RESPIRATORY GENARO          CULTURE, URINE [[id number]] Collected: 05/26/22 1234    Order Status: Completed Specimen: Urine from Clean catch Updated: 05/28/22 1226     Special Requests: NO SPECIAL REQUESTS        Culture result: No growth (<1,000 CFU/ML)       RESPIRATORY VIRUS PANEL W/COVID-19, PCR [[id number]]  (Abnormal) Collected: 05/26/22 1309    Order Status: Completed Specimen: Nasopharyngeal Updated: 05/26/22 1452     Adenovirus Not detected        Coronavirus 229E Not detected        Coronavirus HKU1 Not detected        Coronavirus CVNL63 Not detected        Coronavirus OC43 Not detected        SARS-CoV-2, PCR Not detected        Metapneumovirus Not detected        Rhinovirus and Enterovirus Not detected        Influenza A Not detected        Influenza A, subtype H1 Not detected        Influenza A, subtype H3 Not detected        INFLUENZA A H1N1 PCR Not detected        Influenza B Not detected        Parainfluenza 1 Not detected        Parainfluenza 2 Not detected        Parainfluenza 3 Detected        Parainfluenza virus 4 Not detected        RSV by PCR Not detected        B. parapertussis, PCR Not detected        Bordetella pertussis - PCR Not detected        Chlamydophila pneumoniae DNA, QL, PCR Not detected        Mycoplasma pneumoniae DNA, QL, PCR Not detected              Flavio Adams DO   7/8/2022

## 2022-07-08 NOTE — PROGRESS NOTES
Giving Haldol 2 mg IM dose early as patient is agitated at this time per nursing. With my discussion with nursing, patient had been receiving Haldol injections in his arm and apparently flexes when it is given. Most of the medication has been witnessed using out. Last night injection was given in his buttocks to good effect. This dose is to be given into the buttock.

## 2022-07-08 NOTE — ROUTINE PROCESS
Bedside and Verbal shift change report given to Skylar Saldivar RN (oncoming nurse) by Tiff Hooker RN (offgoing nurse). Report included the following information SBAR.

## 2022-07-08 NOTE — PROGRESS NOTES
Pt continues to attempt to get oob, Dr. Guy Letters aware, no new orders given despite ongoing safety concerns.  Haldol IM given but was not successful in calming pt

## 2022-07-09 NOTE — PROGRESS NOTES
Hospitalist Progress Note    Patient: Miles Velásquez Age: 58 y.o. : 1960 MR#: [id number] SSN: [id number]  Date/Time: 2022     DOA: 2022  PCP: Imtiaz Marie MD    Subjective:     Patient lying in bed appears comfortable. Assessment/Plan:   Interval Hospital Course:  64 y.o male with intellectual disability with h/o PKU (phenylketonuria), Seizure disorder, anxiety disorder, chronic urinary retention with singleton catheter requirement, presented to the ER 22 due to progressive lethargy and altered in his mentation. His group home staff reported that he was nonverbal for 48hrs, he was previously conversant and ambulatory. In the ER, he was found to be in acute respiratory failure with concern for Parainfluenza 3 infection. ID consulted and started him on zosyn for concern of aspiration. Due to his persistent respiratory failure and AMS, he was transferred to ICU for further monitoring. He was eventually intubated 22 due to worsened encephalopathy and became unresponsive. He was stabilized and was able to extubate on 22. 1.  Acute respiratory failure with hypoxia,        S/p intubated for airway protection, Extubated 22. 2.  Atypical pneumonia related to parainfluenza infection, resolved         Aspiration pneumonia concern   3. Sepsis ruled out   4. Acute toxic/metabolic encephalopathy on chronic intellectual disability   5. Intellectual disability with h/o PKU  6.  Seizure disorder, stable   7. Elevated LFT's, improving to baseline   8. Hypercholesteremia   9. Hypokalemia, resolved   10. Urinary retention  11. Severe pharyngeal dysphagia with aspiration risk, unable to maintain NG Tube and family recently inquired about PEG tube placement, however GI specialist did not feel that patient was appropriate for PEG tube placement.  Also, pt now able to feed himself w/o overt signs and symptoms of aspiration, no respiratory compromise/ evidence of pna during his stay here over the past few wks. Brother called again on 22 with request to have PEG tube placed, stating that LTC facilities will not take pt if he does not have a PEG tube. I have informed brother that currently no medical indication for PEG tube placement. Spoke with administration from an LTC facility who is considering pt for admission to the facility. Answered questions about pt's ability to eat and aspiration risk. Shared above information with .  states she will be coming to evaluate pt and will be going through his medical records.     Continue comfort feeds  On Keppra and Depakene  Zyprexa and Cogentin  DNR and DNI      Disposition-pending  Patient is medically stable      Case discussed with:  [x]Patient  []Family  []Nursing  [x]Case Management  DVT Prophylaxis:  []Lovenox  []Hep SQ  [x]SCDs  []Coumadin   []On Heparin gtt    Signed By: Jin Cooper MD     2022               Objective:   VS:   Visit Vitals  /60   Pulse 76   Temp 97.8 °F (36.6 °C)   Resp 17   Ht 5' 8\" (1.727 m)   Wt 58.1 kg (128 lb)   SpO2 96%   BMI 19.46 kg/m²      Tmax/24hrs: Temp (24hrs), Av.7 °F (36.5 °C), Min:97.6 °F (36.4 °C), Max:97.8 °F (36.6 °C)    No intake or output data in the 24 hours ending 22 2100    General:  Awake,   Cardiovascular:  S1S2+, RRR  Pulmonary:  CTA b/l  GI:  Soft, BS+, NT, ND  Extremities:  trace edema        Additional:       Current Facility-Administered Medications   Medication Dose Route Frequency    benztropine (COGENTIN) tablet 1 mg  1 mg Oral TID    haloperidol lactate (HALDOL) injection 2 mg  2 mg IntraMUSCular Q6H PRN    trospium (SANCTURA) tablet 20 mg  20 mg Oral DAILY    famotidine (PEPCID) 40 mg/5 mL (8 mg/mL) oral suspension 20 mg  20 mg Oral DAILY    levETIRAcetam (KEPPRA) oral solution 500 mg  500 mg Oral BID    valproic acid (as sodium salt) (DEPAKENE) 250 mg/5 mL (5 mL) oral solution 500 mg  500 mg Oral Q12H    albuterol-ipratropium (DUO-NEB) 2.5 MG-0.5 MG/3 ML  3 mL Nebulization Q4H PRN    glucose chewable tablet 16 g  4 Tablet Oral PRN    glucagon (GLUCAGEN) injection 1 mg  1 mg IntraMUSCular PRN    dextrose 10% infusion 0-250 mL  0-250 mL IntraVENous PRN    clotrimazole-betamethasone (LOTRISONE) 1-0.05 % cream   Topical BID    loperamide (IMODIUM) capsule 2 mg  2 mg Oral QID PRN    OLANZapine (ZyPREXA) tablet 10 mg  10 mg Oral TID    tamsulosin (FLOMAX) capsule 0.4 mg  0.4 mg Oral DAILY    polyethylene glycol (MIRALAX) packet 17 g  17 g Oral DAILY PRN    ondansetron (ZOFRAN ODT) tablet 4 mg  4 mg Oral Q8H PRN    Or    ondansetron (ZOFRAN) injection 4 mg  4 mg IntraVENous Q6H PRN    acetaminophen (TYLENOL) suppository 650 mg  650 mg Rectal Q4H PRN            Lab/Data Review:  Labs: Results:       Chemistry No results for input(s): GLU, NA, K, CL, CO2, BUN, CREA, BUCR, AGAP, CA, PHOS in the last 72 hours. No results for input(s): TBIL, ALT, ALKP, TP, ALB, GLOB, AGRAT in the last 72 hours. No lab exists for component: SGOT   CBC w/Diff No results for input(s): WBC, RBC, HGB, HCT, MCV, MCH, MCHC, RDW, PLT, BANDS, GRANS, LYMPH, EOS, HGBEXT, HCTEXT, PLTEXT, HGBEXT, HCTEXT, PLTEXT in the last 72 hours. Coagulation No results for input(s): PTP, INR, APTT, INREXT, INREXT in the last 72 hours.     Iron/Ferritin No results found for: IRON, FE, TIBC, IBCT, PSAT, FERR    BNP    Cardiac Enzymes No results found for: CPK, RCK1, RCK2, RCK3, RCK4, CKMB, CKNDX, CKND1, TROPT, TROIQ, BNPP, BNP     Lactic Acid    Thyroid Studies          All Micro Results     Procedure Component Value Units Date/Time    CULTURE, BLOOD [[id number]] Collected: 05/26/22 1100    Order Status: Completed Specimen: Blood Updated: 06/01/22 0641     Special Requests: NO SPECIAL REQUESTS        Culture result: NO GROWTH 6 DAYS       CULTURE, BLOOD [[id number]] Collected: 05/26/22 1200    Order Status: Completed Specimen: Blood Updated: 06/01/22 0641     Special Requests: NO SPECIAL REQUESTS        Culture result: NO GROWTH 6 DAYS       CULTURE, RESPIRATORY/SPUTUM/BRONCH Lesa Parr [[id number]] Collected: 05/28/22 0250    Order Status: Completed Specimen: Sputum Updated: 05/30/22 0927     Special Requests: NO SPECIAL REQUESTS        GRAM STAIN RARE WBCS SEEN               RARE EPITHELIAL CELLS SEEN            NO ORGANISMS SEEN        Culture result:       RARE NORMAL RESPIRATORY GENARO          CULTURE, URINE [[id number]] Collected: 05/26/22 1234    Order Status: Completed Specimen: Urine from Clean catch Updated: 05/28/22 1226     Special Requests: NO SPECIAL REQUESTS        Culture result: No growth (<1,000 CFU/ML)       RESPIRATORY VIRUS PANEL W/COVID-19, PCR [[id number]]  (Abnormal) Collected: 05/26/22 1309    Order Status: Completed Specimen: Nasopharyngeal Updated: 05/26/22 1452     Adenovirus Not detected        Coronavirus 229E Not detected        Coronavirus HKU1 Not detected        Coronavirus CVNL63 Not detected        Coronavirus OC43 Not detected        SARS-CoV-2, PCR Not detected        Metapneumovirus Not detected        Rhinovirus and Enterovirus Not detected        Influenza A Not detected        Influenza A, subtype H1 Not detected        Influenza A, subtype H3 Not detected        INFLUENZA A H1N1 PCR Not detected        Influenza B Not detected        Parainfluenza 1 Not detected        Parainfluenza 2 Not detected        Parainfluenza 3 Detected        Parainfluenza virus 4 Not detected        RSV by PCR Not detected        B. parapertussis, PCR Not detected        Bordetella pertussis - PCR Not detected        Chlamydophila pneumoniae DNA, QL, PCR Not detected        Mycoplasma pneumoniae DNA, QL, PCR Not detected               Images:    CT (Most Recent). XRAY (Most Recent)      EKG No results found for this or any previous visit.      2D ECHO

## 2022-07-09 NOTE — PROGRESS NOTES
Bedside shift report [facility] Report including the following information SBAR, Kardex, recent results, MAR, intake/output .

## 2022-07-09 NOTE — PROGRESS NOTES
Patient's brother Irineo Saenz [phone] called  back, and gave verbal consent to give Group Home any medical information or documents needed, and [facility]              Harley Werner, RN  Case Management [phone]

## 2022-07-09 NOTE — ROUTINE PROCESS
0730-/Bedside shift change report given to Sudha (oncoming nurse) by Angie Trinh (offgoing nurse). Report included the following information SBAR, MAR and Recent Results. 3766-patient's brother Suzanne Garrido gave verbal consent for the patient's medical records to be shared with  Joe De Paz. Her contact information is [phone].  made aware. Joe De Paz will be back by tomorrow to observe the patient and obtain necessary information.    /Bedside shift change report given to Linda (oncoming nurse) by Megha Ramey (offgoing nurse). Report included the following information SBAR, MAR and Recent Results.

## 2022-07-09 NOTE — PROGRESS NOTES
[address] [place] [address] with Lola Cousin [phone] called CM, confirmed with her that CM was given consent by patient's brother for Medical records to be given as requested for possible Group Home placement. Ms. Mukul Castellanos had questions about patient's diet, and what he was being served while she was visiting today, and wanting to make recommendations to the Dr, CM let her know that she would need to speak with the Doctor about patient's medical issues and any questions. Ms. Mukul Castellanos said she will be coming to the hospital again tomorrow morning to see patient, and requesting progress notes as needed.            Gustavo King, RN  Case Management [phone]

## 2022-07-09 NOTE — PROGRESS NOTES
Hospitalist Progress Note    Patient: Palmira Bledsoe Age: 58 y.o. : 1960 MR#: [id number] SSN: [id number]  Date/Time: 2022     DOA: 2022  PCP: Ramos Goldman MD    Subjective:     Patient lying in bed appears comfortable. Assessment/Plan:   Interval Hospital Course:  64 y.o male with intellectual disability with h/o PKU (phenylketonuria), Seizure disorder, anxiety disorder, chronic urinary retention with singleton catheter requirement, presented to the ER 22 due to progressive lethargy and altered in his mentation. His group home staff reported that he was nonverbal for 48hrs, he was previously conversant and ambulatory. In the ER, he was found to be in acute respiratory failure with concern for Parainfluenza 3 infection. ID consulted and started him on zosyn for concern of aspiration. Due to his persistent respiratory failure and AMS, he was transferred to ICU for further monitoring. He was eventually intubated 22 due to worsened encephalopathy and became unresponsive. He was stabilized and was able to extubate on 22. 1.  Acute respiratory failure with hypoxia,        S/p intubated for airway protection, Extubated 22. 2.  Atypical pneumonia related to parainfluenza infection, resolved         Aspiration pneumonia concern   3. Sepsis ruled out   4. Acute toxic/metabolic encephalopathy on chronic intellectual disability   5. Intellectual disability with h/o PKU  6.  Seizure disorder, stable   7. Elevated LFT's, improving to baseline   8. Hypercholesteremia   9. Hypokalemia, resolved   10. Urinary retention  11. Severe pharyngeal dysphagia with aspiration risk, unable to maintain NG Tube and family recently inquired about PEG tube placement, however GI specialist did not feel that patient was appropriate for PEG tube placement.  Also, pt now able to feed himself w/o overt signs and symptoms of aspiration, no respiratory compromise/ evidence of pna during his stay here over the past few wks. Brother called again on 22 with request to have PEG tube placed, stating that LTC facilities will not take pt if he does not have a PEG tube. I have informed brother that currently no medical indication for PEG tube placement. Dr. Sierra Brothers spoke with administration from an [address] [place] [address] facility who is considering pt for admission to the facility. Answered questions about pt's ability to eat and aspiration risk. Shared above information with .  came on  to ask questions, referred her to case management at that time.      Continue comfort feeds  On Keppra and Depakene  Zyprexa and Cogentin  DNR and DNI    Disposition-pending  Patient is medically stable    Case discussed with:  [x]Patient  []Family  []Nursing  [x]Case Management  DVT Prophylaxis:  []Lovenox  []Hep SQ  [x]SCDs  []Coumadin   []On Heparin gtt    Signed By: Kaia Yoo DO     2022               Objective:   VS:   Visit Vitals  /60   Pulse 76   Temp 97.8 °F (36.6 °C)   Resp 17   Ht 5' 8\" (1.727 m)   Wt 58.1 kg (128 lb)   SpO2 96%   BMI 19.46 kg/m²      Tmax/24hrs: Temp (24hrs), Av.8 °F (36.6 °C), Min:97.8 °F (36.6 °C), Max:97.8 °F (36.6 °C)    No intake or output data in the 24 hours ending 22 1800    General:  Awake,   Cardiovascular:  S1S2+, RRR  Pulmonary:  CTA b/l  GI:  Soft, BS+, NT, ND  Extremities:  trace edema        Additional:       Current Facility-Administered Medications   Medication Dose Route Frequency    benztropine (COGENTIN) tablet 1 mg  1 mg Oral TID    haloperidol lactate (HALDOL) injection 2 mg  2 mg IntraMUSCular Q6H PRN    trospium (SANCTURA) tablet 20 mg  20 mg Oral DAILY    famotidine (PEPCID) 40 mg/5 mL (8 mg/mL) oral suspension 20 mg  20 mg Oral DAILY    levETIRAcetam (KEPPRA) oral solution 500 mg  500 mg Oral BID    valproic acid (as sodium salt) (DEPAKENE) 250 mg/5 mL (5 mL) oral solution 500 mg  500 mg Oral Q12H    albuterol-ipratropium (DUO-NEB) 2.5 MG-0.5 MG/3 ML  3 mL Nebulization Q4H PRN    glucose chewable tablet 16 g  4 Tablet Oral PRN    glucagon (GLUCAGEN) injection 1 mg  1 mg IntraMUSCular PRN    dextrose 10% infusion 0-250 mL  0-250 mL IntraVENous PRN    clotrimazole-betamethasone (LOTRISONE) 1-0.05 % cream   Topical BID    loperamide (IMODIUM) capsule 2 mg  2 mg Oral QID PRN    OLANZapine (ZyPREXA) tablet 10 mg  10 mg Oral TID    tamsulosin (FLOMAX) capsule 0.4 mg  0.4 mg Oral DAILY    polyethylene glycol (MIRALAX) packet 17 g  17 g Oral DAILY PRN    ondansetron (ZOFRAN ODT) tablet 4 mg  4 mg Oral Q8H PRN    Or    ondansetron (ZOFRAN) injection 4 mg  4 mg IntraVENous Q6H PRN    acetaminophen (TYLENOL) suppository 650 mg  650 mg Rectal Q4H PRN            Lab/Data Review:  Labs: Results:       Chemistry No results for input(s): GLU, NA, K, CL, CO2, BUN, CREA, BUCR, AGAP, CA, PHOS in the last 72 hours. No results for input(s): TBIL, ALT, ALKP, TP, ALB, GLOB, AGRAT in the last 72 hours. No lab exists for component: SGOT   CBC w/Diff No results for input(s): WBC, RBC, HGB, HCT, MCV, MCH, MCHC, RDW, PLT, BANDS, GRANS, LYMPH, EOS, HGBEXT, HCTEXT, PLTEXT, HGBEXT, HCTEXT, PLTEXT in the last 72 hours. Coagulation No results for input(s): PTP, INR, APTT, INREXT, INREXT in the last 72 hours.     Iron/Ferritin No results found for: IRON, FE, TIBC, IBCT, PSAT, FERR    BNP    Cardiac Enzymes No results found for: CPK, RCK1, RCK2, RCK3, RCK4, CKMB, CKNDX, CKND1, TROPT, TROIQ, BNPP, BNP     Lactic Acid    Thyroid Studies          All Micro Results     Procedure Component Value Units Date/Time    CULTURE, BLOOD [[id number]] Collected: 05/26/22 1100    Order Status: Completed Specimen: Blood Updated: 06/01/22 0641     Special Requests: NO SPECIAL REQUESTS        Culture result: NO GROWTH 6 DAYS       CULTURE, BLOOD [[id number]] Collected: 05/26/22 1200    Order Status: Completed Specimen: Blood Updated: 06/01/22 0641     Special Requests: NO SPECIAL REQUESTS        Culture result: NO GROWTH 6 DAYS       CULTURE, RESPIRATORY/SPUTUM/BRONCH Fina Afua [[id number]] Collected: 05/28/22 0250    Order Status: Completed Specimen: Sputum Updated: 05/30/22 0927     Special Requests: NO SPECIAL REQUESTS        GRAM STAIN RARE WBCS SEEN               RARE EPITHELIAL CELLS SEEN            NO ORGANISMS SEEN        Culture result:       RARE NORMAL RESPIRATORY GENARO          CULTURE, URINE [[id number]] Collected: 05/26/22 1234    Order Status: Completed Specimen: Urine from Clean catch Updated: 05/28/22 1226     Special Requests: NO SPECIAL REQUESTS        Culture result: No growth (<1,000 CFU/ML)       RESPIRATORY VIRUS PANEL W/COVID-19, PCR [[id number]]  (Abnormal) Collected: 05/26/22 1309    Order Status: Completed Specimen: Nasopharyngeal Updated: 05/26/22 1452     Adenovirus Not detected        Coronavirus 229E Not detected        Coronavirus HKU1 Not detected        Coronavirus CVNL63 Not detected        Coronavirus OC43 Not detected        SARS-CoV-2, PCR Not detected        Metapneumovirus Not detected        Rhinovirus and Enterovirus Not detected        Influenza A Not detected        Influenza A, subtype H1 Not detected        Influenza A, subtype H3 Not detected        INFLUENZA A H1N1 PCR Not detected        Influenza B Not detected        Parainfluenza 1 Not detected        Parainfluenza 2 Not detected        Parainfluenza 3 Detected        Parainfluenza virus 4 Not detected        RSV by PCR Not detected        B. parapertussis, PCR Not detected        Bordetella pertussis - PCR Not detected        Chlamydophila pneumoniae DNA, QL, PCR Not detected        Mycoplasma pneumoniae DNA, QL, PCR Not detected               Images:    CT (Most Recent). XRAY (Most Recent)      EKG No results found for this or any previous visit.      2D ECHO

## 2022-07-09 NOTE — PROGRESS NOTES
MARISABEL spoke with Silvio Crawford and updated her on 2 people from a Group home showing up in patient's room, and MARISABEL and Dr. Ricarda Webber were unable to see them, and did not know that they were coming. MARISABEL let Tash Ku know that the Group home visitors asked Sudha OAKLEY to call patient's brother to request permission for them to be allowed access to medical records, and patient's brother gave permission to have medical records given to them. MARISABEL let Tash Ku know that Group home visitors said they will be back tomorrow. Tash Ku asked that MARISABEL also call patient's brother and get his permission to give Group Home visitors medical records, and if brother agrees, MARISABEL is okay to give [address] visitors medical records. MARISABEL called patient's brother Janette Bello [phone], received voicemail, CM left a message letting him know that CM needs consent from him to [facility] requested. MARISABEL left phone number for a return call.              Jaleel Bolanos RN  Case Management [phone]

## 2022-07-09 NOTE — PROGRESS NOTES
Problem: Risk for Spread of Infection  Goal: Prevent transmission of infectious organism to others  Description: Prevent the transmission of infectious organisms to other patients, staff members, and visitors. Outcome: Progressing Towards Goal     Problem: Patient Education:  Go to Education Activity  Goal: Patient/Family Education  Outcome: Progressing Towards Goal     Problem: Pressure Injury - Risk of  Goal: *Prevention of pressure injury  Description: Document Otoniel Scale and appropriate interventions in the flowsheet. Outcome: Progressing Towards Goal  Note: Pressure Injury Interventions:  Sensory Interventions: Minimize linen layers,Pressure redistribution bed/mattress (bed type)    Moisture Interventions: Absorbent underpads,Limit adult briefs    Activity Interventions: Pressure redistribution bed/mattress(bed type)    Mobility Interventions: Pressure redistribution bed/mattress (bed type)    Nutrition Interventions: Document food/fluid/supplement intake    Friction and Shear Interventions: Minimize layers                Problem: Patient Education: Go to Patient Education Activity  Goal: Patient/Family Education  Outcome: Progressing Towards Goal     Problem: Patient Education: Go to Patient Education Activity  Goal: Patient/Family Education  Outcome: Progressing Towards Goal     Problem: Nutrition Deficit  Goal: *Optimize nutritional status  Outcome: Progressing Towards Goal     Problem: Falls - Risk of  Goal: *Absence of Falls  Description: Document Syl Fall Risk and appropriate interventions in the flowsheet.   Outcome: Progressing Towards Goal  Note: Fall Risk Interventions:  Mobility Interventions: Bed/chair exit alarm    Mentation Interventions: Bed/chair exit alarm    Medication Interventions: Bed/chair exit alarm    Elimination Interventions: Bed/chair exit alarm,Patient to call for help with toileting needs,Toileting schedule/hourly rounds    History of Falls Interventions: Bed/chair exit alarm,Investigate reason for fall,Door open when patient unattended         Problem: Patient Education: Go to Patient Education Activity  Goal: Patient/Family Education  Outcome: Progressing Towards Goal     Problem: Injury - Risk of, Adverse Drug Event  Goal: *Absence of adverse drug events  Outcome: Progressing Towards Goal  Goal: *Absence of medication errors  Outcome: Progressing Towards Goal  Goal: *Knowledge of prescribed medications  Outcome: Progressing Towards Goal     Problem: Patient Education: Go to Patient Education Activity  Goal: Patient/Family Education  Outcome: Progressing Towards Goal     Problem: Pain  Goal: *Control of Pain  Outcome: Progressing Towards Goal  Goal: *PALLIATIVE CARE:  Alleviation of Pain  Outcome: Progressing Towards Goal     Problem: Patient Education: Go to Patient Education Activity  Goal: Patient/Family Education  Outcome: Progressing Towards Goal     Problem: Patient Education: Go to Patient Education Activity  Goal: Patient/Family Education  Outcome: Progressing Towards Goal     Problem: Patient Education: Go to Patient Education Activity  Goal: Patient/Family Education  Outcome: Progressing Towards Goal     Problem: General Medical Care Plan  Goal: *Vital signs within specified parameters  Outcome: Progressing Towards Goal  Goal: *Labs within defined limits  Outcome: Progressing Towards Goal  Goal: *Absence of infection signs and symptoms  Outcome: Progressing Towards Goal  Goal: *Optimal pain control at patient's stated goal  Outcome: Progressing Towards Goal  Goal: *Skin integrity maintained  Outcome: Progressing Towards Goal  Goal: *Fluid volume balance  Outcome: Progressing Towards Goal  Goal: *Optimize nutritional status  Outcome: Progressing Towards Goal  Goal: *Anxiety reduced or absent  Outcome: Progressing Towards Goal  Goal: *Progressive mobility and function (eg: ADL's)  Outcome: Progressing Towards Goal     Problem: Patient Education: Go to Patient Education Activity  Goal: Patient/Family Education  Outcome: Progressing Towards Goal

## 2022-07-10 NOTE — PROGRESS NOTES
Walt Mejia  at [facility] [phone] asked CM for 7 days of Dr and nursing notes, and [facility] for 7 days, and H&P. All documents given to Walt Mejia by CM. CM let Walt Mejia know that Dr. Shola Carrera tied up with ED patients but could stop by after. Walt Mejia said she could not wait and asked that CM ask Dr. Shola Carrera to call her. Isabelle Record Dr. Tessa Herndon phone number and that she could not wait, and to please call her,  and updated with all documents given to her for patient.              Heladio Pizarro, RN  Case Management [phone]

## 2022-07-10 NOTE — ROUTINE PROCESS
Tiny Shivers and Verbal shift change report given to Sudha (oncoming nurse) by Flor Field (offgoing nurse). Report included the following information SBAR, MAR and Recent Results. 1705-patient has refused vital signs all shift. Patient becomes physically violent when the RN attempts to take vital signs. 1918-/Bedside and Verbal shift change report given to [facility] (oncoming nurse) by Colletta Chris (offgoing nurse). Report included the following information SBAR, MAR and Recent Results.

## 2022-07-10 NOTE — PROGRESS NOTES
Binh Perera  with [facility] [phone] has not come in today or called about patient at this time.              Maria Guadalupe Dimas, RN  Case Management [phone]

## 2022-07-10 NOTE — PROGRESS NOTES
Rennymaria teresa Pate with Group home just came to unit, asking a lot of medical questions about patient. No Casillas Plenty to see if he can come speak with her.              Beth Rivera, RN  Case Management [phone] What Type Of Note Output Would You Prefer (Optional)?: Bullet Format How Severe Is Your Skin Lesion?: mild Has Your Skin Lesion Been Treated?: not been treated Is This A New Presentation, Or A Follow-Up?: Skin Lesion

## 2022-07-10 NOTE — PROGRESS NOTES
Hospitalist Progress Note    Patient: Wiliam Smith Age: 58 y.o. : 1960 MR#: [id number] SSN: [id number]  Date/Time: 7/10/2022     DOA: 2022  PCP: Kathleen St MD    Subjective:     Patient lying in bed appears comfortable. It is hard to understand patient's speech. Assessment/Plan:   Interval Hospital Course:  64 y.o male with intellectual disability with h/o PKU (phenylketonuria), Seizure disorder, anxiety disorder, chronic urinary retention with singleton catheter requirement, presented to the ER 22 due to progressive lethargy and altered in his mentation. His group home staff reported that he was nonverbal for 48hrs, he was previously conversant and ambulatory. In the ER, he was found to be in acute respiratory failure with concern for Parainfluenza 3 infection. ID consulted and started him on zosyn for concern of aspiration. Due to his persistent respiratory failure and AMS, he was transferred to ICU for further monitoring. He was eventually intubated 22 due to worsened encephalopathy and became unresponsive. He was stabilized and was able to extubate on 22. 1.  Acute respiratory failure with hypoxia,        S/p intubated for airway protection, Extubated 22. 2.  Atypical pneumonia related to parainfluenza infection, resolved         Aspiration pneumonia concern   3. Sepsis ruled out   4. Acute toxic/metabolic encephalopathy on chronic intellectual disability   5. Intellectual disability with h/o PKU  6.  Seizure disorder, stable   7. Elevated LFT's, improving to baseline   8. Hypercholesteremia   9. Hypokalemia, resolved   10. Urinary retention  11. Severe pharyngeal dysphagia with aspiration risk, unable to maintain NG Tube and family recently inquired about PEG tube placement, however GI specialist did not feel that patient was appropriate for PEG tube placement.  Also, pt now able to feed himself w/o overt signs and symptoms of aspiration, no respiratory compromise/ evidence of pna during his stay here over the past few wks. Brother called again on 7/6/22 with request to have PEG tube placed, stating that LTC facilities will not take pt if he does not have a PEG tube. I have informed brother that currently no medical indication for PEG tube placement. Reportedly Dr. Dawit Edwards spoke with administration from an [address] [place] [address] facility who is considering pt for admission to the facility. Answered questions about pt's ability to eat and aspiration risk. Shared above information with .  came on 7/9 to ask questions, referred her to case management at that time. Continue comfort feeds  On Keppra and Depakene  Zyprexa and Cogentin  DNR and DNI    Disposition-pending  Patient is medically stable    Case discussed with:  [x]Patient  []Family  []Nursing  [x]Case Management  DVT Prophylaxis:  []Lovenox  []Hep SQ  [x]SCDs  []Coumadin   []On Heparin gtt      Objective:   VS:   Visit Vitals  /60   Pulse 76   Temp 97.8 °F (36.6 °C)   Resp 17   Ht 5' 8\" (1.727 m)   Wt 58.1 kg (128 lb)   SpO2 96%   BMI 19.46 kg/m²      Tmax/24hrs: No data recorded.     No intake or output data in the 24 hours ending 07/10/22 1347    General:  Awake,   Cardiovascular:  S1S2+, RRR  Pulmonary:  CTA b/l  GI:  Soft, BS+, NT, ND  Extremities:  trace edema    Current Facility-Administered Medications   Medication Dose Route Frequency    benztropine (COGENTIN) tablet 1 mg  1 mg Oral TID    haloperidol lactate (HALDOL) injection 2 mg  2 mg IntraMUSCular Q6H PRN    trospium (SANCTURA) tablet 20 mg  20 mg Oral DAILY    famotidine (PEPCID) 40 mg/5 mL (8 mg/mL) oral suspension 20 mg  20 mg Oral DAILY    levETIRAcetam (KEPPRA) oral solution 500 mg  500 mg Oral BID    valproic acid (as sodium salt) (DEPAKENE) 250 mg/5 mL (5 mL) oral solution 500 mg  500 mg Oral Q12H    albuterol-ipratropium (DUO-NEB) 2.5 MG-0.5 MG/3 ML  3 mL Nebulization Q4H PRN    glucose chewable tablet 16 g  4 Tablet Oral PRN    glucagon (GLUCAGEN) injection 1 mg  1 mg IntraMUSCular PRN    dextrose 10% infusion 0-250 mL  0-250 mL IntraVENous PRN    clotrimazole-betamethasone (LOTRISONE) 1-0.05 % cream   Topical BID    loperamide (IMODIUM) capsule 2 mg  2 mg Oral QID PRN    OLANZapine (ZyPREXA) tablet 10 mg  10 mg Oral TID    tamsulosin (FLOMAX) capsule 0.4 mg  0.4 mg Oral DAILY    polyethylene glycol (MIRALAX) packet 17 g  17 g Oral DAILY PRN    ondansetron (ZOFRAN ODT) tablet 4 mg  4 mg Oral Q8H PRN    Or    ondansetron (ZOFRAN) injection 4 mg  4 mg IntraVENous Q6H PRN    acetaminophen (TYLENOL) suppository 650 mg  650 mg Rectal Q4H PRN     No results found for this or any previous visit (from the past 8 hour(s)).

## 2022-07-11 NOTE — PROGRESS NOTES
called the patient's brother, Pranav Naranjo, regarding the status of group home visit on Saturday. The patient's brother indicated that he is unsure if the patient was accepted to the group home. The patient's brother indicated that he will call the group home.  inquired about the group home name. The patient's brother indicated that he does not know the name of the group home. The patient's brother reported that he provided that information to this .  informed the patient's brother that he did not provide that information to this .  inquired who he provided the name of the group home to at the hospital.  The patient's brother indicated that he provided it to the nurse at the nurse station on Saturday, July 9, 2022. The patient's brother reported that he does not recall the name of the facility but will call the facility today.  voiced an understanding.  inquired about the patient's social security. The patient's brother indicated that he does not know who is getting the patient's social security and  will need to contact [facility] to find out who is getting the patient's social security.  informed the patient's brother that she has reached out to the [facility] and has not received a call response.  encouraged the patient's brother to reach out to the [facility] worker to find out and follow up with . The patient's brother indicated that his [facility] worker is on vacation.  informed the patient's brother to call the [facility] worker supervisor. The patient's brother indicated that he will reach out to the [facility] worker supervisor.  informed the patient's brother that the patient level II was recieved and the patient is medically stable.    notified the patient's brother that she has the level II and the patient can go to a nursing home.  The patient's brother indicated that he is not going to a nursing home because he will loose his Medicaid waiver.           EARNEST Gerardo

## 2022-07-11 NOTE — PROGRESS NOTES
Problem: Dysphagia (Adult)  Goal: *Acute Goals and Plan of Care (Insert Text)  Description: Patient will:  1. Tolerate PO trials with 0 s/s overt distress in 4/5 trials for comfort   2. Utilize compensatory swallow strategies/maneuvers (decrease bite/sip, size/rate, alt. liq/sol) with mod cues in 4/5 trials    Rec:     Continue comfort feeds; soft and bite sized/thin liquids  Aspiration precautions  HOB >45 degrees during all intake and for at least 30 min after intake   Small bites/sips, slow rate of intake   Oral care three times daily   Outcome: Progressing Towards Goal     SPEECH LANGUAGE PATHOLOGY BEDSIDE SWALLOW EVALUATION/TREATMENT    Patient: Alejandra Kim (24 y.o. male)  Date: 7/11/2022  Primary Diagnosis: Pneumonia [J18.9]  Precautions: Aspiration, Contact,Fall,Skin  PLOF: As per H&P    ASSESSMENT :  Based on the objective data described below, the patient presents with mild-mod oral and severe pharyngeal dysphagia. Pt is a known aspirator and has been comfort feeds of puree/thin liquids shortly after MBS completed 5/13/22 showed \"mild oral and severe pharyngeal dysphagia characterized by aspiration with cough across all consistencies presented (thin, NTL, HTL and pudding). Pt unable to follow commands for compensatory strategy use and tsp presentation ineffective at improving airway protection. Deficits include poor bolus formation/control with premature spillage and swallow delay, minimal movement of larynx/epiglottis with vestibule gap and decreased pharyngeal strength/motility\". Pt alert and intermittently shouting/pointing towards lid of lunch tray. Noted 100% of puree lunch tray consumed at bedside. Pt highly impulsive with intake with large sip and bite size noted. Demo mild swallow delay and eye tearing with thin liquids. Demo prolonged mastication of regular solids. Would benefit from soft and bite sized consistency due to impulsivity.      TREATMENT :  Skilled therapy initiated; attempted regular solid trials. Pt requiring max cues for decreased bolus size. Not stimulable for cyclic ingestion despite max cues. Will follow for further dysphagia management as indicated. D/w pt and nursing. Patient will benefit from skilled intervention to address the above impairments. Patient's rehabilitation potential is considered to be Guarded  Factors which may influence rehabilitation potential include:   []            None noted  []            Mental ability/status  []            Medical condition  []            Home/family situation and support systems  []            Safety awareness  []            Pain tolerance/management  []            Other:      PLAN :  Recommendations and Planned Interventions:  As above   Frequency/Duration: Patient will be followed by speech-language pathology 1-2 times per day/3-7 days per week to address goals.   Discharge Recommendations: to be determined      SUBJECTIVE:   Patient stated wa (water)     OBJECTIVE:     Past Medical History:   Diagnosis Date    Anxiety disorder     Dermatitis     High cholesterol     Intellectual disability     PKU (phenylketonuria) ([facility] [address]     Seizures ([facility] [address]     Urinary retention      Past Surgical History:   Procedure Laterality Date    COLONOSCOPY N/A 10/19/2021    COLONOSCOPY performed by Sarmad Dale MD at [facility] ENDOSCOPY    HX GI  03/15/2022    Excision and Fulgeration Anal Condyloma     HX PROSTATE SURGERY      bph    HX UROLOGICAL      retention     Prior Level of Function/Home Situation:  Home Situation  Home Environment: Group home  One/Two Story Residence: Other (Comment)  Living Alone: No  Support Systems: Adult Group Home,Other Family Member(s) (brother)  Patient Expects to be Discharged to[de-identified] [facility]  Current DME Used/Available at Home: Other (comment) (unknown)  Diet prior to admission: unknown   Current Diet:  puree/thin liquids for comfort; recommend soft and bite sized/thin liquids for comfort    Cognitive and Communication Status:  Neurologic State: Eyes open spontaneously  Orientation Level: Unable to verbalize  Cognition: No command following,Impulsive  Perception: Tactile,Verbal  Perseveration: No perseveration noted  Safety/Judgement: Not assessed  Oral Assessment:  Oral Assessment  Labial: No impairment  Dentition: Limited;Natural  Oral Hygiene: Fair  Lingual: Decreased rate;Decreased strength  Velum: Unable to visualize  Mandible: No impairment  P.O. Trials:  Patient Position: [facility] at [facility]  Vocal quality prior to P.O.: No impairment  Consistency Presented: Thin liquid; Solid  How Presented: Self-fed/presented;Cup/sip;Straw;Successive swallows  Bolus Acceptance: No impairment  Bolus Formation/Control: Impaired  Type of Impairment: Mastication;Delayed  Propulsion: Delayed (# of seconds)  Oral Residue: Lingual;10-50% of bolus  Initiation of Swallow: Delayed (# of seconds)  Laryngeal Elevation: Decreased  Aspiration Signs/Symptoms: Watery eyes;Delayed cough/throat clear (Known hx of silent aspiration; currently on comfort feeds)  Pharyngeal Phase Characteristics: Audible swallow  Effective Modifications: Small sips and bites; Alternate liquids/solids  Cues for Modifications: Maximal  Oral Phase Severity: Mild-moderate  Pharyngeal Phase Severity : Moderate-severe    PAIN:  Start of Eval: unable to report   End of Eval: unable to report      After treatment:   []            Patient left in no apparent distress sitting up in chair  [x]            Patient left in no apparent distress in bed  [x]            Call bell left within reach  [x]            Nursing notified  []            Family present  []            Caregiver present  []            Bed alarm activated    COMMUNICATION/EDUCATION:   [x]            Aspiration precautions; swallow safety; compensatory techniques. []            Patient/family have participated as able in goal setting and plan of care.   []            Patient/family agree to work toward stated goals and plan of care. []            Patient understands intent and goals of therapy; neutral about participation. [x]            Patient unable to participate in goal setting/plan of care; educ ongoing with interdisciplinary staff  [x]         Posted safety precautions in patient's room.     Thank you for this referral,  Crystal Kirby M.S., [address] [facility]  Speech-Language Pathologist

## 2022-07-11 NOTE — PROGRESS NOTES
informed the patient's RN Taryn Luther that the patient has a possible group home and is waiting for the group home to talk with the csb regarding placement. Taryn Luther voiced an understanding.       EARNEST Kerr

## 2022-07-11 NOTE — PROGRESS NOTES
Comprehensive Nutrition Assessment    Type and Reason for Visit: Reassess,Consult    Nutrition Recommendations/Plan:   1. Continue nutrition intervention consistent with goals of care - diet for comfort. Malnutrition Assessment:  Malnutrition Status: At risk for malnutrition (specify) (PT DNR/comfort measures. Diet only for comfort.) (07/11/22 1106)      Nutrition Assessment:    Pt continues diet for comfort. Good meal intake. Per case management notes, pt still awaiting placement for d/c that will not require a PEG placement as pt is not a good candidate. Nutrition Related Findings:    BM 7/10. Labs reviewed. Pertinent meds- Pepcid, Zofran, miralax. Wound Type: Moisture associate skin damage    Current Nutrition Intake & Therapies:  Average Meal Intake: %  Average Supplement Intake: None ordered  ADULT DIET Dysphagia - Pureed; Less than 60 gm; No artificial sweeteners, legumes, nuts. LIMIT eggs, dairy, meat. Anthropometric Measures:  Height: 5' 8\" (172.7 cm)  Ideal Body Weight (IBW): 154 lbs (70 kg)     Current Body Wt:  58.1 kg (128 lb), 83.1 % IBW. Bed scale  Current BMI (kg/m2): 19.5        Weight Adjustment: No adjustment                 BMI Category: Normal weight (BMI 18.5-24. 9)    Estimated Daily Nutrient Needs:  Energy Requirements Based On: Kcal/kg (25-30 kcal/kg)  Weight Used for Energy Requirements: Current  Energy (kcal/day): 1452 - 1743  Weight Used for Protein Requirements: Current (0.6-0.8)  Protein (g/day): 35 - 47  Method Used for Fluid Requirements: 1 ml/kcal  Fluid (ml/day): 1452 - 8523    Nutrition Diagnosis:   No nutrition diagnosis at this time     Nutrition Interventions:   Food and/or Nutrient Delivery: Continue current diet  Nutrition Education/Counseling: Education not indicated  Coordination of Nutrition Care: Continue to monitor while inpatient  Plan of Care discussed with: MD    Goals:  Previous Goal Met: Goal(s) achieved  Goals: Meet at least 75% of estimated needs,by next RD assessment       Nutrition Monitoring and Evaluation:   Behavioral-Environmental Outcomes: None identified  Food/Nutrient Intake Outcomes: Food and nutrient intake,Diet advancement/tolerance  Physical Signs/Symptoms Outcomes: Biochemical data,Meal time behavior    Discharge Planning:    No discharge needs at this time    [address]: [phone]

## 2022-07-11 NOTE — PROGRESS NOTES
Bedside report given to [facility], RN. Pt resting quietly in bed at this time, call bell within reach, vital signs stable.

## 2022-07-11 NOTE — PROGRESS NOTES
received a call from Cheryl Summers at [facility] regarding placements. Michelle Elizondo indicated that she is still trying get in contact with the patient's care coordinator. Michelle Elizondo indicated that she will send over the admission paperwork.  provided her email address for the admission paperwork.        EARNEST Monterroso

## 2022-07-11 NOTE — PROGRESS NOTES
Problem: Patient Education: Go to Patient Education Activity  Goal: Patient/Family Education  Outcome: Progressing Towards Goal     Problem: Nutrition Deficit  Goal: *Optimize nutritional status  Outcome: Progressing Towards Goal     Problem: Falls - Risk of  Goal: *Absence of Falls  Description: Document Randy Led Fall Risk and appropriate interventions in the flowsheet.   Outcome: Progressing Towards Goal  Note: Fall Risk Interventions:  Mobility Interventions: Bed/chair exit alarm    Mentation Interventions: Bed/chair exit alarm    Medication Interventions: Patient to call before getting OOB    Elimination Interventions: Bed/chair exit alarm    History of Falls Interventions: Door open when patient unattended

## 2022-07-11 NOTE — PROGRESS NOTES
received a call RN Adrian Welch at [facility] regarding group home placement. Opal Menon indicated that they visit the patient on Saturday, July 9, 2022 and they are willing to accept the patient.  inquired about Opal Menon sending the admission paperwork over to be completed by the physician.  inquired when they can accept the patient. Opal Menon indicated that there is a process. Opal Menon indicated that she will have to speak to the csb worker first before they can accept the patient. Opal Menon reported that the csb worker is on vacation.  inquired if Opal Menon can talk to the csb worker supervisor regarding placement. Opal Menon indicated that she would speak with the patient's csb worker supervisor and will send over admission paperwork tomorrow. Opal Menon indicated that she will need the patient to have home health orders for PT/OT upon discharge.  inquired if there a specific home health company. Opal Menon indicated that they do not have a preference but will follow up with the patient's brother to determine which company he would like to use for home health.  voiced an understanding.      EARNEST Mo

## 2022-07-11 NOTE — PROGRESS NOTES
Hospitalist Progress Note    Patient: Jade Art Age: 58 y.o. : 1960 MR#: [id number]: [id number]  Date/Time: 2022     DOA: 2022  PCP: Mabel Galeana MD    Subjective:     Patient does not offer any meaningful history. Nursing at bedside states there have been no concerns. Patient is not constipated. Assessment/Plan:   Interval Hospital Course:  64 y.o male with intellectual disability with h/o PKU (phenylketonuria), Seizure disorder, anxiety disorder, chronic urinary retention with singleton catheter requirement, presented to the ER 22 due to progressive lethargy and altered in his mentation. His group home staff reported that he was nonverbal for 48hrs, he was previously conversant and ambulatory. In the ER, he was found to be in acute respiratory failure with concern for Parainfluenza 3 infection. ID consulted and started him on zosyn for concern of aspiration. Due to his persistent respiratory failure and AMS, he was transferred to ICU for further monitoring. He was eventually intubated 22 due to worsened encephalopathy and became unresponsive. He was stabilized and was able to extubate on 22. 1.  Acute respiratory failure with hypoxia,        S/p intubated for airway protection, Extubated 22. 2.  Atypical pneumonia related to parainfluenza infection, resolved         Aspiration pneumonia concern   3. Sepsis ruled out   4. Acute toxic/metabolic encephalopathy on chronic intellectual disability   5. Intellectual disability with h/o PKU  6.  Seizure disorder, stable   7. Elevated LFT's, improving to baseline   8. Hypercholesteremia   9. Hypokalemia, resolved   10. Urinary retention  11. Severe pharyngeal dysphagia with aspiration risk, unable to maintain NG Tube and family recently inquired about PEG tube placement, however GI specialist did not feel that patient was appropriate for PEG tube placement.  Also, pt now able to feed himself w/o overt signs and symptoms of aspiration, no respiratory compromise/ evidence of pna during his stay here over the past few wks. Brother called again on 22 with request to have PEG tube placed, stating that LTC facilities will not take pt if he does not have a PEG tube. I have informed brother that currently no medical indication for PEG tube placement. Reportedly Dr. Susanne Wheat spoke with administration from an [address] [place] [address] facility who is considering pt for admission to the facility. Answered questions about pt's ability to eat and aspiration risk. Shared above information with .      Continue comfort feeds  On Keppra and Depakene  Zyprexa and Cogentin  DNR and DNI    Disposition-pending  Patient is medically stable    Case discussed with:  [x]Patient  []Family  []Nursing  [x]Case Management  DVT Prophylaxis:  []Lovenox  []Hep SQ  [x]SCDs  []Coumadin   []On Heparin gtt      Objective:   VS:   Visit Vitals  BP (!) 145/89   Pulse 75   Temp 97.7 °F (36.5 °C)   Resp 17   Ht 5' 8\" (1.727 m)   Wt 58.1 kg (128 lb)   SpO2 96%   BMI 19.46 kg/m²      Tmax/24hrs: Temp (24hrs), Av.7 °F (36.5 °C), Min:97.7 °F (36.5 °C), Max:97.7 °F (36.5 °C)      Intake/Output Summary (Last 24 hours) at 2022 1001  Last data filed at 7/10/2022 2000  Gross per 24 hour   Intake --   Output 300 ml   Net -300 ml       General:  Awake,   Cardiovascular:  S1S2+, RRR  Pulmonary:  CTA b/l  GI:  Soft, BS+, NT, ND  Extremities:  trace edema    Current Facility-Administered Medications   Medication Dose Route Frequency    benztropine (COGENTIN) tablet 1 mg  1 mg Oral TID    haloperidol lactate (HALDOL) injection 2 mg  2 mg IntraMUSCular Q6H PRN    trospium (SANCTURA) tablet 20 mg  20 mg Oral DAILY    famotidine (PEPCID) 40 mg/5 mL (8 mg/mL) oral suspension 20 mg  20 mg Oral DAILY    levETIRAcetam (KEPPRA) oral solution 500 mg  500 mg Oral BID    valproic acid (as sodium salt) (DEPAKENE) 250 mg/5 mL (5 mL) oral solution 500 mg  500 mg Oral Q12H    albuterol-ipratropium (DUO-NEB) 2.5 MG-0.5 MG/3 ML  3 mL Nebulization Q4H PRN    glucose chewable tablet 16 g  4 Tablet Oral PRN    glucagon (GLUCAGEN) injection 1 mg  1 mg IntraMUSCular PRN    dextrose 10% infusion 0-250 mL  0-250 mL IntraVENous PRN    clotrimazole-betamethasone (LOTRISONE) 1-0.05 % cream   Topical BID    loperamide (IMODIUM) capsule 2 mg  2 mg Oral QID PRN    OLANZapine (ZyPREXA) tablet 10 mg  10 mg Oral TID    tamsulosin (FLOMAX) capsule 0.4 mg  0.4 mg Oral DAILY    polyethylene glycol (MIRALAX) packet 17 g  17 g Oral DAILY PRN    ondansetron (ZOFRAN ODT) tablet 4 mg  4 mg Oral Q8H PRN    Or    ondansetron (ZOFRAN) injection 4 mg  4 mg IntraVENous Q6H PRN    acetaminophen (TYLENOL) suppository 650 mg  650 mg Rectal Q4H PRN     No results found for this or any previous visit (from the past 8 hour(s)).

## 2022-07-12 NOTE — PROGRESS NOTES
Hospitalist Progress Note    Patient: Zuly Park Age: 58 y.o. : 1960 MR#: [id number] SSN: [id number]  Date/Time: 2022     DOA: 2022  PCP: Ingrid Fay MD    Subjective:     Patient found resting comfortably. Nursing voices no concerns. Assessment/Plan:   Interval Hospital Course:  64 y.o male with intellectual disability with h/o PKU (phenylketonuria), Seizure disorder, anxiety disorder, chronic urinary retention with singleton catheter requirement, presented to the ER 22 due to progressive lethargy and altered in his mentation. His group home staff reported that he was nonverbal for 48hrs, he was previously conversant and ambulatory. In the ER, he was found to be in acute respiratory failure with concern for Parainfluenza 3 infection. ID consulted and started him on zosyn for concern of aspiration. Due to his persistent respiratory failure and AMS, he was transferred to ICU for further monitoring. He was eventually intubated 22 due to worsened encephalopathy and became unresponsive. He was stabilized and was able to extubate on 22. 1.  Acute respiratory failure with hypoxia,        S/p intubated for airway protection, Extubated 22. 2.  Atypical pneumonia related to parainfluenza infection, resolved         Aspiration pneumonia concern   3. Sepsis ruled out   4. Acute toxic/metabolic encephalopathy on chronic intellectual disability   5. Intellectual disability with h/o PKU  6.  Seizure disorder, stable   7. Elevated LFT's, improving to baseline   8. Hypercholesteremia   9. Hypokalemia, resolved   10. Urinary retention  11. Severe pharyngeal dysphagia with aspiration risk, unable to maintain NG Tube and family recently inquired about PEG tube placement, however GI specialist did not feel that patient was appropriate for PEG tube placement.  Also, pt now able to feed himself w/o overt signs and symptoms of aspiration, no respiratory compromise/ evidence of pna during his stay here over the past few wks. Brother called again on 22 with request to have PEG tube placed, stating that LTC facilities will not take pt if he does not have a PEG tube. I have informed brother that currently no medical indication for PEG tube placement. Reportedly Dr. Asaf Monae spoke with administration from an [address] [place] [address] facility who is considering pt for admission to the facility. Answered questions about pt's ability to eat and aspiration risk. Shared above information with .      Continue comfort feeds  On Keppra and Depakene  Zyprexa and Cogentin  DNR and DNI    Disposition-pending  Patient is medically stable    Case discussed with:  [x]Patient  []Family  []Nursing  [x]Case Management  DVT Prophylaxis:  []Lovenox  []Hep SQ  [x]SCDs  []Coumadin   []On Heparin gtt      Objective:   VS:   Visit Vitals  BP (!) 104/57   Pulse 73   Temp 97.7 °F (36.5 °C)   Resp 16   Ht 5' 8\" (1.727 m)   Wt 58.1 kg (128 lb)   SpO2 98%   BMI 19.46 kg/m²      Tmax/24hrs: Temp (24hrs), Av.8 °F (36.6 °C), Min:97.7 °F (36.5 °C), Max:97.8 °F (36.6 °C)      Intake/Output Summary (Last 24 hours) at 2022 1540  Last data filed at 2022 1012  Gross per 24 hour   Intake 240 ml   Output 350 ml   Net -110 ml       General:  Awake,   Cardiovascular:  S1S2+, RRR  Pulmonary:  CTA b/l  GI:  Soft, BS+, NT, ND  Extremities:  trace edema    Current Facility-Administered Medications   Medication Dose Route Frequency    benztropine (COGENTIN) tablet 1 mg  1 mg Oral TID    haloperidol lactate (HALDOL) injection 2 mg  2 mg IntraMUSCular Q6H PRN    trospium (SANCTURA) tablet 20 mg  20 mg Oral DAILY    famotidine (PEPCID) 40 mg/5 mL (8 mg/mL) oral suspension 20 mg  20 mg Oral DAILY    levETIRAcetam (KEPPRA) oral solution 500 mg  500 mg Oral BID    valproic acid (as sodium salt) (DEPAKENE) 250 mg/5 mL (5 mL) oral solution 500 mg  500 mg Oral Q12H    albuterol-ipratropium (DUO-NEB) 2.5 MG-0.5 MG/3 ML  3 mL Nebulization Q4H PRN    glucose chewable tablet 16 g  4 Tablet Oral PRN    glucagon (GLUCAGEN) injection 1 mg  1 mg IntraMUSCular PRN    dextrose 10% infusion 0-250 mL  0-250 mL IntraVENous PRN    clotrimazole-betamethasone (LOTRISONE) 1-0.05 % cream   Topical BID    loperamide (IMODIUM) capsule 2 mg  2 mg Oral QID PRN    OLANZapine (ZyPREXA) tablet 10 mg  10 mg Oral TID    tamsulosin (FLOMAX) capsule 0.4 mg  0.4 mg Oral DAILY    polyethylene glycol (MIRALAX) packet 17 g  17 g Oral DAILY PRN    ondansetron (ZOFRAN ODT) tablet 4 mg  4 mg Oral Q8H PRN    Or    ondansetron (ZOFRAN) injection 4 mg  4 mg IntraVENous Q6H PRN    acetaminophen (TYLENOL) suppository 650 mg  650 mg Rectal Q4H PRN     No results found for this or any previous visit (from the past 8 hour(s)).

## 2022-07-12 NOTE — PROGRESS NOTES
followed up with ADIN Ramos at [facility] regarding admission paperwork. Pina Crandall indicated that she sent the paperwork over yesterday but it was returned.  informed Pina Crandall that she would sent her an email and she can respond with the admission paperwork.  sent an email to ADIN Ramos at [facility]. ADIN Crandall confirmed she got the email. Pina Crandall discussed admission requirement. Pina Crandall indicated that she is waiting on csb  to get all the information prior to admission. Pina Crandall reported that she spoke with the  at [address] [place] [address] yesterday. Pina Crandall indicated that the hospital will need to order dme, have rehab for PT/OT, and have incontinence care products ordered.  informed ADIN Crandall that if the patient had dme, order through his insurance, at the last group home, the patient may not be able to bill his insurance again for dme. ADIN Crandall indicated that she will follow up with the csb worker regarding dme at last group home.  inquired about home health company. iPna Crandall indicated that [facility] can do home health.  voiced an understanding.       EARNEST Payne

## 2022-07-12 NOTE — PROGRESS NOTES
Bedside and Verbal shift change report given to [address] (oncoming nurse) by Yan Gonzalez RN (offgoing nurse). Report included the following information SBAR, Kardex and MAR.

## 2022-07-12 NOTE — PROGRESS NOTES
Problem: Risk for Spread of Infection  Goal: Prevent transmission of infectious organism to others  Description: Prevent the transmission of infectious organisms to other patients, staff members, and visitors. Outcome: Progressing Towards Goal     Problem: Patient Education:  Go to Education Activity  Goal: Patient/Family Education  Outcome: Progressing Towards Goal     Problem: Pressure Injury - Risk of  Goal: *Prevention of pressure injury  Description: Document Otoniel Scale and appropriate interventions in the flowsheet. Outcome: Progressing Towards Goal  Note: Pressure Injury Interventions:  Sensory Interventions: Assess changes in LOC    Moisture Interventions: Absorbent underpads    Activity Interventions: Pressure redistribution bed/mattress(bed type)    Mobility Interventions: HOB 30 degrees or less    Nutrition Interventions: Document food/fluid/supplement intake    Friction and Shear Interventions: HOB 30 degrees or less                Problem: Patient Education: Go to Patient Education Activity  Goal: Patient/Family Education  Outcome: Progressing Towards Goal     Problem: Patient Education: Go to Patient Education Activity  Goal: Patient/Family Education  Outcome: Progressing Towards Goal     Problem: Nutrition Deficit  Goal: *Optimize nutritional status  Outcome: Progressing Towards Goal     Problem: Falls - Risk of  Goal: *Absence of Falls  Description: Document Syl Fall Risk and appropriate interventions in the flowsheet.   Outcome: Progressing Towards Goal  Note: Fall Risk Interventions:  Mobility Interventions: Bed/chair exit alarm    Mentation Interventions: Bed/chair exit alarm    Medication Interventions: Patient to call before getting OOB    Elimination Interventions: Bed/chair exit alarm,Call light in reach,Patient to call for help with toileting needs    History of Falls Interventions: Door open when patient unattended         Problem: Patient Education: Go to Patient Education Activity  Goal: Patient/Family Education  Outcome: Progressing Towards Goal     Problem: Injury - Risk of, Adverse Drug Event  Goal: *Absence of adverse drug events  Outcome: Progressing Towards Goal  Goal: *Absence of medication errors  Outcome: Progressing Towards Goal  Goal: *Knowledge of prescribed medications  Outcome: Progressing Towards Goal     Problem: Patient Education: Go to Patient Education Activity  Goal: Patient/Family Education  Outcome: Progressing Towards Goal     Problem: Pain  Goal: *Control of Pain  Outcome: Progressing Towards Goal     Problem: Patient Education: Go to Patient Education Activity  Goal: Patient/Family Education  Outcome: Progressing Towards Goal     Problem: Patient Education: Go to Patient Education Activity  Goal: Patient/Family Education  Outcome: Progressing Towards Goal     Problem: Patient Education: Go to Patient Education Activity  Goal: Patient/Family Education  Outcome: Progressing Towards Goal     Problem: General Medical Care Plan  Goal: *Vital signs within specified parameters  Outcome: Progressing Towards Goal  Goal: *Labs within defined limits  Outcome: Progressing Towards Goal  Goal: *Absence of infection signs and symptoms  Outcome: Progressing Towards Goal  Goal: *Optimal pain control at patient's stated goal  Outcome: Progressing Towards Goal  Goal: *Skin integrity maintained  Outcome: Progressing Towards Goal  Goal: *Fluid volume balance  Outcome: Progressing Towards Goal  Goal: *Optimize nutritional status  Outcome: Progressing Towards Goal  Goal: *Anxiety reduced or absent  Outcome: Progressing Towards Goal  Goal: *Progressive mobility and function (eg: ADL's)  Outcome: Progressing Towards Goal     Problem: Patient Education: Go to Patient Education Activity  Goal: Patient/Family Education  Outcome: Progressing Towards Goal

## 2022-07-12 NOTE — PROGRESS NOTES
Problem: Mobility Impaired (Adult and Pediatric)  Goal: *Acute Goals and Plan of Care (Insert Text)  Outcome: Resolved/Met     PHYSICAL THERAPY RE-EVALUATION AND DISCHARGE    Patient: Niharika Gage (7 y.o. male)  Date: 7/12/2022  Primary Diagnosis: Pneumonia [J18.9]        Precautions:   Fall,Skin,Bed Alarm  WBAT  PLOF: see above     ASSESSMENT :  Based on the objective data described below, the patient presents with baseline functional mobility at this time. Pt was previously discharged on 6/6/22. Pt is unable to follow commands at this time, does  not verbalize during session. Pt with decreased terminal knee extension passively. Pt is scooted up in bed for getting meds from nurse with max A x 2. At this time, pt is not appropriate for continued skilled PT, recommend susie lift, hospital bed, wheelchair at discharge at LTC/group home type of setting. Will sign off at this time. Patient does not require further skilled intervention at this level of care. PLAN :  Recommendations and Planned Interventions:   No formal PT needs identified at this time. Discharge Recommendations: Long Term Care/Group Home  Further Equipment Recommendations for Discharge: hospital bed, mechanical lift, and wheelchair     [facility]: Niharika Gage scored a 8/24 on the AM-PAC short mobility form. At this time, no further PT is recommended upon discharge due to pt unable to follow commands and is max A to total A for bed mobility at this time. Recommend patient returns to prior setting with prior services. Please see assessment section for further patient specific details. SUBJECTIVE:   Patient does not verbalize any words during session.     OBJECTIVE DATA SUMMARY:   Hospital course since last seen and reason for re-evaluation: pt not appropriate for skilled PT at this time, unable to follow commands, limited carryover noted, max A/total A for bed mobility  Past Medical History:   Diagnosis Date    Anxiety disorder     Dermatitis High cholesterol     Intellectual disability     PKU (phenylketonuria) ([facility] [place] [address]     Seizures ([facility] [place] [address]     Urinary retention      Past Surgical History:   Procedure Laterality Date    COLONOSCOPY N/A 10/19/2021    COLONOSCOPY performed by Rai Marrero MD at [facility] 2    HX GI  03/15/2022    Excision and Fulgeration Anal Condyloma     HX PROSTATE SURGERY      bph    HX UROLOGICAL      retention     Barriers to Learning/Limitations: yes;  cognitive and physical  Compensate with: Visual Cues, Verbal Cues, and Tactile Cues  Home Situation:   Home Situation  Home Environment: Group home  One/Two Story Residence: Other (Comment)  Living Alone: No  Support Systems: Adult Group Home,Other Family Member(s) (brother)  Patient Expects to be Discharged to[de-identified] [address] [facility]  Current DME Used/Available at Home: Other (comment) (unknown)  Critical Behavior:                                Strength:    Strength: Generally decreased, functional                    Tone & Sensation:   Tone: Normal              Sensation: Intact               Range Of Motion:  AROM: Generally decreased, functional           PROM: Generally decreased, functional    Functional Mobility:  Bed Mobility:           Scooting: Total assistance    Pain:  Pain level pre-treatment: none reported /10   Pain level post-treatment: \"     \" /10   Pain Intervention(s): Medication (see MAR); Rest, Ice, Repositioning   Response to intervention: Nurse notified    Activity Tolerance:   Fair   Please refer to the flowsheet for vital signs taken during this treatment. After treatment:   []         Patient left in no apparent distress sitting up in chair  [x]         Patient left in no apparent distress in bed  [x]         Call bell left within reach  [x]         Nursing notified  []         Caregiver present  [x]         Bed alarm activated  []         SCDs applied    COMMUNICATION/EDUCATION:   [x]         Role of Physical Therapy in the acute care setting.   [x] Fall prevention education was provided and the patient/caregiver indicated understanding. [x]         Patient/family have participated as able in goal setting and plan of care. [x]         Patient/family agree to work toward stated goals and plan of care. []         Patient understands intent and goals of therapy, but is neutral about his/her participation. []         Patient is unable to participate in goal setting/plan of care: ongoing with therapy staff.  []         Other: Thank you for this referral.  Amber Fish   Time Calculation: 8 mins    [employer] AM-PAC® Basic Mobility Inpatient Short Form (6-Clicks) Version 2    How much HELP from another person does the patient currently need    (If the patient hasn't done an activity recently, how much help from another person do you think he/she would need if he/she tried?)   Total (Total A or Dep)   A Lot  (Mod to Max A)   A Little (Sup or Min A)   None (Mod I to I)   Turning from your back to your side while in a flat bed without using bedrails? [] 1 [x] 2 [] 3 [] 4   2. Moving from lying on your back to sitting on the side of a flat bed without using bedrails? [] 1 [x] 2 [] 3 [] 4   3. Moving to and from a bed to a chair (including a wheelchair)? [x] 1 [] 2 [] 3 [] 4   4. Standing up from a chair using your arms (e.g., wheelchair, or bedside chair)? [x] 1 [] 2 [] 3 [] 4   5. Walking in hospital room? [x] 1 [] 2 [] 3 [] 4   6. Climbing 3-5 steps with a railing?+   [x] 1 [] 2 [] 3 [] 4   +If stair climbing cannot be assessed, skip item #6. Sum responses from items 1-5. At this time, no further PT is recommended upon discharge due to pt unable to follow commands and is max A to total A for bed mobility at this time. Recommend patient returns to prior setting with prior services. Please see assessment section for further patient specific details.

## 2022-07-12 NOTE — PROGRESS NOTES
received a email from [email or web address] from [facility]. Memory Carlos indicated that the receive a message from the patient's brother, Aarti Flores, indicating that the previous group home does not have any dme for the patient. Mattie Gonzalez indicated that she will need all DME prior to admission in [facility]. Memory Carlos indicated that she will have  at [facility] reach out to provide admission paperwork.     EARNEST Jean Baptiste

## 2022-07-13 NOTE — PROGRESS NOTES
Hospitalist Progress Note    Patient: Zuly Park Age: 58 y.o. : 1960 MR#: [id number] SSN: [id number]  Date/Time: 2022     DOA: 2022  PCP: Ingrid Fay MD    Subjective:     Patient seen and examined at bedside, noted resting comfortably. In the afternoon, noted agitated, uncooperative. Assessment/Plan:   Interval Hospital Course:  64 y.o male with intellectual disability with h/o PKU (phenylketonuria), Seizure disorder, anxiety disorder, chronic urinary retention with singleton catheter requirement, presented to the ER 22 due to progressive lethargy and altered in his mentation. His group home staff reported that he was nonverbal for 48hrs, he was previously conversant and ambulatory. In the ER, he was found to be in acute respiratory failure with concern for Parainfluenza 3 infection. ID consulted and started him on zosyn for concern of aspiration. Due to his persistent respiratory failure and AMS, he was transferred to ICU for further monitoring. He was eventually intubated 22 due to worsened encephalopathy and became unresponsive. He was stabilized and was able to extubate on 22. 1.  Acute respiratory failure with hypoxia,        S/p intubated for airway protection, Extubated 22. 2.  Atypical pneumonia related to parainfluenza infection, resolved         Aspiration pneumonia concern   3. Sepsis ruled out   4. Acute toxic/metabolic encephalopathy on chronic intellectual disability   5. Intellectual disability with h/o PKU  6.  Seizure disorder, stable   7. Elevated LFT's, improving to baseline   8. Hypercholesteremia   9. Hypokalemia, resolved   10. Urinary retention  11. Severe pharyngeal dysphagia with aspiration risk, unable to maintain NG Tube and family recently inquired about PEG tube placement, however GI specialist did not feel that patient was appropriate for PEG tube placement.  Also, pt now able to feed himself w/o overt signs and symptoms of aspiration, no respiratory compromise/ evidence of pna during his stay here over the past few wks. Brother called again on 22 with request to have PEG tube placed, stating that LTC facilities will not take pt if he does not have a PEG tube. I have informed brother that currently no medical indication for PEG tube placement. Continue comfort feeds  On Keppra and Depakene  Zyprexa and Cogentin  DNR and DNI    Discussed on IDT.     [address]  Patient is medically stable    Case discussed with:  [x]Patient  []Family  [x]Nursing  [x]Case Management  DVT Prophylaxis:  []Lovenox  []Hep SQ  [x]SCDs  []Coumadin   []On Heparin gtt      Objective:   VS:   Visit Vitals  /62   Pulse 88   Temp 97.6 °F (36.4 °C)   Resp 16   Ht 5' 8\" (1.727 m)   Wt 58.1 kg (128 lb)   SpO2 98%   BMI 19.46 kg/m²      Tmax/24hrs: Temp (24hrs), Av.7 °F (36.5 °C), Min:97.6 °F (36.4 °C), Max:97.8 °F (36.6 °C)      Intake/Output Summary (Last 24 hours) at 2022 1558  Last data filed at 2022 1730  Gross per 24 hour   Intake 240 ml   Output --   Net 240 ml       General:  Awake,   Cardiovascular:  S1S2+, RRR  Pulmonary:  CTA b/l  GI:  Soft, BS+, NT, ND  Extremities:  trace edema    Current Facility-Administered Medications   Medication Dose Route Frequency    benztropine (COGENTIN) tablet 1 mg  1 mg Oral TID    haloperidol lactate (HALDOL) injection 2 mg  2 mg IntraMUSCular Q6H PRN    trospium (SANCTURA) tablet 20 mg  20 mg Oral DAILY    famotidine (PEPCID) 40 mg/5 mL (8 mg/mL) oral suspension 20 mg  20 mg Oral DAILY    levETIRAcetam (KEPPRA) oral solution 500 mg  500 mg Oral BID    valproic acid (as sodium salt) (DEPAKENE) 250 mg/5 mL (5 mL) oral solution 500 mg  500 mg Oral Q12H    albuterol-ipratropium (DUO-NEB) 2.5 MG-0.5 MG/3 ML  3 mL Nebulization Q4H PRN    glucose chewable tablet 16 g  4 Tablet Oral PRN    glucagon (GLUCAGEN) injection 1 mg  1 mg IntraMUSCular PRN    dextrose 10% infusion 0-250 mL 0-250 mL IntraVENous PRN    clotrimazole-betamethasone (LOTRISONE) 1-0.05 % cream   Topical BID    loperamide (IMODIUM) capsule 2 mg  2 mg Oral QID PRN    OLANZapine (ZyPREXA) tablet 10 mg  10 mg Oral TID    tamsulosin (FLOMAX) capsule 0.4 mg  0.4 mg Oral DAILY    polyethylene glycol (MIRALAX) packet 17 g  17 g Oral DAILY PRN    ondansetron (ZOFRAN ODT) tablet 4 mg  4 mg Oral Q8H PRN    Or    ondansetron (ZOFRAN) injection 4 mg  4 mg IntraVENous Q6H PRN    acetaminophen (TYLENOL) suppository 650 mg  650 mg Rectal Q4H PRN     No results found for this or any previous visit (from the past 8 hour(s)).

## 2022-07-13 NOTE — PROGRESS NOTES
Patient hitting head against side rails. Patient not following commands. Blankets placed over side rails. Prn Haldol to be administered. Will continue to monitor.

## 2022-07-13 NOTE — PROGRESS NOTES
OT orders received and medical chart review completed for OT re-evaluation. Pt continues to be unable to follow commands and withdrawn e.g. covering head with bed sheet-nursing aware. Pt not appropriate for OT skilled services at this time. Formal OT evaluation not indicated. OT to sign off.

## 2022-07-13 NOTE — PROGRESS NOTES
Bedside shift report given to Tiana Burgess. Oliver OAKLEY from [employer]. Report including the following information SBAR, Kardex, recent results, MAR, intake/output.

## 2022-07-13 NOTE — PROGRESS NOTES
Problem: Risk for Spread of Infection  Goal: Prevent transmission of infectious organism to others  Description: Prevent the transmission of infectious organisms to other patients, staff members, and visitors. Outcome: Progressing Towards Goal     Problem: Patient Education:  Go to Education Activity  Goal: Patient/Family Education  Outcome: Progressing Towards Goal     Problem: Pressure Injury - Risk of  Goal: *Prevention of pressure injury  Description: Document Otoniel Scale and appropriate interventions in the flowsheet. Outcome: Progressing Towards Goal  Note: Pressure Injury Interventions:  Sensory Interventions: Assess changes in LOC,Minimize linen layers,Pressure redistribution bed/mattress (bed type)    Moisture Interventions: Absorbent underpads,Check for incontinence Q2 hours and as needed    Activity Interventions: Pressure redistribution bed/mattress(bed type)    Mobility Interventions: Pressure redistribution bed/mattress (bed type)    Nutrition Interventions: Document food/fluid/supplement intake    Friction and Shear Interventions: Minimize layers                Problem: Patient Education: Go to Patient Education Activity  Goal: Patient/Family Education  Outcome: Progressing Towards Goal     Problem: Patient Education: Go to Patient Education Activity  Goal: Patient/Family Education  Outcome: Progressing Towards Goal     Problem: Nutrition Deficit  Goal: *Optimize nutritional status  Outcome: Progressing Towards Goal     Problem: Falls - Risk of  Goal: *Absence of Falls  Description: Document Syl Fall Risk and appropriate interventions in the flowsheet.   Outcome: Progressing Towards Goal  Note: Fall Risk Interventions:  Mobility Interventions: Bed/chair exit alarm    Mentation Interventions: Bed/chair exit alarm,Update white board,Room close to nurse's station,More frequent rounding,Door open when patient unattended    Medication Interventions: Bed/chair exit alarm    Elimination Interventions: Bed/chair exit alarm,Call light in reach,Patient to call for help with toileting needs    History of Falls Interventions: Door open when patient unattended,Bed/chair exit alarm,Room close to nurse's station         Problem: Patient Education: Go to Patient Education Activity  Goal: Patient/Family Education  Outcome: Progressing Towards Goal     Problem: Injury - Risk of, Adverse Drug Event  Goal: *Absence of adverse drug events  Outcome: Progressing Towards Goal  Goal: *Absence of medication errors  Outcome: Progressing Towards Goal  Goal: *Knowledge of prescribed medications  Outcome: Progressing Towards Goal     Problem: Patient Education: Go to Patient Education Activity  Goal: Patient/Family Education  Outcome: Progressing Towards Goal     Problem: Pain  Goal: *Control of Pain  Outcome: Progressing Towards Goal  Goal: *PALLIATIVE CARE:  Alleviation of Pain  Outcome: Progressing Towards Goal     Problem: Patient Education: Go to Patient Education Activity  Goal: Patient/Family Education  Outcome: Progressing Towards Goal     Problem: Patient Education: Go to Patient Education Activity  Goal: Patient/Family Education  Outcome: Progressing Towards Goal     Problem: Patient Education: Go to Patient Education Activity  Goal: Patient/Family Education  Outcome: Progressing Towards Goal     Problem: General Medical Care Plan  Goal: *Vital signs within specified parameters  Outcome: Progressing Towards Goal  Goal: *Labs within defined limits  Outcome: Progressing Towards Goal  Goal: *Absence of infection signs and symptoms  Outcome: Progressing Towards Goal  Goal: *Optimal pain control at patient's stated goal  Outcome: Progressing Towards Goal  Goal: *Skin integrity maintained  Outcome: Progressing Towards Goal  Goal: *Fluid volume balance  Outcome: Progressing Towards Goal  Goal: *Optimize nutritional status  Outcome: Progressing Towards Goal  Goal: *Anxiety reduced or absent  Outcome: Progressing Towards Goal  Goal: *Progressive mobility and function (eg: ADL's)  Outcome: Progressing Towards Goal     Problem: Patient Education: Go to Patient Education Activity  Goal: Patient/Family Education  Outcome: Progressing Towards Goal     Problem: Patient Education: Go to Patient Education Activity  Goal: Patient/Family Education  Outcome: Progressing Towards Goal

## 2022-07-13 NOTE — PROGRESS NOTES
WHEELCHAIR NECESSITY DOCUMENTATION      Patient _______________________________________ was evaluated on _________________ for a manual wheelchair for home use. Patient has a diagnosis(s) of: ______________________________________________________________________ that causes mobility limitations in the home that significantly impairs the ability to participate in mobility -related activities of daily living (MRADLs) like-   Please check all that apply:  __ Toileting __Bathing __ Grooming __ Dressing __ Feeding      Patient has the following mobility condition/limitation that - Please check one:  __ Prevents the beneficiary from accomplishing MRADL entirely  __ Places the beneficiary at reasonably determined heightened risk of morbidity or mortality secondary to the attempts to perform MRADL  __ Prevents the beneficiary from completing an MRADL within a reasonable time frame      The Patients: (Please check all that apply)  __ Mobility limitation cannot be sufficiently resolved by the use of appropriately fitted cane or walker   __ Home provides adequate access between rooms, maneuvering space, and surfaces for use of the manual wheelchair that is provided  __ Use of a manual wheelchair will significantly improve the ability to participate in MRADLs and will be able to use it at home on a regular basis  __ Has not expressed an unwillingness to use the manual wheelchair in the home  __ Has sufficient upper extremity function and other physical and mental capabilities needed to safely self-propel the wheelchair that is provided in the home during a typical day. Limitations of strength, endurance and range of motion, or coordination, presence of pain, or deformity or absence of one or both upper extremities are relevant to the assessment of upper extremity function.    __ Has a caregiver who is available, willing and able to provide assistance with the wheelchair     _________ Height                _________ Weight    _______________________________                   ______________________  Caye Silverio                                                         NPI

## 2022-07-13 NOTE — PROGRESS NOTES
Beside report given to Shahida Abraham from [facility]. Oliver RN to includes SBAR, Lavon, recent results, MAR, intake/output. [id number]

## 2022-07-13 NOTE — PROGRESS NOTES
Bedside and Verbal shift change report given to Oral Powell RN (oncoming nurse) by Amparo De La Cruz RN (offgoing nurse). Report included the following information SBAR, Kardex, Procedure Summary, Intake/Output and Recent Results.

## 2022-07-13 NOTE — PROGRESS NOTES
spoke with Alannah Pate at [facility] regarding admission to group [place].  informed Jameson Mckeon that she has not received admission paperwork form [facility]. Jameson Mckeon indicated that she will send the contact information by email to , for [facility] worker, because she is driving at this time.  voiced an understanding.  explained that the order for dme, hospital bed, wheelchair, and mechanical lift, but can not do order because their is no formal acceptance without completing admission paperwork. Jameson Mckeon indicated that she will send the contact information for [facility] as soon as she gets to her destination.  voiced an understanding.       EARNEST Colin

## 2022-07-13 NOTE — PROGRESS NOTES
Problem: Pressure Injury - Risk of  Goal: *Prevention of pressure injury  Description: Document Otoniel Scale and appropriate interventions in the flowsheet.   Outcome: Progressing Towards Goal  Note: Pressure Injury Interventions:  Sensory Interventions: Maintain/enhance activity level,Minimize linen layers    Moisture Interventions: Absorbent underpads,Minimize layers    Activity Interventions: Pressure redistribution bed/mattress(bed type),PT/OT evaluation    Mobility Interventions: Pressure redistribution bed/mattress (bed type),PT/OT evaluation    Nutrition Interventions: Document food/fluid/supplement intake    Friction and Shear Interventions: Minimize layers

## 2022-07-13 NOTE — PROGRESS NOTES
[facility] [facility]      Patient has been evaluated to have a hospital bed for home. Patient has a chronic medical condition(s) of: ______________________________________________________________________ Patient's medical condition requires positioning of the body in ways not feasible with an ordinary bed due to (please select a condition)     ___  Requires positioning of the body in ways not feasible with an ordinary bed to alleviate pain. ___  Requires the head of the bed to be elevated more than 30 degrees most of the time due to congestive heart failure, chronic pulmonary disease, or problems with aspiration.   ___  Requires traction equipment, which can only be attached to a hospital bed.                                                                                                                    AND     __X_  Patient has need for frequent changes of body position and/or an immediate need for a change in body position.                 Physician Signature________________________________ NPI________________     Name Printed _________________________________ Date___________________

## 2022-07-14 NOTE — PROGRESS NOTES
Bedside and Verbal shift change report given to Janeth Perez RN (oncoming nurse) by Tona Sinha RN (offgoing nurse). Report included the following information Kardex, MAR and Recent Results.

## 2022-07-14 NOTE — PROGRESS NOTES
Beside report given to Seymour Casarez from [facility]. Oliver OAKLEY to includes SBAR, Kardex, recent results, MAR, intake/output.

## 2022-07-14 NOTE — PROGRESS NOTES
Problem: Dysphagia (Adult)  Goal: *Acute Goals and Plan of Care (Insert Text)  Description: Patient will:  1. Tolerate PO trials with 0 s/s overt distress in 4/5 trials for comfort   2. Utilize compensatory swallow strategies/maneuvers (decrease bite/sip, size/rate, alt. liq/sol) with mod cues in 4/5 trials    Rec:     Continue comfort feeds; soft and bite sized/thin liquids  Aspiration precautions  HOB >45 degrees during all intake and for at least 30 min after intake   Small bites/sips, slow rate of intake   Oral care three times daily   Outcome: Progressing Towards Goal     SPEECH LANGUAGE PATHOLOGY DYSPHAGIA TREATMENT    Patient: Serafin Sanford (49 y.o. male)  Date: 7/14/2022  Diagnosis: Pneumonia [J18.9]   Precautions: Aspiration, Fall,Skin,Bed Alarm  PLOF: As per H&P      ASSESSMENT:  Pt seen for follow up dysphagia treatment. Pt drowsy initially, alerting as session progressed. Demo eye tearing across all po (known aspirator, currently on comfort feeds). Demo prolonged but thorough oral prep phase with mech soft trials. Demo prolonged/inefficient bolus manipulation with firm solids with mod residuals post intake that were not cleared with multiple liquid washes. Recommend continue current diet with strict aspiration precautions in place. Will follow x1-2 visits to ensure continue diet tolerance as suspect pt approaching safest, least restrictive diet. Progression toward goals:  []         Improving appropriately and progressing toward goals  []         Improving slowly and progressing toward goals  [x]         Not making progress toward goals and plan of care will be adjusted     PLAN:  Recommendations and Planned Interventions:  Patient continues to benefit from skilled intervention to address the above impairments. Continue treatment per established plan of care. Discharge Recommendations:   To Be Determined     SUBJECTIVE:   Patient stated wa (for water)     OBJECTIVE:   Cognitive and Communication Status:  Neurologic State: Sleeping  Orientation Level: Unable to verbalize  Cognition: Unable to assess (comment)  Perception: Tactile,Verbal  Perseveration: No perseveration noted  Safety/Judgement: Not assessed  Dysphagia Treatment:  Oral Assessment:  Oral Assessment  Labial: No impairment  Dentition: Limited,Natural  Oral Hygiene: Fair  Lingual: Decreased rate,Decreased strength  Velum: Unable to visualize  Mandible: No impairment  Gag Reflex: No impairment  P.O.  Trials:   See above     PAIN:  Start of Tx: unable to report   End of Tx: unable to report      After treatment:   []              Patient left in no apparent distress sitting up in chair  [x]              Patient left in no apparent distress in bed  [x]              Call bell left within reach  [x]              Nursing notified  []              Family present  []              Caregiver present  []              Bed alarm activated      COMMUNICATION/EDUCATION:   [x] Aspiration precautions; swallow safety; compensatory techniques  []        Patient/family able to participate in training and education   [x]  Patient unable to participate in training and education, education ongoing with staff   [] Patient understands goals and intent of therapy; neutral about participation     Duane Adam M.S., [id number] [facility]  Speech-Language Pathologist

## 2022-07-14 NOTE — PROGRESS NOTES
received email address for csb contact from Lore Noguera, admission coordinator, at [facility].  email csb contact to get the status of admission paperwork.       EARNEST Crain

## 2022-07-14 NOTE — PROGRESS NOTES
Problem: Risk for Spread of Infection  Goal: Prevent transmission of infectious organism to others  Description: Prevent the transmission of infectious organisms to other patients, staff members, and visitors. Outcome: Progressing Towards Goal     Problem: Patient Education:  Go to Education Activity  Goal: Patient/Family Education  Outcome: Progressing Towards Goal     Problem: Pressure Injury - Risk of  Goal: *Prevention of pressure injury  Description: Document Otoniel Scale and appropriate interventions in the flowsheet. Outcome: Progressing Towards Goal  Note: Pressure Injury Interventions:  Sensory Interventions: Assess changes in LOC,Pressure redistribution bed/mattress (bed type)    Moisture Interventions: Absorbent underpads,Check for incontinence Q2 hours and as needed    Activity Interventions: Pressure redistribution bed/mattress(bed type)    Mobility Interventions: Pressure redistribution bed/mattress (bed type)    Nutrition Interventions: Document food/fluid/supplement intake    Friction and Shear Interventions: Minimize layers                Problem: Patient Education: Go to Patient Education Activity  Goal: Patient/Family Education  Outcome: Progressing Towards Goal     Problem: Patient Education: Go to Patient Education Activity  Goal: Patient/Family Education  Outcome: Progressing Towards Goal     Problem: Nutrition Deficit  Goal: *Optimize nutritional status  Outcome: Progressing Towards Goal     Problem: Falls - Risk of  Goal: *Absence of Falls  Description: Document Syl Fall Risk and appropriate interventions in the flowsheet.   Outcome: Progressing Towards Goal  Note: Fall Risk Interventions:  Mobility Interventions: Bed/chair exit alarm    Mentation Interventions: More frequent rounding,Update white board,Room close to nurse's station,Bed/chair exit alarm,Door open when patient unattended    Medication Interventions: Bed/chair exit alarm,Patient to call before getting OOB,Teach patient to arise slowly    Elimination Interventions:  Toileting schedule/hourly rounds    History of Falls Interventions: Bed/chair exit alarm,Door open when patient unattended,Room close to nurse's station,Evaluate medications/consider consulting pharmacy         Problem: Patient Education: Go to Patient Education Activity  Goal: Patient/Family Education  Outcome: Progressing Towards Goal     Problem: Injury - Risk of, Adverse Drug Event  Goal: *Absence of adverse drug events  Outcome: Progressing Towards Goal  Goal: *Absence of medication errors  Outcome: Progressing Towards Goal  Goal: *Knowledge of prescribed medications  Outcome: Progressing Towards Goal     Problem: Patient Education: Go to Patient Education Activity  Goal: Patient/Family Education  Outcome: Progressing Towards Goal     Problem: Pain  Goal: *Control of Pain  Outcome: Progressing Towards Goal  Goal: *PALLIATIVE CARE:  Alleviation of Pain  Outcome: Progressing Towards Goal     Problem: Patient Education: Go to Patient Education Activity  Goal: Patient/Family Education  Outcome: Progressing Towards Goal     Problem: Patient Education: Go to Patient Education Activity  Goal: Patient/Family Education  Outcome: Progressing Towards Goal     Problem: Patient Education: Go to Patient Education Activity  Goal: Patient/Family Education  Outcome: Progressing Towards Goal     Problem: General Medical Care Plan  Goal: *Vital signs within specified parameters  Outcome: Progressing Towards Goal  Goal: *Labs within defined limits  Outcome: Progressing Towards Goal  Goal: *Absence of infection signs and symptoms  Outcome: Progressing Towards Goal  Goal: *Optimal pain control at patient's stated goal  Outcome: Progressing Towards Goal  Goal: *Skin integrity maintained  Outcome: Progressing Towards Goal  Goal: *Fluid volume balance  Outcome: Progressing Towards Goal  Goal: *Optimize nutritional status  Outcome: Progressing Towards Goal  Goal: *Anxiety reduced or absent  Outcome: Progressing Towards Goal  Goal: *Progressive mobility and function (eg: ADL's)  Outcome: Progressing Towards Goal     Problem: Patient Education: Go to Patient Education Activity  Goal: Patient/Family Education  Outcome: Progressing Towards Goal     Problem: Patient Education: Go to Patient Education Activity  Goal: Patient/Family Education  Outcome: Progressing Towards Goal

## 2022-07-14 NOTE — PROGRESS NOTES
Hospitalist Progress Note    Patient: Kun Sage Age: 58 y.o. : 1960 MR#: [id number] SSN: [id number]  Date/Time: 2022     DOA: 2022  PCP: Joe Barlow MD    Subjective:     Patient seen and examined at bedside, noted resting comfortably. Assessment/Plan:   Interval Hospital Course:  64 y.o male with intellectual disability with h/o PKU (phenylketonuria), Seizure disorder, anxiety disorder, chronic urinary retention with singleton catheter requirement, presented to the ER 22 due to progressive lethargy and altered in his mentation. His group home staff reported that he was nonverbal for 48hrs, he was previously conversant and ambulatory. In the ER, he was found to be in acute respiratory failure with concern for Parainfluenza 3 infection. ID consulted and started him on zosyn for concern of aspiration. Due to his persistent respiratory failure and AMS, he was transferred to ICU for further monitoring. He was eventually intubated 22 due to worsened encephalopathy and became unresponsive. He was stabilized and was able to extubate on 22. 1.  Acute respiratory failure with hypoxia,        S/p intubated for airway protection, Extubated 22. 2.  Atypical pneumonia related to parainfluenza infection, resolved         Aspiration pneumonia concern   3. Sepsis ruled out   4. Acute toxic/metabolic encephalopathy on chronic intellectual disability   5. Intellectual disability with h/o PKU  6.  Seizure disorder, stable   7. Elevated LFT's, improving to baseline   8. Hypercholesteremia   9. Hypokalemia, resolved   10. Urinary retention  11. Severe pharyngeal dysphagia with aspiration risk, unable to maintain NG Tube and family recently inquired about PEG tube placement, however GI specialist did not feel that patient was appropriate for PEG tube placement.  Also, pt now able to feed himself w/o overt signs and symptoms of aspiration, no respiratory compromise/ evidence of pna during his stay here over the past few wks. Brother called again on 7/6/22 with request to have PEG tube placed, stating that LTC facilities will not take pt if he does not have a PEG tube. I have informed brother that currently no medical indication for PEG tube placement. Continue comfort feeds  On Keppra and Depakene  Zyprexa and Cogentin  DNR and DNI    Discussed on IDT. [address]  Patient is medically stable    Case discussed with:  [x]Patient  []Family  [x]Nursing  [x]Case Management  DVT Prophylaxis:  []Lovenox  []Hep SQ  [x]SCDs  []Coumadin   []On Heparin gtt      Objective:   VS:   Visit Vitals  /62   Pulse 88   Temp 97.6 °F (36.4 °C)   Resp 16   Ht 5' 8\" (1.727 m)   Wt 58.1 kg (128 lb)   SpO2 98%   BMI 19.46 kg/m²      Tmax/24hrs: No data recorded.       Intake/Output Summary (Last 24 hours) at 7/14/2022 1633  Last data filed at 7/14/2022 1338  Gross per 24 hour   Intake 240 ml   Output --   Net 240 ml       General:  Awake,   Cardiovascular:  S1S2+, RRR  Pulmonary:  CTA b/l  GI:  Soft, BS+, NT, ND  Extremities:  trace edema    Current Facility-Administered Medications   Medication Dose Route Frequency    benztropine (COGENTIN) tablet 1 mg  1 mg Oral TID    haloperidol lactate (HALDOL) injection 2 mg  2 mg IntraMUSCular Q6H PRN    trospium (SANCTURA) tablet 20 mg  20 mg Oral DAILY    famotidine (PEPCID) 40 mg/5 mL (8 mg/mL) oral suspension 20 mg  20 mg Oral DAILY    levETIRAcetam (KEPPRA) oral solution 500 mg  500 mg Oral BID    valproic acid (as sodium salt) (DEPAKENE) 250 mg/5 mL (5 mL) oral solution 500 mg  500 mg Oral Q12H    albuterol-ipratropium (DUO-NEB) 2.5 MG-0.5 MG/3 ML  3 mL Nebulization Q4H PRN    glucose chewable tablet 16 g  4 Tablet Oral PRN    glucagon (GLUCAGEN) injection 1 mg  1 mg IntraMUSCular PRN    dextrose 10% infusion 0-250 mL  0-250 mL IntraVENous PRN    clotrimazole-betamethasone (LOTRISONE) 1-0.05 % cream   Topical BID    loperamide (IMODIUM) capsule 2 mg  2 mg Oral QID PRN    OLANZapine (ZyPREXA) tablet 10 mg  10 mg Oral TID    tamsulosin (FLOMAX) capsule 0.4 mg  0.4 mg Oral DAILY    polyethylene glycol (MIRALAX) packet 17 g  17 g Oral DAILY PRN    ondansetron (ZOFRAN ODT) tablet 4 mg  4 mg Oral Q8H PRN    Or    ondansetron (ZOFRAN) injection 4 mg  4 mg IntraVENous Q6H PRN    acetaminophen (TYLENOL) suppository 650 mg  650 mg Rectal Q4H PRN     No results found for this or any previous visit (from the past 8 hour(s)).

## 2022-07-14 NOTE — PROGRESS NOTES
received an email from [address] at [facility] indicating that she will be sending admission paperwork later today.       EARNEST Castillo

## 2022-07-14 NOTE — PROGRESS NOTES
received email from [employer] at [employer].  called RN Rochelle Choudhary, admission coordinator, at [address] [place] [address] to discuss admission paperwork.   Erika Watson indicated that it was the wrong admission paperwork that [facility] sent and she will sent the correct paperwork when she gets to the office around 3:00pm.      EARNEST Villalba

## 2022-07-15 NOTE — PROGRESS NOTES
[facility] Hospitalist Group  Progress Note    Patient: Rhiannon Stout Age: 58 y.o. : 1960 MR#: [id number] SSN: [id number]  Date/Time: 7/15/2022     C/C: Acute hypoxic respiratory failure      Subjective:   HPI : 71-year-old male with intellectual disability with history of PKU, seizure disorder, anxiety disorder, chronic urinary retention with Flores catheter, admitted on 2022 to [facility] with progressive lethargy and altered mental status also according to the group home patient became more nonverbal.  Currently he is nonverbal and bedbound dependent. Initial presentation was with acute respiratory failure with concern for parainfluenza 3 infection deterioration in respiratory status required ICU admission intubated on 2022 extubated next day since then he is stable without supportive oxygen therapy. Off-and-on some behavioral issues like trying to get out of bed but no violence noted. Review of Systems:  Proper ROS cannot be obtained due to patient's mental condition  Assessment/Plan:     1. Acute hypoxic respiratory failure requiring ventilator support now stable  2 atypical pneumonia improved  3 chronic intellectual disability with history of PKU  4 seizure disorder-stable  5 chronic urinary retention requiring chronic use of Flores catheter  6 pharyngeal dysphagia-currently patient is managing to eat he is on comfort feeding not a candidate for PEG tube per GI high risk of patient pulling out on PEG tube causing complications-risk of PEG tube more than benefit.     Plan  -Supportive care  -For further sedation Benadryl added.   -Discussed with nursing  -Continue comfort feeds        Objective:       General:  Alert, awake but not cooperative  HEENT: No facial asymmetry, KATIE Tylor, External ears - WNL    Cardiovascular: S1S2 - regular , No Murmur   Pulmonary: Equal expansion , No Use of accessory muscles , No Rales No Rhonchi    GI:  +BS in all four quadrants, soft, non-tender  Extremities:  No edema; 2+ dorsalis pedis pulses bilaterally  Neuro:  Moves all extremities detailed neuro cannot be examined      DVT Prophylaxis:  []Lovenox  []Hep SQ  [x]SCDs  []Coumadin   []On Heparin gtt    [] Eliquis [] Xarelto     Vitals:         VS:   Visit Vitals  /72 (BP 1 Location: Right arm, BP Patient Position: At rest)   Pulse 74   Temp 97.3 °F (36.3 °C)   Resp 18   Ht 5' 8\" (1.727 m)   Wt 58.1 kg (128 lb)   SpO2 99%   BMI 19.46 kg/m²      Tmax/24hrs: Temp (24hrs), Av.5 °F (36.4 °C), Min:97.3 °F (36.3 °C), Max:97.7 °F (36.5 °C)        Medications:   Current Facility-Administered Medications   Medication Dose Route Frequency    diphenhydrAMINE (BENADRYL) capsule 50 mg  50 mg Oral Q6H PRN    benztropine (COGENTIN) tablet 1 mg  1 mg Oral TID    haloperidol lactate (HALDOL) injection 2 mg  2 mg IntraMUSCular Q6H PRN    trospium (SANCTURA) tablet 20 mg  20 mg Oral DAILY    famotidine (PEPCID) 40 mg/5 mL (8 mg/mL) oral suspension 20 mg  20 mg Oral DAILY    levETIRAcetam (KEPPRA) oral solution 500 mg  500 mg Oral BID    valproic acid (as sodium salt) (DEPAKENE) 250 mg/5 mL (5 mL) oral solution 500 mg  500 mg Oral Q12H    albuterol-ipratropium (DUO-NEB) 2.5 MG-0.5 MG/3 ML  3 mL Nebulization Q4H PRN    glucose chewable tablet 16 g  4 Tablet Oral PRN    glucagon (GLUCAGEN) injection 1 mg  1 mg IntraMUSCular PRN    dextrose 10% infusion 0-250 mL  0-250 mL IntraVENous PRN    clotrimazole-betamethasone (LOTRISONE) 1-0.05 % cream   Topical BID    loperamide (IMODIUM) capsule 2 mg  2 mg Oral QID PRN    OLANZapine (ZyPREXA) tablet 10 mg  10 mg Oral TID    tamsulosin (FLOMAX) capsule 0.4 mg  0.4 mg Oral DAILY    polyethylene glycol (MIRALAX) packet 17 g  17 g Oral DAILY PRN    ondansetron (ZOFRAN ODT) tablet 4 mg  4 mg Oral Q8H PRN    Or    ondansetron (ZOFRAN) injection 4 mg  4 mg IntraVENous Q6H PRN    acetaminophen (TYLENOL) suppository 650 mg  650 mg Rectal Q4H PRN       Labs:    No results for input(s): WBC, HGB, HCT, PLT, HGBEXT, HCTEXT, PLTEXT in the last 72 hours. No results for input(s): NA, K, CL, CO2, GLU, BUN, CREA, CA, MG, PHOS, ALB, TBIL, ALT, INR, INREXT in the last 72 hours. No lab exists for component: SGOT      Time spent on direct patient care >30 mints     Complexity : High complex - due to multiple medical issues outlined above. CODE Status : DNR/DNI  Case discussed with:  []Patient  [] Family  [x]Nursing  [x]Case Management         Disclaimer: Sections of this note are dictated utilizing voice recognition software, which may have resulted in some phonetic based errors in grammar and contents. Even though attempts were made to correct all the mistakes, some may have been missed, and remained in the body of the document. If questions arise, please contact our department.     Signed By: Aidan Mckeon MD     July 15, 2022

## 2022-07-15 NOTE — PROGRESS NOTES
Bedside and Verbal report given to Urmila Brantley( oncoming nurse) by Jon Najera RN (offgoing nurse. Report included Kardex, MAR and Recent Reports.

## 2022-07-15 NOTE — PROGRESS NOTES
emailed ADIN Davies, admission coordinator at [facility], regarding the status of admission paperwork.       EARNEST Zaragoza

## 2022-07-15 NOTE — PROGRESS NOTES
spoke with ADIN Kaur at [facility] regarding admission paperwork. Charanjit German indicated that she will need the dme delivered before an admission date is set.  informed Charanjit German that she can not have the dme delivered without a discharge date or tentative discharge date.  inquired about paperwork for admission. Charanjit German indicated that she is waiting on csb to send some more information and then she will send admission paperwork.  asked if she could send admission paperwork prior to the csb sending her the rest of their information. Charanjit German indicated that she can not send the information.  informed Charanjit German that she will wait on admission paperwork.       EARNEST Patterson

## 2022-07-16 NOTE — PROGRESS NOTES
[facility] Hospitalist Group  Progress Note    Patient: Artie Lilly Age: 58 y.o. : 1960 MR#: [id number] SSN: [id number]  Date/Time: 2022     C/C: Acute hypoxic respiratory failure      Subjective:   HPI : 28-year-old male with intellectual disability with history of PKU, seizure disorder, anxiety disorder, chronic urinary retention with Flores catheter, admitted on 2022 to [facility] with progressive lethargy and altered mental status also according to the group home patient became more nonverbal.  Currently he is nonverbal and bedbound dependent. Initial presentation was with acute respiratory failure with concern for parainfluenza 3 infection deterioration in respiratory status required ICU admission intubated on 2022 extubated next day since then he is stable without supportive oxygen therapy. Off-and-on some behavioral issues like trying to get out of bed but no violence noted. Review of Systems:     Patient is sleepy  No distress noted    Proper ROS cannot be obtained due to patient's mental condition  Assessment/Plan:     1. Acute hypoxic respiratory failure requiring ventilator support now stable  2 atypical pneumonia improved  3 chronic intellectual disability with history of PKU  4 seizure disorder-stable  5 chronic urinary retention requiring chronic use of Flores catheter  6 pharyngeal dysphagia-currently patient is managing to eat he is on comfort feeding not a candidate for PEG tube per GI high risk of patient pulling out on PEG tube causing complications-risk of PEG tube more than benefit.     Plan  -Continue current supportive care  --As needed use of Benadryl today patient is sleeping very nicely  -Continue Zyprexa  -Placement      Objective:       General: Patient is sleepy and resting  HEENT: No facial asymmetry, KATIE Tylor, External ears - WNL    Cardiovascular: S1S2 - regular , No Murmur   Pulmonary: Equal expansion , No Use of accessory muscles , No Rales No Rhonchi    GI:  +BS in all four quadrants, soft, non-tender  Extremities:  No edema; 2+ dorsalis pedis pulses bilaterally  Neuro: Moves all extremities detailed neuro cannot be examined      DVT Prophylaxis:  []Lovenox  []Hep SQ  [x]SCDs  []Coumadin   []On Heparin gtt    [] Eliquis [] Xarelto       Vitals:         VS:   Visit Vitals  /72 (BP 1 Location: Right upper arm, BP Patient Position: Lying left side)   Pulse 87   Temp 97.3 °F (36.3 °C)   Resp 18   Ht 5' 8\" (1.727 m)   Wt 58.1 kg (128 lb)   SpO2 99%   BMI 19.46 kg/m²      Tmax/24hrs: No data recorded.         Medications:   Current Facility-Administered Medications   Medication Dose Route Frequency    diphenhydrAMINE (BENADRYL) capsule 50 mg  50 mg Oral Q6H PRN    benztropine (COGENTIN) tablet 1 mg  1 mg Oral TID    haloperidol lactate (HALDOL) injection 2 mg  2 mg IntraMUSCular Q6H PRN    trospium (SANCTURA) tablet 20 mg  20 mg Oral DAILY    famotidine (PEPCID) 40 mg/5 mL (8 mg/mL) oral suspension 20 mg  20 mg Oral DAILY    levETIRAcetam (KEPPRA) oral solution 500 mg  500 mg Oral BID    valproic acid (as sodium salt) (DEPAKENE) 250 mg/5 mL (5 mL) oral solution 500 mg  500 mg Oral Q12H    albuterol-ipratropium (DUO-NEB) 2.5 MG-0.5 MG/3 ML  3 mL Nebulization Q4H PRN    glucose chewable tablet 16 g  4 Tablet Oral PRN    glucagon (GLUCAGEN) injection 1 mg  1 mg IntraMUSCular PRN    dextrose 10% infusion 0-250 mL  0-250 mL IntraVENous PRN    clotrimazole-betamethasone (LOTRISONE) 1-0.05 % cream   Topical BID    loperamide (IMODIUM) capsule 2 mg  2 mg Oral QID PRN    OLANZapine (ZyPREXA) tablet 10 mg  10 mg Oral TID    tamsulosin (FLOMAX) capsule 0.4 mg  0.4 mg Oral DAILY    polyethylene glycol (MIRALAX) packet 17 g  17 g Oral DAILY PRN    ondansetron (ZOFRAN ODT) tablet 4 mg  4 mg Oral Q8H PRN    Or    ondansetron (ZOFRAN) injection 4 mg  4 mg IntraVENous Q6H PRN    acetaminophen (TYLENOL) suppository 650 mg 650 mg Rectal Q4H PRN       Labs:    No results for input(s): WBC, HGB, HCT, PLT, HGBEXT, HCTEXT, PLTEXT in the last 72 hours. No results for input(s): NA, K, CL, CO2, GLU, BUN, CREA, CA, MG, PHOS, ALB, TBIL, ALT, INR, INREXT in the last 72 hours. No lab exists for component: SGOT      Time spent on direct patient care >30 mints     Complexity : High complex - due to multiple medical issues outlined above. CODE Status : DNR    Case discussed with:  []Patient  [] Family  [x]Nursing  []Case Management         Disclaimer: Sections of this note are dictated utilizing voice recognition software, which may have resulted in some phonetic based errors in grammar and contents. Even though attempts were made to correct all the mistakes, some may have been missed, and remained in the body of the document. If questions arise, please contact our department.     Signed By: Kimberlyn Romero MD     July 16, 2022

## 2022-07-17 NOTE — PROGRESS NOTES
Bedside shift change report given to [employer] (oncoming nurse). Report included the following information SBAR. Pt currently resting comfortably, bed locked & in lowest position, bed alarm on, call bell within reach. Seizure & fall precautions continued. Pt currently on video monitor for safety.

## 2022-07-17 NOTE — PROGRESS NOTES
[facility] Hospitalist Group  Progress Note    Patient: Bina Kay Age: 58 y.o. : 1960 MR#: [id number] SSN: [id number]  Date/Time: 2022     C/C: Acute hypoxic respiratory failure      Subjective:   HPI : 28-year-old male with intellectual disability with history of PKU, seizure disorder, anxiety disorder, chronic urinary retention with Flores catheter, admitted on 2022 to [facility] with progressive lethargy and altered mental status also according to the group home patient became more nonverbal.  Currently he is nonverbal and bedbound dependent. Initial presentation was with acute respiratory failure with concern for parainfluenza 3 infection deterioration in respiratory status required ICU admission intubated on 2022 extubated next day since then he is stable without supportive oxygen therapy. Off-and-on some behavioral issues like trying to get out of bed but no violence noted. Review of Systems:     Today patient is alert awake no orientation whatsoever lying in bed appears little agitated trying to do something appears frustrated. Does not appear in any distress    Proper ROS cannot be obtained due to patient's mental condition  Assessment/Plan:     1. Acute hypoxic respiratory failure requiring ventilator support now stable  2 atypical pneumonia improved  3 chronic intellectual disability with history of PKU  4 seizure disorder-stable  5 chronic urinary retention requiring chronic use of Flores catheter  6 pharyngeal dysphagia-currently patient is managing to eat he is on comfort feeding not a candidate for PEG tube per GI high risk of patient pulling out on PEG tube causing complications-risk of PEG tube more than benefit.     Plan  -Continue current treatment  -Difficult to elicit history from him he is non communicative  -Awaiting placement    Objective:       General: Patient is sitting up in bed slightly agitated  HEENT: No facial asymmetry, KATIE Tylor, External ears - WNL    Cardiovascular: S1S2 - regular , No Murmur   Pulmonary: Equal expansion , No Use of accessory muscles , No Rales No Rhonchi    GI:  +BS in all four quadrants, soft, non-tender  Extremities:  No edema; 2+ dorsalis pedis pulses bilaterally  Neuro:  Moves all extremities detailed neuro cannot be examined      DVT Prophylaxis:  []Lovenox  []Hep SQ  [x]SCDs  []Coumadin   []On Heparin gtt    [] Eliquis [] Xarelto       Vitals:         VS:   Visit Vitals  /68 (BP 1 Location: Right leg, BP Patient Position: At rest)   Pulse 88   Temp 97.8 °F (36.6 °C)   Resp 18   Ht 5' 8\" (1.727 m)   Wt 58.1 kg (128 lb)   SpO2 98%   BMI 19.46 kg/m²      Tmax/24hrs: Temp (24hrs), Av.8 °F (36.6 °C), Min:97.8 °F (36.6 °C), Max:97.8 °F (36.6 °C)        Medications:   Current Facility-Administered Medications   Medication Dose Route Frequency    diphenhydrAMINE (BENADRYL) capsule 50 mg  50 mg Oral Q6H PRN    benztropine (COGENTIN) tablet 1 mg  1 mg Oral TID    haloperidol lactate (HALDOL) injection 2 mg  2 mg IntraMUSCular Q6H PRN    trospium (SANCTURA) tablet 20 mg  20 mg Oral DAILY    famotidine (PEPCID) 40 mg/5 mL (8 mg/mL) oral suspension 20 mg  20 mg Oral DAILY    levETIRAcetam (KEPPRA) oral solution 500 mg  500 mg Oral BID    valproic acid (as sodium salt) (DEPAKENE) 250 mg/5 mL (5 mL) oral solution 500 mg  500 mg Oral Q12H    albuterol-ipratropium (DUO-NEB) 2.5 MG-0.5 MG/3 ML  3 mL Nebulization Q4H PRN    glucose chewable tablet 16 g  4 Tablet Oral PRN    glucagon (GLUCAGEN) injection 1 mg  1 mg IntraMUSCular PRN    dextrose 10% infusion 0-250 mL  0-250 mL IntraVENous PRN    clotrimazole-betamethasone (LOTRISONE) 1-0.05 % cream   Topical BID    loperamide (IMODIUM) capsule 2 mg  2 mg Oral QID PRN    OLANZapine (ZyPREXA) tablet 10 mg  10 mg Oral TID    tamsulosin (FLOMAX) capsule 0.4 mg  0.4 mg Oral DAILY    polyethylene glycol (MIRALAX) packet 17 g  17 g Oral DAILY PRN    ondansetron (ZOFRAN ODT) tablet 4 mg  4 mg Oral Q8H PRN    Or    ondansetron (ZOFRAN) injection 4 mg  4 mg IntraVENous Q6H PRN    acetaminophen (TYLENOL) suppository 650 mg  650 mg Rectal Q4H PRN       Labs:    No results for input(s): WBC, HGB, HCT, PLT, HGBEXT, HCTEXT, PLTEXT, HGBEXT, HCTEXT, PLTEXT in the last 72 hours. No results for input(s): NA, K, CL, CO2, GLU, BUN, CREA, CA, MG, PHOS, ALB, TBIL, ALT, INR, INREXT, INREXT in the last 72 hours. No lab exists for component: SGOT      Time spent on direct patient care >30 mints     Complexity : High complex - due to multiple medical issues outlined above. CODE Status : DNR    Case discussed with:  []Patient  [] Family  [x]Nursing  []Case Management         Disclaimer: Sections of this note are dictated utilizing voice recognition software, which may have resulted in some phonetic based errors in grammar and contents. Even though attempts were made to correct all the mistakes, some may have been missed, and remained in the body of the document. If questions arise, please contact our department.     Signed By: Amos Bright MD     July 17, 2022

## 2022-07-17 NOTE — PROGRESS NOTES
Problem: Pressure Injury - Risk of  Goal: *Prevention of pressure injury  Description: Document Otoniel Scale and appropriate interventions in the flowsheet. Outcome: Progressing Towards Goal  Note: Pressure Injury Interventions:  Sensory Interventions: Check visual cues for pain,Assess need for specialty bed,Avoid rigorous massage over bony prominences,Float heels,Keep linens dry and wrinkle-free,Maintain/enhance activity level,Minimize linen layers,Monitor skin under medical devices,Pressure redistribution bed/mattress (bed type),Turn and reposition approx. every two hours (pillows and wedges if needed),Use 30-degree side-lying position    Moisture Interventions: Absorbent underpads,Apply protective barrier, creams and emollients,Assess need for specialty bed,Check for incontinence Q2 hours and as needed,Limit adult briefs,Maintain skin hydration (lotion/cream),Minimize layers,Moisture barrier,Offer toileting Q_hr    Activity Interventions: Assess need for specialty bed    Mobility Interventions: Assess need for specialty bed,Float heels,HOB 30 degrees or less,Turn and reposition approx. every two hours(pillow and wedges)    Nutrition Interventions: Document food/fluid/supplement intake,Offer support with meals,snacks and hydration    Friction and Shear Interventions: Apply protective barrier, creams and emollients,Foam dressings/transparent film/skin sealants,HOB 30 degrees or less,Minimize layers             Problem: Falls - Risk of  Goal: *Absence of Falls  Description: Document Syl Fall Risk and appropriate interventions in the flowsheet.   Outcome: Progressing Towards Goal  Note: Fall Risk Interventions:  Mobility Interventions: Communicate number of staff needed for ambulation/transfer,Bed/chair exit alarm,Strengthening exercises (ROM-active/passive)    Mentation Interventions: Adequate sleep, hydration, pain control,Bed/chair exit alarm,Door open when patient unattended,Evaluate medications/consider consulting pharmacy,More frequent rounding,Reorient patient,Room close to nurse's station,Family/sitter at bedside,Update white board,Toileting rounds    Medication Interventions: Bed/chair exit alarm,Evaluate medications/consider consulting pharmacy    Elimination Interventions: Bed/chair exit alarm,Call light in reach,Toileting schedule/hourly rounds    History of Falls Interventions: Bed/chair exit alarm,Door open when patient unattended,Evaluate medications/consider consulting pharmacy,Room close to nurse's station

## 2022-07-18 NOTE — PROGRESS NOTES
received an email from Cheryl Summers, at [address] [place] [address] regarding group home. Reg Greenberg indicated that the admission date is August 1, 2022. Reg Greenberg indicated that she will need hospital records, progress notes, medication list, all recommendations from all doctors. Reg Greenberg reported that she will need hard copies of written or typed signed prescription for all medications, dme , diet order, tb screening, covid vaccine record and test, current psychological history, medical history, and medical notes from past 30 days. Reg Greenberg indicated that she will need hospital bed, lift, shower chair, and wheelchair.       Charrodrigo Neighbours, MSW

## 2022-07-18 NOTE — PROGRESS NOTES
informed ADIN Buitrago that the patient has a discharge date of August 1, 2022. Lucina Buitrago voiced an understanding.       EARNEST Villalba

## 2022-07-18 NOTE — PROGRESS NOTES
informed Dr. Ab Diaz that the patient admission paperwork for [facility] is on his chart and he has a discharge date of August 1, 2022.       EARNEST Monterroso

## 2022-07-18 NOTE — PROGRESS NOTES
[facility]    Patient: Lois Humphries Age: 58 y.o. : 1960 MR#: [id number] SSN: [id number]  Date/Time: 2022     C/C: Acute hypoxic respiratory failure      Subjective:   HPI : 59-year-old male with intellectual disability with history of PKU, seizure disorder, anxiety disorder, chronic urinary retention with Flores catheter, admitted on 2022 to [facility] with progressive lethargy and altered mental status also according to the group home patient became more nonverbal.  Currently he is nonverbal and bedbound dependent. Initial presentation was with acute respiratory failure with concern for parainfluenza 3 infection deterioration in respiratory status required ICU admission intubated on 2022 extubated next day since then he is stable without supportive oxygen therapy. Off-and-on some behavioral issues like trying to get out of bed but no violence noted. Review of Systems:     Patient is alert awake oriented  Patient is aphasic does not give much history but is not agitated today    Proper ROS cannot be obtained due to patient's mental condition  Assessment/Plan:     1. Acute hypoxic respiratory failure requiring ventilator support now stable  2 atypical pneumonia improved  3 chronic intellectual disability with history of PKU  4 seizure disorder-stable  5 chronic urinary retention requiring chronic use of Flores catheter  6 pharyngeal dysphagia-currently patient is managing to eat he is on comfort feeding not a candidate for PEG tube per GI high risk of patient pulling out on PEG tube causing complications-risk of PEG tube more than benefit.     Plan  -No change in treatment  -Likely discharge   -Continue current treatment  Objective:       General: Patient is sitting up in bed,calm  HEENT: No facial asymmetry, KATIE Tylor, External ears - WNL    Cardiovascular: S1S2 - regular , No Murmur   Pulmonary: Equal expansion , No Use of accessory muscles , No Rales No Rhonchi    GI:  +BS in all four quadrants, soft, non-tender  Extremities:  No edema; 2+ dorsalis pedis pulses bilaterally  Neuro:  Moves all extremities detailed neuro cannot be examined      DVT Prophylaxis:  []Lovenox  []Hep SQ  [x]SCDs  []Coumadin   []On Heparin gtt    [] Eliquis [] Xarelto       Vitals:         VS:   Visit Vitals  /77 (BP 1 Location: Left upper arm, BP Patient Position: At rest)   Pulse 70   Temp 97.6 °F (36.4 °C)   Resp 18   Ht 5' 8\" (1.727 m)   Wt 58.1 kg (128 lb)   SpO2 98%   BMI 19.46 kg/m²      Tmax/24hrs: Temp (24hrs), Av.7 °F (36.5 °C), Min:97.6 °F (36.4 °C), Max:97.7 °F (36.5 °C)        Medications:   Current Facility-Administered Medications   Medication Dose Route Frequency    diphenhydrAMINE (BENADRYL) capsule 50 mg  50 mg Oral Q6H PRN    benztropine (COGENTIN) tablet 1 mg  1 mg Oral TID    haloperidol lactate (HALDOL) injection 2 mg  2 mg IntraMUSCular Q6H PRN    trospium (SANCTURA) tablet 20 mg  20 mg Oral DAILY    famotidine (PEPCID) 40 mg/5 mL (8 mg/mL) oral suspension 20 mg  20 mg Oral DAILY    levETIRAcetam (KEPPRA) oral solution 500 mg  500 mg Oral BID    valproic acid (as sodium salt) (DEPAKENE) 250 mg/5 mL (5 mL) oral solution 500 mg  500 mg Oral Q12H    albuterol-ipratropium (DUO-NEB) 2.5 MG-0.5 MG/3 ML  3 mL Nebulization Q4H PRN    glucose chewable tablet 16 g  4 Tablet Oral PRN    glucagon (GLUCAGEN) injection 1 mg  1 mg IntraMUSCular PRN    dextrose 10% infusion 0-250 mL  0-250 mL IntraVENous PRN    clotrimazole-betamethasone (LOTRISONE) 1-0.05 % cream   Topical BID    loperamide (IMODIUM) capsule 2 mg  2 mg Oral QID PRN    OLANZapine (ZyPREXA) tablet 10 mg  10 mg Oral TID    tamsulosin (FLOMAX) capsule 0.4 mg  0.4 mg Oral DAILY    polyethylene glycol (MIRALAX) packet 17 g  17 g Oral DAILY PRN    ondansetron (ZOFRAN ODT) tablet 4 mg  4 mg Oral Q8H PRN    Or    ondansetron (ZOFRAN) injection 4 mg  4 mg IntraVENous Q6H PRN    acetaminophen (TYLENOL) suppository 650 mg  650 mg Rectal Q4H PRN       Labs:    No results for input(s): WBC, HGB, HCT, PLT, HGBEXT, HCTEXT, PLTEXT, HGBEXT, HCTEXT, PLTEXT in the last 72 hours. No results for input(s): NA, K, CL, CO2, GLU, BUN, CREA, CA, MG, PHOS, ALB, TBIL, ALT, INR, INREXT, INREXT in the last 72 hours. No lab exists for component: SGOT      Time spent on direct patient care >30 mints     Complexity : High complex - due to multiple medical issues outlined above. CODE Status : DNR    Case discussed with:  []Patient  [] Family  [x]Nursing  []Case Management         Disclaimer: Sections of this note are dictated utilizing voice recognition software, which may have resulted in some phonetic based errors in grammar and contents. Even though attempts were made to correct all the mistakes, some may have been missed, and remained in the body of the document. If questions arise, please contact our department.     Signed By: Brian Mendoza MD     July 18, 2022

## 2022-07-18 NOTE — PROGRESS NOTES
Problem: Dysphagia (Adult)  Goal: *Acute Goals and Plan of Care (Insert Text)  Description: Patient will:  1. Tolerate PO trials with 0 s/s overt distress in 4/5 trials for comfort   2. Utilize compensatory swallow strategies/maneuvers (decrease bite/sip, size/rate, alt. liq/sol) with mod cues in 4/5 trials    Rec:     Continue comfort feeds; soft and bite sized/thin liquids  Aspiration precautions  HOB >45 degrees during all intake and for at least 30 min after intake   Small bites/sips, slow rate of intake   Oral care three times daily   Outcome: Progressing Towards Goal     SPEECH LANGUAGE PATHOLOGY DYSPHAGIA TREATMENT    Patient: Eva Bass (64 y.o. male)  Date: 7/18/2022  Diagnosis: Pneumonia [J18.9] <principal problem not specified>       Precautions: Fall,Skin,Bed Alarm  PLOF:See eval    ASSESSMENT:  Pt seen at bedside during lunchtime for dysphagia fu. Alert throughout. Pt demo continued prolonged/inefficient bolus manipulation with firm solid trials, and prolonged oral prep with mech soft trials. Intermittent coughing, SLP providing frequent reminders to slow pace of intake. Recommend continue current diet with strict aspiration precautions in place. Continue to fu x1-2 visits to ensure diet tolerance. Progression toward goals:  []         Improving appropriately and progressing toward goals  [x]         Improving slowly and progressing toward goals  []         Not making progress toward goals and plan of care will be adjusted     PLAN:  Recommendations and Planned Interventions:  See above  Patient continues to benefit from skilled intervention to address the above impairments. Continue treatment per established plan of care. Discharge Recommendations: To Be Determined     SUBJECTIVE:   Patient stated Gerhardt Cane in response to questions from SLP.     OBJECTIVE:   Cognitive and Communication Status:  Neurologic State: Confused  Orientation Level: Unable to verbalize  Cognition: Decreased attention/concentration,Decreased command following,Impulsive,Poor safety awareness  Perception: Tactile,Verbal  Perseveration: No perseveration noted  Safety/Judgement: Not assessed  Dysphagia Treatment:  Oral Assessment:  Oral Assessment  Labial: No impairment  Dentition: Limited,Natural  Oral Hygiene: Fair  Lingual: Decreased rate,Decreased strength  Velum: Unable to visualize  Mandible: No impairment  Gag Reflex: No impairment  P.O. Trials:   Patient Position: 55 [facility]   Vocal quality prior to P.O.: No impairment   Consistency Presented:  Thin liquid,Solid   How Presented: Self-fed/presented,Cup/sip,Straw,Successive swallows   How Much: 4   Bolus Acceptance: No impairment   Bolus Formation/Control: Impaired   Type of Impairment: Mastication,Delayed   Propulsion: Delayed (# of seconds)   Oral Residue: Lingual,10-50% of bolus   Initiation of Swallow: Delayed (# of seconds)   Laryngeal Elevation: Decreased   Aspiration Signs/Symptoms: Watery eyes,Delayed cough/throat clear (Known hx of silent aspiration; currently on comfort feeds)   Pharyngeal Phase Characteristics: Audible swallow   Effective Modifications: Small sips and bites,Alternate liquids/solids   Cues for Modifications: Maximal         Oral Phase Severity: Mild-moderate   Pharyngeal Phase Severity : Moderate-severe    PAIN:  Pain level pre-treatment: 0/10   Pain level post-treatment: 0/10     After treatment:   []              Patient left in no apparent distress sitting up in chair  [x]              Patient left in no apparent distress in bed  []              Call bell left within reach  [x]              Nursing notified  []              Family present  []              Caregiver present  []              Bed alarm activated      COMMUNICATION/EDUCATION:   [x] Aspiration precautions; swallow safety; compensatory techniques  []        Patient unable to participate in education; education ongoing with staff  []  Posted safety precautions in patient's room.  [] Oral-motor/laryngeal strengthening exercises      ONEAL Mao  Time Calculation: 12 mins

## 2022-07-19 NOTE — ROUTINE PROCESS
Bedside and Verbal shift change report given to Laura Knowles RN (oncoming nurse) by Angie Joyce RN   (offgoing nurse). Report included the following information SBAR, Kardex, Intake/Output, MAR and Recent Results.

## 2022-07-19 NOTE — ROUTINE PROCESS
Bedside and Verbal shift change report given to Kel Barrios, ADIN (oncoming nurse) by Gardenia Lane RN (offgoing nurse). Report included the following information SBAR, Kardex and Recent Results.

## 2022-07-19 NOTE — ROUTINE PROCESS
Bedside and Verbal shift change report given to Charlotte Brito RN (oncoming nurse) by KENDRICK Eason RN (offgoing nurse). Report included the following information SBAR, Kardex, MAR and Recent Results.

## 2022-07-19 NOTE — PROGRESS NOTES
[facility]    Patient: Chris Calles Age: 58 y.o. : 1960 MR#: [id number] SSN: [id number]  Date/Time: 2022     C/C: Acute hypoxic respiratory failure      Subjective:   HPI : 60-year-old male with intellectual disability with history of PKU, seizure disorder, anxiety disorder, chronic urinary retention with Flores catheter, admitted on 2022 to [facility] with progressive lethargy and altered mental status also according to the group home patient became more nonverbal.  Currently he is nonverbal and bedbound dependent. Initial presentation was with acute respiratory failure with concern for parainfluenza 3 infection deterioration in respiratory status required ICU admission intubated on 2022 extubated next day since then he is stable without supportive oxygen therapy. Off-and-on some behavioral issues like trying to get out of bed but no violence noted. Review of Systems:     Patient is alert awake oriented  Patient is aphasic does not give much history but is not agitated today    Proper ROS cannot be obtained due to patient's mental condition  Assessment/Plan:     1. Acute hypoxic respiratory failure requiring ventilator support now stable  2 atypical pneumonia improved  3 chronic intellectual disability with history of PKU  4 seizure disorder-stable  5 chronic urinary retention requiring chronic use of Flores catheter  6 pharyngeal dysphagia-currently patient is managing to eat he is on comfort feeding not a candidate for PEG tube per GI high risk of patient pulling out on PEG tube causing complications-risk of PEG tube more than benefit.     Plan  - Continue current management   - Awaiting placement   - expected aug 1st for transfer   D/w case management       Objective:       General: Patient is sitting up in bed,calm  HEENT: No facial asymmetry, KATIE Tylor, External ears - WNL    Cardiovascular: S1S2 - regular , No Murmur Pulmonary: Equal expansion , No Use of accessory muscles , No Rales No Rhonchi    GI:  +BS in all four quadrants, soft, non-tender  Extremities:  No edema; 2+ dorsalis pedis pulses bilaterally  Neuro:  Moves all extremities detailed neuro cannot be examined      DVT Prophylaxis:  []Lovenox  []Hep SQ  [x]SCDs  []Coumadin   []On Heparin gtt    [] Eliquis [] Xarelto       Vitals:         VS:   Visit Vitals  /74   Pulse (!) 58   Temp 97.5 °F (36.4 °C)   Resp 16   Ht 5' 8\" (1.727 m)   Wt 58.1 kg (128 lb)   SpO2 98%   BMI 19.46 kg/m²      Tmax/24hrs: Temp (24hrs), Av.4 °F (36.3 °C), Min:97.3 °F (36.3 °C), Max:97.5 °F (36.4 °C)        Medications:   Current Facility-Administered Medications   Medication Dose Route Frequency    diphenhydrAMINE (BENADRYL) capsule 50 mg  50 mg Oral Q6H PRN    benztropine (COGENTIN) tablet 1 mg  1 mg Oral TID    haloperidol lactate (HALDOL) injection 2 mg  2 mg IntraMUSCular Q6H PRN    trospium (SANCTURA) tablet 20 mg  20 mg Oral DAILY    famotidine (PEPCID) 40 mg/5 mL (8 mg/mL) oral suspension 20 mg  20 mg Oral DAILY    levETIRAcetam (KEPPRA) oral solution 500 mg  500 mg Oral BID    valproic acid (as sodium salt) (DEPAKENE) 250 mg/5 mL (5 mL) oral solution 500 mg  500 mg Oral Q12H    albuterol-ipratropium (DUO-NEB) 2.5 MG-0.5 MG/3 ML  3 mL Nebulization Q4H PRN    glucose chewable tablet 16 g  4 Tablet Oral PRN    glucagon (GLUCAGEN) injection 1 mg  1 mg IntraMUSCular PRN    dextrose 10% infusion 0-250 mL  0-250 mL IntraVENous PRN    clotrimazole-betamethasone (LOTRISONE) 1-0.05 % cream   Topical BID    loperamide (IMODIUM) capsule 2 mg  2 mg Oral QID PRN    OLANZapine (ZyPREXA) tablet 10 mg  10 mg Oral TID    tamsulosin (FLOMAX) capsule 0.4 mg  0.4 mg Oral DAILY    polyethylene glycol (MIRALAX) packet 17 g  17 g Oral DAILY PRN    ondansetron (ZOFRAN ODT) tablet 4 mg  4 mg Oral Q8H PRN    Or    ondansetron (ZOFRAN) injection 4 mg  4 mg IntraVENous Q6H PRN    acetaminophen (TYLENOL) suppository 650 mg  650 mg Rectal Q4H PRN       Labs:    No results for input(s): WBC, HGB, HCT, PLT, HGBEXT, HCTEXT, PLTEXT, HGBEXT, HCTEXT, PLTEXT in the last 72 hours. No results for input(s): NA, K, CL, CO2, GLU, BUN, CREA, CA, MG, PHOS, ALB, TBIL, ALT, INR, INREXT, INREXT in the last 72 hours. No lab exists for component: SGOT      Time spent on direct patient care >30 mints     Complexity : High complex - due to multiple medical issues outlined above. CODE Status : DNR    Case discussed with:  []Patient  [] Family  [x]Nursing  []Case Management         Disclaimer: Sections of this note are dictated utilizing voice recognition software, which may have resulted in some phonetic based errors in grammar and contents. Even though attempts were made to correct all the mistakes, some may have been missed, and remained in the body of the document. If questions arise, please contact our department.     Signed By: Froilan Sebastian MD     July 19, 2022

## 2022-07-19 NOTE — PROGRESS NOTES
Problem: Pressure Injury - Risk of  Goal: *Prevention of pressure injury  Description: Document Otoniel Scale and appropriate interventions in the flowsheet. Outcome: Progressing Towards Goal  Note: Pressure Injury Interventions:  Sensory Interventions: Assess changes in LOC,Avoid rigorous massage over bony prominences,Keep linens dry and wrinkle-free,Maintain/enhance activity level,Minimize linen layers,Pressure redistribution bed/mattress (bed type)    Moisture Interventions: Absorbent underpads,Check for incontinence Q2 hours and as needed,Maintain skin hydration (lotion/cream),Minimize layers,Limit adult briefs    Activity Interventions: Increase time out of bed,Pressure redistribution bed/mattress(bed type),PT/OT evaluation    Mobility Interventions: HOB 30 degrees or less,Pressure redistribution bed/mattress (bed type)    Nutrition Interventions: Document food/fluid/supplement intake    Friction and Shear Interventions: Apply protective barrier, creams and emollients,HOB 30 degrees or less,Minimize layers                Problem: Patient Education: Go to Patient Education Activity  Goal: Patient/Family Education  Outcome: Progressing Towards Goal     Problem: Falls - Risk of  Goal: *Absence of Falls  Description: Document Syl Fall Risk and appropriate interventions in the flowsheet.   Outcome: Progressing Towards Goal  Note: Fall Risk Interventions:  Mobility Interventions: Bed/chair exit alarm,Patient to call before getting OOB,PT Consult for mobility concerns    Mentation Interventions: Adequate sleep, hydration, pain control,Bed/chair exit alarm,Door open when patient unattended,More frequent rounding,Increase mobility,Room close to nurse's station,Toileting rounds,Update white board    Medication Interventions: Bed/chair exit alarm,Evaluate medications/consider consulting pharmacy    Elimination Interventions: Bed/chair exit alarm,Call light in reach,Toileting schedule/hourly rounds    History of Falls Interventions: Bed/chair exit alarm,Consult care management for discharge planning,Door open when patient unattended         Problem: Patient Education: Go to Patient Education Activity  Goal: Patient/Family Education  Outcome: Progressing Towards Goal     Problem: General Medical Care Plan  Goal: *Vital signs within specified parameters  Outcome: Progressing Towards Goal  Goal: *Labs within defined limits  Outcome: Progressing Towards Goal  Goal: *Absence of infection signs and symptoms  Outcome: Progressing Towards Goal  Goal: *Optimal pain control at patient's stated goal  Outcome: Progressing Towards Goal  Goal: *Skin integrity maintained  Outcome: Progressing Towards Goal  Goal: *Fluid volume balance  Outcome: Progressing Towards Goal  Goal: *Optimize nutritional status  Outcome: Progressing Towards Goal  Goal: *Anxiety reduced or absent  Outcome: Progressing Towards Goal  Goal: *Progressive mobility and function (eg: ADL's)  Outcome: Progressing Towards Goal     Problem: Patient Education: Go to Patient Education Activity  Goal: Patient/Family Education  Outcome: Progressing Towards Goal

## 2022-07-20 NOTE — PROGRESS NOTES
[facility] Hospitalist Group  Progress Note    Patient: Ryan Lovelace Age: 58 y.o. : 1960 MR#: [id number] SSN: [id number]  Date/Time: 2022     C/C: Acute hypoxic respiratory failure      Subjective:   HPI : 70-year-old male with intellectual disability with history of PKU, seizure disorder, anxiety disorder, chronic urinary retention with Flores catheter, admitted on 2022 to [facility] with progressive lethargy and altered mental status also according to the group home patient became more nonverbal.  Currently he is nonverbal and bedbound dependent. Initial presentation was with acute respiratory failure with concern for parainfluenza 3 infection deterioration in respiratory status required ICU admission intubated on 2022 extubated next day since then he is stable without supportive oxygen therapy. Off-and-on some behavioral issues like trying to get out of bed but no violence noted. Review of Systems:     Patient is alert awake oriented  Patient is aphasic does not give much history but is not agitated today    Proper ROS cannot be obtained due to patient's mental condition  Assessment/Plan:     1. Acute hypoxic respiratory failure requiring ventilator support now stable  2 atypical pneumonia improved  3 chronic intellectual disability with history of PKU  4 seizure disorder-stable  5 chronic urinary retention requiring chronic use of Flores catheter  6 pharyngeal dysphagia-currently patient is managing to eat he is on comfort feeding not a candidate for PEG tube per GI high risk of patient pulling out on PEG tube causing complications-risk of PEG tube more than benefit.     Plan  -Chart reviewed labs and imaging studies reviewed  -Awaiting placement    Objective:       General: Patient is sitting up in bed,calm  HEENT: No facial asymmetry, KATIE Tylor, External ears - WNL    Cardiovascular: S1S2 - regular , No Murmur   Pulmonary: Equal expansion , No Use of accessory muscles , No Rales No Rhonchi    GI:  +BS in all four quadrants, soft, non-tender  Extremities:  No edema; 2+ dorsalis pedis pulses bilaterally  Neuro:  Moves all extremities detailed neuro cannot be examined      DVT Prophylaxis:  []Lovenox  []Hep SQ  [x]SCDs  []Coumadin   []On Heparin gtt    [] Eliquis [] Xarelto       Vitals:         VS:   Visit Vitals  /74   Pulse (!) 101   Temp 97.4 °F (36.3 °C)   Resp 16   Ht 5' 8\" (1.727 m)   Wt 58.1 kg (128 lb)   SpO2 98%   BMI 19.46 kg/m²      Tmax/24hrs: Temp (24hrs), Av.5 °F (36.4 °C), Min:97.4 °F (36.3 °C), Max:97.6 °F (36.4 °C)        Medications:   Current Facility-Administered Medications   Medication Dose Route Frequency    diphenhydrAMINE (BENADRYL) capsule 50 mg  50 mg Oral Q6H PRN    benztropine (COGENTIN) tablet 1 mg  1 mg Oral TID    haloperidol lactate (HALDOL) injection 2 mg  2 mg IntraMUSCular Q6H PRN    trospium (SANCTURA) tablet 20 mg  20 mg Oral DAILY    famotidine (PEPCID) 40 mg/5 mL (8 mg/mL) oral suspension 20 mg  20 mg Oral DAILY    levETIRAcetam (KEPPRA) oral solution 500 mg  500 mg Oral BID    valproic acid (as sodium salt) (DEPAKENE) 250 mg/5 mL (5 mL) oral solution 500 mg  500 mg Oral Q12H    albuterol-ipratropium (DUO-NEB) 2.5 MG-0.5 MG/3 ML  3 mL Nebulization Q4H PRN    glucose chewable tablet 16 g  4 Tablet Oral PRN    glucagon (GLUCAGEN) injection 1 mg  1 mg IntraMUSCular PRN    dextrose 10% infusion 0-250 mL  0-250 mL IntraVENous PRN    clotrimazole-betamethasone (LOTRISONE) 1-0.05 % cream   Topical BID    loperamide (IMODIUM) capsule 2 mg  2 mg Oral QID PRN    OLANZapine (ZyPREXA) tablet 10 mg  10 mg Oral TID    tamsulosin (FLOMAX) capsule 0.4 mg  0.4 mg Oral DAILY    polyethylene glycol (MIRALAX) packet 17 g  17 g Oral DAILY PRN    ondansetron (ZOFRAN ODT) tablet 4 mg  4 mg Oral Q8H PRN    Or    ondansetron (ZOFRAN) injection 4 mg  4 mg IntraVENous Q6H PRN    acetaminophen (TYLENOL) suppository 650 mg  650 mg Rectal Q4H PRN       Labs:    No results for input(s): WBC, HGB, HCT, PLT, HGBEXT, HCTEXT, PLTEXT, HGBEXT, HCTEXT, PLTEXT in the last 72 hours. No results for input(s): NA, K, CL, CO2, GLU, BUN, CREA, CA, MG, PHOS, ALB, TBIL, ALT, INR, INREXT, INREXT in the last 72 hours. No lab exists for component: SGOT      Time spent on direct patient care >30 mints     Complexity : High complex - due to multiple medical issues outlined above. CODE Status : DNR    Case discussed with:  []Patient  [] Family  [x]Nursing  []Case Management         Disclaimer: Sections of this note are dictated utilizing voice recognition software, which may have resulted in some phonetic based errors in grammar and contents. Even though attempts were made to correct all the mistakes, some may have been missed, and remained in the body of the document. If questions arise, please contact our department.     Signed By: Mary Sierra MD     July 20, 2022

## 2022-07-20 NOTE — ROUTINE PROCESS
Bedside and Verbal shift change report given to ADIN Parker (oncoming nurse) by Ten Garrett RN   (offgoing nurse). Report included the following information SBAR, Kardex, Intake/Output, MAR, and Recent Results.

## 2022-07-20 NOTE — PROGRESS NOTES
informed the patient's brother, Francisco J Gonzalez, that the patient has an admission date for [address] for August 1, 2022.       EARNEST Gerardo

## 2022-07-20 NOTE — PROGRESS NOTES
Problem: Pressure Injury - Risk of  Goal: *Prevention of pressure injury  Description: Document Otoniel Scale and appropriate interventions in the flowsheet. Outcome: Progressing Towards Goal  Note: Pressure Injury Interventions:  Sensory Interventions: Assess changes in LOC, Check visual cues for pain, Keep linens dry and wrinkle-free, Maintain/enhance activity level, Minimize linen layers, Pressure redistribution bed/mattress (bed type), Turn and reposition approx. every two hours (pillows and wedges if needed)    Moisture Interventions: Absorbent underpads, Check for incontinence Q2 hours and as needed, Limit adult briefs, Maintain skin hydration (lotion/cream), Minimize layers    Activity Interventions: Pressure redistribution bed/mattress(bed type), PT/OT evaluation    Mobility Interventions: HOB 30 degrees or less, Pressure redistribution bed/mattress (bed type)    Nutrition Interventions: Document food/fluid/supplement intake    Friction and Shear Interventions: HOB 30 degrees or less, Minimize layers                Problem: Patient Education: Go to Patient Education Activity  Goal: Patient/Family Education  Outcome: Progressing Towards Goal     Problem: Patient Education: Go to Patient Education Activity  Goal: Patient/Family Education  Outcome: Progressing Towards Goal     Problem: Nutrition Deficit  Goal: *Optimize nutritional status  Outcome: Progressing Towards Goal     Problem: Falls - Risk of  Goal: *Absence of Falls  Description: Document Syl Fall Risk and appropriate interventions in the flowsheet.   Outcome: Progressing Towards Goal  Note: Fall Risk Interventions:  Mobility Interventions: Bed/chair exit alarm, PT Consult for mobility concerns    Mentation Interventions: Adequate sleep, hydration, pain control, Bed/chair exit alarm, Door open when patient unattended, Evaluate medications/consider consulting pharmacy, Increase mobility, More frequent rounding, Reorient patient, Room close to nurse's station, Toileting rounds, Update white board    Medication Interventions: Bed/chair exit alarm, Evaluate medications/consider consulting pharmacy, Teach patient to arise slowly    Elimination Interventions: Bed/chair exit alarm, Call light in reach, Toileting schedule/hourly rounds    History of Falls Interventions: Bed/chair exit alarm, Consult care management for discharge planning, Door open when patient unattended, Evaluate medications/consider consulting pharmacy         Problem: Patient Education: Go to Patient Education Activity  Goal: Patient/Family Education  Outcome: Progressing Towards Goal     Problem: Injury - Risk of, Adverse Drug Event  Goal: *Absence of adverse drug events  Outcome: Progressing Towards Goal  Goal: *Absence of medication errors  Outcome: Progressing Towards Goal  Goal: *Knowledge of prescribed medications  Outcome: Progressing Towards Goal     Problem: Patient Education: Go to Patient Education Activity  Goal: Patient/Family Education  Outcome: Progressing Towards Goal     Problem: Pain  Goal: *Control of Pain  Outcome: Progressing Towards Goal  Goal: *PALLIATIVE CARE:  Alleviation of Pain  Outcome: Progressing Towards Goal     Problem: Patient Education: Go to Patient Education Activity  Goal: Patient/Family Education  Outcome: Progressing Towards Goal     Problem: Patient Education: Go to Patient Education Activity  Goal: Patient/Family Education  Outcome: Progressing Towards Goal     Problem: Patient Education: Go to Patient Education Activity  Goal: Patient/Family Education  Outcome: Progressing Towards Goal     Problem: General Medical Care Plan  Goal: *Vital signs within specified parameters  Outcome: Progressing Towards Goal  Goal: *Labs within defined limits  Outcome: Progressing Towards Goal  Goal: *Absence of infection signs and symptoms  Outcome: Progressing Towards Goal  Goal: *Optimal pain control at patient's stated goal  Outcome: Progressing Towards Goal  Goal: *Skin integrity maintained  Outcome: Progressing Towards Goal  Goal: *Fluid volume balance  Outcome: Progressing Towards Goal  Goal: *Optimize nutritional status  Outcome: Progressing Towards Goal  Goal: *Anxiety reduced or absent  Outcome: Progressing Towards Goal  Goal: *Progressive mobility and function (eg: ADL's)  Outcome: Progressing Towards Goal     Problem: Patient Education: Go to Patient Education Activity  Goal: Patient/Family Education  Outcome: Progressing Towards Goal     Problem: Patient Education: Go to Patient Education Activity  Goal: Patient/Family Education  Outcome: Progressing Towards Goal

## 2022-07-20 NOTE — PROGRESS NOTES
Problem: Pressure Injury - Risk of  Goal: *Prevention of pressure injury  Description: Document Otoniel Scale and appropriate interventions in the flowsheet. Outcome: Progressing Towards Goal  Note: Pressure Injury Interventions:  Sensory Interventions: Assess changes in LOC, Check visual cues for pain, Keep linens dry and wrinkle-free, Maintain/enhance activity level, Minimize linen layers, Pressure redistribution bed/mattress (bed type), Turn and reposition approx. every two hours (pillows and wedges if needed)    Moisture Interventions: Absorbent underpads, Check for incontinence Q2 hours and as needed, Limit adult briefs, Maintain skin hydration (lotion/cream), Minimize layers    Activity Interventions: Pressure redistribution bed/mattress(bed type), PT/OT evaluation    Mobility Interventions: HOB 30 degrees or less, Pressure redistribution bed/mattress (bed type)    Nutrition Interventions: Document food/fluid/supplement intake    Friction and Shear Interventions: HOB 30 degrees or less, Minimize layers                Problem: Patient Education: Go to Patient Education Activity  Goal: Patient/Family Education  Outcome: Progressing Towards Goal     Problem: Falls - Risk of  Goal: *Absence of Falls  Description: Document Syl Fall Risk and appropriate interventions in the flowsheet.   Outcome: Progressing Towards Goal  Note: Fall Risk Interventions:  Mobility Interventions: Bed/chair exit alarm, PT Consult for mobility concerns    Mentation Interventions: Adequate sleep, hydration, pain control, Bed/chair exit alarm, Door open when patient unattended, Evaluate medications/consider consulting pharmacy, Increase mobility, More frequent rounding, Reorient patient, Room close to nurse's station, Toileting rounds, Update white board    Medication Interventions: Bed/chair exit alarm, Evaluate medications/consider consulting pharmacy, Teach patient to arise slowly    Elimination Interventions: Bed/chair exit alarm, Call light in reach, Toileting schedule/hourly rounds    History of Falls Interventions: Bed/chair exit alarm, Consult care management for discharge planning, Door open when patient unattended, Evaluate medications/consider consulting pharmacy         Problem: Patient Education: Go to Patient Education Activity  Goal: Patient/Family Education  Outcome: Progressing Towards Goal     Problem: Injury - Risk of, Adverse Drug Event  Goal: *Absence of adverse drug events  Outcome: Progressing Towards Goal  Goal: *Absence of medication errors  Outcome: Progressing Towards Goal  Goal: *Knowledge of prescribed medications  Outcome: Progressing Towards Goal     Problem: Patient Education: Go to Patient Education Activity  Goal: Patient/Family Education  Outcome: Progressing Towards Goal     Problem: Pain  Goal: *Control of Pain  Outcome: Progressing Towards Goal  Goal: *PALLIATIVE CARE:  Alleviation of Pain  Outcome: Progressing Towards Goal     Problem: Patient Education: Go to Patient Education Activity  Goal: Patient/Family Education  Outcome: Progressing Towards Goal     Problem: General Medical Care Plan  Goal: *Vital signs within specified parameters  Outcome: Progressing Towards Goal  Goal: *Labs within defined limits  Outcome: Progressing Towards Goal  Goal: *Absence of infection signs and symptoms  Outcome: Progressing Towards Goal  Goal: *Optimal pain control at patient's stated goal  Outcome: Progressing Towards Goal  Goal: *Skin integrity maintained  Outcome: Progressing Towards Goal  Goal: *Fluid volume balance  Outcome: Progressing Towards Goal  Goal: *Optimize nutritional status  Outcome: Progressing Towards Goal  Goal: *Anxiety reduced or absent  Outcome: Progressing Towards Goal  Goal: *Progressive mobility and function (eg: ADL's)  Outcome: Progressing Towards Goal     Problem: Patient Education: Go to Patient Education Activity  Goal: Patient/Family Education  Outcome: Progressing Towards Goal

## 2022-07-20 NOTE — PROGRESS NOTES
Bedside and Verbal shift change report given to [facility] (oncoming nurse) by Rick Pepper RN (offgoing nurse). Report included the following information SBAR and Recent Results.

## 2022-07-21 NOTE — PROGRESS NOTES
Problem: Dysphagia (Adult)  Goal: *Acute Goals and Plan of Care (Insert Text)  Description: Patient will:  1. Tolerate PO trials with 0 s/s overt distress in 4/5 trials for comfort-met  2. Utilize compensatory swallow strategies/maneuvers (decrease bite/sip, size/rate, alt. liq/sol) with mod cues in 4/5 trials     Rec:     Continue comfort feeds; soft and bite sized/thin liquids  Aspiration precautions  HOB >45 degrees during all intake and for at least 30 min after intake   Small bites/sips, slow rate of intake   Oral care three times daily   Outcome: Resolved/Met     SPEECH LANGUAGE PATHOLOGY DYSPHAGIA TREATMENT & DISCHARGE    Patient: Wiliam Smith [de-identified]58 y.o. male)  Date: 7/21/2022  Diagnosis: Pneumonia [J18.9]   Precautions: Aspiration, Fall, Skin, Bed Alarm  PLOF: As per H&P     ASSESSMENT:  Pt seen for follow up dysphagia treatment with soft and bite sized/thin liquid breakfast meal.  Pt remains on comfort diet given hx of silent aspiration. Demo prolonged but thorough mastication/clearance of items from tray. X1 delayed cough noted at conclusion of meal. Currently tolerating most appropriate comfort diet at this time. Will sign off. D/w pt and nursing. Progression toward goals:  [x]         Goals met/approximated  []         Not making progress/Not appropriate - will d/c POC     PLAN:  Recommendations and Planned Interventions:  Maximum therapeutic gains met; safest, least restrictive diet achieved. D/C ST intervention at this time. Discharge Recommendations:   To Be Determined     SUBJECTIVE:   Patient stated yeah    OBJECTIVE:   Cognitive and Communication Status:  Neurologic State: Alert, Confused, Eyes open spontaneously  Orientation Level: Unable to verbalize  Cognition: Decreased command following, Impulsive, Impaired decision making, Poor safety awareness  Perception: Tactile, Verbal  Perseveration: No perseveration noted  Safety/Judgement: Not assessed  Dysphagia Treatment:  Oral Assessment:  Oral Assessment  Labial: No impairment  Dentition: Limited, Natural  Oral Hygiene: Fair  Lingual: Decreased rate, Decreased strength  Velum: Unable to visualize  Mandible: No impairment  Gag Reflex: No impairment  P.O.  Trials:   See above     PAIN:  Start of Tx: unable to report   End of Tx: unable to report      After treatment:   []              Patient left in no apparent distress sitting up in chair  [x]              Patient left in no apparent distress in bed  [x]              Call bell left within reach  [x]              Nursing notified  []              Caregiver present  []              Bed alarm activated      COMMUNICATION/EDUCATION:   [x] Aspiration precautions; swallow safety; compensatory techniques  []        Patient/family able to participate in training and education   [x]  Patient unable to participate in education; education ongoing with staff  [] Patient understands goals/intent of therapy; neutral about participation     Thank you for this referral,  iMta Mendez M.S., [id number] [facility]  Speech-Language Pathologist

## 2022-07-21 NOTE — ROUTINE PROCESS
Bedside and Verbal shift change report given to ADIN Parker (oncoming nurse) by Michelle Funk RN   (offgoing nurse). Report included the following information SBAR, Kardex, Intake/Output, MAR, and Recent Results.

## 2022-07-21 NOTE — PROGRESS NOTES
[facility] Hospitalist Group  Progress Note    Patient: Ariane Correia Age: 58 y.o. : 1960 MR#: [id number] SSN: [id number]  Date/Time: 2022     C/C: Acute hypoxic respiratory failure      Subjective:   HPI : 40-year-old male with intellectual disability with history of PKU, seizure disorder, anxiety disorder, chronic urinary retention with Flores catheter, admitted on 2022 to [facility] with progressive lethargy and altered mental status also according to the group home patient became more nonverbal.  Currently he is nonverbal and bedbound dependent. Initial presentation was with acute respiratory failure with concern for parainfluenza 3 infection deterioration in respiratory status required ICU admission intubated on 2022 extubated next day since then he is stable without supportive oxygen therapy. Off-and-on some behavioral issues like trying to get out of bed but no violence noted. Review of Systems:     Patient is alert awake   Patient is aphasic does not give much history but is not agitated today    Assessment/Plan:     1. Acute hypoxic respiratory failure requiring ventilator support now stable  2 atypical pneumonia improved  3 chronic intellectual disability with history of PKU  4 seizure disorder-stable  5 chronic urinary retention requiring chronic use of Flores catheter  6 pharyngeal dysphagia-currently patient is managing to eat he is on comfort feeding not a candidate for PEG tube per GI high risk of patient pulling out on PEG tube causing complications-risk of PEG tube more than benefit. Plan  -Chart reviewed labs and imaging studies reviewed  -Awaiting placement   -Per case management patient has been accepted at a group home and anticipate discharge on .     Objective:       General: Patient is sitting up in bed,calm  HEENT: No facial asymmetry, KATIE Tylor, External ears - WNL    Cardiovascular: S1S2 - regular , No Murmur   Pulmonary: Equal expansion , No Use of accessory muscles , No Rales No Rhonchi    GI:  +BS in all four quadrants, soft, non-tender  Extremities:  No edema; 2+ dorsalis pedis pulses bilaterally  Neuro:  Moves all extremities detailed neuro cannot be examined      DVT Prophylaxis:  []Lovenox  []Hep SQ  [x]SCDs  []Coumadin   []On Heparin gtt    [] Eliquis [] Xarelto       Vitals:         VS:   Visit Vitals  /76   Pulse (!) 108   Temp 98.6 °F (37 °C)   Resp 16   Ht 5' 8\" (1.727 m)   Wt 58.1 kg (128 lb)   SpO2 97%   BMI 19.46 kg/m²      Tmax/24hrs: Temp (24hrs), Av.5 °F (36.9 °C), Min:98.3 °F (36.8 °C), Max:98.6 °F (37 °C)        Medications:   Current Facility-Administered Medications   Medication Dose Route Frequency    diphenhydrAMINE (BENADRYL) capsule 50 mg  50 mg Oral Q6H PRN    benztropine (COGENTIN) tablet 1 mg  1 mg Oral TID    haloperidol lactate (HALDOL) injection 2 mg  2 mg IntraMUSCular Q6H PRN    trospium (SANCTURA) tablet 20 mg  20 mg Oral DAILY    famotidine (PEPCID) 40 mg/5 mL (8 mg/mL) oral suspension 20 mg  20 mg Oral DAILY    levETIRAcetam (KEPPRA) oral solution 500 mg  500 mg Oral BID    valproic acid (as sodium salt) (DEPAKENE) 250 mg/5 mL (5 mL) oral solution 500 mg  500 mg Oral Q12H    albuterol-ipratropium (DUO-NEB) 2.5 MG-0.5 MG/3 ML  3 mL Nebulization Q4H PRN    glucose chewable tablet 16 g  4 Tablet Oral PRN    glucagon (GLUCAGEN) injection 1 mg  1 mg IntraMUSCular PRN    dextrose 10% infusion 0-250 mL  0-250 mL IntraVENous PRN    clotrimazole-betamethasone (LOTRISONE) 1-0.05 % cream   Topical BID    loperamide (IMODIUM) capsule 2 mg  2 mg Oral QID PRN    OLANZapine (ZyPREXA) tablet 10 mg  10 mg Oral TID    tamsulosin (FLOMAX) capsule 0.4 mg  0.4 mg Oral DAILY    polyethylene glycol (MIRALAX) packet 17 g  17 g Oral DAILY PRN    ondansetron (ZOFRAN ODT) tablet 4 mg  4 mg Oral Q8H PRN    Or    ondansetron (ZOFRAN) injection 4 mg  4 mg IntraVENous Q6H PRN    acetaminophen (TYLENOL) suppository 650 mg  650 mg Rectal Q4H PRN       Labs:    No results for input(s): WBC, HGB, HCT, PLT, HGBEXT, HCTEXT, PLTEXT, HGBEXT, HCTEXT, PLTEXT in the last 72 hours. No results for input(s): NA, K, CL, CO2, GLU, BUN, CREA, CA, MG, PHOS, ALB, TBIL, ALT, INR, INREXT, INREXT in the last 72 hours. No lab exists for component: SGOT      Time spent on direct patient care >30 mints     Complexity : High complex - due to multiple medical issues outlined above. CODE Status : DNR    Case discussed with:  []Patient  [] Family  [x]Nursing  []Case Management         Disclaimer: Sections of this note are dictated utilizing voice recognition software, which may have resulted in some phonetic based errors in grammar and contents. Even though attempts were made to correct all the mistakes, some may have been missed, and remained in the body of the document. If questions arise, please contact our department.     Signed By: Serafin Sultana MD     July 21, 2022

## 2022-07-22 NOTE — PROGRESS NOTES
[facility] Hospitalist Group  Progress Note    Patient: Skip Games Age: 58 y.o. : 1960 MR#: [id number] SSN: [id number]  Date/Time: 2022     C/C: Acute hypoxic respiratory failure      Subjective:   HPI : 51-year-old male with intellectual disability with history of PKU, seizure disorder, anxiety disorder, chronic urinary retention with Flores catheter, admitted on 2022 to [facility] with progressive lethargy and altered mental status also according to the group home patient became more nonverbal.  Currently he is nonverbal and bedbound dependent. Initial presentation was with acute respiratory failure with concern for parainfluenza 3 infection deterioration in respiratory status required ICU admission intubated on 2022 extubated next day since then he is stable without supportive oxygen therapy. Off-and-on some behavioral issues like trying to get out of bed but no violence noted. Review of Systems:     Patient is alert awake   Patient is aphasic does not give much history but is not agitated today    Assessment/Plan:     1. Acute hypoxic respiratory failure requiring ventilator support now stable  2 atypical pneumonia improved  3 chronic intellectual disability with history of PKU  4 seizure disorder-stable  5 chronic urinary retention requiring chronic use of Flores catheter  6 pharyngeal dysphagia-currently patient is managing to eat he is on comfort feeding not a candidate for PEG tube per GI high risk of patient pulling out on PEG tube causing complications-risk of PEG tube more than benefit.     Plan  -Chart reviewed labs and imaging studies reviewed  -Awaiting placement   -Per case management patient has been accepted at a group home and anticipate discharge on .  - Paperwork is being completed     Objective:       General: Patient is sitting up in bed,calm  HEENT: No facial asymmetry, KATIE Tylor, External ears - WNL    Cardiovascular: S1S2 - regular , No Murmur   Pulmonary: Equal expansion , No Use of accessory muscles , No Rales No Rhonchi    GI:  +BS in all four quadrants, soft, non-tender  Extremities:  No edema; 2+ dorsalis pedis pulses bilaterally  Neuro:  Moves all extremities detailed neuro cannot be examined      DVT Prophylaxis:  []Lovenox  []Hep SQ  [x]SCDs  []Coumadin   []On Heparin gtt    [] Eliquis [] Xarelto       Vitals:         VS:   Visit Vitals  /84   Pulse (!) 102   Temp 98.9 °F (37.2 °C)   Resp 16   Ht 5' 8\" (1.727 m)   Wt 58.1 kg (128 lb)   SpO2 99%   BMI 19.46 kg/m²      Tmax/24hrs: Temp (24hrs), Av.3 °F (36.8 °C), Min:97.7 °F (36.5 °C), Max:98.9 °F (37.2 °C)        Medications:   Current Facility-Administered Medications   Medication Dose Route Frequency    diphenhydrAMINE (BENADRYL) capsule 50 mg  50 mg Oral Q6H PRN    benztropine (COGENTIN) tablet 1 mg  1 mg Oral TID    haloperidol lactate (HALDOL) injection 2 mg  2 mg IntraMUSCular Q6H PRN    trospium (SANCTURA) tablet 20 mg  20 mg Oral DAILY    famotidine (PEPCID) 40 mg/5 mL (8 mg/mL) oral suspension 20 mg  20 mg Oral DAILY    levETIRAcetam (KEPPRA) oral solution 500 mg  500 mg Oral BID    valproic acid (as sodium salt) (DEPAKENE) 250 mg/5 mL (5 mL) oral solution 500 mg  500 mg Oral Q12H    albuterol-ipratropium (DUO-NEB) 2.5 MG-0.5 MG/3 ML  3 mL Nebulization Q4H PRN    glucose chewable tablet 16 g  4 Tablet Oral PRN    glucagon (GLUCAGEN) injection 1 mg  1 mg IntraMUSCular PRN    dextrose 10% infusion 0-250 mL  0-250 mL IntraVENous PRN    clotrimazole-betamethasone (LOTRISONE) 1-0.05 % cream   Topical BID    loperamide (IMODIUM) capsule 2 mg  2 mg Oral QID PRN    OLANZapine (ZyPREXA) tablet 10 mg  10 mg Oral TID    tamsulosin (FLOMAX) capsule 0.4 mg  0.4 mg Oral DAILY    polyethylene glycol (MIRALAX) packet 17 g  17 g Oral DAILY PRN    ondansetron (ZOFRAN ODT) tablet 4 mg  4 mg Oral Q8H PRN    Or    ondansetron (ZOFRAN) injection 4 mg  4 mg IntraVENous Q6H PRN acetaminophen (TYLENOL) suppository 650 mg  650 mg Rectal Q4H PRN       Labs:    No results for input(s): WBC, HGB, HCT, PLT, HGBEXT, HCTEXT, PLTEXT, HGBEXT, HCTEXT, PLTEXT in the last 72 hours. No results for input(s): NA, K, CL, CO2, GLU, BUN, CREA, CA, MG, PHOS, ALB, TBIL, ALT, INR, INREXT, INREXT in the last 72 hours. No lab exists for component: SGOT      Time spent on direct patient care >30 mints     Complexity : High complex - due to multiple medical issues outlined above. CODE Status : DNR    Case discussed with:  []Patient  [] Family  [x]Nursing  []Case Management         Disclaimer: Sections of this note are dictated utilizing voice recognition software, which may have resulted in some phonetic based errors in grammar and contents. Even though attempts were made to correct all the mistakes, some may have been missed, and remained in the body of the document. If questions arise, please contact our department.     Signed By: Jordin Yousif MD     July 22, 2022

## 2022-07-22 NOTE — PROGRESS NOTES
was informed by Dr. Harvinder Ruiz that they are working on admission paperwork for the patient's group home.       EARNEST Kerr

## 2022-07-23 NOTE — ROUTINE PROCESS
Bedside and Verbal shift change report given to Geo Quintero RN (oncoming nurse) by KENDRICK Eason RN (offgoing nurse). Report included the following information SBAR, Kardex, MAR, and Recent Results.

## 2022-07-23 NOTE — PROGRESS NOTES
[facility] Hospitalist Group  Progress Note    Patient: Pantera Sebastian Age: 58 y.o. : 1960 MR#: [id number] SSN: [id number]  Date/Time: 2022     C/C: Acute hypoxic respiratory failure      Subjective:   HPI : 68-year-old male with intellectual disability with history of PKU, seizure disorder, anxiety disorder, chronic urinary retention with Flores catheter, admitted on 2022 to [facility] with progressive lethargy and altered mental status also according to the group home patient became more nonverbal.  Currently he is nonverbal and bedbound dependent. Initial presentation was with acute respiratory failure with concern for parainfluenza 3 infection deterioration in respiratory status required ICU admission intubated on 2022 extubated next day since then he is stable without supportive oxygen therapy. Off-and-on some behavioral issues like trying to get out of bed but no violence noted. Review of Systems:     Patient is alert awake   Patient is aphasic does not give much history but is not agitated today    Assessment/Plan:     1. Acute hypoxic respiratory failure requiring ventilator support now stable  2 atypical pneumonia improved  3 chronic intellectual disability with history of PKU  4 seizure disorder-stable  5 chronic urinary retention requiring chronic use of Flores catheter  6 pharyngeal dysphagia-currently patient is managing to eat he is on comfort feeding not a candidate for PEG tube per GI high risk of patient pulling out on PEG tube causing complications-risk of PEG tube more than benefit.     Plan  -Chart reviewed labs and imaging studies reviewed  -Awaiting placement   -Per case management patient has been accepted at a group home and anticipate discharge on .  - Paperwork is being completed     Objective:       General: Patient is sitting up in bed,calm  HEENT: No facial asymmetry, KATIE Tylor, External ears - WNL    Cardiovascular: S1S2 - regular , No Murmur   Pulmonary: Equal expansion , No Use of accessory muscles , No Rales No Rhonchi    GI:  +BS in all four quadrants, soft, non-tender  Extremities:  No edema; 2+ dorsalis pedis pulses bilaterally  Neuro:  Moves all extremities detailed neuro cannot be examined      DVT Prophylaxis:  []Lovenox  []Hep SQ  [x]SCDs  []Coumadin   []On Heparin gtt    [] Eliquis [] Xarelto       Vitals:         VS:   Visit Vitals  /77   Pulse 100   Temp 97.7 °F (36.5 °C)   Resp 16   Ht 5' 8\" (1.727 m)   Wt 58.1 kg (128 lb)   SpO2 93%   BMI 19.46 kg/m²      Tmax/24hrs: Temp (24hrs), Av.7 °F (36.5 °C), Min:97.7 °F (36.5 °C), Max:97.7 °F (36.5 °C)        Medications:   Current Facility-Administered Medications   Medication Dose Route Frequency    diphenhydrAMINE (BENADRYL) capsule 50 mg  50 mg Oral Q6H PRN    benztropine (COGENTIN) tablet 1 mg  1 mg Oral TID    haloperidol lactate (HALDOL) injection 2 mg  2 mg IntraMUSCular Q6H PRN    trospium (SANCTURA) tablet 20 mg  20 mg Oral DAILY    famotidine (PEPCID) 40 mg/5 mL (8 mg/mL) oral suspension 20 mg  20 mg Oral DAILY    levETIRAcetam (KEPPRA) oral solution 500 mg  500 mg Oral BID    valproic acid (as sodium salt) (DEPAKENE) 250 mg/5 mL (5 mL) oral solution 500 mg  500 mg Oral Q12H    albuterol-ipratropium (DUO-NEB) 2.5 MG-0.5 MG/3 ML  3 mL Nebulization Q4H PRN    glucose chewable tablet 16 g  4 Tablet Oral PRN    glucagon (GLUCAGEN) injection 1 mg  1 mg IntraMUSCular PRN    dextrose 10% infusion 0-250 mL  0-250 mL IntraVENous PRN    clotrimazole-betamethasone (LOTRISONE) 1-0.05 % cream   Topical BID    loperamide (IMODIUM) capsule 2 mg  2 mg Oral QID PRN    OLANZapine (ZyPREXA) tablet 10 mg  10 mg Oral TID    tamsulosin (FLOMAX) capsule 0.4 mg  0.4 mg Oral DAILY    polyethylene glycol (MIRALAX) packet 17 g  17 g Oral DAILY PRN    ondansetron (ZOFRAN ODT) tablet 4 mg  4 mg Oral Q8H PRN    Or    ondansetron (ZOFRAN) injection 4 mg  4 mg IntraVENous Q6H PRN acetaminophen (TYLENOL) suppository 650 mg  650 mg Rectal Q4H PRN       Labs:    No results for input(s): WBC, HGB, HCT, PLT, HGBEXT, HCTEXT, PLTEXT, HGBEXT, HCTEXT, PLTEXT in the last 72 hours. No results for input(s): NA, K, CL, CO2, GLU, BUN, CREA, CA, MG, PHOS, ALB, TBIL, ALT, INR, INREXT, INREXT in the last 72 hours. No lab exists for component: SGOT      Time spent on direct patient care >30 mints     Complexity : High complex - due to multiple medical issues outlined above. CODE Status : DNR    Case discussed with:  []Patient  [] Family  [x]Nursing  []Case Management         Disclaimer: Sections of this note are dictated utilizing voice recognition software, which may have resulted in some phonetic based errors in grammar and contents. Even though attempts were made to correct all the mistakes, some may have been missed, and remained in the body of the document. If questions arise, please contact our department.     Signed By: Prabhu Iraheta MD     July 23, 2022

## 2022-07-24 NOTE — PROGRESS NOTES
Problem: Pressure Injury - Risk of  Goal: *Prevention of pressure injury  Description: Document Otoniel Scale and appropriate interventions in the flowsheet.   Outcome: Progressing Towards Goal  Note: Pressure Injury Interventions:  Sensory Interventions: Assess changes in LOC, Minimize linen layers    Moisture Interventions: Minimize layers    Activity Interventions: Pressure redistribution bed/mattress(bed type), Increase time out of bed    Mobility Interventions: Pressure redistribution bed/mattress (bed type), HOB 30 degrees or less    Nutrition Interventions: Document food/fluid/supplement intake    Friction and Shear Interventions: Lift sheet, HOB 30 degrees or less                Problem: Patient Education: Go to Patient Education Activity  Goal: Patient/Family Education  Outcome: Progressing Towards Goal     Problem: Patient Education: Go to Patient Education Activity  Goal: Patient/Family Education  Outcome: Progressing Towards Goal     Problem: Nutrition Deficit  Goal: *Optimize nutritional status  Outcome: Progressing Towards Goal

## 2022-07-24 NOTE — PROGRESS NOTES
Bedside and Verbal shift change report given to Stephanie Vicente RN (oncoming nurse) by Marleny Phillips RN (offgoing nurse). Report included the following information SBAR, Kardex, Intake/Output and MAR.

## 2022-07-24 NOTE — PROGRESS NOTES
07/24/22 0818   Intake (mL)   P.O. 480 mL   % Diet Eaten 51 - 75%   Pt has a good appetite. Tolerated diet well.

## 2022-07-24 NOTE — PROGRESS NOTES
[facility] Hospitalist Group  Progress Note    Patient: Artie Lilly Age: 58 y.o. : 1960 MR#: [id number] SSN: [id number]  Date/Time: 2022     C/C: Acute hypoxic respiratory failure      Subjective:   HPI : 28-year-old male with intellectual disability with history of PKU, seizure disorder, anxiety disorder, chronic urinary retention with Flores catheter, admitted on 2022 to [facility] with progressive lethargy and altered mental status also according to the group home patient became more nonverbal.  Currently he is nonverbal and bedbound dependent. Initial presentation was with acute respiratory failure with concern for parainfluenza 3 infection deterioration in respiratory status required ICU admission intubated on 2022 extubated next day since then he is stable without supportive oxygen therapy. Off-and-on some behavioral issues like trying to get out of bed but no violence noted. Review of Systems:     Patient is alert awake   Patient is aphasic does not give much history but is not agitated today    Assessment/Plan:     1. Acute hypoxic respiratory failure requiring ventilator support now stable  2 atypical pneumonia improved  3 chronic intellectual disability with history of PKU  4 seizure disorder-stable  5 chronic urinary retention requiring chronic use of Flores catheter  6 pharyngeal dysphagia-currently patient is managing to eat he is on comfort feeding not a candidate for PEG tube per GI high risk of patient pulling out on PEG tube causing complications-risk of PEG tube more than benefit.     Plan  -Chart reviewed labs and imaging studies reviewed  -Awaiting placement   -Per case management patient has been accepted at a group home and anticipate discharge on .  - Paperwork is being completed     Objective:       General: Patient is sitting up in bed,calm  HEENT: No facial asymmetry, KATIE Tylor, External ears - WNL    Cardiovascular: S1S2 - regular , No Murmur   Pulmonary: Equal expansion , No Use of accessory muscles , No Rales No Rhonchi    GI:  +BS in all four quadrants, soft, non-tender  Extremities:  No edema; 2+ dorsalis pedis pulses bilaterally  Neuro:  Moves all extremities detailed neuro cannot be examined      DVT Prophylaxis:  []Lovenox  []Hep SQ  [x]SCDs  []Coumadin   []On Heparin gtt    [] Eliquis [] Xarelto       Vitals:         VS:   Visit Vitals  /74   Pulse 92   Temp 98.1 °F (36.7 °C)   Resp 15   Ht 5' 8\" (1.727 m)   Wt 58.1 kg (128 lb)   SpO2 96%   BMI 19.46 kg/m²      Tmax/24hrs: Temp (24hrs), Av.2 °F (36.8 °C), Min:98.1 °F (36.7 °C), Max:98.3 °F (36.8 °C)        Medications:   Current Facility-Administered Medications   Medication Dose Route Frequency    diphenhydrAMINE (BENADRYL) capsule 50 mg  50 mg Oral Q6H PRN    benztropine (COGENTIN) tablet 1 mg  1 mg Oral TID    haloperidol lactate (HALDOL) injection 2 mg  2 mg IntraMUSCular Q6H PRN    trospium (SANCTURA) tablet 20 mg  20 mg Oral DAILY    famotidine (PEPCID) 40 mg/5 mL (8 mg/mL) oral suspension 20 mg  20 mg Oral DAILY    levETIRAcetam (KEPPRA) oral solution 500 mg  500 mg Oral BID    valproic acid (as sodium salt) (DEPAKENE) 250 mg/5 mL (5 mL) oral solution 500 mg  500 mg Oral Q12H    albuterol-ipratropium (DUO-NEB) 2.5 MG-0.5 MG/3 ML  3 mL Nebulization Q4H PRN    glucose chewable tablet 16 g  4 Tablet Oral PRN    glucagon (GLUCAGEN) injection 1 mg  1 mg IntraMUSCular PRN    dextrose 10% infusion 0-250 mL  0-250 mL IntraVENous PRN    clotrimazole-betamethasone (LOTRISONE) 1-0.05 % cream   Topical BID    loperamide (IMODIUM) capsule 2 mg  2 mg Oral QID PRN    OLANZapine (ZyPREXA) tablet 10 mg  10 mg Oral TID    tamsulosin (FLOMAX) capsule 0.4 mg  0.4 mg Oral DAILY    polyethylene glycol (MIRALAX) packet 17 g  17 g Oral DAILY PRN    ondansetron (ZOFRAN ODT) tablet 4 mg  4 mg Oral Q8H PRN    Or    ondansetron (ZOFRAN) injection 4 mg  4 mg IntraVENous Q6H PRN acetaminophen (TYLENOL) suppository 650 mg  650 mg Rectal Q4H PRN       Labs:    No results for input(s): WBC, HGB, HCT, PLT, HGBEXT, HCTEXT, PLTEXT, HGBEXT, HCTEXT, PLTEXT in the last 72 hours. Recent Labs     07/24/22  0744   *   K 4.5   CL 88*   CO2 29   *   BUN 13   CREA 0.57*   CA 9.0         Time spent on direct patient care >30 mints     Complexity : High complex - due to multiple medical issues outlined above. CODE Status : DNR    Case discussed with:  []Patient  [] Family  [x]Nursing  []Case Management         Disclaimer: Sections of this note are dictated utilizing voice recognition software, which may have resulted in some phonetic based errors in grammar and contents. Even though attempts were made to correct all the mistakes, some may have been missed, and remained in the body of the document. If questions arise, please contact our department.     Signed By: Karina Carvajal MD     July 24, 2022

## 2022-07-25 NOTE — PROGRESS NOTES
Comprehensive Nutrition Assessment    Type and Reason for Visit: Reassess, Consult    Nutrition Recommendations/Plan:   Continue nutrition intervention consistent with goals of care- diet for comfort. Malnutrition Assessment:  Malnutrition Status: At risk for malnutrition (specify) (PT DNR/comfort measures. Diet only for comfort.) (07/11/22 1106)        Nutrition Assessment:    Pt continues to have diet for comfort. Per case management, pt has been accepted into group home with anticipated admission date of 8/1/22. Pt continues with good meal intake. Nutrition Related Findings:    M 7/24. Labs reviewed 7/24. Pertinent meds- Pepcid, Zofran, miralax. Wound Type: Moisture associate skin damage    Current Nutrition Intake & Therapies:  Average Meal Intake: %  Average Supplement Intake: None ordered  ADULT DIET Dysphagia - Soft & Bite Sized; No artificial sweeteners, legumes, nuts. LIMIT eggs, dairy, meat. Anthropometric Measures:  Height: 5' 8\" (172.7 cm)  Ideal Body Weight (IBW): 154 lbs (70 kg)     Current Body Wt:  57.6 kg (126 lb 14 oz), 82.4 % IBW. Not specified  Current BMI (kg/m2): 19.3        Weight Adjustment: No adjustment                 BMI Category: Normal weight (BMI 18.5-24. 9)    Estimated Daily Nutrient Needs:  Energy Requirements Based On: Kcal/kg (25-30 kcal/kg)  Weight Used for Energy Requirements: Current  Energy (kcal/day): 1452 - 1743  Weight Used for Protein Requirements: Current (0.6-0.8)  Protein (g/day): 35 - 47  Method Used for Fluid Requirements: 1 ml/kcal  Fluid (ml/day): 1452 - 1650    Nutrition Diagnosis:   No nutrition diagnosis at this time     Nutrition Interventions:   Food and/or Nutrient Delivery: Continue current diet  Nutrition Education/Counseling: Education not indicated  Coordination of Nutrition Care: Continue to monitor while inpatient  Plan of Care discussed with: N/A    Goals:  Previous Goal Met: Goal(s) achieved  Goals: Meet at least 75% of estimated needs, by next RD assessment       Nutrition Monitoring and Evaluation:   Behavioral-Environmental Outcomes: None identified  Food/Nutrient Intake Outcomes: Diet advancement/tolerance, Food and nutrient intake  Physical Signs/Symptoms Outcomes: Biochemical data, Meal time behavior    Discharge Planning:     Too soon to determine    Myrna Nicholas  Contact: [phone]

## 2022-07-25 NOTE — PROGRESS NOTES
contacted JAZZY in hospitalist department regarding the patient's group home paperwork.  was informed that [employer] was not in today and was informed that they can send her a message.  was informed that the doctor normally fills out the group home paperwork.  indicated that she will contact Dr. Carlotta Mercado.  contacted Dr. Carlotta Mercado regarding the patient's admission paperwork. Dr. Carlotta Mercado indicated that JAZZY is out of the office and he will let  know once the paperwork is completed.  voiced an understanding.         EARNEST Caro

## 2022-07-25 NOTE — PROGRESS NOTES
Problem: Pressure Injury - Risk of  Goal: *Prevention of pressure injury  Description: Document Otoniel Scale and appropriate interventions in the flowsheet. Outcome: Progressing Towards Goal  Note: Pressure Injury Interventions:  Sensory Interventions: Assess changes in LOC, Assess need for specialty bed, Avoid rigorous massage over bony prominences, Chair cushion, Check visual cues for pain, Discuss PT/OT consult with provider, Float heels, Keep linens dry and wrinkle-free, Maintain/enhance activity level, Minimize linen layers, Monitor skin under medical devices, Pad between skin to skin, Pressure redistribution bed/mattress (bed type), Sit a 90-degree angle/use footstool if needed, Turn and reposition approx. every two hours (pillows and wedges if needed), Use 30-degree side-lying position    Moisture Interventions: Absorbent underpads, Apply protective barrier, creams and emollients, Assess need for specialty bed, Check for incontinence Q2 hours and as needed, Internal/External urinary devices, Maintain skin hydration (lotion/cream), Limit adult briefs, Moisture barrier, Minimize layers, Offer toileting Q_hr    Activity Interventions: Increase time out of bed, Pressure redistribution bed/mattress(bed type), PT/OT evaluation, Assess need for specialty bed    Mobility Interventions: Assess need for specialty bed, Chair cushion, Float heels, HOB 30 degrees or less, Pressure redistribution bed/mattress (bed type), PT/OT evaluation, Turn and reposition approx.  every two hours(pillow and wedges)    Nutrition Interventions: Document food/fluid/supplement intake, Discuss nutritional consult with provider, Offer support with meals,snacks and hydration    Friction and Shear Interventions: Apply protective barrier, creams and emollients, Foam dressings/transparent film/skin sealants, Feet elevated on foot rest, HOB 30 degrees or less, Lift sheet, Minimize layers, Sit at 90-degree angle                Problem: Falls - Risk of  Goal: *Absence of Falls  Description: Document Mahnaz Garcia Fall Risk and appropriate interventions in the flowsheet.   Outcome: Progressing Towards Goal  Note: Fall Risk Interventions:  Mobility Interventions: Assess mobility with egress test, Bed/chair exit alarm, Communicate number of staff needed for ambulation/transfer, OT consult for ADLs, Patient to call before getting OOB, PT Consult for mobility concerns, PT Consult for assist device competence, Strengthening exercises (ROM-active/passive)    Mentation Interventions: Adequate sleep, hydration, pain control, Bed/chair exit alarm, Evaluate medications/consider consulting pharmacy, Door open when patient unattended, Eyeglasses and hearing aids, Familiar objects from home, Update white board, Toileting rounds, Room close to nurse's station, Reorient patient, More frequent rounding, HELP ([address]) if available    Medication Interventions: Bed/chair exit alarm, Evaluate medications/consider consulting pharmacy    Elimination Interventions: Bed/chair exit alarm, Call light in reach, Patient to call for help with toileting needs, Stay With Me (per policy), Toilet paper/wipes in reach, Toileting schedule/hourly rounds    History of Falls Interventions: Bed/chair exit alarm         Problem: Injury - Risk of, Adverse Drug Event  Goal: *Absence of adverse drug events  Outcome: Progressing Towards Goal     Problem: Injury - Risk of, Adverse Drug Event  Goal: *Absence of medication errors  Outcome: Progressing Towards Goal     Problem: Injury - Risk of, Adverse Drug Event  Goal: *Absence of adverse drug events  Outcome: Progressing Towards Goal     Problem: Injury - Risk of, Adverse Drug Event  Goal: *Absence of medication errors  Outcome: Progressing Towards Goal     Problem: Pain  Goal: *Control of Pain  Outcome: Progressing Towards Goal     Problem: General Medical Care Plan  Goal: *Absence of infection signs and symptoms  Outcome: Progressing Towards Goal     Problem: General Medical Care Plan  Goal: *Labs within defined limits  Outcome: Progressing Towards Goal     Problem: General Medical Care Plan  Goal: *Vital signs within specified parameters  Outcome: Progressing Towards Goal     Problem: General Medical Care Plan  Goal: *Skin integrity maintained  Outcome: Progressing Towards Goal     Problem: General Medical Care Plan  Goal: *Fluid volume balance  Outcome: Progressing Towards Goal     Problem: General Medical Care Plan  Goal: *Absence of infection signs and symptoms  Outcome: Progressing Towards Goal     Problem: General Medical Care Plan  Goal: *Optimal pain control at patient's stated goal  Outcome: Progressing Towards Goal     Problem: General Medical Care Plan  Goal: *Anxiety reduced or absent  Outcome: Progressing Towards Goal

## 2022-07-25 NOTE — ROUTINE PROCESS
Bedside and Verbal shift change report given to JAS Norman  by Onesimo Jerez. Santiago,RN  Report included the following information SBAR, Kardex, Intake/Output, and MAR.

## 2022-07-25 NOTE — PROGRESS NOTES
[facility]    Patient: Lois Humphries Age: 58 y.o. : 1960 MR#: [id number] SSN: [id number]  Date/Time: 2022     C/C: Acute hypoxic respiratory failure      Subjective:   HPI : 70-year-old male with intellectual disability with history of PKU, seizure disorder, anxiety disorder, chronic urinary retention with Flores catheter, admitted on 2022 to [facility] with progressive lethargy and altered mental status also according to the group home patient became more nonverbal.  Currently he is nonverbal and bedbound dependent. Initial presentation was with acute respiratory failure with concern for parainfluenza 3 infection deterioration in respiratory status required ICU admission intubated on 2022 extubated next day since then he is stable without supportive oxygen therapy. Off-and-on some behavioral issues like trying to get out of bed but no violence noted. Review of Systems:     Patient is alert awake   Patient is aphasic does not give much history but is not agitated today    Assessment/Plan:     1. Acute hypoxic respiratory failure requiring ventilator support now stable  2 atypical pneumonia improved  3 chronic intellectual disability with history of PKU  4 seizure disorder-stable  5 chronic urinary retention requiring chronic use of Flores catheter  6 pharyngeal dysphagia-currently patient is managing to eat he is on comfort feeding not a candidate for PEG tube per GI high risk of patient pulling out on PEG tube causing complications-risk of PEG tube more than benefit.     Plan  -Chart reviewed labs and imaging studies reviewed  -Awaiting placement   -Per case management patient has been accepted at a group home and anticipate discharge on .  - Paperwork is being completed     Objective:       General: Patient is sitting up in bed,calm  HEENT: No facial asymmetry, KATIE Tylor, External ears - WNL    Cardiovascular: S1S2 - regular , No Murmur   Pulmonary: Equal expansion , No Use of accessory muscles , No Rales No Rhonchi    GI:  +BS in all four quadrants, soft, non-tender  Extremities:  No edema; 2+ dorsalis pedis pulses bilaterally  Neuro:  Moves all extremities detailed neuro cannot be examined      DVT Prophylaxis:  []Lovenox  []Hep SQ  [x]SCDs  []Coumadin   []On Heparin gtt    [] Eliquis [] Xarelto       Vitals:         VS:   Visit Vitals  BP (!) 151/78 (BP 1 Location: Left upper arm)   Pulse (!) 109   Temp 98.5 °F (36.9 °C)   Resp 20   Ht 5' 8\" (1.727 m)   Wt 57.6 kg (126 lb 14 oz)   SpO2 96%   BMI 19.29 kg/m²      Tmax/24hrs: Temp (24hrs), Av.2 °F (37.3 °C), Min:98.5 °F (36.9 °C), Max:99.8 °F (37.7 °C)        Medications:   Current Facility-Administered Medications   Medication Dose Route Frequency    diphenhydrAMINE (BENADRYL) capsule 50 mg  50 mg Oral Q6H PRN    benztropine (COGENTIN) tablet 1 mg  1 mg Oral TID    haloperidol lactate (HALDOL) injection 2 mg  2 mg IntraMUSCular Q6H PRN    trospium (SANCTURA) tablet 20 mg  20 mg Oral DAILY    famotidine (PEPCID) 40 mg/5 mL (8 mg/mL) oral suspension 20 mg  20 mg Oral DAILY    levETIRAcetam (KEPPRA) oral solution 500 mg  500 mg Oral BID    valproic acid (as sodium salt) (DEPAKENE) 250 mg/5 mL (5 mL) oral solution 500 mg  500 mg Oral Q12H    albuterol-ipratropium (DUO-NEB) 2.5 MG-0.5 MG/3 ML  3 mL Nebulization Q4H PRN    glucose chewable tablet 16 g  4 Tablet Oral PRN    glucagon (GLUCAGEN) injection 1 mg  1 mg IntraMUSCular PRN    dextrose 10% infusion 0-250 mL  0-250 mL IntraVENous PRN    clotrimazole-betamethasone (LOTRISONE) 1-0.05 % cream   Topical BID    loperamide (IMODIUM) capsule 2 mg  2 mg Oral QID PRN    OLANZapine (ZyPREXA) tablet 10 mg  10 mg Oral TID    tamsulosin (FLOMAX) capsule 0.4 mg  0.4 mg Oral DAILY    polyethylene glycol (MIRALAX) packet 17 g  17 g Oral DAILY PRN    ondansetron (ZOFRAN ODT) tablet 4 mg  4 mg Oral Q8H PRN    Or    ondansetron (ZOFRAN) injection 4 mg  4 mg IntraVENous Q6H PRN    acetaminophen (TYLENOL) suppository 650 mg  650 mg Rectal Q4H PRN       Labs:    No results for input(s): WBC, HGB, HCT, PLT, HGBEXT, HCTEXT, PLTEXT, HGBEXT, HCTEXT, PLTEXT in the last 72 hours. Recent Labs     07/24/22  0744   *   K 4.5   CL 88*   CO2 29   *   BUN 13   CREA 0.57*   CA 9.0           Time spent on direct patient care >30 mints     Complexity : High complex - due to multiple medical issues outlined above. CODE Status : DNR    Case discussed with:  []Patient  [] Family  [x]Nursing  []Case Management         Disclaimer: Sections of this note are dictated utilizing voice recognition software, which may have resulted in some phonetic based errors in grammar and contents. Even though attempts were made to correct all the mistakes, some may have been missed, and remained in the body of the document. If questions arise, please contact our department.     Signed By: Sofia Toledo MD     July 25, 2022

## 2022-07-26 NOTE — DISCHARGE SUMMARY
Death Summary    Patient: Chris Calles MRN: [id number]  CSN: [id number]    YOB: 1960  Age: 58 y.o. Sex: male    DOA: 5/26/2022 LOS:  LOS: 60 days   Discharge Date: 7/25/2022     Admission Diagnoses: Pneumonia [J18.9]    Discharge Diagnoses:    Acute respiratory failure  Infection with parainfluenza 3 virus  Aspiration pneumonia  Acute metabolic encephalopathy  History of seizure disorder  History of phenylketonuria  Dysphagia    Discharge Condition: Passed away    Consults: Gastroenterology, Infectious Disease, and Pulmonary/Critical Care                                HPI:    Chris Calles is a 64 y.o.  male with hx of PKU, seizures, anxiety disorder. Patient presented from group home secondary to decreased alertness. He apparently was at baseline until 2 days ago and then started progressively becoming more lethargic and altered  Caregivers state normally patient is able to walk and hold conversation but has been nonverbal for the last 24 to 48 hours. Upon presentation, patient is excessively lethargic. Will open eyes on exam but otherwise does not respond to stimuli. Not answering questions by provider. Patient is new to current group home - has only been there for 2 days (from ED notes). Hospital Course:   59-year-old male past medical history of phenylketonuria, leading to progressive intellectual disability functional disability on currently severe dysphagia leading to aspiration of all consistencies. Patient also has possible history of seizures, according to patient's brother patient may have severe sleep apnea\" stop breathing\" . Currently was admitted with altered mental status, requiring intubation to prevent aspiration however patient has developed aspiration pneumonia which is being treated actively by ID.   Currently patient also has issues regarding dysphagia severe oropharyngeal dysphagia NG tube was placed twice for nutrition another medication adjustment twice patient pulled out NG tube restraints were not effective, further risk of trauma aspiration through NG tube is very high and this uncooperative patient so I discussed this with patient's brother and for now we are going to hold back NG tube placement and for same reason PEG tube. Family has decided on comfort feeding. Patient had a prolonged hospital stay secondary to placement issues. Case management on board. Given patient's multiple medical problems palliative care consulted and patient was made to DNR/DNI. Patient was supposed to be discharged to a group home and case management was actively working on it when patient passed away. Imaging:  XR Results (most recent):  Results from Hospital Encounter encounter on 05/26/22    XR ABD (KUB)    Narrative  Abdomen Portable    CPT CODE: 22805    HISTORY: Tube placement. COMPARISON: None. FINDINGS:    Nasogastric tube tip at the proximal stomach near the fundus. Multiple wires  overlie the patient. Nonspecific bowel gas pattern. Contrast in the right colon. Visualized lungs are grossly clear. Probable overlying clothing artifact over  the right shoulder. .    Impression  Nasogastric tube tip at the stomach fundus. CT Results (most recent):  Results from Hospital Encounter encounter on 05/26/22    CT HEAD WO CONT    Narrative  CT OF THE HEAD WITHOUT CONTRAST. CLINICAL HISTORY: Altered mental status. TECHNIQUE: Helical scan obtained of the head were obtained from the skull vertex  through the base of the skull without intravenous contrast.    All CT scans are  performed using dose optimization techniques as appropriate to the performed  exam including the following: Automated exposure control, adjustment of mA  and/or kV according to patient size, and use of iterative reconstructive  technique. COMPARISON: 9/22/2020. FINDINGS:    The sulcal pattern and ventricular system are normal in size and configuration  for age.  Mild periventricular white matter hypodensities. No intracranial  hemorrhage. No mass effect. The visualized paranasal sinuses are clear. The  mastoid air cells are clear. The visualized bony structures are unremarkable. Impression  No acute findings. Stable mild features of chronic microvascular disease.         Procedures:   None          Minutes spent on death summary: 37 minutes spent coordinating this discharge (review instructions/follow-up, prescriptions, preparing report for sign off)    Dee Dee Andrade MD  7/26/2022 8:17 AM

## 2022-07-26 NOTE — PROGRESS NOTES
During 2000 hourly rounding pt found to be     without pulse & respirations  ( at approx. 2030)     Pt. Is DNR. On call hospitalist notified. Nursing     supervisor notified.   Family to be notified by MD.

## 2022-07-26 NOTE — PROGRESS NOTES
responded to Death of  Pillo Rubi, who was a 58 y. o.,male,     The  provided the following Interventions:  Provided crisis pastoral care, pastoral support and grief interventions. Offered prayers on behalf of the patient. Chart reviewed. Plan:  Chaplains will continue to follow and will provide pastoral care on an as needed/requested basis and grief support for the family.       [address] [place] [address]   [phone]

## 2022-07-26 NOTE — DEATH NOTE
Death Pronouncement Note    Patient's Name: Bina Kay   Patient's YOB: 1960  MRN Number: [id number]    Admitting Provider: Georgie Wei DO  Attending Provider: Sarah Roman MD     Patient was examined and the following were absent: Pulses, Blood Pressure, and Respiratory effort    I declared the patient dead on  at 8:46 PM on 7/25/2022. Notified Brother Haley Fiore and his wife Glenis Pool. Preliminary Cause of Death:   Acute hypoxic resp failure  Phenyl Ketonuria  Seizure disorder.       Electronically signed by Neli Ross MD on 7/25/2022 at 8:58 PM

## 2023-06-11 NOTE — OP NOTES
Operative Note    Patient: Treasure Marcus  YOB: 1960  MRN: [id number]    Date of Procedure: 3/15/2022     Pre-Op Diagnosis: ANAL CONDYLOMA    Post-Op Diagnosis: Same as preoperative diagnosis. Procedure(s):  EXCISION AND FULGERATION ANAL CONDYLOMA    Surgeon(s):  Elisabeth Randall MD    Surgical Assistant: Surg Asst-1: Celestino Jeans    Anesthesia: General     Estimated Blood Loss (mL):  Minimal    Complications: None    Specimens: * No specimens in log *     Implants: * No implants in log *    Drains: * No LDAs found *    Findings: Fairly extensive perianal condyloma no condyloma noted within the anal canal    Detailed Description of Procedure: The patient was brought to the operating room and, following the induction of general anesthesia was positioned on the OR table in the prone position. After assuring that all pressure points were adequately padded the table jackknife to the buttocks taped apart and the perianal region prepped and draped in the standard sterile fashion. A surgical timeout was then performed at which time the patient's identity and surgical procedure were once again confirmed. Upon inspection of the perianal skin fairly extensive condyloma was noted extending out approximately 2.5 to 3.5 cm from the anal verge. After infiltrating with 0.25% with Marcaine electrocautery was used to fulgurate all condylomatous lesions. After assuring that there was no missed lesions we then placed a her first retractor within the anal canal and careful inspection of the anal mucosa was made. No internal lesions noted. At this point the procedure was terminated. Bacitracin dressing was applied and the patient returned to supine position, awakened, extubated, taken recovery in stable condition. All counts were correct at the close of the case.     Electronically Signed by Katty Cuevas MD on 3/15/2022 at 9:53 AM
Unknown

## (undated) DEVICE — LAMINECTOMY ARM CRADLE FOAM POSITIONER: Brand: CARDINAL HEALTH

## (undated) DEVICE — BASIC SINGLE BASIN-LF: Brand: MEDLINE INDUSTRIES, INC.

## (undated) DEVICE — NEPTUNE E-SEP SMOKE EVACUATION PENCIL, COATED, 70MM BLADE, PUSH BUTTON SWITCH: Brand: NEPTUNE E-SEP

## (undated) DEVICE — JELLY LUBRICATING 2 OZ SCREW-CAP MTL TUBE STRL SURGLUB

## (undated) DEVICE — INTENDED FOR TISSUE SEPARATION, AND OTHER PROCEDURES THAT REQUIRE A SHARP SURGICAL BLADE TO PUNCTURE OR CUT.: Brand: BARD-PARKER ® CARBON RIB-BACK BLADES

## (undated) DEVICE — SOLUTION IRRIG 500ML 0.9% SOD CHL USP POUR PLAS BTL

## (undated) DEVICE — HYPODERMIC SAFETY NEEDLE: Brand: MONOJECT

## (undated) DEVICE — TAPE,CLOTH/SILK,CURAD,3"X10YD,LF,40/CS: Brand: CURAD

## (undated) DEVICE — YANKAUER,BULB TIP,W/O VENT,RIGID,STERILE: Brand: MEDLINE

## (undated) DEVICE — WET SKIN PREP TRAY: Brand: MEDLINE INDUSTRIES, INC.

## (undated) DEVICE — GOWN,NON-REINFORCED,XXL: Brand: MEDLINE

## (undated) DEVICE — TOWEL SURG W17XL27IN STD BLU COT NONFENESTRATED PREWASHED

## (undated) DEVICE — ENDO KIT 1: Brand: MEDLINE INDUSTRIES, INC.

## (undated) DEVICE — GARMENT,MEDLINE,DVT,INT,CALF,MED, GEN2: Brand: MEDLINE

## (undated) DEVICE — GLOVE ORANGE PI 7 1/2   MSG9075

## (undated) DEVICE — SPONGE LAP 18X18IN STRL -- 5/PK

## (undated) DEVICE — HEAD FRAME WITH SOFT LAYER FOAM POSITIONER: Brand: CARDINAL HEALTH

## (undated) DEVICE — SOUTHSIDE TURNOVER: Brand: MEDLINE INDUSTRIES, INC.

## (undated) DEVICE — CANNULA NSL O2 AD 7 FT END-TIDAL CARBON DIOX VENTFLO

## (undated) DEVICE — MINOR GENERAL PACK: Brand: MEDLINE INDUSTRIES, INC.

## (undated) DEVICE — SYR 10ML LUER LOK 1/5ML GRAD --

## (undated) DEVICE — DRAPE,REIN 53X77,STERILE: Brand: MEDLINE